# Patient Record
Sex: FEMALE | Race: WHITE | NOT HISPANIC OR LATINO | Employment: FULL TIME | URBAN - METROPOLITAN AREA
[De-identification: names, ages, dates, MRNs, and addresses within clinical notes are randomized per-mention and may not be internally consistent; named-entity substitution may affect disease eponyms.]

---

## 2017-04-18 ENCOUNTER — TELEPHONE (OUTPATIENT)
Dept: INTERNAL MEDICINE | Facility: CLINIC | Age: 52
End: 2017-04-18

## 2017-04-25 ENCOUNTER — OFFICE VISIT (OUTPATIENT)
Dept: INTERNAL MEDICINE | Facility: CLINIC | Age: 52
End: 2017-04-25

## 2017-04-25 VITALS
DIASTOLIC BLOOD PRESSURE: 72 MMHG | WEIGHT: 150 LBS | OXYGEN SATURATION: 99 % | BODY MASS INDEX: 26.57 KG/M2 | SYSTOLIC BLOOD PRESSURE: 124 MMHG | HEART RATE: 79 BPM

## 2017-04-25 DIAGNOSIS — M16.11 OSTEOARTHRITIS OF RIGHT HIP, UNSPECIFIED OSTEOARTHRITIS TYPE: ICD-10-CM

## 2017-04-25 DIAGNOSIS — E03.9 HYPOTHYROIDISM, UNSPECIFIED TYPE: Primary | ICD-10-CM

## 2017-04-25 DIAGNOSIS — L30.9 DERMATITIS: ICD-10-CM

## 2017-04-25 DIAGNOSIS — Z00.00 HEALTHCARE MAINTENANCE: ICD-10-CM

## 2017-04-25 PROCEDURE — 99214 OFFICE O/P EST MOD 30 MIN: CPT | Performed by: INTERNAL MEDICINE

## 2017-04-25 RX ORDER — MELOXICAM 15 MG/1
15 TABLET ORAL DAILY
Qty: 30 TABLET | Refills: 2 | Status: SHIPPED | OUTPATIENT
Start: 2017-04-25 | End: 2018-02-06

## 2017-04-25 RX ORDER — VALSARTAN 160 MG/1
160 TABLET ORAL DAILY
Qty: 30 TABLET | Refills: 5 | Status: SHIPPED | OUTPATIENT
Start: 2017-04-25 | End: 2017-04-28 | Stop reason: DRUGHIGH

## 2017-04-25 RX ORDER — ESTERIFIED ESTROGEN AND METHYLTESTOSTERONE .625; 1.25 MG/1; MG/1
1 TABLET ORAL DAILY
COMMUNITY
End: 2018-05-02 | Stop reason: ALTCHOICE

## 2017-04-25 RX ORDER — CLOTRIMAZOLE AND BETAMETHASONE DIPROPIONATE 10; .64 MG/G; MG/G
CREAM TOPICAL 2 TIMES DAILY
Qty: 30 G | Refills: 0 | Status: SHIPPED | OUTPATIENT
Start: 2017-04-25 | End: 2018-02-06

## 2017-04-25 RX ORDER — TRAMADOL HYDROCHLORIDE 50 MG/1
50 TABLET ORAL EVERY 6 HOURS PRN
Qty: 20 TABLET | Refills: 2 | Status: SHIPPED | OUTPATIENT
Start: 2017-04-25 | End: 2017-09-25 | Stop reason: SDUPTHER

## 2017-04-25 RX ORDER — MELOXICAM 15 MG/1
15 TABLET ORAL DAILY
COMMUNITY
End: 2017-04-25 | Stop reason: SDUPTHER

## 2017-04-25 RX ORDER — LEVOTHYROXINE SODIUM 0.03 MG/1
25 TABLET ORAL DAILY
COMMUNITY
End: 2017-06-15

## 2017-04-25 RX ORDER — CYCLOSPORINE 0.5 MG/ML
1 EMULSION OPHTHALMIC 2 TIMES DAILY
COMMUNITY
End: 2022-03-08 | Stop reason: ALTCHOICE

## 2017-04-25 RX ORDER — LEVOTHYROXINE AND LIOTHYRONINE 38; 9 UG/1; UG/1
60 TABLET ORAL DAILY
COMMUNITY
End: 2017-09-25 | Stop reason: ALTCHOICE

## 2017-04-25 RX ORDER — TRIAMCINOLONE ACETONIDE OINTMENT USP, 0.05% 0.5 MG/G
OINTMENT TOPICAL
COMMUNITY
End: 2019-11-12

## 2017-04-25 NOTE — PROGRESS NOTES
Chief Complaint   Patient presents with   • Back Pain   • Hip Pain     R hip   • Rash     both breast   htn'    History of Present Illness   Cheri Caballero is a 52 y.o. female presents for f/u exam w/ acute needs. Last office visit approx 9/14 as she moved from [place] for a few years and just moved back in October.  Patient has right hip pain. She is s/p labral repair and repair of cartilage damage. Pain has not improved greatly from the surgery. She has not had injections since 2012. She has been to physical therapy. She is taking meloxicam 15 mg daily. Has also tried ibuprofen and alleve in the past. Pain is most pronounced if she does prolonged activity.   Has hypothyroidism. Is taking synthroid and armour thyroid daily. Has been on thyroid replacement therapy for many years. Had normal thyroid evaluation in 12/16. On hrt for menopausal symptoms. Last DEXA at least 3 years ago.   Notes skin changes on nipples.  Has elevated bp today. H/o hypertension. Has been put on meds twice and self d/c.       The following portions of the patient's history were reviewed and updated as appropriate: allergies, current medications, past family history, past medical history, past social history, past surgical history and problem list.  No current outpatient prescriptions on file prior to visit.     No current facility-administered medications on file prior to visit.      Review of Systems   Constitutional: Negative.    HENT: Negative.    Eyes: Negative.    Respiratory: Negative.    Cardiovascular: Negative.    Gastrointestinal: Negative.    Endocrine: Negative.    Genitourinary: Negative.    Musculoskeletal: Positive for arthralgias.   Allergic/Immunologic: Negative.    Neurological: Negative.    Hematological: Negative.    Psychiatric/Behavioral: Negative.        Objective   Physical Exam   Constitutional: She is oriented to person, place, and time. She appears well-developed and well-nourished.   HENT:   Head:  Normocephalic and atraumatic.   Right Ear: External ear normal.   Left Ear: External ear normal.   Nose: Nose normal.   Mouth/Throat: Oropharynx is clear and moist.   Eyes: EOM are normal. Pupils are equal, round, and reactive to light.   Neck: Normal range of motion. Neck supple.   Cardiovascular: Normal rate, regular rhythm and normal heart sounds.    Pulmonary/Chest: Effort normal and breath sounds normal. No respiratory distress.   Abdominal: Soft.   Musculoskeletal:   Right hip pain w/ reduced rom   Neurological: She is alert and oriented to person, place, and time.   Skin: Skin is warm and dry.   Dry irritated area on nipple B   Psychiatric: She has a normal mood and affect. Her behavior is normal. Judgment and thought content normal.   Nursing note and vitals reviewed.       /72  Pulse 79  Wt 150 lb (68 kg)  SpO2 99%  BMI 26.57 kg/m2    Assessment/Plan   Diagnoses and all orders for this visit:    Hypothyroidism, unspecified type  -     TSH  -     T4, Free  -     T3, free    Osteoarthritis of right hip, unspecified osteoarthritis type  -     Comprehensive Metabolic Panel  -     Ambulatory Referral to Orthopedic Surgery    Healthcare maintenance  -     CBC & Differential  -     Comprehensive Metabolic Panel  -     TSH  -     T4, Free  -     T3, free  -     Lipid Panel With LDL / HDL Ratio    Dermatitis    Other orders  -     Thyroid 60 MG PO tablet; Take 60 mg by mouth Daily.  -     levothyroxine (SYNTHROID, LEVOTHROID) 25 MCG tablet; Take 25 mcg by mouth Daily.  -     Discontinue: meloxicam (MOBIC) 15 MG tablet; Take 15 mg by mouth Daily.  -     cycloSPORINE (RESTASIS) 0.05 % ophthalmic emulsion; 1 drop 2 (Two) Times a Day.  -     estrogens, conjugated,-methyltestosterone (ESTRATEST HS) 0.625-1.25 MG per tablet; Take 1 tablet by mouth Daily.  -     progesterone (PROMETRIUM) 100 MG capsule; Take 100 mg by mouth Daily.  -     valsartan (DIOVAN) 160 MG tablet; Take 1 tablet by mouth Daily.  -      meloxicam (MOBIC) 15 MG tablet; Take 1 tablet by mouth Daily.  -     traMADol (ULTRAM) 50 MG tablet; Take 1 tablet by mouth Every 6 (Six) Hours As Needed for Moderate Pain (4-6).  -     clotrimazole-betamethasone (LOTRISONE) 1-0.05 % cream; Apply  topically 2 (Two) Times a Day.  -     Triamcinolone Acetonide 0.05 % ointment; Apply  topically.    Patient presents after almost 3 years w/ multiple complaints. Most pressing is hip pain. She has exhausted conservative measures w/ nsaid, pt and even conservative surgery. She will likely need a thr. She is referred to ortho for further evaluation. She will have listed labs tested. She has hypothyroidism and will continue current meds for this and will test labs. She has eczema v chaffing v fungal changes on her breast. Will try lotrisone and reevaluate. If persists to derm. Normal mammo. Suspect from her activity in bass fishing as noted after a full day tournament. She has hypertension and will try diovan 1 tab daily. F/u in 2 mo or prn.

## 2017-04-26 ENCOUNTER — TELEPHONE (OUTPATIENT)
Dept: INTERNAL MEDICINE | Facility: CLINIC | Age: 52
End: 2017-04-26

## 2017-04-26 LAB
ALBUMIN SERPL-MCNC: 4.7 G/DL (ref 3.5–5.2)
ALBUMIN/GLOB SERPL: 1.5 G/DL
ALP SERPL-CCNC: 111 U/L (ref 39–117)
ALT SERPL-CCNC: 42 U/L (ref 1–33)
AST SERPL-CCNC: 42 U/L (ref 1–32)
BASOPHILS # BLD AUTO: 0.05 10*3/MM3 (ref 0–0.2)
BASOPHILS NFR BLD AUTO: 0.7 % (ref 0–1.5)
BILIRUB SERPL-MCNC: 0.3 MG/DL (ref 0.1–1.2)
BUN SERPL-MCNC: 12 MG/DL (ref 6–20)
BUN/CREAT SERPL: 13 (ref 7–25)
CALCIUM SERPL-MCNC: 10.2 MG/DL (ref 8.6–10.5)
CHLORIDE SERPL-SCNC: 98 MMOL/L (ref 98–107)
CHOLEST SERPL-MCNC: 193 MG/DL (ref 0–200)
CO2 SERPL-SCNC: 25.6 MMOL/L (ref 22–29)
CREAT SERPL-MCNC: 0.92 MG/DL (ref 0.57–1)
EOSINOPHIL # BLD AUTO: 0.14 10*3/MM3 (ref 0–0.7)
EOSINOPHIL NFR BLD AUTO: 2 % (ref 0.3–6.2)
ERYTHROCYTE [DISTWIDTH] IN BLOOD BY AUTOMATED COUNT: 13.8 % (ref 11.7–13)
GLOBULIN SER CALC-MCNC: 3.2 GM/DL
GLUCOSE SERPL-MCNC: 93 MG/DL (ref 65–99)
HCT VFR BLD AUTO: 43.1 % (ref 35.6–45.5)
HDLC SERPL-MCNC: 107 MG/DL (ref 40–60)
HGB BLD-MCNC: 14.3 G/DL (ref 11.9–15.5)
IMM GRANULOCYTES # BLD: 0 10*3/MM3 (ref 0–0.03)
IMM GRANULOCYTES NFR BLD: 0 % (ref 0–0.5)
LDLC SERPL CALC-MCNC: 77 MG/DL (ref 0–100)
LDLC/HDLC SERPL: 0.72 {RATIO}
LYMPHOCYTES # BLD AUTO: 2 10*3/MM3 (ref 0.9–4.8)
LYMPHOCYTES NFR BLD AUTO: 28.8 % (ref 19.6–45.3)
MCH RBC QN AUTO: 31 PG (ref 26.9–32)
MCHC RBC AUTO-ENTMCNC: 33.2 G/DL (ref 32.4–36.3)
MCV RBC AUTO: 93.5 FL (ref 80.5–98.2)
MONOCYTES # BLD AUTO: 0.41 10*3/MM3 (ref 0.2–1.2)
MONOCYTES NFR BLD AUTO: 5.9 % (ref 5–12)
NEUTROPHILS # BLD AUTO: 4.35 10*3/MM3 (ref 1.9–8.1)
NEUTROPHILS NFR BLD AUTO: 62.6 % (ref 42.7–76)
PLATELET # BLD AUTO: 216 10*3/MM3 (ref 140–500)
POTASSIUM SERPL-SCNC: 4.4 MMOL/L (ref 3.5–5.2)
PROT SERPL-MCNC: 7.9 G/DL (ref 6–8.5)
RBC # BLD AUTO: 4.61 10*6/MM3 (ref 3.9–5.2)
SODIUM SERPL-SCNC: 139 MMOL/L (ref 136–145)
T3FREE SERPL-MCNC: 3 PG/ML (ref 2–4.4)
T4 FREE SERPL-MCNC: 1.08 NG/DL (ref 0.93–1.7)
TRIGL SERPL-MCNC: 44 MG/DL (ref 0–150)
TSH SERPL DL<=0.005 MIU/L-ACNC: 1.12 MIU/ML (ref 0.27–4.2)
VLDLC SERPL CALC-MCNC: 8.8 MG/DL (ref 5–40)
WBC # BLD AUTO: 6.95 10*3/MM3 (ref 4.5–10.7)

## 2017-04-27 NOTE — TELEPHONE ENCOUNTER
i spoke with pt today and she said she is taking half of the pill and and it seems to be feeling much better from just taking half.

## 2017-04-28 RX ORDER — VALSARTAN 80 MG/1
80 TABLET ORAL DAILY
Qty: 30 TABLET | Refills: 3 | Status: SHIPPED | OUTPATIENT
Start: 2017-04-28 | End: 2018-02-06 | Stop reason: SDUPTHER

## 2017-05-26 ENCOUNTER — TELEPHONE (OUTPATIENT)
Dept: INTERNAL MEDICINE | Facility: CLINIC | Age: 52
End: 2017-05-26

## 2017-06-05 ENCOUNTER — TELEPHONE (OUTPATIENT)
Dept: INTERNAL MEDICINE | Facility: CLINIC | Age: 52
End: 2017-06-05

## 2017-06-05 NOTE — TELEPHONE ENCOUNTER
Yes  Verify that she is taking synthroid 25 mcg daily. See if she is on branded med or generic levothyroxine. #30 x 3  i think she has a follow up visit in September?

## 2017-06-15 ENCOUNTER — TELEPHONE (OUTPATIENT)
Dept: INTERNAL MEDICINE | Facility: CLINIC | Age: 52
End: 2017-06-15

## 2017-06-15 DIAGNOSIS — E03.9 HYPOTHYROIDISM, UNSPECIFIED TYPE: Primary | ICD-10-CM

## 2017-06-15 RX ORDER — LEVOTHYROXINE SODIUM 50 UG/1
50 CAPSULE ORAL DAILY
Qty: 30 CAPSULE | Refills: 2 | Status: SHIPPED | OUTPATIENT
Start: 2017-06-15 | End: 2017-06-20

## 2017-06-15 NOTE — TELEPHONE ENCOUNTER
Pt called last week for a rf on her [place] thyroid , you requested that  I get medical records, pt did request those but i do not see them at this time in her chart, pt is worried that it has been a week and she is still without thyroid med. She does take levothyroxine with [place] and stopped the levo since she was out of [place] (?)

## 2017-06-15 NOTE — TELEPHONE ENCOUNTER
Per Dr Gonzalez she is to increase her levothyroxine to 50 mcg and recheck in 8 weeks  Pt informed [facility]

## 2017-06-20 ENCOUNTER — TELEPHONE (OUTPATIENT)
Dept: INTERNAL MEDICINE | Facility: CLINIC | Age: 52
End: 2017-06-20

## 2017-06-20 RX ORDER — LEVOTHYROXINE SODIUM 0.05 MG/1
50 TABLET ORAL DAILY
Qty: 90 TABLET | Refills: 1 | Status: SHIPPED | OUTPATIENT
Start: 2017-06-20 | End: 2017-09-25 | Stop reason: DRUGHIGH

## 2017-06-20 NOTE — TELEPHONE ENCOUNTER
Pt is calling to question her thyroid rx that went to her  Pharmacy. She said she doesn't take Tirosint , wanting to know did you change her med?

## 2017-08-31 ENCOUNTER — TELEPHONE (OUTPATIENT)
Dept: INTERNAL MEDICINE | Facility: CLINIC | Age: 52
End: 2017-08-31

## 2017-09-11 DIAGNOSIS — Z00.00 HEALTHCARE MAINTENANCE: Primary | ICD-10-CM

## 2017-09-11 DIAGNOSIS — E03.9 ADULT HYPOTHYROIDISM: ICD-10-CM

## 2017-09-11 PROBLEM — K59.09 CHRONIC CONSTIPATION: Status: ACTIVE | Noted: 2017-09-11

## 2017-09-11 PROBLEM — IMO0001 BLUES: Status: ACTIVE | Noted: 2017-09-11

## 2017-09-16 LAB
ALBUMIN SERPL-MCNC: 4.8 G/DL (ref 3.5–5.2)
ALBUMIN/GLOB SERPL: 1.5 G/DL
ALP SERPL-CCNC: 97 U/L (ref 39–117)
ALT SERPL-CCNC: 32 U/L (ref 1–33)
APPEARANCE UR: CLEAR
AST SERPL-CCNC: 35 U/L (ref 1–32)
BACTERIA #/AREA URNS HPF: NORMAL /HPF
BACTERIA UR CULT: NORMAL
BACTERIA UR CULT: NORMAL
BASOPHILS # BLD AUTO: 0.06 10*3/MM3 (ref 0–0.2)
BASOPHILS NFR BLD AUTO: 1 % (ref 0–1.5)
BILIRUB SERPL-MCNC: 0.5 MG/DL (ref 0.1–1.2)
BILIRUB UR QL STRIP: NEGATIVE
BUN SERPL-MCNC: 9 MG/DL (ref 6–20)
BUN/CREAT SERPL: 10.5 (ref 7–25)
CALCIUM SERPL-MCNC: 10.1 MG/DL (ref 8.6–10.5)
CHLORIDE SERPL-SCNC: 97 MMOL/L (ref 98–107)
CHOLEST SERPL-MCNC: 206 MG/DL (ref 0–200)
CO2 SERPL-SCNC: 29.6 MMOL/L (ref 22–29)
COLOR UR: YELLOW
CREAT SERPL-MCNC: 0.86 MG/DL (ref 0.57–1)
EOSINOPHIL # BLD AUTO: 0.18 10*3/MM3 (ref 0–0.7)
EOSINOPHIL NFR BLD AUTO: 3 % (ref 0.3–6.2)
EPI CELLS #/AREA URNS HPF: NORMAL /HPF
ERYTHROCYTE [DISTWIDTH] IN BLOOD BY AUTOMATED COUNT: 13.6 % (ref 11.7–13)
GLOBULIN SER CALC-MCNC: 3.1 GM/DL
GLUCOSE SERPL-MCNC: 76 MG/DL (ref 65–99)
GLUCOSE UR QL: NEGATIVE
HCT VFR BLD AUTO: 44.4 % (ref 35.6–45.5)
HCV AB S/CO SERPL IA: <0.1 S/CO RATIO (ref 0–0.9)
HDLC SERPL-MCNC: 113 MG/DL (ref 40–60)
HGB BLD-MCNC: 15.1 G/DL (ref 11.9–15.5)
HGB UR QL STRIP: NEGATIVE
IMM GRANULOCYTES # BLD: 0.02 10*3/MM3 (ref 0–0.03)
IMM GRANULOCYTES NFR BLD: 0.3 % (ref 0–0.5)
KETONES UR QL STRIP: NEGATIVE
LDLC SERPL CALC-MCNC: 83 MG/DL (ref 0–100)
LDLC/HDLC SERPL: 0.74 {RATIO}
LEUKOCYTE ESTERASE UR QL STRIP: ABNORMAL
LYMPHOCYTES # BLD AUTO: 1.91 10*3/MM3 (ref 0.9–4.8)
LYMPHOCYTES NFR BLD AUTO: 31.6 % (ref 19.6–45.3)
MCH RBC QN AUTO: 32.9 PG (ref 26.9–32)
MCHC RBC AUTO-ENTMCNC: 34 G/DL (ref 32.4–36.3)
MCV RBC AUTO: 96.7 FL (ref 80.5–98.2)
MICRO URNS: ABNORMAL
MONOCYTES # BLD AUTO: 0.39 10*3/MM3 (ref 0.2–1.2)
MONOCYTES NFR BLD AUTO: 6.4 % (ref 5–12)
NEUTROPHILS # BLD AUTO: 3.49 10*3/MM3 (ref 1.9–8.1)
NEUTROPHILS NFR BLD AUTO: 57.7 % (ref 42.7–76)
NITRITE UR QL STRIP: NEGATIVE
PH UR STRIP: 6.5 [PH] (ref 5–7.5)
PLATELET # BLD AUTO: 198 10*3/MM3 (ref 140–500)
POTASSIUM SERPL-SCNC: 4.5 MMOL/L (ref 3.5–5.2)
PROT SERPL-MCNC: 7.9 G/DL (ref 6–8.5)
PROT UR QL STRIP: NEGATIVE
RBC # BLD AUTO: 4.59 10*6/MM3 (ref 3.9–5.2)
RBC #/AREA URNS HPF: NORMAL /HPF
SODIUM SERPL-SCNC: 139 MMOL/L (ref 136–145)
SP GR UR: <=1.005 (ref 1–1.03)
TRIGL SERPL-MCNC: 49 MG/DL (ref 0–150)
TSH SERPL DL<=0.005 MIU/L-ACNC: 4.75 MIU/ML (ref 0.27–4.2)
URINALYSIS REFLEX: ABNORMAL
UROBILINOGEN UR STRIP-MCNC: 0.2 MG/DL (ref 0.2–1)
VLDLC SERPL CALC-MCNC: 9.8 MG/DL (ref 5–40)
WBC # BLD AUTO: 6.05 10*3/MM3 (ref 4.5–10.7)
WBC #/AREA URNS HPF: NORMAL /HPF

## 2017-09-25 ENCOUNTER — OFFICE VISIT (OUTPATIENT)
Dept: INTERNAL MEDICINE | Facility: CLINIC | Age: 52
End: 2017-09-25

## 2017-09-25 VITALS
OXYGEN SATURATION: 99 % | WEIGHT: 150 LBS | DIASTOLIC BLOOD PRESSURE: 78 MMHG | SYSTOLIC BLOOD PRESSURE: 124 MMHG | HEIGHT: 64 IN | HEART RATE: 63 BPM | BODY MASS INDEX: 25.61 KG/M2

## 2017-09-25 DIAGNOSIS — Z00.00 HEALTHCARE MAINTENANCE: ICD-10-CM

## 2017-09-25 DIAGNOSIS — Z12.11 COLON CANCER SCREENING: ICD-10-CM

## 2017-09-25 DIAGNOSIS — R94.31 ABNORMAL EKG: ICD-10-CM

## 2017-09-25 DIAGNOSIS — K59.09 CHRONIC CONSTIPATION: ICD-10-CM

## 2017-09-25 DIAGNOSIS — I10 ESSENTIAL HYPERTENSION: Primary | ICD-10-CM

## 2017-09-25 PROCEDURE — 99396 PREV VISIT EST AGE 40-64: CPT | Performed by: INTERNAL MEDICINE

## 2017-09-25 RX ORDER — TRAMADOL HYDROCHLORIDE 50 MG/1
50 TABLET ORAL EVERY 6 HOURS PRN
Qty: 30 TABLET | Refills: 2 | Status: SHIPPED | OUTPATIENT
Start: 2017-09-25 | End: 2018-04-17 | Stop reason: SINTOL

## 2017-09-25 RX ORDER — LEVOTHYROXINE SODIUM 75 MCG
75 TABLET ORAL DAILY
Qty: 90 TABLET | Refills: 3 | Status: SHIPPED | OUTPATIENT
Start: 2017-09-25 | End: 2018-02-06 | Stop reason: SDUPTHER

## 2017-09-25 NOTE — PROGRESS NOTES
Subjective   CPE, low back pain, hypothyroidism, hypertension,    Cheri Mendoza is a 52 y.o. female who presents for a complete physical exam.  In general she is doing fairly well. Unfortunately she is having increasing back pain. She has known schmorls nodes in the back and a right hip labral tear. She is s/p labral repair of the right hip. Unfortunately continues to have pain on a daily persistent basis of the right hip. She has low back pain as well. She is to have an MRI on the back today. She has tried exercise and injection therapy w/out benefit. She has tramadol available. This provides relief for about 6 hours. Does have constipation w/ this med as she has a baseline history of constipation. Sitting is actually a more pain free position. She reports feeling somewhat medicated when taking tramadol. Fitness is now limited due to pain w/ walking and hip discomfort with cycling.         Review of Systems   Constitutional: Negative.    HENT: Negative.    Eyes: Negative.    Respiratory: Negative.    Cardiovascular: Negative.    Gastrointestinal: Negative.    Endocrine: Negative.    Genitourinary: Negative.    Musculoskeletal: Positive for back pain.        Hip pain   Skin: Negative.    Allergic/Immunologic: Negative.    Hematological: Negative.    Psychiatric/Behavioral: Negative.        The following portions of the patient's history were reviewed and updated as appropriate: allergies, current medications, past family history, past medical history, past social history, past surgical history and problem list.     Patient Active Problem List   Diagnosis   • Chronic constipation   • Blues   • Adult hypothyroidism       Past Medical History:   Diagnosis Date   • Hypothyroidism        No past surgical history on file.    Family History   Problem Relation Age of Onset   • Osteoarthritis Mother    • Heart disease Mother        Social History     Social History   • Marital status:      Spouse name: N/A   •  "Number of children: N/A   • Years of education: N/A     Occupational History   • Not on file.     Social History Main Topics   • Smoking status: Never Smoker   • Smokeless tobacco: Not on file   • Alcohol use Yes   • Drug use: Not on file   • Sexual activity: Not on file     Other Topics Concern   • Not on file     Social History Narrative   • No narrative on file       Current Outpatient Prescriptions on File Prior to Visit   Medication Sig Dispense Refill   • cycloSPORINE (RESTASIS) 0.05 % ophthalmic emulsion 1 drop 2 (Two) Times a Day.     • estrogens, conjugated,-methyltestosterone (ESTRATEST HS) 0.625-1.25 MG per tablet Take 1 tablet by mouth Daily.     • levothyroxine (SYNTHROID, LEVOTHROID) 50 MCG tablet Take 1 tablet by mouth Daily. 90 tablet 1   • progesterone (PROMETRIUM) 100 MG capsule Take 100 mg by mouth Daily.     • traMADol (ULTRAM) 50 MG tablet Take 1 tablet by mouth Every 6 (Six) Hours As Needed for Moderate Pain (4-6). 20 tablet 2   • Triamcinolone Acetonide 0.05 % ointment Apply  topically.     • valsartan (DIOVAN) 80 MG tablet Take 1 tablet by mouth Daily. 30 tablet 3   • [DISCONTINUED] Thyroid 60 MG PO tablet Take 60 mg by mouth Daily.     • clotrimazole-betamethasone (LOTRISONE) 1-0.05 % cream Apply  topically 2 (Two) Times a Day. 30 g 0   • meloxicam (MOBIC) 15 MG tablet Take 1 tablet by mouth Daily. 30 tablet 2     No current facility-administered medications on file prior to visit.        Allergies   Allergen Reactions   • Sulfa Antibiotics Other (See Comments)     Elevated liver enzymes            There is no immunization history on file for this patient.    Objective     /78  Pulse 63  Ht 63.5\" (161.3 cm)  Wt 150 lb (68 kg)  SpO2 99%  BMI 26.15 kg/m2    Physical Exam   Constitutional: She is oriented to person, place, and time. She appears well-developed and well-nourished.   HENT:   Head: Normocephalic and atraumatic.   Right Ear: External ear normal.   Left Ear: External ear " normal.   Nose: Nose normal.   Mouth/Throat: Oropharynx is clear and moist.   Eyes: EOM are normal. Pupils are equal, round, and reactive to light.   Neck: Neck supple.   Cardiovascular: Normal rate, regular rhythm and normal heart sounds.    Pulmonary/Chest: Effort normal and breath sounds normal. No respiratory distress.   Abdominal: Soft. Bowel sounds are normal.   Musculoskeletal: Normal range of motion.   Neurological: She is alert and oriented to person, place, and time.   Skin: Skin is warm and dry.   Psychiatric: She has a normal mood and affect. Her behavior is normal. Judgment and thought content normal.   Nursing note and vitals reviewed.  EKG sinus. t inv vI-vII. New from previous.     Assessment/Plan   Cheri was seen today for annual exam.    Diagnoses and all orders for this visit:    Essential hypertension  -     ECG 12 Lead    Healthcare maintenance    Other orders  -     SYNTHROID 75 MCG tablet; Take 1 tablet by mouth Daily.  -     traMADol (ULTRAM) 50 MG tablet; Take 1 tablet by mouth Every 6 (Six) Hours As Needed for Moderate Pain .        Discussion    Patient presents today for a CPE.  She has back and hip pain and will have a spinal mri today. She is to  her prior lumbar mri for comparison to todays image. She has some minor ekg abnormalities so will test a stress echo prior to surgical intervention if needed. She will increase synthroid to 75 mcg daily. She is encouraged to use tramadol more regularly. She will follow up with her orthopedist routinely.     Patient follows a healthy diet.   Patient follows an adequate exercise regimen. Mammogram is up to date.   Colon cancer screening is up to date.   Pap smears are performed by the patient's gynecologist.   Immunizations are up to date.   DEXA is up to date.           No future appointments.

## 2017-10-02 NOTE — TELEPHONE ENCOUNTER
Problem: DISCHARGE PLANNING - CARE MANAGEMENT  Goal: Discharge to post-acute care or home with appropriate resources  INTERVENTIONS:  - Conduct assessment to determine patient/family and health care team treatment goals, and need for post-acute services based on payer coverage, community resources, and patient preferences, and barriers to discharge  - Address psychosocial, clinical, and financial barriers to discharge as identified in assessment in conjunction with the patient/family and health care team  - Arrange appropriate level of post-acute services according to patients   needs and preference and payer coverage in collaboration with the physician and health care team  - Communicate with and update the patient/family, physician, and health care team regarding progress on the discharge plan  - Arrange appropriate transportation to post-acute venues  Outcome: Progressing  LOS: 2 CM met with pt at bedside  Pt reports she lives in [place] with her   Pt lives in apartment on first floor with no steps to enter  Pt uses walker to ambulate  Pt denies hx of Rehab and [facility]  Pt has  through [employer]  Pt reports hx of depression and anxiety  Pt has psychiatrist, Dr Prince Amador and therapist, Iva Murrell  Pt uses [employer]  Pt denies hx of substance abuse  Pt does not have POA, does not work, and can't drive  Pt's  transports and she uses shared ride  Pt has no needs at this time  CM Department to follow pt through discharge  Pt called about the Valsartan you gave her yesterday. She said it is making her very dizzy. She feels drunk. Wants to know if there is a lower dose to start on.

## 2017-10-06 ENCOUNTER — TELEPHONE (OUTPATIENT)
Dept: INTERNAL MEDICINE | Facility: CLINIC | Age: 52
End: 2017-10-06

## 2017-10-06 NOTE — TELEPHONE ENCOUNTER
Advise that we just upgraded our machine advise patient to come back for a repeat ekg at no fee. Apologize for inconvenience. Will determine need for stress test after that.

## 2017-10-06 NOTE — TELEPHONE ENCOUNTER
Pt is calling again to see if the stress test is necessary.  It is quit expensive and wants to know what you found on the EKG that made you concerned.

## 2017-12-06 DIAGNOSIS — Z12.11 ENCOUNTER FOR SCREENING FOR MALIGNANT NEOPLASM OF COLON: Primary | ICD-10-CM

## 2017-12-06 RX ORDER — SODIUM CHLORIDE, SODIUM LACTATE, POTASSIUM CHLORIDE, CALCIUM CHLORIDE 600; 310; 30; 20 MG/100ML; MG/100ML; MG/100ML; MG/100ML
30 INJECTION, SOLUTION INTRAVENOUS CONTINUOUS
Status: CANCELLED | OUTPATIENT
Start: 2017-12-06

## 2017-12-13 PROBLEM — Z12.11 ENCOUNTER FOR SCREENING FOR MALIGNANT NEOPLASM OF COLON: Status: ACTIVE | Noted: 2017-12-13

## 2017-12-14 ENCOUNTER — TELEPHONE (OUTPATIENT)
Dept: INTERNAL MEDICINE | Facility: CLINIC | Age: 52
End: 2017-12-14

## 2017-12-14 NOTE — TELEPHONE ENCOUNTER
Pt is calling to ask if she can get a rx for Baclofen 10 mg for her back. She states is going to have surgery and they do not subscribe these types of meds/

## 2018-01-17 ENCOUNTER — HOSPITAL ENCOUNTER (OUTPATIENT)
Facility: HOSPITAL | Age: 53
Setting detail: HOSPITAL OUTPATIENT SURGERY
Discharge: HOME | End: 2018-01-17
Attending: INTERNAL MEDICINE | Admitting: INTERNAL MEDICINE

## 2018-01-17 ENCOUNTER — ANESTHESIA EVENT (OUTPATIENT)
Dept: GASTROENTEROLOGY | Facility: HOSPITAL | Age: 53
End: 2018-01-17

## 2018-01-17 ENCOUNTER — ANESTHESIA (OUTPATIENT)
Dept: GASTROENTEROLOGY | Facility: HOSPITAL | Age: 53
End: 2018-01-17

## 2018-01-17 VITALS
SYSTOLIC BLOOD PRESSURE: 146 MMHG | HEART RATE: 65 BPM | BODY MASS INDEX: 26.46 KG/M2 | TEMPERATURE: 98.2 F | WEIGHT: 149.31 LBS | RESPIRATION RATE: 16 BRPM | DIASTOLIC BLOOD PRESSURE: 92 MMHG | HEIGHT: 63 IN | OXYGEN SATURATION: 100 %

## 2018-01-17 DIAGNOSIS — Z12.11 ENCOUNTER FOR SCREENING FOR MALIGNANT NEOPLASM OF COLON: ICD-10-CM

## 2018-01-17 PROCEDURE — 45380 COLONOSCOPY AND BIOPSY: CPT | Performed by: INTERNAL MEDICINE

## 2018-01-17 PROCEDURE — 88305 TISSUE EXAM BY PATHOLOGIST: CPT | Performed by: INTERNAL MEDICINE

## 2018-01-17 PROCEDURE — 25010000002 PROPOFOL 10 MG/ML EMULSION: Performed by: NURSE ANESTHETIST, CERTIFIED REGISTERED

## 2018-01-17 RX ORDER — PROPOFOL 10 MG/ML
VIAL (ML) INTRAVENOUS AS NEEDED
Status: DISCONTINUED | OUTPATIENT
Start: 2018-01-17 | End: 2018-01-17 | Stop reason: SURG

## 2018-01-17 RX ORDER — SODIUM CHLORIDE, SODIUM LACTATE, POTASSIUM CHLORIDE, CALCIUM CHLORIDE 600; 310; 30; 20 MG/100ML; MG/100ML; MG/100ML; MG/100ML
30 INJECTION, SOLUTION INTRAVENOUS CONTINUOUS
Status: DISCONTINUED | OUTPATIENT
Start: 2018-01-17 | End: 2018-01-17 | Stop reason: HOSPADM

## 2018-01-17 RX ORDER — LIDOCAINE HYDROCHLORIDE 20 MG/ML
INJECTION, SOLUTION INFILTRATION; PERINEURAL AS NEEDED
Status: DISCONTINUED | OUTPATIENT
Start: 2018-01-17 | End: 2018-01-17 | Stop reason: SURG

## 2018-01-17 RX ORDER — PROPOFOL 10 MG/ML
VIAL (ML) INTRAVENOUS CONTINUOUS PRN
Status: DISCONTINUED | OUTPATIENT
Start: 2018-01-17 | End: 2018-01-17 | Stop reason: SURG

## 2018-01-17 RX ADMIN — PROPOFOL 50 MG: 10 INJECTION, EMULSION INTRAVENOUS at 08:18

## 2018-01-17 RX ADMIN — PROPOFOL 200 MCG/KG/MIN: 10 INJECTION, EMULSION INTRAVENOUS at 08:18

## 2018-01-17 RX ADMIN — LIDOCAINE HYDROCHLORIDE 60 MG: 20 INJECTION, SOLUTION INFILTRATION; PERINEURAL at 08:18

## 2018-01-17 RX ADMIN — SODIUM CHLORIDE, POTASSIUM CHLORIDE, SODIUM LACTATE AND CALCIUM CHLORIDE 30 ML/HR: 600; 310; 30; 20 INJECTION, SOLUTION INTRAVENOUS at 08:10

## 2018-01-17 NOTE — PLAN OF CARE
Problem: Patient Care Overview (Adult)  Goal: Plan of Care Review  Outcome: Ongoing (interventions implemented as appropriate)   01/17/18 0734   Coping/Psychosocial Response Interventions   Plan Of Care Reviewed With patient   Patient Care Overview   Progress no change     Goal: Adult Individualization and Mutuality  Outcome: Ongoing (interventions implemented as appropriate)    Goal: Discharge Needs Assessment  Outcome: Ongoing (interventions implemented as appropriate)   01/17/18 0734   Discharge Needs Assessment   Concerns To Be Addressed basic needs concerns   Discharge Disposition home or self-care   Living Environment   Transportation Available car       Problem: GI Endoscopy (Adult)  Goal: Signs and Symptoms of Listed Potential Problems Will be Absent or Manageable (GI Endoscopy)  Outcome: Ongoing (interventions implemented as appropriate)   01/17/18 0734   GI Endoscopy   Problems Assessed (GI Endoscopy) pain   Problems Present (GI Endoscopy) none

## 2018-01-17 NOTE — DISCHARGE INSTRUCTIONS
For the next 24 hours patient needs to be with a responsible adult.    For 24 hours DO NOT drive, operate machinery, appliances, drink alcohol, make important decisions or sign legal documents.    Start with a light or bland diet and advance to regular diet as tolerated.    Follow recommendations on procedure report provided by your doctor.    Call Dr Luis for problems [phone]    Problems may include but not limited to: large amounts of bleeding, trouble breathing, repeated vomiting, severe unrelieved pain, fever or chills.

## 2018-01-17 NOTE — ANESTHESIA PREPROCEDURE EVALUATION
Anesthesia Evaluation     Patient summary reviewed and Nursing notes reviewed   NPO Solid Status: > 8 hours  NPO Liquid Status: > 8 hours     Airway   Mallampati: II  TM distance: >3 FB  Neck ROM: full  no difficulty expected  Dental - normal exam     Pulmonary - negative pulmonary ROS and normal exam   Cardiovascular - negative cardio ROS and normal exam        Neuro/Psych- negative ROS  GI/Hepatic/Renal/Endo    (+)  hypothyroidism,     Musculoskeletal (-) negative ROS    Abdominal  - normal exam   Substance History - negative use     OB/GYN          Other - negative ROS                                               Anesthesia Plan    ASA 2     MAC     Anesthetic plan and risks discussed with patient.

## 2018-01-17 NOTE — ANESTHESIA POSTPROCEDURE EVALUATION
"Patient: Cheri Mendoza    Procedure Summary     Date Anesthesia Start Anesthesia Stop Room / Location    01/17/18 0814 0845  [facility]       Procedure Diagnosis Surgeon Provider    COLONOSCOPY to cecum and t.i. with polypectomy (N/A ) Encounter for screening for malignant neoplasm of colon  (Encounter for screening for malignant neoplasm of colon [Z12.11]) MD Schuyler Barrios MD          Anesthesia Type: MAC  Last vitals  BP   133/89 (01/17/18 0751)   Temp   36.8 °C (98.2 °F) (01/17/18 0751)   Pulse   73 (01/17/18 0751)   Resp   16 (01/17/18 0751)     SpO2   95 % (01/17/18 0751)     Post Anesthesia Care and Evaluation    Patient location during evaluation: PACU  Patient participation: complete - patient participated  Level of consciousness: awake and alert  Pain management: adequate  Airway patency: patent  Anesthetic complications: No anesthetic complications    Cardiovascular status: acceptable  Respiratory status: acceptable  Hydration status: acceptable    Comments: /89 (BP Location: Left arm, Patient Position: Lying)  Pulse 73  Temp 36.8 °C (98.2 °F) (Oral)   Resp 16  Ht 160 cm (63\")  Wt 67.7 kg (149 lb 5 oz)  SpO2 95%  BMI 26.45 kg/m2      "

## 2018-01-17 NOTE — H&P
[facility]  Pre Procedure History & Physical    Chief Complaint: Colon cancer screening      HPI: 51yo W with PMH as below here for routine screening colonoscopy.  She has no family history of GI malignancy nor colon polyps.  No change in bowel habits or blood in her stool.  Last colonoscopy ~2003 for constipation and reportedly normal.  Otherwise, she feels well today     Past Medical History:   Past Medical History:   Diagnosis Date   • H/O colonoscopy    • Hypothyroidism        Family History:  Family History   Problem Relation Age of Onset   • Osteoarthritis Mother    • Heart disease Mother        Social History:   reports that she has never smoked. She does not have any smokeless tobacco history on file. She reports that she drinks alcohol.    Medications:   Prescriptions Prior to Admission   Medication Sig Dispense Refill Last Dose   • cycloSPORINE (RESTASIS) 0.05 % ophthalmic emulsion 1 drop 2 (Two) Times a Day.   1/17/2018 at Unknown time   • SYNTHROID 75 MCG tablet Take 1 tablet by mouth Daily. 90 tablet 3 1/16/2018 at Unknown time   • Triamcinolone Acetonide 0.05 % ointment Apply  topically.   1/16/2018 at Unknown time   • valsartan (DIOVAN) 80 MG tablet Take 1 tablet by mouth Daily. 30 tablet 3 1/16/2018 at Unknown time   • clotrimazole-betamethasone (LOTRISONE) 1-0.05 % cream Apply  topically 2 (Two) Times a Day. 30 g 0 More than a month at Unknown time   • estrogens, conjugated,-methyltestosterone (ESTRATEST HS) 0.625-1.25 MG per tablet Take 1 tablet by mouth Daily.   Taking   • meloxicam (MOBIC) 15 MG tablet Take 1 tablet by mouth Daily. 30 tablet 2 1/17/2017   • mupirocin (BACTROBAN) 2 % ointment Apply  topically 3 (Three) Times a Day. 15 g 0 More than a month at Unknown time   • progesterone (PROMETRIUM) 100 MG capsule Take 100 mg by mouth Daily.   1/15/2018   • traMADol (ULTRAM) 50 MG tablet Take 1 tablet by mouth Every 6 (Six) Hours As Needed for Moderate Pain . 30 tablet 2  "1/3/2018       Allergies:  Sulfa antibiotics    ROS:    Pertinent items are noted in HPI     Objective     Blood pressure 133/89, pulse 73, temperature 98.2 °F (36.8 °C), temperature source Oral, resp. rate 16, height 160 cm (63\"), weight 67.7 kg (149 lb 5 oz), SpO2 95 %.    Physical Exam   Constitutional: Pt is oriented to person, place, and time and well-developed, well-nourished, and in no distress.   HENT:   Mouth/Throat: Oropharynx is clear and moist.   Neck: Normal range of motion. Neck supple.   Cardiovascular: Normal rate, regular rhythm and normal heart sounds.    Pulmonary/Chest: Effort normal and breath sounds normal. No respiratory distress. No  wheezes.   Abdominal: Soft. Bowel sounds are normal.   Skin: Skin is warm and dry.   Psychiatric: Mood, memory, affect and judgment normal.     Assessment/Plan     Diagnosis: Colon cancer screening      Anticipated Surgical Procedure:    Colonoscopy    The risks, benefits, and alternatives of this procedure have been discussed with the patient or the responsible party- the patient understands and agrees to proceed.  "

## 2018-01-18 ENCOUNTER — TELEPHONE (OUTPATIENT)
Dept: GASTROENTEROLOGY | Facility: CLINIC | Age: 53
End: 2018-01-18

## 2018-01-18 LAB
CYTO UR: NORMAL
LAB AP CASE REPORT: NORMAL
Lab: NORMAL
PATH REPORT.FINAL DX SPEC: NORMAL
PATH REPORT.GROSS SPEC: NORMAL

## 2018-01-18 NOTE — PROGRESS NOTES
The rectal polyp was a benign hyperplastic polyp.  In absence of symptoms, her next colonoscopy should be in 10 years.  Please place in recall.

## 2018-01-18 NOTE — TELEPHONE ENCOUNTER
Called pt and spoke with her partner Ermias ( on [employer] form, and advised per Dr Luis that the rectal polyp hyperplastic polyp.  In the absence of symptoms , her next c/s should be in 10yrs.  Pt's partner verb understanding.      C/s placed in recall for 01/17/2028.

## 2018-01-18 NOTE — TELEPHONE ENCOUNTER
----- Message from Rima Luis MD sent at 1/18/2018  1:15 PM EST -----  The rectal polyp was a benign hyperplastic polyp.  In absence of symptoms, her next colonoscopy should be in 10 years.  Please place in recall.

## 2018-01-24 DIAGNOSIS — E03.9 ADULT HYPOTHYROIDISM: Primary | ICD-10-CM

## 2018-01-29 LAB
T4 FREE SERPL-MCNC: 2.05 NG/DL (ref 0.93–1.7)
THYROGLOB AB SERPL-ACNC: 1.5 IU/ML (ref 0–0.9)
THYROPEROXIDASE AB SERPL-ACNC: 12 IU/ML (ref 0–34)
TSH SERPL DL<=0.005 MIU/L-ACNC: 0.35 MIU/ML (ref 0.27–4.2)

## 2018-02-06 ENCOUNTER — OFFICE VISIT (OUTPATIENT)
Dept: INTERNAL MEDICINE | Facility: CLINIC | Age: 53
End: 2018-02-06

## 2018-02-06 VITALS
DIASTOLIC BLOOD PRESSURE: 60 MMHG | SYSTOLIC BLOOD PRESSURE: 126 MMHG | WEIGHT: 150 LBS | BODY MASS INDEX: 26.57 KG/M2 | OXYGEN SATURATION: 100 % | HEART RATE: 64 BPM

## 2018-02-06 DIAGNOSIS — G89.29 CHRONIC RIGHT-SIDED LOW BACK PAIN WITHOUT SCIATICA: Primary | ICD-10-CM

## 2018-02-06 DIAGNOSIS — E03.9 ADULT HYPOTHYROIDISM: ICD-10-CM

## 2018-02-06 DIAGNOSIS — I10 HYPERTENSION, UNSPECIFIED TYPE: ICD-10-CM

## 2018-02-06 DIAGNOSIS — M54.50 CHRONIC RIGHT-SIDED LOW BACK PAIN WITHOUT SCIATICA: Primary | ICD-10-CM

## 2018-02-06 PROCEDURE — 99214 OFFICE O/P EST MOD 30 MIN: CPT | Performed by: INTERNAL MEDICINE

## 2018-02-06 RX ORDER — GABAPENTIN 100 MG/1
100 CAPSULE ORAL 3 TIMES DAILY
Qty: 90 CAPSULE | Refills: 2 | Status: SHIPPED | OUTPATIENT
Start: 2018-02-06 | End: 2018-03-09 | Stop reason: DRUGHIGH

## 2018-02-06 RX ORDER — VALSARTAN 80 MG/1
80 TABLET ORAL DAILY
Qty: 90 TABLET | Refills: 3 | Status: SHIPPED | OUTPATIENT
Start: 2018-02-06 | End: 2018-07-27 | Stop reason: CLARIF

## 2018-02-06 RX ORDER — VALSARTAN 80 MG/1
80 TABLET ORAL DAILY
Qty: 90 TABLET | Refills: 3 | Status: SHIPPED | OUTPATIENT
Start: 2018-02-06 | End: 2018-02-06 | Stop reason: SDUPTHER

## 2018-02-06 RX ORDER — LEVOTHYROXINE SODIUM 75 MCG
75 TABLET ORAL DAILY
Qty: 90 TABLET | Refills: 3 | Status: SHIPPED | OUTPATIENT
Start: 2018-02-06 | End: 2018-02-06 | Stop reason: SDUPTHER

## 2018-02-06 RX ORDER — FLUTICASONE PROPIONATE 50 MCG
2 SPRAY, SUSPENSION (ML) NASAL DAILY
Qty: 1 BOTTLE | Refills: 6 | Status: SHIPPED | OUTPATIENT
Start: 2018-02-06 | End: 2018-03-08

## 2018-02-06 RX ORDER — LEVOTHYROXINE SODIUM 75 MCG
75 TABLET ORAL DAILY
Qty: 90 TABLET | Refills: 3 | Status: SHIPPED | OUTPATIENT
Start: 2018-02-06 | End: 2019-04-23 | Stop reason: SDUPTHER

## 2018-02-06 NOTE — PROGRESS NOTES
Chief Complaint   Patient presents with   • Hypertension   • Nasal Congestion     Hypertension, low back pain, hypothyroidism  History of Present Illness   Cheri Mendoza is a 52 y.o. female presents for routine follow up evaluation. She is doing fairly well today. She has continued low back pain. She is using a brace, going to PT, and getting epidural injections. She has pain with walking or standing but can sit fairly well without pain. She has stopped meloxicam and rarely uses tramadol.   TSH is suppressed and t4 elevated. She did take 2 tab 50 mcg synthroid when she was out of 75 mcg tablets so level was increased. However she does feel balanced.      The following portions of the patient's history were reviewed and updated as appropriate: allergies, current medications, past family history, past medical history, past social history, past surgical history and problem list.  Current Outpatient Prescriptions on File Prior to Visit   Medication Sig Dispense Refill   • cycloSPORINE (RESTASIS) 0.05 % ophthalmic emulsion 1 drop 2 (Two) Times a Day.     • estrogens, conjugated,-methyltestosterone (ESTRATEST HS) 0.625-1.25 MG per tablet Take 1 tablet by mouth Daily.     • progesterone (PROMETRIUM) 100 MG capsule Take 100 mg by mouth Daily.     • traMADol (ULTRAM) 50 MG tablet Take 1 tablet by mouth Every 6 (Six) Hours As Needed for Moderate Pain . 30 tablet 2   • Triamcinolone Acetonide 0.05 % ointment Apply  topically.     • [DISCONTINUED] clotrimazole-betamethasone (LOTRISONE) 1-0.05 % cream Apply  topically 2 (Two) Times a Day. 30 g 0   • [DISCONTINUED] meloxicam (MOBIC) 15 MG tablet Take 1 tablet by mouth Daily. 30 tablet 2   • [DISCONTINUED] mupirocin (BACTROBAN) 2 % ointment Apply  topically 3 (Three) Times a Day. 15 g 0   • [DISCONTINUED] SYNTHROID 75 MCG tablet Take 1 tablet by mouth Daily. 90 tablet 3   • [DISCONTINUED] valsartan (DIOVAN) 80 MG tablet Take 1 tablet by mouth Daily. 30 tablet 3     No current  facility-administered medications on file prior to visit.      Review of Systems   Constitutional: Negative.    HENT: Negative.    Eyes: Negative.    Respiratory: Negative.    Cardiovascular: Negative.    Gastrointestinal: Negative.    Endocrine: Negative.    Genitourinary: Negative.    Musculoskeletal: Positive for back pain.   Skin: Negative.    Allergic/Immunologic: Negative.    Neurological: Negative.    Hematological: Negative.    Psychiatric/Behavioral: Negative.        Objective   Physical Exam   Constitutional: She is oriented to person, place, and time. She appears well-developed and well-nourished.   HENT:   Head: Normocephalic and atraumatic.   Right Ear: External ear normal.   Left Ear: External ear normal.   Nose: Nose normal.   Mouth/Throat: Oropharynx is clear and moist.   Eyes: EOM are normal. Pupils are equal, round, and reactive to light.   Neck: Neck supple.   Cardiovascular: Normal rate, regular rhythm and normal heart sounds.    Pulmonary/Chest: Effort normal and breath sounds normal. No respiratory distress.   Abdominal: Soft. Bowel sounds are normal.   Musculoskeletal: Normal range of motion.   Point tenderness low back   Neurological: She is alert and oriented to person, place, and time.   Skin: Skin is warm and dry.   Psychiatric: She has a normal mood and affect. Her behavior is normal. Judgment and thought content normal.   Nursing note and vitals reviewed.       /60  Pulse 64  Wt 68 kg (150 lb)  SpO2 100%  BMI 26.57 kg/m2    Assessment/Plan   Diagnoses and all orders for this visit:    Chronic right-sided low back pain without sciatica    Adult hypothyroidism    Hypertension, unspecified type    Other orders  -     Discontinue: SYNTHROID 75 MCG tablet; Take 1 tablet by mouth Daily.  -     Discontinue: valsartan (DIOVAN) 80 MG tablet; Take 1 tablet by mouth Daily.  -     SYNTHROID 75 MCG tablet; Take 1 tablet by mouth Daily.  -     valsartan (DIOVAN) 80 MG tablet; Take 1 tablet  by mouth Daily.  -     gabapentin (NEURONTIN) 100 MG capsule; Take 1 capsule by mouth 3 (Three) Times a Day.  -     fluticasone (FLONASE) 50 MCG/ACT nasal spray; 2 sprays into each nostril Daily for 30 days. Administer 2 sprays in each nostril for each dose.      Patient w/ chronic back pain. Will start gabapentin at 100 mg qhs and advance to tid. Will then advance from there if needed. Continue PT and try to get aquatic therapy. She will continue valsartan for bp. Take synthroid 75 mcg daily for thyroid regulation. She will f/u in 6 weeks or prn.

## 2018-03-09 RX ORDER — GABAPENTIN 300 MG/1
300 CAPSULE ORAL 3 TIMES DAILY
Qty: 90 CAPSULE | Refills: 2 | Status: SHIPPED | OUTPATIENT
Start: 2018-03-09 | End: 2018-04-17

## 2018-04-04 DIAGNOSIS — G89.29 CHRONIC LOW BACK PAIN WITH SCIATICA, SCIATICA LATERALITY UNSPECIFIED, UNSPECIFIED BACK PAIN LATERALITY: Primary | ICD-10-CM

## 2018-04-04 DIAGNOSIS — M54.40 CHRONIC LOW BACK PAIN WITH SCIATICA, SCIATICA LATERALITY UNSPECIFIED, UNSPECIFIED BACK PAIN LATERALITY: Primary | ICD-10-CM

## 2018-04-12 ENCOUNTER — TELEPHONE (OUTPATIENT)
Dept: INTERNAL MEDICINE | Facility: CLINIC | Age: 53
End: 2018-04-12

## 2018-04-12 DIAGNOSIS — M54.5 ACUTE LOW BACK PAIN, UNSPECIFIED BACK PAIN LATERALITY, WITH SCIATICA PRESENCE UNSPECIFIED: Primary | ICD-10-CM

## 2018-04-12 NOTE — TELEPHONE ENCOUNTER
Patient called and wanted to know if Dr. Gonzalez was going to put in a pain management referral for her or does Dr. Gonzalez want her to see the neurologists first.  There is no pain management referral in chart.  Would Dr. Gonzalez want patient to see neurologists first before putting in the pain management referral or what does she suggest for patient?

## 2018-04-12 NOTE — TELEPHONE ENCOUNTER
I put in a pain management referral. However, I would like an office visit with the patient to see how I can help her while we are waiting for pain management. She had requested a neurology referral and the office she requested does not see patients for back pain. Please schedule an office visit (rather than email medicine)

## 2018-04-17 ENCOUNTER — OFFICE VISIT (OUTPATIENT)
Dept: INTERNAL MEDICINE | Facility: CLINIC | Age: 53
End: 2018-04-17

## 2018-04-17 VITALS
HEART RATE: 70 BPM | DIASTOLIC BLOOD PRESSURE: 82 MMHG | SYSTOLIC BLOOD PRESSURE: 142 MMHG | WEIGHT: 160 LBS | BODY MASS INDEX: 28.34 KG/M2

## 2018-04-17 DIAGNOSIS — M54.41 ACUTE RIGHT-SIDED LOW BACK PAIN WITH RIGHT-SIDED SCIATICA: Primary | ICD-10-CM

## 2018-04-17 PROCEDURE — 99214 OFFICE O/P EST MOD 30 MIN: CPT | Performed by: INTERNAL MEDICINE

## 2018-04-17 RX ORDER — PREGABALIN 100 MG/1
100 CAPSULE ORAL 3 TIMES DAILY
Qty: 90 CAPSULE | Refills: 1 | Status: SHIPPED | OUTPATIENT
Start: 2018-04-17 | End: 2018-05-02

## 2018-04-17 NOTE — PROGRESS NOTES
"Chief Complaint   Patient presents with   • Back Pain       History of Present Illness   Cheri Mendoza is a 53 y.o. female presents for follow up on back pain. Patient is struggling with persistent back pain. She has had 2 epidural injections in the low back. First epidural with modest benefit but no benefit with the next injection. She is on gabapentin 300 mg tid with modest benefit. She has \"incredible\" constipation with tramadol. If she takes 100 mg she does get some pain relief. Went to spinal specialist as well. She is in physical therapy as well. She uses a TENS unit but then gets adhesive reaction.       The following portions of the patient's history were reviewed and updated as appropriate: allergies, current medications, past family history, past medical history, past social history, past surgical history and problem list.  Current Outpatient Prescriptions on File Prior to Visit   Medication Sig Dispense Refill   • cycloSPORINE (RESTASIS) 0.05 % ophthalmic emulsion 1 drop 2 (Two) Times a Day.     • estrogens, conjugated,-methyltestosterone (ESTRATEST HS) 0.625-1.25 MG per tablet Take 1 tablet by mouth Daily.     • progesterone (PROMETRIUM) 100 MG capsule Take 100 mg by mouth Daily.     • SYNTHROID 75 MCG tablet Take 1 tablet by mouth Daily. 90 tablet 3   • Triamcinolone Acetonide 0.05 % ointment Apply  topically.     • valsartan (DIOVAN) 80 MG tablet Take 1 tablet by mouth Daily. 90 tablet 3   • [DISCONTINUED] gabapentin (NEURONTIN) 300 MG capsule Take 1 capsule by mouth 3 (Three) Times a Day. 90 capsule 2   • [DISCONTINUED] traMADol (ULTRAM) 50 MG tablet Take 1 tablet by mouth Every 6 (Six) Hours As Needed for Moderate Pain . 30 tablet 2     No current facility-administered medications on file prior to visit.      Review of Systems   HENT: Negative.    Eyes: Negative.    Respiratory: Negative.    Cardiovascular: Negative.  Negative for chest pain.   Gastrointestinal: Negative.  Negative for abdominal " pain.   Endocrine: Negative.    Genitourinary: Negative.  Negative for dysuria and pelvic pain.   Musculoskeletal: Positive for back pain.   Skin: Negative.    Allergic/Immunologic: Negative.    Neurological: Positive for weakness. Negative for numbness and headaches.   Hematological: Negative.    Psychiatric/Behavioral: Negative.        Objective   Physical Exam   Constitutional: She is oriented to person, place, and time. She appears well-developed and well-nourished.   HENT:   Head: Normocephalic and atraumatic.   Right Ear: External ear normal.   Left Ear: External ear normal.   Nose: Nose normal.   Mouth/Throat: Oropharynx is clear and moist.   Eyes: Conjunctivae and EOM are normal. Pupils are equal, round, and reactive to light.   Neck: Normal range of motion. Neck supple.   Cardiovascular: Normal rate, regular rhythm, normal heart sounds and intact distal pulses.    Pulmonary/Chest: Effort normal and breath sounds normal.   Abdominal: Soft. Bowel sounds are normal.   Genitourinary: Vagina normal and uterus normal.   Neurological: She is alert and oriented to person, place, and time. She has normal reflexes.   Skin: Skin is warm and dry.   Psychiatric: She has a normal mood and affect. Her behavior is normal. Judgment and thought content normal.   Nursing note and vitals reviewed.       /82   Pulse 70   Wt 72.6 kg (160 lb)   BMI 28.34 kg/m²     Assessment/Plan   Diagnoses and all orders for this visit:    Acute right-sided low back pain with right-sided sciatica    Other orders  -     pregabalin (LYRICA) 100 MG capsule; Take 1 capsule by mouth 3 (Three) Times a Day.        Patient w/ right sided low back pain. She will try switching from gabapentin to lyrica. She will go to pain management for possible epidural injections. To see neurosurgery for additional opinion. iftikhar back brace and physical therapy. She will follow up in 2 mo or prn.

## 2018-05-02 ENCOUNTER — ANESTHESIA EVENT (OUTPATIENT)
Dept: PAIN MEDICINE | Facility: HOSPITAL | Age: 53
End: 2018-05-02

## 2018-05-02 ENCOUNTER — HOSPITAL ENCOUNTER (OUTPATIENT)
Dept: PAIN MEDICINE | Facility: HOSPITAL | Age: 53
Discharge: HOME | End: 2018-05-02
Admitting: INTERNAL MEDICINE

## 2018-05-02 ENCOUNTER — TRANSCRIBE ORDERS (OUTPATIENT)
Dept: PAIN MEDICINE | Facility: HOSPITAL | Age: 53
End: 2018-05-02

## 2018-05-02 ENCOUNTER — ANESTHESIA (OUTPATIENT)
Dept: PAIN MEDICINE | Facility: HOSPITAL | Age: 53
End: 2018-05-02

## 2018-05-02 ENCOUNTER — HOSPITAL ENCOUNTER (OUTPATIENT)
Dept: GENERAL RADIOLOGY | Facility: HOSPITAL | Age: 53
Discharge: HOME | End: 2018-05-02

## 2018-05-02 VITALS
DIASTOLIC BLOOD PRESSURE: 80 MMHG | WEIGHT: 160 LBS | TEMPERATURE: 98.3 F | HEART RATE: 71 BPM | RESPIRATION RATE: 16 BRPM | BODY MASS INDEX: 28.35 KG/M2 | SYSTOLIC BLOOD PRESSURE: 128 MMHG | OXYGEN SATURATION: 98 % | HEIGHT: 63 IN

## 2018-05-02 DIAGNOSIS — R52 PAIN: ICD-10-CM

## 2018-05-02 DIAGNOSIS — M54.5 ACUTE LOW BACK PAIN, UNSPECIFIED BACK PAIN LATERALITY, WITH SCIATICA PRESENCE UNSPECIFIED: Primary | ICD-10-CM

## 2018-05-02 DIAGNOSIS — M54.5 ACUTE LOW BACK PAIN, UNSPECIFIED BACK PAIN LATERALITY, WITH SCIATICA PRESENCE UNSPECIFIED: ICD-10-CM

## 2018-05-02 PROCEDURE — C1755 CATHETER, INTRASPINAL: HCPCS

## 2018-05-02 PROCEDURE — 77003 FLUOROGUIDE FOR SPINE INJECT: CPT

## 2018-05-02 PROCEDURE — 25010000002 MIDAZOLAM PER 1 MG: Performed by: ANESTHESIOLOGY

## 2018-05-02 PROCEDURE — 25010000002 METHYLPREDNISOLONE PER 80 MG: Performed by: ANESTHESIOLOGY

## 2018-05-02 RX ORDER — ESTRADIOL 0.04 MG/D
1 FILM, EXTENDED RELEASE TRANSDERMAL 2 TIMES WEEKLY
COMMUNITY
End: 2022-06-02

## 2018-05-02 RX ORDER — METHYLPREDNISOLONE ACETATE 80 MG/ML
80 INJECTION, SUSPENSION INTRA-ARTICULAR; INTRALESIONAL; INTRAMUSCULAR; SOFT TISSUE ONCE
Status: COMPLETED | OUTPATIENT
Start: 2018-05-02 | End: 2018-05-02

## 2018-05-02 RX ORDER — FENTANYL CITRATE 50 UG/ML
50 INJECTION, SOLUTION INTRAMUSCULAR; INTRAVENOUS AS NEEDED
Status: DISCONTINUED | OUTPATIENT
Start: 2018-05-02 | End: 2018-05-03 | Stop reason: HOSPADM

## 2018-05-02 RX ORDER — SODIUM CHLORIDE 0.9 % (FLUSH) 0.9 %
1-10 SYRINGE (ML) INJECTION AS NEEDED
Status: DISCONTINUED | OUTPATIENT
Start: 2018-05-02 | End: 2018-05-03 | Stop reason: HOSPADM

## 2018-05-02 RX ORDER — LIDOCAINE HYDROCHLORIDE 10 MG/ML
1 INJECTION, SOLUTION INFILTRATION; PERINEURAL ONCE AS NEEDED
Status: DISCONTINUED | OUTPATIENT
Start: 2018-05-02 | End: 2018-05-03 | Stop reason: HOSPADM

## 2018-05-02 RX ORDER — SODIUM CHLORIDE 0.9 % (FLUSH) 0.9 %
1-10 SYRINGE (ML) INJECTION AS NEEDED
Status: CANCELLED | OUTPATIENT
Start: 2018-06-02

## 2018-05-02 RX ORDER — MIDAZOLAM HYDROCHLORIDE 1 MG/ML
1 INJECTION INTRAMUSCULAR; INTRAVENOUS AS NEEDED
Status: DISCONTINUED | OUTPATIENT
Start: 2018-05-02 | End: 2018-05-03 | Stop reason: HOSPADM

## 2018-05-02 RX ADMIN — METHYLPREDNISOLONE ACETATE 80 MG: 80 INJECTION, SUSPENSION INTRA-ARTICULAR; INTRALESIONAL; INTRAMUSCULAR; SOFT TISSUE at 14:10

## 2018-05-02 RX ADMIN — MIDAZOLAM 2 MG: 1 INJECTION INTRAMUSCULAR; INTRAVENOUS at 14:07

## 2018-05-02 NOTE — H&P
[facility]    History and Physical    Patient Name: Cheri Mendoza  :  1965  MRN:  [id number]  Date of Admission: 2018    Subjective     Patient is a 53 y.o. female presents with chief complaint of chronic low back pain.  Onset of symptoms was gradual starting 10 years ago.  Symptoms are associated/aggravated by activity. Symptoms improve with relaxation, injection and TENS unit.  Patient had 2 epidurals at another facility in January and Feb with some success.  Pain radiate to right thigh, 3-8/10.  MRI showed LDDD at L45.    The following portions of the patients history were reviewed and updated as appropriate: current medications, allergies, past medical history, past surgical history, past family history, past social history and problem list                Objective     Past Medical History:   Past Medical History:   Diagnosis Date   • H/O colonoscopy    • Hypertension    • Hypothyroidism      Past Surgical History:   Past Surgical History:   Procedure Laterality Date   • CARPAL TUNNEL RELEASE WITH CUBITAL TUNNEL RELEASE Right    • COLONOSCOPY N/A 2018    Procedure: COLONOSCOPY to cecum and t.i. with polypectomy;  Surgeon: Rima Luis MD;  Location: [facility] ENDOSCOPY;  Service:    • HIP DEBRIDEMENT Right    • KNEE CARTILAGE SURGERY Right    • SINUS SURGERY       Family History:   Family History   Problem Relation Age of Onset   • Osteoarthritis Mother    • Heart disease Mother      Social History:   Social History   Substance Use Topics   • Smoking status: Never Smoker   • Smokeless tobacco: Never Used   • Alcohol use Yes      Comment: socially       Vital Signs Range for the last 24 hours  Temperature: Temp:  [36.8 °C (98.3 °F)] 36.8 °C (98.3 °F)   Temp Source: Temp src: Oral   BP: BP: (136)/(87) 136/87   Pulse: Heart Rate:  [77] 77   Respirations: Resp:  [16] 16   SPO2: SpO2:  [97 %] 97 %   O2 Amount (l/min):     O2 Devices Device (Oxygen Therapy): aerosol mask   Weight: Weight:   "[72.6 kg (160 lb)] 72.6 kg (160 lb)     Flowsheet Rows    Flowsheet Row First Filed Value   Admission Height 160 cm (63\") Documented at 05/02/2018 1347   Admission Weight 72.6 kg (160 lb) Documented at 05/02/2018 1347          --------------------------------------------------------------------------------    Current Outpatient Prescriptions   Medication Sig Dispense Refill   • Ascorbic Acid (VITAMIN C PO) Take 1,000 mg by mouth.     • Cholecalciferol (VITAMIN D3) 5000 units capsule capsule Take 5,000 Units by mouth Daily.     • cycloSPORINE (RESTASIS) 0.05 % ophthalmic emulsion 1 drop 2 (Two) Times a Day.     • DHEA 10 MG capsule Take  by mouth.     • estradiol (VIVELLE-DOT) 0.0375 MG/24HR Place 1 patch on the skin 2 (Two) Times a Week.     • Magnesium 100 MG tablet Take  by mouth.     • progesterone (PROMETRIUM) 100 MG capsule Take 100 mg by mouth Daily.     • SYNTHROID 75 MCG tablet Take 1 tablet by mouth Daily. 90 tablet 3   • valsartan (DIOVAN) 80 MG tablet Take 1 tablet by mouth Daily. 90 tablet 3   • Triamcinolone Acetonide 0.05 % ointment Apply  topically.       Current Facility-Administered Medications   Medication Dose Route Frequency Provider Last Rate Last Dose   • fentaNYL citrate (PF) (SUBLIMAZE) injection 50 mcg  50 mcg Intravenous PRN Sharmila Yan MD       • lidocaine (XYLOCAINE) 1 % injection 1 mL  1 mL Intradermal Once PRN Sharmila Yan MD       • methylPREDNISolone acetate (DEPO-medrol) injection 80 mg  80 mg Intra-articular Once Sharmila Yan MD       • midazolam (VERSED) injection 1 mg  1 mg Intravenous PRN Sharmila Yan MD       • sodium chloride 0.9 % flush 1-10 mL  1-10 mL Intravenous PRN Sharmila Yan MD           --------------------------------------------------------------------------------  Assessment/Plan      Anesthesia Evaluation     Patient summary reviewed and Nursing notes reviewed                Airway   Mallampati: II  TM distance: >3 FB  Neck ROM: full  no difficulty expected  " Dental - normal exam     Pulmonary - negative pulmonary ROS    breath sounds clear to auscultation  Cardiovascular - normal exam  Exercise tolerance: good (4-7 METS)    Rhythm: regular  Rate: normal    (+) hypertension,       Neuro/Psych- negative ROS and neuro exam normal  GI/Hepatic/Renal/Endo    (+)   hypothyroidism,     Musculoskeletal (-) normal exam    (+) back pain,   Abdominal  - normal exam   Substance History - negative use     OB/GYN          Other - negative ROS                  Diagnosis and Plan    Treatment Plan  ASA 2   Patient has had previous injection/procedure with 25-50% improvement.   Procedures: Lumbar Epidural Steroid Injection(LESI), With fluoroscopy,       Anesthetic plan and risks discussed with patient.          Diagnosis     * Lumbar radicular pain [M54.16]

## 2018-05-02 NOTE — ANESTHESIA PROCEDURE NOTES
PAIN Epidural block    Patient location during procedure: pain clinic  Start Time: 5/2/2018 2:00 PM  Stop Time: 5/2/2018 2:12 PM  Indication:procedure for pain  Performed By  Anesthesiologist: TANJA SALAZAR  Preanesthetic Checklist  Completed: patient identified, site marked, surgical consent, pre-op evaluation, timeout performed, IV checked, risks and benefits discussed and monitors and equipment checked  Additional Notes  LDDD  Prep:  Pt Position:prone  Sterile Tech:cap, gloves and sterile barrier  Prep:chlorhexidine gluconate and isopropyl alcohol  Monitoring:EKG, continuous pulse oximetry and blood pressure monitoring  Procedure:  Sedation: yes   Approach:right paramedian  Guidance: fluoroscopy  Location:lumbar  Level:4-5  Needle Type:Tuohy  Needle Gauge:20  Aspiration:negative  Medications:  Depomedrol:80  Preservative Free Saline:3mL    Post Assessment:  Dressing:secured with tape  Pt Tolerance:patient tolerated the procedure well with no apparent complications  Complications:no

## 2018-05-29 ENCOUNTER — TELEPHONE (OUTPATIENT)
Dept: INTERNAL MEDICINE | Facility: CLINIC | Age: 53
End: 2018-05-29

## 2018-05-29 RX ORDER — GABAPENTIN 300 MG/1
300 CAPSULE ORAL 3 TIMES DAILY
Qty: 90 CAPSULE | Refills: 3 | Status: SHIPPED | OUTPATIENT
Start: 2018-05-29 | End: 2018-05-29

## 2018-05-29 RX ORDER — PREGABALIN 150 MG/1
150 CAPSULE ORAL 3 TIMES DAILY
Qty: 90 CAPSULE | Refills: 2 | Status: SHIPPED | OUTPATIENT
Start: 2018-05-29 | End: 2018-08-13

## 2018-05-29 NOTE — TELEPHONE ENCOUNTER
Pt called to say that she started the Lyrica and it does seem to help. She said you all discussed increasing the mg. She is asking if it can be increased. She is on 100 mg tid  Pt has had the epidurals and does see Neuro July 9th

## 2018-07-09 ENCOUNTER — OFFICE VISIT (OUTPATIENT)
Dept: NEUROSURGERY | Facility: CLINIC | Age: 53
End: 2018-07-09

## 2018-07-09 VITALS
HEART RATE: 88 BPM | WEIGHT: 160 LBS | DIASTOLIC BLOOD PRESSURE: 78 MMHG | SYSTOLIC BLOOD PRESSURE: 132 MMHG | BODY MASS INDEX: 28.35 KG/M2 | HEIGHT: 63 IN

## 2018-07-09 DIAGNOSIS — G89.29 CHRONIC RIGHT-SIDED LOW BACK PAIN WITH RIGHT-SIDED SCIATICA: ICD-10-CM

## 2018-07-09 DIAGNOSIS — M54.41 CHRONIC RIGHT-SIDED LOW BACK PAIN WITH RIGHT-SIDED SCIATICA: ICD-10-CM

## 2018-07-09 DIAGNOSIS — M25.551 RIGHT HIP PAIN: ICD-10-CM

## 2018-07-09 DIAGNOSIS — M51.36 DDD (DEGENERATIVE DISC DISEASE), LUMBAR: Primary | ICD-10-CM

## 2018-07-09 PROBLEM — M51.369 DDD (DEGENERATIVE DISC DISEASE), LUMBAR: Status: ACTIVE | Noted: 2018-07-09

## 2018-07-09 PROCEDURE — 99244 OFF/OP CNSLTJ NEW/EST MOD 40: CPT | Performed by: NEUROLOGICAL SURGERY

## 2018-07-27 RX ORDER — LOSARTAN POTASSIUM 25 MG/1
25 TABLET ORAL DAILY
Qty: 90 TABLET | Refills: 0 | Status: SHIPPED | OUTPATIENT
Start: 2018-07-27 | End: 2019-02-12 | Stop reason: SDUPTHER

## 2018-08-08 NOTE — PROGRESS NOTES
Subjective   Patient ID: Cheri Mendoza is a 53 y.o. female is here today for follow-up on back pain.    At the last visit the patient reported back pain that radiates into bilateral thighs along with some weakness right worse than left.    Today the patient is here with a new myelogram of the lumbar spine. She reports no changes with the back pain and bilateral thigh weakness.    Back Pain   This is a chronic problem. The current episode started more than 1 year ago. The problem occurs daily. The problem is unchanged. The pain is present in the lumbar spine. The pain radiates to the right thigh and left thigh. Associated symptoms include leg pain and weakness. Pertinent negatives include no bladder incontinence, bowel incontinence, numbness or tingling.       The following portions of the patient's history were reviewed and updated as appropriate: allergies, current medications, past family history, past medical history, past social history, past surgical history and problem list.    Review of Systems   Gastrointestinal: Negative for bowel incontinence.   Genitourinary: Negative for bladder incontinence.   Musculoskeletal: Positive for back pain.   Neurological: Positive for weakness. Negative for tingling and numbness.   All other systems reviewed and are negative.    She is with her . We discussed her myelogram which did not show any specific nerve root impingement. Frankly, I did not expect that it would. Of course, it did confirm that she has degenerative disk disease and facet disease. I think that her pain certainly comes from that. This is not a primary hip problem. She certainly does not need any primary spinal surgery such as a diskectomy, decompression, and certainly not a fusion. She has had epidural blocks and they did not help. She has gone through therapy. 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 She does have a [employer] but the standing part still bothers her. She will look  into whether or not she can get a stool to use, that is a bar stool, that the [employer] will allow her to have. If there is some letter writing that needs to be done, I will be happy to do that. She also asked about disability parking and I would be happy to fill out a form to that effect. I do think at this point, it would be reasonable to have the pain doctors try medial branch blocks on the right side or SI blocks on the right side. If those help, then perhaps an ablation could help her. If those do not help at all, then a spinal cord stimulator trial would be the next option, and I would be happy to put in a permanent lead if it was successful and it came to that. Will refer her to Dr. Stein and have her come back in 4 months.      Objective   Physical Exam   Constitutional: She is oriented to person, place, and time. She appears well-developed and well-nourished.   HENT:   Head: Normocephalic and atraumatic.   Eyes: Pupils are equal, round, and reactive to light. Conjunctivae and EOM are normal.   Fundoscopic exam:       The right eye shows no papilledema. The right eye shows venous pulsations.        The left eye shows no papilledema. The left eye shows venous pulsations.   Neck: Carotid bruit is not present.   Neurological: She is oriented to person, place, and time. She has a normal Finger-Nose-Finger Test and a normal Heel to Shin Test. Gait normal.   Reflex Scores:       Tricep reflexes are 2+ on the right side and 2+ on the left side.       Bicep reflexes are 2+ on the right side and 2+ on the left side.       Brachioradialis reflexes are 2+ on the right side and 2+ on the left side.       Patellar reflexes are 2+ on the right side and 2+ on the left side.       Achilles reflexes are 2+ on the right side and 2+ on the left side.  Psychiatric: Her speech is normal.     Neurologic Exam     Mental Status   Oriented to person, place, and time.   Registration of memory: Good recent and remote memory.   Attention:  normal. Concentration: normal.   Speech: speech is normal   Level of consciousness: alert  Knowledge: consistent with education.     Cranial Nerves     CN II   Visual fields full to confrontation.   Visual acuity: normal    CN III, IV, VI   Pupils are equal, round, and reactive to light.  Extraocular motions are normal.     CN V   Facial sensation intact.   Right corneal reflex: normal  Left corneal reflex: normal    CN VII   Facial expression full, symmetric.   Right facial weakness: none  Left facial weakness: none    CN VIII   Hearing: intact    CN IX, X   Palate: symmetric    CN XI   Right sternocleidomastoid strength: normal  Left sternocleidomastoid strength: normal    CN XII   Tongue: not atrophic  Tongue deviation: none    Motor Exam   Muscle bulk: normal  Right arm tone: normal  Left arm tone: normal  Right leg tone: normal  Left leg tone: normal    Strength   Strength 5/5 except as noted.     Sensory Exam   Light touch normal.     Gait, Coordination, and Reflexes     Gait  Gait: normal    Coordination   Finger to nose coordination: normal  Heel to shin coordination: normal    Reflexes   Right brachioradialis: 2+  Left brachioradialis: 2+  Right biceps: 2+  Left biceps: 2+  Right triceps: 2+  Left triceps: 2+  Right patellar: 2+  Left patellar: 2+  Right achilles: 2+  Left achilles: 2+  Right : 2+  Left : 2+      Assessment/Plan   Independent Review of Radiographic Studies:    Reviewed the lumbar myelogram done recently which did not show any specific nerve root impingement at L4-L5 or L5-S1.  However there is quite a bit of degenerative disc disease and facet hypertrophy.  Agree with the report.      Medical Decision Making:    I reassured the patient that there are things to do for her problem even though a primary spinal surgery is not one of them.  We'll refer her to Dr. Stein for a trial of right-sided medial branch blocks and right-sided SI blocks and a possible radiofrequency ablation.  The  backup option if this is a spinal cord stimulator trial if the former does not work.  I'll follow up with her in 4 months.  If she needs some paperwork for me to get a stool to sit on at work I be happy to write a letter to that effect.  If she wants me to sign a disabled parking permit I will do that as well.      Cheri was seen today for back pain.    Diagnoses and all orders for this visit:    DDD (degenerative disc disease), lumbar  -     Ambulatory Referral to Pain Management    Chronic right-sided low back pain with right-sided sciatica  -     Ambulatory Referral to Pain Management    Right hip pain  -     Ambulatory Referral to Pain Management      Return in about 4 months (around 12/22/2018).

## 2018-08-17 ENCOUNTER — HOSPITAL ENCOUNTER (OUTPATIENT)
Dept: GENERAL RADIOLOGY | Facility: HOSPITAL | Age: 53
Discharge: HOME | End: 2018-08-17
Attending: NEUROLOGICAL SURGERY

## 2018-08-17 ENCOUNTER — HOSPITAL ENCOUNTER (OUTPATIENT)
Dept: CT IMAGING | Facility: HOSPITAL | Age: 53
Discharge: HOME | End: 2018-08-17
Attending: NEUROLOGICAL SURGERY | Admitting: NEUROLOGICAL SURGERY

## 2018-08-17 VITALS
SYSTOLIC BLOOD PRESSURE: 124 MMHG | BODY MASS INDEX: 26.58 KG/M2 | RESPIRATION RATE: 16 BRPM | OXYGEN SATURATION: 99 % | TEMPERATURE: 97 F | HEIGHT: 63 IN | HEART RATE: 69 BPM | DIASTOLIC BLOOD PRESSURE: 82 MMHG | WEIGHT: 150 LBS

## 2018-08-17 DIAGNOSIS — M54.41 CHRONIC RIGHT-SIDED LOW BACK PAIN WITH RIGHT-SIDED SCIATICA: ICD-10-CM

## 2018-08-17 DIAGNOSIS — M25.551 RIGHT HIP PAIN: ICD-10-CM

## 2018-08-17 DIAGNOSIS — G89.29 CHRONIC RIGHT-SIDED LOW BACK PAIN WITH RIGHT-SIDED SCIATICA: ICD-10-CM

## 2018-08-17 DIAGNOSIS — M51.36 DDD (DEGENERATIVE DISC DISEASE), LUMBAR: ICD-10-CM

## 2018-08-17 PROCEDURE — 72114 X-RAY EXAM L-S SPINE BENDING: CPT

## 2018-08-17 PROCEDURE — 0 IOPAMIDOL 41 % SOLUTION: Performed by: RADIOLOGY

## 2018-08-17 PROCEDURE — 72131 CT LUMBAR SPINE W/O DYE: CPT

## 2018-08-17 PROCEDURE — 72240 MYELOGRAPHY NECK SPINE: CPT

## 2018-08-17 PROCEDURE — 62304 MYELOGRAPHY LUMBAR INJECTION: CPT

## 2018-08-17 RX ORDER — LIDOCAINE HYDROCHLORIDE 10 MG/ML
10 INJECTION, SOLUTION INFILTRATION; PERINEURAL ONCE
Status: COMPLETED | OUTPATIENT
Start: 2018-08-17 | End: 2018-08-17

## 2018-08-17 RX ORDER — ALPRAZOLAM 0.5 MG/1
0.5 TABLET ORAL ONCE
Status: COMPLETED | OUTPATIENT
Start: 2018-08-17 | End: 2018-08-17

## 2018-08-17 RX ADMIN — ALPRAZOLAM 0.5 MG: 0.5 TABLET ORAL at 08:32

## 2018-08-17 RX ADMIN — IOPAMIDOL 20 ML: 408 INJECTION, SOLUTION INTRATHECAL at 10:07

## 2018-08-17 RX ADMIN — LIDOCAINE HYDROCHLORIDE 2 ML: 10 INJECTION, SOLUTION INFILTRATION; PERINEURAL at 10:06

## 2018-08-17 NOTE — H&P
Name: Cheri Mendoza ADMIT: 2018   : 1965  PCP: Felicia Gonzalez MD    MRN: [id number] LOS: 0 days   AGE/SEX: 53 y.o. female  ROOM: Room/bed info not found       Chief complaint   Patient is a 53 y.o. female presents for myelogram  Past Surgical History:  Past Surgical History:   Procedure Laterality Date   • BREAST AUGMENTATION  2007   • CARPAL TUNNEL RELEASE WITH CUBITAL TUNNEL RELEASE Right    • COLONOSCOPY N/A 2018    Procedure: COLONOSCOPY to cecum and t.i. with polypectomy;  Surgeon: Rima Luis MD;  Location: [facility] ENDOSCOPY;  Service:    • HIP DEBRIDEMENT Right    • KNEE CARTILAGE SURGERY Right    • SINUS SURGERY     • SKIN BIOPSY      hand - precancerous   • SKIN BIOPSY         Past Medical History:  Past Medical History:   Diagnosis Date   • GERD (gastroesophageal reflux disease)    • H/O colonoscopy    • Hypertension    • Hypothyroidism    • Pneumonia 10/2011       Home Medications:    (Not in a hospital admission)    Allergies:  Sulfa antibiotics    Family History:  Family History   Problem Relation Age of Onset   • Osteoarthritis Mother    • Heart disease Mother    • No Known Problems Brother    • Breast cancer Maternal Aunt    • Cancer Paternal Grandmother        Social History:  Social History   Substance Use Topics   • Smoking status: Former Smoker     Packs/day: 0.25     Years: 5.00     Quit date:    • Smokeless tobacco: Never Used   • Alcohol use Yes      Comment: socially        Objective     Physical Exam:   Neuro intact    Vital Signs  Temp:  [97 °F (36.1 °C)] 97 °F (36.1 °C)  Heart Rate:  [77] 77  Resp:  [16] 16  BP: (124)/(75) 124/75    Anticipated Surgical Procedure:  myelogram    The risks, benefits and alternatives of this procedure have been discussed with the patient or responsible party: Yes        Gabriele Scott MD  18  9:40 AM

## 2018-08-17 NOTE — DISCHARGE INSTRUCTIONS
EDUCATION /DISCHARGE INSTRUCTIONS:  A myelogram is a special radiology procedure of the spinal cord, spinal nerves and other related structures.  You will be awake during the examination.  An area of your lower back will be cleansed with an antiseptic solution.  The physician will inject a numbing medication in your lower back.  While your back is numb, a needle will be placed in the lower back area.  A small amount of spinal fluid may be withdrawn and sent to the lab if ordered by your physician. While the needle is in the back, an injection of a contrast material (xray dye) will be given through the needle.  The contrast material will allow the physician to see the spinal cord and spinal nerves.  Once injected, the needle will be removed and a band aid will be placed over the injection site.  The table will be tilted during the process to allow the contrast material to flow to particular areas in the spine.  Following the injection and xrays, you will be taken to the CT scan where more pictures will be taken. After the procedure is finished, the contrast material will be absorbed by your body and eliminated through your kidneys.  The radiologist will study and interpret your myelogram and send the results to your physician.    Procedure risks of a myelogram include, but are not limited to:  *  Bleeding   *  seizure  *  Infection   *  Headache, possibly severe requiring  *  Contrast reaction      a blood patch  *  Nerve or cord injury  *  Paralysis and death    Benefits of the procedure:  *  Best examination for delineating pathology related to spinal cord compression from a disc and/or nerve root compression    Alternatives to the procedure:  MRI - a non invasive procedure requiring intravenous contrast injection.  Cannot be done on patients with certain pacemakers or metal in the body.  MRI risks include possible reaction to the contrast material, movement of metal located in the body.  Benefit to MRI:   Non-invasive and usually painless procedure.  THIS EDUCATION INFORMATION WAS REVIEWED PRIOR TO THE PROCEDURE AND CONSENT. Patient initials __________________Time____0814_____________    Important information following your myelogram:  *  Lie down with your head elevated no more than 2 pillows for the next 24 hours.   *  Sit up in the car going home.  Sit up to eat and use the restroom only,  for 24 hours.  *  24 HOURS COMPLETE AT ____1100____________________   *  Tomorrow, after 24 hours complete, take it easy and rest.  *  Do not drive for 48 hours following a myelogram  *  You may remove the bandage and shower in the morning  *  Increase your fluids for the next 24 hours.  Caffeinated drinks are encouraged.     Resume taking your blood thinner or Aspirin on _No Aspirin for 24 hours after the myelogram_    CALL YOUR PHYSICIAN FOR THE FOLLOWING:    * Pain at the injection site  * Reddness, swelling, bruising or drainage at the injection site  * A fever by mouth of 101.0  * Any new symptoms    If you have problems with a headache that is not relieved with rest and medication, please call the Radiology Triage Nurse desk  [phone]

## 2018-08-17 NOTE — NURSING NOTE
Verbal and written D/C instructions given to patient and sig. Other.  They voice understanding and are able to teach back D/C instructions.

## 2018-08-18 ENCOUNTER — TELEPHONE (OUTPATIENT)
Dept: INTERVENTIONAL RADIOLOGY/VASCULAR | Facility: HOSPITAL | Age: 53
End: 2018-08-18

## 2018-08-22 ENCOUNTER — OFFICE VISIT (OUTPATIENT)
Dept: NEUROSURGERY | Facility: CLINIC | Age: 53
End: 2018-08-22

## 2018-08-22 VITALS
HEIGHT: 63 IN | SYSTOLIC BLOOD PRESSURE: 130 MMHG | WEIGHT: 150 LBS | DIASTOLIC BLOOD PRESSURE: 76 MMHG | HEART RATE: 65 BPM | BODY MASS INDEX: 26.58 KG/M2

## 2018-08-22 DIAGNOSIS — G89.29 CHRONIC RIGHT-SIDED LOW BACK PAIN WITH RIGHT-SIDED SCIATICA: ICD-10-CM

## 2018-08-22 DIAGNOSIS — M25.551 RIGHT HIP PAIN: ICD-10-CM

## 2018-08-22 DIAGNOSIS — M54.41 CHRONIC RIGHT-SIDED LOW BACK PAIN WITH RIGHT-SIDED SCIATICA: ICD-10-CM

## 2018-08-22 DIAGNOSIS — M51.36 DDD (DEGENERATIVE DISC DISEASE), LUMBAR: Primary | ICD-10-CM

## 2018-08-22 PROCEDURE — 99214 OFFICE O/P EST MOD 30 MIN: CPT | Performed by: NEUROLOGICAL SURGERY

## 2018-08-23 ENCOUNTER — DOCUMENTATION (OUTPATIENT)
Dept: NEUROSURGERY | Facility: CLINIC | Age: 53
End: 2018-08-23

## 2018-08-28 ENCOUNTER — OFFICE VISIT (OUTPATIENT)
Dept: PAIN MEDICINE | Facility: CLINIC | Age: 53
End: 2018-08-28

## 2018-08-28 VITALS
DIASTOLIC BLOOD PRESSURE: 85 MMHG | BODY MASS INDEX: 27.11 KG/M2 | RESPIRATION RATE: 16 BRPM | TEMPERATURE: 97.6 F | OXYGEN SATURATION: 96 % | WEIGHT: 153 LBS | HEART RATE: 72 BPM | HEIGHT: 63 IN | SYSTOLIC BLOOD PRESSURE: 127 MMHG

## 2018-08-28 DIAGNOSIS — M54.41 CHRONIC RIGHT-SIDED LOW BACK PAIN WITH RIGHT-SIDED SCIATICA: ICD-10-CM

## 2018-08-28 DIAGNOSIS — G89.29 CHRONIC RIGHT-SIDED LOW BACK PAIN WITH RIGHT-SIDED SCIATICA: ICD-10-CM

## 2018-08-28 DIAGNOSIS — M53.3 SACROILIAC JOINT DYSFUNCTION OF RIGHT SIDE: ICD-10-CM

## 2018-08-28 DIAGNOSIS — G89.29 OTHER CHRONIC PAIN: Primary | ICD-10-CM

## 2018-08-28 DIAGNOSIS — M51.36 DDD (DEGENERATIVE DISC DISEASE), LUMBAR: ICD-10-CM

## 2018-08-28 PROCEDURE — 99204 OFFICE O/P NEW MOD 45 MIN: CPT | Performed by: NURSE PRACTITIONER

## 2018-08-28 PROCEDURE — 80305 DRUG TEST PRSMV DIR OPT OBS: CPT | Performed by: NURSE PRACTITIONER

## 2018-08-28 RX ORDER — NAPROXEN SODIUM 220 MG
220 TABLET ORAL 2 TIMES DAILY PRN
COMMUNITY
End: 2018-12-18

## 2018-08-28 NOTE — PROGRESS NOTES
CHIEF COMPLAINT  Pt is referred here by Dr Gauthier for R sided LBP and R lateral gluteal pain . No back surgery.Pt is here for S. I. Joint or MBB injections.  Pt has had a series of 3 lumbar injections this year,most recently 5/2/18 at [facility],with no relief.   Hx of P.T. : no relief,including dry needling with no results.   Pt had hydrocodone post labrum repair in 3/2016 and became nauseated; Pt also gets nauseated with current tramadol,and takes it sparingly,on weekends (with moderate relief).   Standing and walking increases pain.Pt has no numbness.    Subjective   Cheri Mendoza is a 53 y.o. female.   She presents to the office for evaluation of back pain. She was referred here by Dr. Gauthier.     Patient reports low back pain worse on the right side since 2003 but worse for about the past 4-5 years.  She went through a series of injections at the [facility] in 2012 which she thinks were SI joint injections and these were helpful. She has been evaluated by multiple orthopedic surgeons and neurosurgeons.  She underwent a right labral surgery and [place] in 2015 which did not help her hip/back pain.  Her pain has increased since that time.  She was seen by orthopedic surgeon here in [place], Dr. Nagy, did not feel that this problem was related to her hip.  She was evaluated by neurosurgeon Dr. Short at [facility], recommended surgery, she was not comfortable with him or surgery.   She underwent a series of 3 lumbar epidural steroid injections at [facility] this spring, these did not help.  She has been to physical therapy which was not helpful.  Most recently she was evaluated by Dr. Gauthier for a second opinion.  She completed a myelogram of the lumbar spine.  At this time neurosurgical intervention is not being recommended.  She is referred here for consideration of right sided facet blocks versus right-sided sacroiliac joint injection.    C/o right lumbosacral area  pain.  Pain today 4/10 in severity.  Pain is worse as the day goes on. It is aggravated by prolonged standing and walking.  Improved with rest, sitting, laying down flat.  She takes an occasional Tramadol on the weekend, does not take during the week because it makes her drowsy and and nauseated.  Reports that tylenol, aleve, ibuprofen, meloxicam have not helped.      Back Pain   This is a chronic problem. The current episode started more than 1 year ago. The problem occurs daily. The problem has been gradually worsening since onset. The pain is present in the lumbar spine and sacro-iliac (RIGHT). The quality of the pain is described as aching and burning. The pain does not radiate. The pain is at a severity of 4/10. The pain is the same all the time. The symptoms are aggravated by standing and position (WALKING). Pertinent negatives include no bladder incontinence, bowel incontinence, chest pain, dysuria or numbness. She has tried NSAIDs, analgesics, muscle relaxant, walking and home exercises for the symptoms. The treatment provided mild relief.        PEG Assessment   What number best describes your pain on average in the past week?5  What number best describes how, during the past week, pain has interfered with your enjoyment of life?9  What number best describes how, during the past week, pain has interfered with your general activity?  9      Current Outpatient Prescriptions:   •  Ascorbic Acid (VITAMIN C PO), Take 1,000 mg by mouth Daily., Disp: , Rfl:   •  B Complex Vitamins (VITAMIN B COMPLEX PO), Take 200 mcg by mouth Daily., Disp: , Rfl:   •  baclofen (LIORESAL) 10 MG tablet, Take 10 mg by mouth 3 (Three) Times a Day As Needed., Disp: , Rfl:   •  Cholecalciferol (VITAMIN D3) 5000 units capsule capsule, Take 2,000 Units by mouth Daily., Disp: , Rfl:   •  cycloSPORINE (RESTASIS) 0.05 % ophthalmic emulsion, 1 drop 2 (Two) Times a Day., Disp: , Rfl:   •  DHEA 10 MG capsule, Take  by mouth Daily., Disp: , Rfl:    •  estradiol (VIVELLE-DOT) 0.0375 MG/24HR, Place 1 patch on the skin 2 (Two) Times a Week., Disp: , Rfl:   •  losartan (COZAAR) 25 MG tablet, Take 1 tablet by mouth Daily., Disp: 90 tablet, Rfl: 0  •  Magnesium 100 MG tablet, Take 500 mg by mouth Every Night., Disp: , Rfl:   •  Magnesium-Potassium (CHANDRAKANT MAGNESIUM-POTASSIUM) 250-100 MG tablet, Take  by mouth Daily., Disp: , Rfl:   •  Multiple Vitamins-Minerals (PRESERVISION AREDS PO), Take  by mouth Daily., Disp: , Rfl:   •  naproxen sodium (ALEVE) 220 MG tablet, Take 220 mg by mouth As Needed., Disp: , Rfl:   •  Omega-3 Fatty Acids (FISH OIL PO), Take 1,170 mg by mouth Daily., Disp: , Rfl:   •  Probiotic Product (PROBIOTIC DAILY PO), Take  by mouth Daily., Disp: , Rfl:   •  progesterone (PROMETRIUM) 100 MG capsule, Take 100 mg by mouth Daily., Disp: , Rfl:   •  SYNTHROID 75 MCG tablet, Take 1 tablet by mouth Daily., Disp: 90 tablet, Rfl: 3  •  traMADol (ULTRAM) 50 MG tablet, Take 50 mg by mouth Every 6 (Six) Hours As Needed for Moderate Pain ., Disp: , Rfl:   •  Triamcinolone Acetonide 0.05 % ointment, Apply  topically., Disp: , Rfl:     The following portions of the patient's history were reviewed and updated as appropriate: allergies, current medications, past family history, past medical history, past social history, past surgical history and problem list.    REVIEW OF PERTINENT MEDICAL DATA    The office visit encounter from 8/22/2018 with Dr. Ramon Gauthier  Patient reported back pain that radiated into bilateral thighs.  He presents today with a nEW myelogram of lumbar spine.  Myelogram did not show any specific nerve root impingement.  She does have degenerative disc disease and facet disease.  I think her pain primarily comes back.  She does not need spinal surgery.  We'll refer to pain management for medial branch blocks on the right side or SI joint injections on the right side.  It does help possible ablation could help her.  If those are not helpful  "a spinal cord stimulator trial.  Next option and I would be happy to put in a permanent lead if it comes to that.    MRI      OTTO      Review of Systems   Constitutional: Positive for activity change (decreased walks with a crutch on long distances). Negative for appetite change.   HENT: Negative for hearing loss and trouble swallowing.    Eyes: Negative for visual disturbance.   Respiratory: Negative for apnea, chest tightness and shortness of breath.    Cardiovascular: Negative for chest pain.   Gastrointestinal: Positive for nausea (occasionally,from tramadol). Negative for bowel incontinence, constipation and diarrhea (IBS).   Genitourinary: Negative for bladder incontinence, difficulty urinating and dysuria.   Musculoskeletal: Positive for back pain and gait problem (due to back pain).   Neurological: Negative for dizziness, seizures, light-headedness and numbness.   Psychiatric/Behavioral: Positive for sleep disturbance. Negative for suicidal ideas.     Vitals:    08/28/18 1531   BP: 127/85   Pulse: 72   Resp: 16   Temp: 97.6 °F (36.4 °C)   SpO2: 96%   Weight: 69.4 kg (153 lb)   Height: 160.2 cm (63.07\")   PainSc: 4  Comment: R sided LBP ranges from 2-6/10   PainLoc: Back     Objective   Physical Exam   Constitutional: She is oriented to person, place, and time. She appears well-developed and well-nourished. She is cooperative. No distress.   HENT:   Head: Normocephalic and atraumatic.   Right Ear: External ear normal.   Left Ear: External ear normal.   Nose: Nose normal.   Eyes: Pupils are equal, round, and reactive to light. Conjunctivae, EOM and lids are normal.   Neck: Trachea normal and normal range of motion. Neck supple.   Cardiovascular: Normal rate, regular rhythm and normal heart sounds.    Pulmonary/Chest: Effort normal and breath sounds normal. No respiratory distress.   Abdominal: Soft. Normal appearance.   Musculoskeletal: Normal range of motion. She exhibits no deformity.        Lumbar back: " She exhibits tenderness and pain.   +RIGHT SI JOINT TENDERNESS  LALI EQUIVOCAL    Neurological: She is alert and oriented to person, place, and time. She has normal strength and normal reflexes. No cranial nerve deficit or sensory deficit. Coordination and gait normal.   Reflex Scores:       Patellar reflexes are 2+ on the right side and 2+ on the left side.       Achilles reflexes are 2+ on the right side and 2+ on the left side.  Skin: Skin is warm, dry and intact. No rash noted. She is not diaphoretic.   Psychiatric: She has a normal mood and affect. Her speech is normal and behavior is normal. Judgment and thought content normal. Cognition and memory are normal.   Nursing note and vitals reviewed.    Assessment/Plan   Cheri was seen today for back pain.    Diagnoses and all orders for this visit:    Other chronic pain    Chronic right-sided low back pain with right-sided sciatica    DDD (degenerative disc disease), lumbar    Sacroiliac joint dysfunction of right side  -     Case Request      --- Right SI joint injection  ----------  Education about Sacroiliac joint injections:  This Sacroiliac joint injection (blockade) we have suggested is intended for diagnostic purposes, with the intent of offering the patient Radiofrequency thermal rhizotomy (RF) of the sensory branches to the joint if the block is diagnostically effective.  The diagnostic blockade is necessary to determine the likelihood that RF therapy could be efficacious in providing long term relief to the patient.    In this procedure, the sacroiliac joint is aligned with imaging, and under image guidance a needle is placed with the needle tip into the joint.  The needle position is confirmed to be appropriately in the joint before injection of medication into the joint.  When xray fluoroscopy is used, contrast dye is used to confirm a proper arthrogram (i.e., outline of the joint).  When ultrasound is used, IV fluid (normal saline) is injected to  see the flow of the fluid into the joint.  Once confirmed, then the medication can be injected into the joint.  Oftentimes this medication is a combination of local anesthetics (for diagnostic purposes) and also a steroid (to decrease irritation & inflammation in the joint, also known as sacroilitis).      Medically, a successful RF procedure should provide a patient with 50% pain relief or more for at least 6 months.  Clinical experience suggests that successful patients receive relief more in the range of 12 months on average.  We also discussed that many patients receive therapeutic success from the intraarticular joint injection, and may not require RF ablation.  If a patient receives more than 8 weeks of relief from joint injection, then occasional repeat joint blocks for therapeutic purposes is a very reasonable alternative therapy.  This course of therapy is consistent with our LCDs according to our CMS  in the area, and therefore other insurance providers should follow accordingly.  We will monitor our patients to screen for these therapeutic responders and will offer RF therapy only when necessary.      We discussed that joint injections & also RF procedures are not without risks.  Best practices regarding anticoagulant use & neuraxial procedures will be respected.  Oftentimes a patient on an anticoagulant can be offered a joint injection safely, but again this is not risk-free, and such patients give consent with regards to this increased bleeding risk, which could cause problems including but not limited to worsening of pain, nerve damage, or muscle damage.  Patients that are ill or otherwise may be at risk for sepsis will not have their spines accessed by neuraxial injections of any type.  This patient will not be offered these therapies if there is an increased risk.   We discussed that there is a risk of postprocedural pain and also a risk of worsening of clinical picture with these  procedures as with any neuraxial procedure.    ----------    --- Routine UDS in office today as part of new patient evaluation.  The specimen was viewed and the immunoassay result reviewed and is negative.  This specimen will be sent to [facility] for confirmation.     --- Follow-up after procedure         OTTO REPORT    As part of the patient's treatment plan, I am prescribing controlled substances. The patient has been made aware of appropriate use of such medications, including potential risk of somnolence, limited ability to drive and/or work safely, and the potential for dependence or overdose. It has also bee made clear that these medications are for use by this patient only, without concomitant use of alcohol or other substances unless prescribed.     Patient has completed prescribing agreement detailing terms of continued prescribing of controlled substances, including monitoring OTTO reports, urine drug screening, and pill counts if necessary. The patient is aware that inappropriate use will results in cessation of prescribing such medications.    OTTO report has been reviewed and scanned into the patient's chart.    As the clinician, I personally reviewed the OTTO from 8/27/2018 while the patient was in the office today.    History and physical exam exhibit continued safe and appropriate use of controlled substances.     EMR Dragon/Transcription disclaimer:   Much of this encounter note is an electronic transcription/translation of spoken language to printed text. The electronic translation of spoken language may permit erroneous, or at times, nonsensical words or phrases to be inadvertently transcribed; Although I have reviewed the note for such errors, some may still exist.

## 2018-08-29 LAB
POC AMPHETAMINES: NEGATIVE
POC BARBITURATES: NEGATIVE
POC BENZODIAZEPHINES: NEGATIVE
POC COCAINE: NEGATIVE
POC METHADONE: NEGATIVE
POC METHAMPHETAMINE SCREEN URINE: NEGATIVE
POC OPIATES: NEGATIVE
POC OXYCODONE: NEGATIVE
POC PHENCYCLIDINE: NEGATIVE
POC PROPOXYPHENE: NEGATIVE
POC THC: NEGATIVE
POC TRICYCLIC ANTIDEPRESSANTS: NEGATIVE

## 2018-09-03 ENCOUNTER — RESULTS ENCOUNTER (OUTPATIENT)
Dept: PAIN MEDICINE | Facility: CLINIC | Age: 53
End: 2018-09-03

## 2018-09-03 DIAGNOSIS — G89.29 OTHER CHRONIC PAIN: ICD-10-CM

## 2018-09-03 DIAGNOSIS — M54.41 CHRONIC RIGHT-SIDED LOW BACK PAIN WITH RIGHT-SIDED SCIATICA: ICD-10-CM

## 2018-09-03 DIAGNOSIS — M51.36 DDD (DEGENERATIVE DISC DISEASE), LUMBAR: ICD-10-CM

## 2018-09-03 DIAGNOSIS — G89.29 CHRONIC RIGHT-SIDED LOW BACK PAIN WITH RIGHT-SIDED SCIATICA: ICD-10-CM

## 2018-09-03 DIAGNOSIS — M53.3 SACROILIAC JOINT DYSFUNCTION OF RIGHT SIDE: ICD-10-CM

## 2018-09-26 ENCOUNTER — TELEPHONE (OUTPATIENT)
Dept: NEUROSURGERY | Facility: CLINIC | Age: 53
End: 2018-09-26

## 2018-09-26 DIAGNOSIS — M51.36 DDD (DEGENERATIVE DISC DISEASE), LUMBAR: Primary | ICD-10-CM

## 2018-09-26 NOTE — TELEPHONE ENCOUNTER
Patient wanted to know if Dr Gauthier could write a prescription for the electrod pads and wires for her tens unit.  The patient did say he was not the one who prescribed it.

## 2018-09-27 NOTE — TELEPHONE ENCOUNTER
Left message for patient to let her know that Dr. Gauthier is going to write the Rx for the pads and wires for the tens unit.

## 2018-10-03 ENCOUNTER — OUTSIDE FACILITY SERVICE (OUTPATIENT)
Dept: PAIN MEDICINE | Facility: CLINIC | Age: 53
End: 2018-10-03

## 2018-10-03 ENCOUNTER — DOCUMENTATION (OUTPATIENT)
Dept: PAIN MEDICINE | Facility: CLINIC | Age: 53
End: 2018-10-03

## 2018-10-03 PROCEDURE — 27096 INJECT SACROILIAC JOINT: CPT | Performed by: PAIN MEDICINE

## 2018-10-03 NOTE — PROGRESS NOTES
RIGHT Sacroiliac Joint Injection  [facility]    PREOPERATIVE DIAGNOSIS:   Sacroiliac joint dysfunction on the RIGHT    POSTOPERATIVE DIAGNOSIS:  Sacroiliac joint dysfunction on the RIGHT    PROCEDURE:  Sacroiliac Joint Injection, on the RIGHT, with fluoroscopic guidance    PRE-PROCEDURE DISCUSSION WITH PATIENT:    Risks and complications were discussed with the patient prior to starting the procedure and informed consent was obtained.  We discussed various topics including but not limited to bleeding, infection, injury, postprocedural site soreness, painful flareup, worsening of clinical picture, paralysis, coma, and death.     SURGEON:  Rossi Stein MD    REASON FOR PROCEDURE:    Patient has pain consistent with SI pathology on history and physical exam. Positive sacroiliac provocation maneuvers noted.   Tender to palpation over the affected SI joint.    SEDATION:  Versed 2mg & Fentanyl 50 mcg IV  ANESTHETIC AGENT:  Marcaine 0.5%  STEROID AGENT:  40mg DepoMedrol    DESCRIPTON OF PROCEDURE:  After obtaining informed consent, IV access was obtained in the preoperative area.  The patient was transported to the operative suite and placed in the prone position with a pillow under the pelvic area. EKG, blood pressure, and pulse oximeter were monitored. The lumbosacral area was prepped with Chloraprep and draped in a sterile fashion.     Under fluoroscopic guidance the inferior most portion of the RIGHT sacroiliac joint was identified. The overlying skin and subcutaneous tissue was anesthetized with 1% lidocaine. A 22-gauge spinal needle was introduced from the inferior most portion of the joint into the sacroiliac joint under fluoroscopic guidance in the AP dimension with slight oblique rotation to the contralateral side.  Aspiration was negative.  After confirming the position of the needle with fluoroscopy, 1 mL of Omnipaque was injected and after seeing appropriate spread into the joint a  total of 2.5 mL of Marcaine, with approximately 40 mg of DepoMedrol, was injected very slowly.  Vital signs remained stable.  The onset of analgesia was noted.    ESTIMATED BLOOD LOSS:  minimal  SPECIMENS:  None    COMPLICATIONS:  No complications were noted. and There was no indication of vascular uptake on live injection of contrast dye.    TOLERANCE & DISCHARGE CONDITION:    The patient tolerated the procedure well.  The patient was transported to the recovery area without difficulties.  The patient was discharged to home under the care of family in stable and satisfactory condition.    PLAN OF CARE:  1. The patient was given our standard instruction sheet and will resume all medications as per the medication reconciliation sheet.  2. The patient will Return to clinic 4-6 wks.  3. The patient is instructed to keep a pain log hourly for 8 hours after the procedure.

## 2018-10-16 ENCOUNTER — TELEPHONE (OUTPATIENT)
Dept: PAIN MEDICINE | Facility: CLINIC | Age: 53
End: 2018-10-16

## 2018-10-16 NOTE — TELEPHONE ENCOUNTER
Pt called to request an earlier appointment than 10/31/18 since her R sided LBP/hip pain is significantly increased post R S. I. Joint injection. Pt told we would attempt to get an earlier appointment and call her if this is doable.

## 2018-10-17 ENCOUNTER — TELEPHONE (OUTPATIENT)
Dept: PAIN MEDICINE | Facility: CLINIC | Age: 53
End: 2018-10-17

## 2018-10-17 RX ORDER — ACETAMINOPHEN AND CODEINE PHOSPHATE 300; 30 MG/1; MG/1
1 TABLET ORAL DAILY PRN
Qty: 30 TABLET | Refills: 0 | Status: SHIPPED | OUTPATIENT
Start: 2018-10-17 | End: 2018-10-23

## 2018-10-23 ENCOUNTER — OFFICE VISIT (OUTPATIENT)
Dept: PAIN MEDICINE | Facility: CLINIC | Age: 53
End: 2018-10-23

## 2018-10-23 VITALS
BODY MASS INDEX: 27.36 KG/M2 | SYSTOLIC BLOOD PRESSURE: 143 MMHG | HEART RATE: 67 BPM | RESPIRATION RATE: 15 BRPM | DIASTOLIC BLOOD PRESSURE: 90 MMHG | WEIGHT: 154.4 LBS | HEIGHT: 63 IN | OXYGEN SATURATION: 100 % | TEMPERATURE: 98.1 F

## 2018-10-23 DIAGNOSIS — G89.29 OTHER CHRONIC PAIN: Primary | ICD-10-CM

## 2018-10-23 DIAGNOSIS — M53.3 SACROILIAC JOINT DYSFUNCTION: ICD-10-CM

## 2018-10-23 DIAGNOSIS — M47.816 LUMBAR FACET ARTHROPATHY: ICD-10-CM

## 2018-10-23 DIAGNOSIS — M51.36 DDD (DEGENERATIVE DISC DISEASE), LUMBAR: ICD-10-CM

## 2018-10-23 PROCEDURE — 99213 OFFICE O/P EST LOW 20 MIN: CPT | Performed by: NURSE PRACTITIONER

## 2018-10-23 PROCEDURE — 96372 THER/PROPH/DIAG INJ SC/IM: CPT | Performed by: NURSE PRACTITIONER

## 2018-10-23 RX ORDER — KETOROLAC TROMETHAMINE 30 MG/ML
30 INJECTION, SOLUTION INTRAMUSCULAR; INTRAVENOUS ONCE
Status: COMPLETED | OUTPATIENT
Start: 2018-10-23 | End: 2018-10-23

## 2018-10-23 RX ADMIN — KETOROLAC TROMETHAMINE 30 MG: 30 INJECTION, SOLUTION INTRAMUSCULAR; INTRAVENOUS at 10:14

## 2018-10-23 NOTE — PROGRESS NOTES
"CHIEF COMPLAINT  F/U right hip pain. Pt states she feels it is worse. Tylenol #3 helped pain but it made her constipated so she stopped taking it. She takes Aleve and doesn't prefer to take Tramadol because how it affects her (can not take and work) but it does help the pain. States she will be on FMLA for 4 weeks starting next Monday.    Subjective   Cheri Mendoza is a 53 y.o. female  who presents to the office for follow-up of procedure.  She completed a RIGHT Sacroiliac Joint Injection   on  10-03-18 performed by Dr. Stein for management of right hip pain. Patient reports 0% relief from the procedure.     Saw. Dr. Gauthier on 8/22/18.  Recommended SI joint injection versus right lumbar MBB.      Given Tylenol #3 outside of this office visit.  \"constipated\" her \"immediately\".  Still takes Tramadol PRN prescribed by PCP.      Back Pain   This is a chronic problem. The current episode started more than 1 year ago. The problem occurs daily. The problem has been gradually worsening since onset. The pain is present in the lumbar spine and sacro-iliac (RIGHT). The quality of the pain is described as aching and burning. The pain does not radiate. The pain is at a severity of 8/10. The pain is severe. The pain is the same all the time. The symptoms are aggravated by standing and position (WALKING). Associated symptoms include headaches and weakness (right leg). Pertinent negatives include no bladder incontinence, bowel incontinence, chest pain, dysuria or numbness. She has tried NSAIDs, analgesics, muscle relaxant, walking and home exercises for the symptoms. The treatment provided mild relief.          PEG Assessment   What number best describes your pain on average in the past week?7-8  What number best describes how, during the past week, pain has interfered with your enjoyment of life?10  What number best describes how, during the past week, pain has interfered with your general activity?  10    The following " "portions of the patient's history were reviewed and updated as appropriate: allergies, current medications, past family history, past medical history, past social history, past surgical history and problem list.    Review of Systems   Constitutional: Positive for activity change (decreased walks with a crutch on long distances). Negative for appetite change.   HENT: Negative for hearing loss and trouble swallowing.    Eyes: Negative for visual disturbance.   Respiratory: Negative for apnea, chest tightness and shortness of breath.    Cardiovascular: Negative for chest pain.   Gastrointestinal: Positive for nausea (occasionally,from tramadol). Negative for bowel incontinence, constipation and diarrhea (IBS).   Genitourinary: Negative for bladder incontinence, difficulty urinating and dysuria.   Musculoskeletal: Positive for back pain and gait problem (due to back pain).   Neurological: Positive for weakness (right leg) and headaches. Negative for dizziness, seizures, light-headedness and numbness.   Psychiatric/Behavioral: Positive for sleep disturbance (occ). Negative for agitation, confusion and suicidal ideas. The patient is not nervous/anxious.        Vitals:    10/23/18 0950   BP: 143/90   Pulse: 67   Resp: 15   Temp: 98.1 °F (36.7 °C)   SpO2: 100%   Weight: 70 kg (154 lb 6.4 oz)   Height: 160.2 cm (63.07\")   PainSc:   8   PainLoc: Hip  Comment: right       Objective   Physical Exam   Constitutional: She is oriented to person, place, and time. She appears well-developed and well-nourished. She is cooperative. No distress.   HENT:   Head: Normocephalic and atraumatic.   Eyes: Conjunctivae, EOM and lids are normal.   Neck: Trachea normal and normal range of motion. Neck supple.   Cardiovascular: Normal rate.    Pulmonary/Chest: Effort normal. No respiratory distress.   Abdominal: Normal appearance.   Musculoskeletal: Normal range of motion.        Lumbar back: She exhibits tenderness and pain.   +right lumbar " "facet tenderness/loading    Neurological: She is alert and oriented to person, place, and time. She has normal strength and normal reflexes. No sensory deficit. Gait normal.   Skin: Skin is warm, dry and intact.   Psychiatric: She has a normal mood and affect. Her speech is normal and behavior is normal. Judgment and thought content normal. Cognition and memory are normal.   Nursing note and vitals reviewed.      Assessment/Plan   Cheri was seen today for back pain and hip pain.    Diagnoses and all orders for this visit:    Other chronic pain  -     ketorolac (TORADOL) injection 30 mg; Inject 1 mL into the appropriate muscle as directed by prescriber 1 (One) Time.    DDD (degenerative disc disease), lumbar  -     Ambulatory Referral to Physical Therapy [facility]    Sacroiliac joint dysfunction    Lumbar facet arthropathy  -     Case Request      --- Right L2-L5 MBB   -------  Education about Medial Branch Blockade and RF Therapy:    This medial branch blockade (MBB) suggested is intended for diagnostic purposes, with the intent of offering the patient Radiofrequency thermal rhizotomy (RF) if the MBB is diagnostically effective.  The diagnostic blockade is necessary to determine the likelihood that RF therapy could be efficacious in providing long term relief to the patient.    Medial branches are sensory nerve branches that connect to a facet joint and transmit sensations & pain signals from that joint.  Facet is a term for the type of joints found in the spine.  Medial branches are the nerves that go to a facet, and therefore are also sometimes called \"facet joint nerves\" (FJNs).      In a medial branch blockade procedure, xray fluoroscopy is used to verify the locations of the outside of the joint lines which are being targeted.  Under xray guidance, needles are placed to these areas.  Contrast dye is injected to confirm proper placement, with dye flowing over the joint area, and to ensure that the dye does not " flow into unintended areas such as a vein.  When this is confirmed, local anesthetic is injected to block the medial branch at that joint level.      If MBBs are diagnostically successful in blocking pain, then the patient is most likely a great candidate for Radiofrequency of those facet joint nerves.  In the RF procedure, needles are placed to the joint lines in the same fashion, and after testing, the needle tips are heated to thermally treat the nerves, blocking the nerves by in essence damaging the nerves with the heat treatment.       Medically, a successful RF procedure should provide a patient with 50% pain relief or more for at least 6 months.  Clinical experience suggests that successful patients receive relief more in the range of 12 months on average.  We also discussed that a fortunate minority of patients receive therapeutic success from the MBB, and may not require RF ablation.  If a patient receives more than 8 weeks of relief from MBB, then occasional repeat MBB for therapeutic purposes is a very reasonable alternative therapy.  This course of therapy is consistent with our LCDs according to our CMS  in the area, and therefore other insurance providers should follow accordingly.  We will monitor our patients to screen for these therapeutic responders and will offer RF therapy only when necessary.      We discussed that MBB & RF are not without risks.  Guidelines regarding anticoagulant use & neuraxial procedures will be respected.  Patients that are ill or otherwise may be at risk for sepsis will not have their spines accessed by neuraxial injections of any type.  This patient will not be offered these therapies if there is an increased risk.   We discussed that there is a risk of postprocedural pain and also a risk of worsening of clinical picture with these procedures as with any neuraxial procedure.    -------    --- Toradol 30 mg IM   --- Follow-up after procedure         OTTO  REPORT    OTTO report has been reviewed and scanned into the patient's chart.    As the clinician, I personally reviewed the OTTO from 10/22/2018 while the patient was in the office today.    EMR Dragon/Transcription disclaimer:   Much of this encounter note is an electronic transcription/translation of spoken language to printed text. The electronic translation of spoken language may permit erroneous, or at times, nonsensical words or phrases to be inadvertently transcribed; Although I have reviewed the note for such errors, some may still exist.

## 2018-10-23 NOTE — PATIENT INSTRUCTIONS
"-------  Education about Medial Branch Blockade and RF Therapy:    This medial branch blockade (MBB) suggested is intended for diagnostic purposes, with the intent of offering the patient Radiofrequency thermal rhizotomy (RF) if the MBB is diagnostically effective.  The diagnostic blockade is necessary to determine the likelihood that RF therapy could be efficacious in providing long term relief to the patient.    Medial branches are sensory nerve branches that connect to a facet joint and transmit sensations & pain signals from that joint.  Facet is a term for the type of joints found in the spine.  Medial branches are the nerves that go to a facet, and therefore are also sometimes called \"facet joint nerves\" (FJNs).      In a medial branch blockade procedure, xray fluoroscopy is used to verify the locations of the outside of the joint lines which are being targeted.  Under xray guidance, needles are placed to these areas.  Contrast dye is injected to confirm proper placement, with dye flowing over the joint area, and to ensure that the dye does not flow into unintended areas such as a vein.  When this is confirmed, local anesthetic is injected to block the medial branch at that joint level.      If MBBs are diagnostically successful in blocking pain, then the patient is most likely a great candidate for Radiofrequency of those facet joint nerves.  In the RF procedure, needles are placed to the joint lines in the same fashion, and after testing, the needle tips are heated to thermally treat the nerves, blocking the nerves by in essence damaging the nerves with the heat treatment.       Medically, a successful RF procedure should provide a patient with 50% pain relief or more for at least 6 months.  Clinical experience suggests that successful patients receive relief more in the range of 12 months on average.  We also discussed that a fortunate minority of patients receive therapeutic success from the MBB, and may " not require RF ablation.  If a patient receives more than 8 weeks of relief from MBB, then occasional repeat MBB for therapeutic purposes is a very reasonable alternative therapy.  This course of therapy is consistent with our LCDs according to our CMS  in the area, and therefore other insurance providers should follow accordingly.  We will monitor our patients to screen for these therapeutic responders and will offer RF therapy only when necessary.        We discussed that MBB & RF are not without risks.  Guidelines regarding anticoagulant use & neuraxial procedures will be respected.  Patients that are ill or otherwise may be at risk for sepsis will not have their spines accessed by neuraxial injections of any type.  This patient will not be offered these therapies if there is an increased risk.   We discussed that there is a risk of postprocedural pain and also a risk of worsening of clinical picture with these procedures as with any neuraxial procedure.    -------

## 2018-10-30 ENCOUNTER — HOSPITAL ENCOUNTER (OUTPATIENT)
Dept: PHYSICAL THERAPY | Facility: HOSPITAL | Age: 53
Setting detail: THERAPIES SERIES
Discharge: HOME | End: 2018-10-30

## 2018-10-30 DIAGNOSIS — G89.29 CHRONIC RIGHT-SIDED LOW BACK PAIN WITHOUT SCIATICA: ICD-10-CM

## 2018-10-30 DIAGNOSIS — Z74.09 IMPAIRED MOBILITY AND ACTIVITIES OF DAILY LIVING: ICD-10-CM

## 2018-10-30 DIAGNOSIS — M53.3 SI (SACROILIAC) JOINT DYSFUNCTION: Primary | ICD-10-CM

## 2018-10-30 DIAGNOSIS — Z78.9 IMPAIRED MOBILITY AND ACTIVITIES OF DAILY LIVING: ICD-10-CM

## 2018-10-30 DIAGNOSIS — M54.50 CHRONIC RIGHT-SIDED LOW BACK PAIN WITHOUT SCIATICA: ICD-10-CM

## 2018-10-30 DIAGNOSIS — M51.36 DDD (DEGENERATIVE DISC DISEASE), LUMBAR: ICD-10-CM

## 2018-10-30 PROCEDURE — 97162 PT EVAL MOD COMPLEX 30 MIN: CPT | Performed by: PHYSICAL THERAPIST

## 2018-10-31 ENCOUNTER — DOCUMENTATION (OUTPATIENT)
Dept: PAIN MEDICINE | Facility: CLINIC | Age: 53
End: 2018-10-31

## 2018-10-31 ENCOUNTER — OUTSIDE FACILITY SERVICE (OUTPATIENT)
Dept: PAIN MEDICINE | Facility: CLINIC | Age: 53
End: 2018-10-31

## 2018-10-31 PROCEDURE — 64495 INJ PARAVERT F JNT L/S 3 LEV: CPT | Performed by: PAIN MEDICINE

## 2018-10-31 PROCEDURE — 64493 INJ PARAVERT F JNT L/S 1 LEV: CPT | Performed by: PAIN MEDICINE

## 2018-10-31 PROCEDURE — 64494 INJ PARAVERT F JNT L/S 2 LEV: CPT | Performed by: PAIN MEDICINE

## 2018-10-31 NOTE — THERAPY EVALUATION
Outpatient Physical Therapy Ortho Initial Evaluation  [facility]     Patient Name: Cheri Mendoza  : 1965  MRN: [id number]  Today's Date: 10/31/2018      Visit Date: 10/30/2018    Patient Active Problem List   Diagnosis   • Chronic constipation   • Blues   • Adult hypothyroidism   • Encounter for screening for malignant neoplasm of colon   • Acute right-sided low back pain with right-sided sciatica   • DDD (degenerative disc disease), lumbar   • Chronic right-sided low back pain with right-sided sciatica   • Right hip pain   • Sacroiliac joint dysfunction        Past Medical History:   Diagnosis Date   • GERD (gastroesophageal reflux disease)    • H/O colonoscopy    • Hypertension    • Hypothyroidism    • Low back pain    • Pneumonia 10/2011        Past Surgical History:   Procedure Laterality Date   • BREAST AUGMENTATION  2007   • CARPAL TUNNEL RELEASE WITH CUBITAL TUNNEL RELEASE Right    • COLONOSCOPY N/A 2018    Procedure: COLONOSCOPY to cecum and t.i. with polypectomy;  Surgeon: Rima Luis MD;  Location: [facility] ENDOSCOPY;  Service:    • HIP DEBRIDEMENT Right    • KNEE CARTILAGE SURGERY Right    • SINUS SURGERY     • SKIN BIOPSY      hand - precancerous   • SKIN BIOPSY         Visit Dx:     ICD-10-CM ICD-9-CM   1. SI (sacroiliac) joint dysfunction M53.3 724.6   2. Chronic right-sided low back pain without sciatica M54.5 724.2    G89.29 338.29   3. Impaired mobility and activities of daily living Z74.09 799.89   4. DDD (degenerative disc disease), lumbar M51.36 722.52             Patient History     Row Name 10/31/18 0900             History    Chief Complaint Balance Problems;Difficulty Walking;Difficulty with daily activities;Fatigue/poor endurance;Muscle weakness;Pain  -CK      Type of Pain Hip pain;Back pain  -CK      Brief Description of Current Complaint In 2016 I was having some R hip pain. I had a labral debridement/resurfacing in [place] and the pain has never stopped.  I have seen Dr. BUENROSTRO and he ruled out avascular necrosis or issues of the hip joint. I am seeing Dr. Gauthier for my back issues and Rossi Stein for pain management. I have done land therapy with Flo at [facility] to no avail. I have tried an SI belt, SI injections, traction, etc. I was in more pain after my last SI injection. Now I am trying injections to determine if RFA with help (starting tomorrow). I cannot walk or stand for any length of time (8/10 pain). sitting (3-4/10 pain). I just took FMLA to address my issues and I am changing to a lower stress job starting Nov. 26th. I am unable to sleep on the R side at all. I use my tens machine daily.   -CK      Previous treatment for THIS PROBLEM Injections;Massage;Pain Management;Rehabilitation;Medication  -CK      Patient/Caregiver Goals Relieve pain;Improve strength;Return to prior level of function  -CK      Current Tobacco Use none  -CK      Patient's Rating of General Health Good  -CK      What clinical tests have you had for this problem? X-ray;CT scan;MRI;Myelogram;Arthrogram  -CK      Results of Clinical Tests lumbar stenosis and degenerative changes  -CK      Are you or can you be pregnant No  -CK         Pain     Pain Location Hip;Buttocks;Back  -CK      Pain at Present 8  -CK      Pain at Best 6  -CK      Pain at Worst 10  -CK      Pain Frequency Constant/continuous  -CK      Pain Description Throbbing;Stabbing;Sharp  -CK      What Performance Factors Make the Current Problem(s) WORSE? standing/walking  -CK      What Performance Factors Make the Current Problem(s) BETTER? prayer position  -CK      Is your sleep disturbed? Yes  -CK      Difficulties at work? yes  -CK      Difficulties with ADL's? yes  -CK      Difficulties with recreational activities? yes  -CK         Fall Risk Assessment    Any falls in the past year: No  -CK         Services    Prior Rehab/Home Health Experiences No  -CK         Daily Activities    Primary Language English  -CK      Are  you able to read Yes  -CK      Are you able to write Yes  -CK      How does patient learn best? Listening;Pictures/Video;Demonstration  -CK      Teaching needs identified Home Exercise Program;Management of Condition  -CK      Patient is concerned about/has problems with Climbing Stairs;Difficulty with self care (i.e. bathing, dressing, toileting:;Flexibility;Performing home management (household chores, shopping, care of dependents);Performing job responsibilities/community activities (work, school,;Performing sports, recreation, and play activities;Sitting;Standing;Transfers (getting out of a chair, bed);Walking  -CK      Does patient have problems with the following? Depression;Other (comment)   emotional problems  -CK      Barriers to learning None  -CK      Pt Participated in POC and Goals Yes  -CK         Safety    Are you being hurt, hit, or frightened by anyone at home or in your life? No  -CK      Are you being neglected by a caregiver No  -CK        User Key  (r) = Recorded By, (t) = Taken By, (c) = Cosigned By    Initials Name Provider Type    CK Albert Luciano, PT Physical Therapist                PT Ortho     Row Name 10/31/18 0900       Posture/Observations    Alignment Options Lumbar lordosis;Iliac crests  -CK    Lumbar lordosis Decreased  -CK    Iliac crests Normal  -CK    Posture/Observations Comments patient stands with slumped posture fwd/to the L. Possible LLD vs. scoliosis observed.  -CK       DTR- Lower Quarter Clearing    Patellar tendon (L2-4) Bilateral:;2- Normal response  -CK    Achilles tendon (S1-2) Bilateral:;1- Minimal response  -CK       Neural Tension Signs- Lower Quarter Clearing    SLR Bilateral:;Negative  -CK    Prone knee flexion Bilateral:;Negative  -CK       Sensory Screen for Light Touch- Lower Quarter Clearing    L1 (inguinal area) Bilateral:;Intact  -CK    L2 (anterior mid thigh) Bilateral:;Intact  -CK    L3 (distal anterior thigh) Bilateral:;Intact  -CK    L4 (medial lower  leg/foot) Bilateral:;Intact  -CK    L5 (lateral lower leg/great toe) Bilateral:;Intact  -CK    S1 (bottom of foot) Bilateral:;Intact  -CK       Myotomal Screen- Lower Quarter Clearing    Hip flexion (L2) Bilateral:;4+ (Good +)  -CK    Knee extension (L3) Bilateral:;4+ (Good +)  -CK    Ankle PF (S1) Bilateral:;4 (Good)  -CK    Knee flexion (S2) Bilateral:;4+ (Good +)  -CK       Lumbar ROM Screen- Lower Quarter Clearing    Lumbar Flexion Normal   able to touch the floor but with pain  -CK    Lumbar Extension Normal   with pain  -CK    Lumbar Lateral Flexion Impaired   Limited 50% Bilaterally  -CK    Lumbar Rotation Impaired   limtied 25%  -CK       SI/Hip Screen- Lower Quarter Clearing    Gaenslen's test Right:;Positive  -CK    Sherman's/Garret's test Negative  -CK    Posterior thigh sheer Right:;Positive  -CK    Pain in Chitra's area Right:;Positive  -CK       Lumbar/SI Special Tests    FAIR Test (Piriformis Syndrome) Right:;Positive  -CK       Special Lumbosacral Questions    Are you pregnant? No  -CK    Have you had any abdominal surgeries? No  -CK    Do you have pain when standing on one leg? No  -CK    Do you have pain going up/down stairs? Yes  -CK       Hip Special Tests    Hip scour test (labral vs hip pathology) Right:;Negative  -CK    Stinchfield test (hip vs back pathology) Right:;Negative  -CK       General ROM    GENERAL ROM COMMENTS BLE AROM full  -CK       MMT (Manual Muscle Testing)    General MMT Comments B hip abd: 4/5, B hip ext: 4/5  -CK       Flexibility    Flexibility Tested? Lower Extremity  -CK       Lower Extremity Flexibility    Hamstrings Bilateral:;Mildly limited  -CK    Hip External Rotators Bilateral:;Moderately limited  -CK    Quadratus Lumborum Bilateral:;Moderately limited  -CK       Balance Skills Training    SLS 4-5 sec B  -CK       Gait/Stairs Assessment/Training    Blaine Level (Gait) conditional independence  -CK    Assistive Device (Gait) cane, straight  -CK     "Deviations/Abnormal Patterns (Gait) antalgic;ana decreased;stride length decreased  -CK    Bilateral Gait Deviations forward flexed posture;leans left  -CK    Right Sided Gait Deviations weight shift ability decreased;heel strike decreased  -CK    Comment (Gait/Stairs) stopped twice during 50\" ambulation assessment to bend forward and rest arms on legs due to pain  -CK      User Key  (r) = Recorded By, (t) = Taken By, (c) = Cosigned By    Initials Name Provider Type    CK Albert Luciano PT Physical Therapist                      Therapy Education  Education Details: Findings from evaluation. Plan to transition to aquatic based therapy  Program: New  How Provided: Verbal  Provided to: Patient  Level of Understanding: Verbalized           PT OP Goals     Row Name 10/31/18 1000          PT Short Term Goals    STG 1 Patient will report improved ability to sleep by 25%  -CK     STG 2 Patient will report no increase in pain for 24 hours following aqua therapy session.   -CK     STG 3 Patient will be able to ambulate in aquatic environment for 10-15 min without rest break.   -CK        Long Term Goals    LTG 1 Patient will report improved ability to sleep/lie on R side by 75%.  -CK     LTG 2 Patient will demonstrate understanding of comprehensive aquatic program for self management once complete with formal therapy  -CK     LTG 3 Patient will report decreased pain to 4-5/10 with all standing/walking activities.  -CK     LTG 4 Patient will be able to ambulate with upright posture and proper heel-toe gait pattern using SC for imroved household and community mobility.  -CK     LTG 5 Patient will report </= 50% score on Oswestry Index, indicating improved percieved function with all daily activties.  -CK       User Key  (r) = Recorded By, (t) = Taken By, (c) = Cosigned By    Initials Name Provider Type    Albert Adame, PT Physical Therapist                PT Assessment/Plan     Row Name 10/31/18 0927          PT " "Assessment    Functional Limitations Impaired gait;Impaired locomotion;Limitation in home management;Limitations in community activities;Performance in work activities;Performance in sport activities;Performance in self-care ADL;Performance in leisure activities;Limitations in functional capacity and performance  -CK     Impairments Balance;Endurance;Gait;Posture;Poor body mechanics;Pain;Range of motion;Impaired muscle endurance;Muscle strength  -CK     Assessment Comments Ms. Mendoza is a 53 year old female who presents to clinic with c/o evolving R back/buttock/hip pain. She has comorbid history of R hip debridement/resurfacing surgery. She has tried unsuccesfully to date: land PT, SI belt, Si injections, traction, and massage. She is to trial injections for possible RFA tomorrow. Her personal factors including depression, emotional problems, and a high stress job interefere with her ability to manage her chronic pain. She reports pain is exacerbated by standing/walking of any length. She ambulates with SC and fwd/laterally flexed gait pattern due to high levels of pain. Sitting decreases but does not alleviate pain. She demonstrated positive testing for Chitra's area, posterior shear, and Gaenslen's test indicative of SI involvement. However, SI injections did not alleviate any of her pain. Lumbar imaging is positive for \"mild\" degenerative disc changes at L4-5. AROM is limited in rotation and sidebending. Flexion/ext are full but painful. BLE is grossly 4+/5, exception of hip extensors/abductors at 4/5. She self scored a 70% disability on the Oswestry Index. She is appropriate for skilled aquatic therapy at this time to address her deficits in order to improve her participation in daily activities with decreased pain.   -CK     Please refer to paper survey for additional self-reported information Yes  -CK     Rehab Potential Fair  -CK     Patient/caregiver participated in establishment of treatment plan and " goals Yes  -CK     Patient would benefit from skilled therapy intervention Yes  -CK        PT Plan    PT Frequency 2x/week  -CK     Predicted Duration of Therapy Intervention (Therapy Eval) 8 weeks  -CK     Planned CPT's? PT EVAL MOD COMPLELITY: 21882;PT THER PROC EA 15 MIN: 50537;PT MANUAL THERAPY EA 15 MIN: 11819;PT GAIT TRAINING EA 15 MIN: 42750;PT AQUATIC THERAPY EA 15 MIN: 08869;PT HOT OR COLD PACK TREAT MCARE;PT ELECTRICAL STIM UNATTEND: ;PT ULTRASOUND EA 15 MIN: 44526;PT TRACTION LUMBAR: 25951  -CK     PT Plan Comments Transfer to aqua program  -CK       User Key  (r) = Recorded By, (t) = Taken By, (c) = Cosigned By    Initials Name Provider Type    CK Albert Luciano, PT Physical Therapist                              Outcome Measure Options: Modifed Owestry, Lower Extremity Functional Scale (LEFS)  Lower Extremity Functional Index  Any of your usual work, housework or school activities:  (to be filled out at first treatment session)  Modified Oswestry  Modified Oswestry Score/Comments: 70%      Time Calculation:     Therapy Suggested Charges     Code   Minutes Charges    None             Start Time: 1615  Stop Time: 1710  Time Calculation (min): 55 min  Total Timed Code Minutes- PT: 0 minute(s)     Therapy Charges for Today     Code Description Service Date Service Provider Modifiers Qty    80668785810  PT EVAL MOD COMPLEXITY 4 10/30/2018 Albert Luciano, PT GP 1          PT G-Codes  Outcome Measure Options: Modifed Owestry, Lower Extremity Functional Scale (LEFS)  Modified Oswestry Score/Comments: 70%         Albert Luciano PT  10/31/2018

## 2018-10-31 NOTE — PROGRESS NOTES
RIGHT L2-5 Lumbar Medial Branch Blockade  [facility]    PREOPERATIVE DIAGNOSIS:  Lumbar spondylosis without myelopathy    POSTOPERATIVE DIAGNOSIS:  Lumbar spondylosis without myelopathy    PROCEDURE:   Diagnostic Right Lumbar Medial Branch Nerve Blockades, with fluoroscopy:  L2, L3, L4, and L5 nerves (at the L3, L4, L5 transverse processes and the sacral alar groove) to block facet joints L3-4, L4-5, and L5-S1  1. 49679 -- Lumbar Facet block, 1st Level  2. 94010 -- Lumbar Facet block, 2nd  Level  3. 82870 -- Lumbar Facet block, 3rd Level    PRE-PROCEDURE DISCUSSION WITH PATIENT:    Risks and complications were discussed with the patient prior to starting the procedure and informed consent was obtained.      SURGEON:   Rossi Stein MD    REASON FOR PROCEDURE:    The patient complains of pain that seems to have a significant axial component, Increased back pain on range of motion exams and Pain on extension of the lumbar spine    SEDATION:  Versed 4mg IV  ANESTHETIC:  Marcaine 0.25%  STEROID:  Methylprednisolone (DEPO MEDROL) 40mg/ml  TOTAL VOLUME OF SOLUTION:  4 mL    DESCRIPTON OF PROCEDURE:  After obtaining informed consent, IV access was obtained in the preoperative area.   The patient was taken to the operating room.  The patient was placed in the prone position with a pillow under the abdomen. All pressure points were well padded.  EKG, blood pressure, and pulse oximeter were monitored.  The patient was monitored and sedated by the RN under my direction. The lumbosacral area was prepped with Chloraprep and draped in a sterile fashion. Under fluoroscopic guidance the transverse processes of the L3, L4, and L5 vertebrae at the junctions of the superior articular processes were identified on the affected side.  Also identified was the groove between the ala and the superior articular process of the sacrum on the ipsilateral side.  Skin and subcutaneous tissue were anesthetized with 1%  lidocaine above each of these points. A spinal needle was introduced under fluoroscopic guidance at the above junctions. Aspiration was negative for blood and CSF.  After confirming the position of the needle with fluoroscope in all views, 1 mL of the anesthetic solution noted above was injected at each of these points.  Needles were removed intact from each of the areas.   Onset of analgesia was noted.  Vital signs remained stable throughout.      ESTIMATED BLOOD LOSS:  <5 mL  SPECIMENS:  none    COMPLICATIONS:   No complications were noted.    TOLERANCE & DISCHARGE CONDITION:    The patient tolerated the procedure well.  The patient was transported to the recovery area without difficulties.  The patient was discharged to home under the care of family in stable and satisfactory condition.    PLAN OF CARE:  1. The patient was given our standard instruction sheet.  2. We discussed that Lumbar Medial Branch Blockade is a diagnostic procedure in consideration for radiofrequency ablation if two diagnostic procedures prove to be positive for significant benefit.  If sustained relief of 6 to eight weeks is obtained, then an alternative plan could be therapeutic lumbar branch blockades.  3. The patient is asked to keep a pain log each hour for 8 hours after the procedure today.  4. The patient will  Return to clinic 4-6 wks  5. The patient will resume all medications as per the medication reconciliation sheet.

## 2018-11-01 ENCOUNTER — APPOINTMENT (OUTPATIENT)
Dept: PHYSICAL THERAPY | Facility: HOSPITAL | Age: 53
End: 2018-11-01

## 2018-11-05 ENCOUNTER — HOSPITAL ENCOUNTER (OUTPATIENT)
Dept: PHYSICAL THERAPY | Facility: HOSPITAL | Age: 53
Setting detail: THERAPIES SERIES
Discharge: HOME | End: 2018-11-05

## 2018-11-05 DIAGNOSIS — M53.3 SI (SACROILIAC) JOINT DYSFUNCTION: Primary | ICD-10-CM

## 2018-11-05 DIAGNOSIS — Z78.9 IMPAIRED MOBILITY AND ACTIVITIES OF DAILY LIVING: ICD-10-CM

## 2018-11-05 DIAGNOSIS — Z74.09 IMPAIRED MOBILITY AND ACTIVITIES OF DAILY LIVING: ICD-10-CM

## 2018-11-05 DIAGNOSIS — M54.50 CHRONIC RIGHT-SIDED LOW BACK PAIN WITHOUT SCIATICA: ICD-10-CM

## 2018-11-05 DIAGNOSIS — M51.36 DDD (DEGENERATIVE DISC DISEASE), LUMBAR: ICD-10-CM

## 2018-11-05 DIAGNOSIS — G89.29 CHRONIC RIGHT-SIDED LOW BACK PAIN WITHOUT SCIATICA: ICD-10-CM

## 2018-11-05 PROCEDURE — 97113 AQUATIC THERAPY/EXERCISES: CPT | Performed by: PHYSICAL THERAPIST

## 2018-11-05 NOTE — THERAPY TREATMENT NOTE
Outpatient Physical Therapy Ortho Treatment Note  [facility]     Patient Name: Cheri Mendoza  : 1965  MRN: [id number]  Today's Date: 2018      Visit Date: 2018    Visit Dx:    ICD-10-CM ICD-9-CM   1. SI (sacroiliac) joint dysfunction M53.3 724.6   2. Chronic right-sided low back pain without sciatica M54.5 724.2    G89.29 338.29   3. Impaired mobility and activities of daily living Z74.09 799.89   4. DDD (degenerative disc disease), lumbar M51.36 722.52       Patient Active Problem List   Diagnosis   • Chronic constipation   • Blues   • Adult hypothyroidism   • Encounter for screening for malignant neoplasm of colon   • Acute right-sided low back pain with right-sided sciatica   • DDD (degenerative disc disease), lumbar   • Chronic right-sided low back pain with right-sided sciatica   • Right hip pain   • Sacroiliac joint dysfunction        Past Medical History:   Diagnosis Date   • GERD (gastroesophageal reflux disease)    • H/O colonoscopy    • Hypertension    • Hypothyroidism    • Low back pain    • Pneumonia 10/2011        Past Surgical History:   Procedure Laterality Date   • BREAST AUGMENTATION  2007   • CARPAL TUNNEL RELEASE WITH CUBITAL TUNNEL RELEASE Right    • COLONOSCOPY N/A 2018    Procedure: COLONOSCOPY to cecum and t.i. with polypectomy;  Surgeon: Rima Luis MD;  Location: [facility] ENDOSCOPY;  Service:    • HIP DEBRIDEMENT Right    • KNEE CARTILAGE SURGERY Right    • SINUS SURGERY     • SKIN BIOPSY      hand - precancerous   • SKIN BIOPSY                               PT Assessment/Plan     Row Name 18 1534          PT Assessment    Assessment Comments First session in aquatic environment today. Patient demonstrated significant gait deviations observed more today as she had on a swimsuit not baggy clothing. R fwd/lateral trunk flexion with straight cane. Cane may not be proper height from observation. Initiated basic aquatic program for mobility and  AROM. All exercises performed in small painfree ROM.  Majority of session performed in deep water for decompression purposes. Patient able to stand with erect posture in chest depth water.   -CK       User Key  (r) = Recorded By, (t) = Taken By, (c) = Cosigned By    Initials Name Provider Type    CK Albert Luciano, PT Physical Therapist                    Exercises     Row Name 11/05/18 1500             Subjective Comments    Subjective Comments I had the medial branch blocks done last week and I have yet to notice a difference.   -CK         Subjective Pain    Able to rate subjective pain? yes  -CK      Pre-Treatment Pain Level 8  -CK      Post-Treatment Pain Level 7  -CK         Aquatics    Aquatics performed? Yes  -CK         Aquatics LE    Water Walk forward;side;backward   x 3 laps 15” across pool with TA  -CK      Stretch 1 B calf stretch 2 x 30 sec  -CK      Stretch 2 B piriformis stretch 2 x30 sec  -CK      Stretch 3 B HS/adductor/sciatic stretch x 12 SN  -CK      Stretch Other 1 B quad stretch 2 x30 SN  -CK      Stretch Other 2 DKTC on wall 5 x 10sec  -CK      Hip Abd/Add B 12x with TA  -CK      March in Place deep water x 3 laps,15”  -CK      Toe/Heel Raises tip toes/tandem 15” x 3 laps  -CK      Uni-Clock B hip circles cw/ccw 10/10  -CK      Bicycle seated x 2min  -CK      Flutter/Scissor seated x 1 min/-  -CK         Exercise 1    Exercise Name 1 Hot tub for jet massage, end of session  -CK        User Key  (r) = Recorded By, (t) = Taken By, (c) = Cosigned By    Initials Name Provider Type    CK Albert Luciano, PT Physical Therapist                               PT OP Goals     Row Name 11/05/18 1500          PT Short Term Goals    STG 1 Patient will report improved ability to sleep by 25%  -CK     STG 1 Progress Ongoing  -CK     STG 2 Patient will report no increase in pain for 24 hours following aqua therapy session.   -CK     STG 2 Progress Ongoing  -CK     STG 3 Patient will be able to ambulate in  aquatic environment for 10-15 min without rest break.   -CK     STG 3 Progress Ongoing  -CK        Long Term Goals    LTG 1 Patient will report improved ability to sleep/lie on R side by 75%.  -CK     LTG 1 Progress Ongoing  -CK     LTG 2 Patient will demonstrate understanding of comprehensive aquatic program for self management once complete with formal therapy  -CK     LTG 2 Progress Ongoing  -CK     LTG 3 Patient will report decreased pain to 4-5/10 with all standing/walking activities.  -CK     LTG 3 Progress Ongoing  -CK     LTG 4 Patient will be able to ambulate with upright posture and proper heel-toe gait pattern using SC for imroved household and community mobility.  -CK     LTG 4 Progress Ongoing  -CK     LTG 5 Patient will report </= 50% score on Oswestry Index, indicating improved percieved function with all daily activties.  -CK     LTG 5 Progress Ongoing  -CK       User Key  (r) = Recorded By, (t) = Taken By, (c) = Cosigned By    Initials Name Provider Type    CK Albert Luciano, PT Physical Therapist                         Time Calculation:   Start Time: 1430  Stop Time: 1515  Time Calculation (min): 45 min  Total Timed Code Minutes- PT: 45 minute(s)  Therapy Suggested Charges     Code   Minutes Charges    None           Therapy Charges for Today     Code Description Service Date Service Provider Modifiers Qty    14100701730 HC PT AQUATIC THERAPY EA 15 MIN 11/5/2018 Albert Luciano PT GP 3                    Albert Luciano PT  11/5/2018

## 2018-11-14 ENCOUNTER — HOSPITAL ENCOUNTER (OUTPATIENT)
Dept: PHYSICAL THERAPY | Facility: HOSPITAL | Age: 53
Setting detail: THERAPIES SERIES
Discharge: HOME | End: 2018-11-14

## 2018-11-14 DIAGNOSIS — M53.3 SI (SACROILIAC) JOINT DYSFUNCTION: Primary | ICD-10-CM

## 2018-11-14 DIAGNOSIS — M51.36 DDD (DEGENERATIVE DISC DISEASE), LUMBAR: ICD-10-CM

## 2018-11-14 DIAGNOSIS — G89.29 CHRONIC RIGHT-SIDED LOW BACK PAIN WITHOUT SCIATICA: ICD-10-CM

## 2018-11-14 DIAGNOSIS — Z78.9 IMPAIRED MOBILITY AND ACTIVITIES OF DAILY LIVING: ICD-10-CM

## 2018-11-14 DIAGNOSIS — M54.50 CHRONIC RIGHT-SIDED LOW BACK PAIN WITHOUT SCIATICA: ICD-10-CM

## 2018-11-14 DIAGNOSIS — Z74.09 IMPAIRED MOBILITY AND ACTIVITIES OF DAILY LIVING: ICD-10-CM

## 2018-11-14 PROCEDURE — 97113 AQUATIC THERAPY/EXERCISES: CPT | Performed by: PHYSICAL THERAPIST

## 2018-11-14 NOTE — THERAPY TREATMENT NOTE
Outpatient Physical Therapy Ortho Treatment Note  [facility]     Patient Name: Cheri Mendoza  : 1965  MRN: [id number]  Today's Date: 2018      Visit Date: 2018    Visit Dx:    ICD-10-CM ICD-9-CM   1. SI (sacroiliac) joint dysfunction M53.3 724.6   2. Chronic right-sided low back pain without sciatica M54.5 724.2    G89.29 338.29   3. Impaired mobility and activities of daily living Z74.09 799.89   4. DDD (degenerative disc disease), lumbar M51.36 722.52       Patient Active Problem List   Diagnosis   • Chronic constipation   • Blues   • Adult hypothyroidism   • Encounter for screening for malignant neoplasm of colon   • Acute right-sided low back pain with right-sided sciatica   • DDD (degenerative disc disease), lumbar   • Chronic right-sided low back pain with right-sided sciatica   • Right hip pain   • Sacroiliac joint dysfunction        Past Medical History:   Diagnosis Date   • GERD (gastroesophageal reflux disease)    • H/O colonoscopy    • Hypertension    • Hypothyroidism    • Low back pain    • Pneumonia 10/2011        Past Surgical History:   Procedure Laterality Date   • BREAST AUGMENTATION  2007   • CARPAL TUNNEL RELEASE WITH CUBITAL TUNNEL RELEASE Right    • HIP DEBRIDEMENT Right    • KNEE CARTILAGE SURGERY Right    • SINUS SURGERY     • SKIN BIOPSY      hand - precancerous   • SKIN BIOPSY                         PT Assessment/Plan     Row Name 18 1133          PT Assessment    Assessment Comments  Second session in aquatic envrionment. Hiatus from therapy secondary being out of town to [place] to care for her ailing mother. Reports plans to start land therapy tomorrow for dry needling to the hip. Discussion on not “over performing activities” and not performing activities that arent appropriate at this time as she reports she wants to “try inline skating.” Suggested we work on improving her ambulation/gait pattern with less pain before skating. Continued with  basic exercises from last session with addition of a few core strengthening exercises. She demonstrated improved posture in aquatic environment with walking tasks.   -CK       User Key  (r) = Recorded By, (t) = Taken By, (c) = Cosigned By    Initials Name Provider Type    CK Albert Luciano, PT Physical Therapist              Exercises     Row Name 11/14/18 1000             Subjective Comments    Subjective Comments  I did really well after the first session. I was actually encouraged. I start land PT tomorrow for dry needling to my hips  -CK         Subjective Pain    Able to rate subjective pain?  yes  -CK      Pre-Treatment Pain Level  7  -CK      Post-Treatment Pain Level  6  -CK         Aquatics    Aquatics performed?  Yes  -CK      41557 - Aquatic Therapy Minutes  45  -CK         Aquatics LE    Water Walk  forward;side;backward x 3 laps 15” across pool with TA  -CK      Stretch 1  B calf stretch 2 x 30 sec  -CK      Stretch 2  B piriformis stretch 2 x30 sec  -CK      Stretch 3  B HS/adductor/sciatic stretch x 15 SN  -CK      Stretch Other 2  DKTC on wall 5 x 10sec  -CK      Abdominals  noodle LN, x 10 rectus and obliques  -CK      Hip Abd/Add  B 12x with TA  -CK      March in Place  deep water x 3 laps,15”  -CK      Toe/Heel Raises  tip toes/tandem 15” x 3 laps  -CK      Uni-Squat  B leg press x 15,LN  -CK      Uni-Clock  B hip circles cw/ccw 10/10  -CK      Step Ups  sit to stand from bench w/ gluteal squeeze at top x 8  -CK      Bicycle  seated x 2min  -CK         Exercise 1    Exercise Name 1  Hot tub for jet massage, end of session  -CK        User Key  (r) = Recorded By, (t) = Taken By, (c) = Cosigned By    Initials Name Provider Type    CK Albert Luciano, PT Physical Therapist                         PT OP Goals     Row Name 11/14/18 1100          PT Short Term Goals    STG 1  Patient will report improved ability to sleep by 25%  -CK     STG 1 Progress  Ongoing  -CK     STG 1 Progress Comments  she  reports sleeping 6hours last night. normal is 3-4  -CK     STG 2  Patient will report no increase in pain for 24 hours following aqua therapy session.   -CK     STG 2 Progress  Ongoing  -CK     STG 2 Progress Comments  decreased pain after initial session per her report  -CK     STG 3  Patient will be able to ambulate in aquatic environment for 10-15 min without rest break.   -CK     STG 3 Progress  Ongoing  -CK     STG 3 Progress Comments  approx 10 min at this time with improved posture demonstrated  -CK        Long Term Goals    LTG 1  Patient will report improved ability to sleep/lie on R side by 75%.  -CK     LTG 1 Progress  Ongoing  -CK     LTG 2  Patient will demonstrate understanding of comprehensive aquatic program for self management once complete with formal therapy  -CK     LTG 2 Progress  Ongoing  -CK     LTG 3  Patient will report decreased pain to 4-5/10 with all standing/walking activities.  -CK     LTG 3 Progress  Ongoing  -CK     LTG 4  Patient will be able to ambulate with upright posture and proper heel-toe gait pattern using SC for imroved household and community mobility.  -CK     LTG 4 Progress  Ongoing  -CK     LTG 5  Patient will report </= 50% score on Oswestry Index, indicating improved percieved function with all daily activties.  -CK     LTG 5 Progress  Ongoing  -CK       User Key  (r) = Recorded By, (t) = Taken By, (c) = Cosigned By    Initials Name Provider Type    CK Albert Luciano, PT Physical Therapist                         Time Calculation:   Start Time: 1030  Stop Time: 1115  Time Calculation (min): 45 min  Total Timed Code Minutes- PT: 45 minute(s)  Therapy Suggested Charges     Code   Minutes Charges    36344 (CPT®) Hc Pt Neuromusc Re Education Ea 15 Min      11933 (CPT®) Hc Pt Ther Proc Ea 15 Min      60161 (CPT®) Hc Gait Training Ea 15 Min      01015 (CPT®) Hc Pt Therapeutic Act Ea 15 Min      39287 (CPT®) Hc Pt Manual Therapy Ea 15 Min      77112 (CPT®) Hc Pt Ther Massage-  Per 15 Min      62669 (CPT®) Hc Pt Iontophoresis Ea 15 Min      32191 (CPT®) Hc Pt Elec Stim Ea-Per 15 Min      91860 (CPT®) Hc Pt Ultrasound Ea 15 Min      25374 (CPT®) Hc Pt Self Care/Mgmt/Train Ea 15 Min      59399 (CPT®) Hc Pt Prosthetic (S) Train Initial Encounter, Each 15 Min      97613 (CPT®) Hc Orthotic(S) Mgmt/Train Initial Encounter, Each 15min      17094 (CPT®) Hc Pt Aquatic Therapy Ea 15 Min 45 3    99515 (CPT®) Hc Pt Orthotic(S)/Prosthetic(S) Encounter, Each 15 Min       (CPT®) Hc Pt Electrical Stim Unattended      Total  45 3        Therapy Charges for Today     Code Description Service Date Service Provider Modifiers Qty    38534205875 HC PT AQUATIC THERAPY EA 15 MIN 11/14/2018 Albert Luciano, PT GP 3                    Albert Luciano, PT  11/14/2018

## 2018-11-19 ENCOUNTER — HOSPITAL ENCOUNTER (OUTPATIENT)
Dept: PHYSICAL THERAPY | Facility: HOSPITAL | Age: 53
Setting detail: THERAPIES SERIES
Discharge: HOME | End: 2018-11-19

## 2018-11-19 DIAGNOSIS — M51.36 DDD (DEGENERATIVE DISC DISEASE), LUMBAR: ICD-10-CM

## 2018-11-19 DIAGNOSIS — Z78.9 IMPAIRED MOBILITY AND ACTIVITIES OF DAILY LIVING: ICD-10-CM

## 2018-11-19 DIAGNOSIS — M54.50 CHRONIC RIGHT-SIDED LOW BACK PAIN WITHOUT SCIATICA: Primary | ICD-10-CM

## 2018-11-19 DIAGNOSIS — G89.29 CHRONIC RIGHT-SIDED LOW BACK PAIN WITHOUT SCIATICA: Primary | ICD-10-CM

## 2018-11-19 DIAGNOSIS — M53.3 SI (SACROILIAC) JOINT DYSFUNCTION: ICD-10-CM

## 2018-11-19 DIAGNOSIS — Z74.09 IMPAIRED MOBILITY AND ACTIVITIES OF DAILY LIVING: ICD-10-CM

## 2018-11-19 PROCEDURE — 97113 AQUATIC THERAPY/EXERCISES: CPT | Performed by: PHYSICAL THERAPIST

## 2018-11-19 NOTE — THERAPY TREATMENT NOTE
Outpatient Physical Therapy Ortho Treatment Note  [facility]     Patient Name: Cheri Mendoza  : 1965  MRN: [id number]  Today's Date: 2018      Visit Date: 2018    Visit Dx:    ICD-10-CM ICD-9-CM   1. Chronic right-sided low back pain without sciatica M54.5 724.2    G89.29 338.29   2. Impaired mobility and activities of daily living Z74.09 799.89   3. DDD (degenerative disc disease), lumbar M51.36 722.52   4. SI (sacroiliac) joint dysfunction M53.3 724.6       Patient Active Problem List   Diagnosis   • Chronic constipation   • Blues   • Adult hypothyroidism   • Encounter for screening for malignant neoplasm of colon   • Acute right-sided low back pain with right-sided sciatica   • DDD (degenerative disc disease), lumbar   • Chronic right-sided low back pain with right-sided sciatica   • Right hip pain   • Sacroiliac joint dysfunction        Past Medical History:   Diagnosis Date   • GERD (gastroesophageal reflux disease)    • H/O colonoscopy    • Hypertension    • Hypothyroidism    • Low back pain    • Pneumonia 10/2011        Past Surgical History:   Procedure Laterality Date   • BREAST AUGMENTATION  2007   • CARPAL TUNNEL RELEASE WITH CUBITAL TUNNEL RELEASE Right    • HIP DEBRIDEMENT Right    • KNEE CARTILAGE SURGERY Right    • SINUS SURGERY     • SKIN BIOPSY      hand - precancerous   • SKIN BIOPSY                         PT Assessment/Plan     Row Name 18 1123          PT Assessment    Assessment Comments  Third session in aquatic environment. Reports of increased pain after land session late last week. Continued with basic ROM and mobility work. Performed all ROM in pain minimal range in deep water to decrease gravity and decompressive forces. Added suspended core work with no immediate increase in symptoms/discomfort. Observed improved posture and gait mechanics in aquatic environment despite pain.   -CK       User Key  (r) = Recorded By, (t) = Taken By, (c) =  Cosigned By    Initials Name Provider Type    CK Albert Luciano, PT Physical Therapist              Exercises     Row Name 11/19/18 1000             Subjective Comments    Subjective Comments  I had a land session last week, Thursday. We did some hip strengthening and dry needling. I was really hurting on Saturday and thus I took two tramadol. Standing and walking is the absolute worst.   -CK         Subjective Pain    Able to rate subjective pain?  yes  -CK      Pre-Treatment Pain Level  4  -CK         Aquatics    34608 - Aquatic Therapy Minutes  45  -CK         Aquatics LE    Water Walk  forward;side;backward x 3 laps 15” across pool with TA, deep water  -CK      Stretch 1  B calf stretch 2 x 30 sec  -CK      Stretch 2  B piriformis stretch 2 x30 sec  -CK      Stretch 3  B HS/adductor/sciatic stretch x 15 LN  -CK      Stretch Other 2  DKTC on wall 5 x 10sec  -CK      Abdominals  noodle LN, x 10 rectus and obliques  -CK      Clams  suspended x 20 (small range)  -CK      Hip Abd/Add  B 12x with TA, deep water  -CK      March in Place  deep water x 3 laps,15”  -CK      Toe/Heel Raises  tip toes/tandem 15” x 2 laps  -CK      Uni-Clock  B hip circles cw/ccw 10/10 deep water  -CK      Step Ups  sit to stand from bench w/ gluteal squeeze at top x 8  -CK      Bicycle  seated x 2min, suspended x 2 min  -CK      Flutter/Scissor  seated x 1 min/-  -CK         Exercise 1    Exercise Name 1  Hot tub for jet massage, end of session  -CK         Exercise 2    Exercise Name 2  suspended tuck ups   -CK      Reps 2  15  -CK        User Key  (r) = Recorded By, (t) = Taken By, (c) = Cosigned By    Initials Name Provider Type    CK Albert Luciano, PT Physical Therapist                                            Time Calculation:   Start Time: 1030  Stop Time: 1115  Time Calculation (min): 45 min  Total Timed Code Minutes- PT: 45 minute(s)  Therapy Suggested Charges     Code   Minutes Charges    91853 (CPT®)  Pt Neuromusc Re  Education Ea 15 Min      85561 (CPT®) Hc Pt Ther Proc Ea 15 Min      57096 (CPT®) Hc Gait Training Ea 15 Min      84646 (CPT®) Hc Pt Therapeutic Act Ea 15 Min      00513 (CPT®) Hc Pt Manual Therapy Ea 15 Min      88028 (CPT®) Hc Pt Ther Massage- Per 15 Min      24086 (CPT®) Hc Pt Iontophoresis Ea 15 Min      71333 (CPT®) Hc Pt Elec Stim Ea-Per 15 Min      40191 (CPT®) Hc Pt Ultrasound Ea 15 Min      38465 (CPT®) Hc Pt Self Care/Mgmt/Train Ea 15 Min      35040 (CPT®) Hc Pt Prosthetic (S) Train Initial Encounter, Each 15 Min      04562 (CPT®) Hc Orthotic(S) Mgmt/Train Initial Encounter, Each 15min      01986 (CPT®) Hc Pt Aquatic Therapy Ea 15 Min 45 3    45133 (CPT®) Hc Pt Orthotic(S)/Prosthetic(S) Encounter, Each 15 Min       (CPT®) Hc Pt Electrical Stim Unattended      Total  45 3        Therapy Charges for Today     Code Description Service Date Service Provider Modifiers Qty    93033101407 HC PT AQUATIC THERAPY EA 15 MIN 11/19/2018 Albert Luciano, PT GP 3                    Albert Luciano, PT  11/19/2018

## 2018-11-20 ENCOUNTER — OFFICE VISIT (OUTPATIENT)
Dept: PAIN MEDICINE | Facility: CLINIC | Age: 53
End: 2018-11-20

## 2018-11-20 VITALS
HEIGHT: 63 IN | BODY MASS INDEX: 26.4 KG/M2 | SYSTOLIC BLOOD PRESSURE: 146 MMHG | TEMPERATURE: 97.9 F | DIASTOLIC BLOOD PRESSURE: 92 MMHG | WEIGHT: 149 LBS | OXYGEN SATURATION: 98 % | RESPIRATION RATE: 16 BRPM | HEART RATE: 71 BPM

## 2018-11-20 DIAGNOSIS — G89.29 CHRONIC RIGHT-SIDED LOW BACK PAIN WITH RIGHT-SIDED SCIATICA: Primary | ICD-10-CM

## 2018-11-20 DIAGNOSIS — M53.3 SACROILIAC JOINT DYSFUNCTION: ICD-10-CM

## 2018-11-20 DIAGNOSIS — M51.36 DDD (DEGENERATIVE DISC DISEASE), LUMBAR: ICD-10-CM

## 2018-11-20 DIAGNOSIS — M54.41 CHRONIC RIGHT-SIDED LOW BACK PAIN WITH RIGHT-SIDED SCIATICA: Primary | ICD-10-CM

## 2018-11-20 DIAGNOSIS — M47.816 LUMBAR FACET ARTHROPATHY: ICD-10-CM

## 2018-11-20 PROCEDURE — 99214 OFFICE O/P EST MOD 30 MIN: CPT | Performed by: NURSE PRACTITIONER

## 2018-11-20 NOTE — PROGRESS NOTES
CHIEF COMPLAINT  Pt reportedly had pain reduction following 10/31/18 R L2-5 MBB  Initial Evaluation by Lisbeth GONCALVES-- KW PATIENT.  Subjective   Cheri Mendoza is a 53 y.o. female  who presents to the office for follow-up of procedure.  She completed a RIGHT L2-L5 MBB  on  10-31-18 performed by Dr. STEIN for management of BACK PAIN. Patient reports 80% relief from the procedure for 8 hours post-procedure.     Complains of pain in her low back, right worse than left. Today her pain is 5/10VAS.  Describes the pain as continuous aching with occasional burning. Pain increases with walking, standing; pain decreases with medication, rest and sitting.  ADL's by self.    She completed a RIGHT Sacroiliac Joint Injection   on  10-03-18 performed by Dr. Stein for management of right hip pain. Patient reports 0% relief from the procedure.      Saw. Dr. Gauthier on 8/22/18.  Recommended SI joint injection versus right lumbar MBB.    Back Pain   This is a chronic problem. The current episode started more than 1 year ago. The problem occurs constantly. The problem has been gradually worsening since onset. The pain is present in the gluteal and sacro-iliac (RIGHT). The quality of the pain is described as aching, burning and cramping. The pain radiates to the right thigh. The pain is at a severity of 5/10 (pain ranges from 3-7/10VAS). The pain is worse during the day. The symptoms are aggravated by position, standing, stress and twisting. Stiffness is present all day. Associated symptoms include leg pain and weakness (R leg). Pertinent negatives include no abdominal pain, bladder incontinence, bowel incontinence, chest pain, dysuria, fever, headaches, numbness, paresis, paresthesias, pelvic pain, perianal numbness, tingling or weight loss. Risk factors include history of osteoporosis, lack of exercise and menopause.      PEG Assessment   What number best describes your pain on average in the past week?5  What number  "best describes how, during the past week, pain has interfered with your enjoyment of life?9  What number best describes how, during the past week, pain has interfered with your general activity?  9    The following portions of the patient's history were reviewed and updated as appropriate: allergies, current medications, past family history, past medical history, past social history, past surgical history and problem list.    Review of Systems   Constitutional: Negative for activity change (decreased walks with a crutch on long distances), appetite change, fever and weight loss.   HENT: Negative for hearing loss and trouble swallowing.    Eyes: Negative for visual disturbance.   Respiratory: Negative for apnea, chest tightness and shortness of breath.    Cardiovascular: Negative for chest pain.   Gastrointestinal: Negative for abdominal pain, bowel incontinence, constipation, diarrhea (IBS) and nausea (occasionally,from tramadol).   Genitourinary: Negative for bladder incontinence, difficulty urinating, dysuria and pelvic pain.   Musculoskeletal: Positive for back pain and gait problem (due to back pain).   Neurological: Positive for weakness (R leg). Negative for dizziness, tingling, seizures, light-headedness, numbness, headaches and paresthesias.   Psychiatric/Behavioral: Positive for sleep disturbance (occ). Negative for agitation, confusion and suicidal ideas. The patient is not nervous/anxious.        Vitals:    11/20/18 1431   BP: 146/92   Pulse: 71   Resp: 16   Temp: 97.9 °F (36.6 °C)   SpO2: 98%   Weight: 67.6 kg (149 lb)   Height: 160.2 cm (63.07\")   PainSc:   5   PainLoc: Back  Comment: R sided LBP ranges from 3-7/10     Objective   Physical Exam   Constitutional: She is oriented to person, place, and time. Vital signs are normal. She appears well-developed and well-nourished. She is cooperative.   HENT:   Head: Normocephalic and atraumatic.   Nose: Nose normal.   Eyes: Conjunctivae and lids are normal. "   Cardiovascular: Normal rate.   Pulmonary/Chest: Effort normal.   Abdominal: Normal appearance.   Musculoskeletal:        Lumbar back: She exhibits tenderness and bony tenderness (moderate tenderness right L3-L5 facets--- + loading manuever).   Mild right SI joint tenderness    Negative SLR bilaterally   Neurological: She is alert and oriented to person, place, and time. Gait (cane) abnormal.   Reflex Scores:       Patellar reflexes are 1+ on the right side and 1+ on the left side.  Skin: Skin is warm, dry and intact.   Psychiatric: She has a normal mood and affect. Her speech is normal and behavior is normal. Judgment and thought content normal. Cognition and memory are normal.   Nursing note and vitals reviewed.      Assessment/Plan   Cheri was seen today for back pain.    Diagnoses and all orders for this visit:    Chronic right-sided low back pain with right-sided sciatica    DDD (degenerative disc disease), lumbar    Sacroiliac joint dysfunction    Lumbar facet arthropathy  -     Case Request      --- Right L2-L5 MBB x1 with Dr. Stein. No blood thinners. Reviewed the procedure at length with the patient.  Included in the review was expectations, complications, risk and benefits.The procedure was described in detail and the risks, benefits and alternatives were discussed with the patient (including but not limited to: bleeding, infection, nerve damage, worsening of pain, inability to perform injection, paralysis, seizures, and death) who agreed to proceed.  Discussed the potential for sedation if warranted/wanted.  The procedure will plan to be performed at [facility] with fluoroscopic guidance(unless ultrasound is indicated). Questions were answered and in a way the patient could understand.  Patient verbalized understanding and wishes to proceed.  This intervention will be ordered.  --- Follow-up after procedure or sooner if needed.  -------  Education about Medial Branch Blockade and  "RF Therapy:    This medial branch blockade (MBB) suggested is intended for diagnostic purposes, with the intent of offering the patient Radiofrequency thermal rhizotomy (RF) if the MBB is diagnostically effective.  The diagnostic blockade is necessary to determine the likelihood that RF therapy could be efficacious in providing long term relief to the patient.    Medial branches are sensory nerve branches that connect to a facet joint and transmit sensations & pain signals from that joint.  Facet is a term for the type of joints found in the spine.  Medial branches are the nerves that go to a facet, and therefore are also sometimes called \"facet joint nerves\" (FJNs).      In a medial branch blockade procedure, xray fluoroscopy is used to verify the locations of the outside of the joint lines which are being targeted.  Under xray guidance, needles are placed to these areas.  Contrast dye is injected to confirm proper placement, with dye flowing over the joint area, and to ensure that the dye does not flow into unintended areas such as a vein.  When this is confirmed, local anesthetic is injected to block the medial branch at that joint level.      If MBBs are diagnostically successful in blocking pain, then the patient is most likely a great candidate for Radiofrequency of those facet joint nerves.  In the RF procedure, needles are placed to the joint lines in the same fashion, and after testing, the needle tips are heated to thermally treat the nerves, blocking the nerves by in essence damaging the nerves with the heat treatment.       Medically, a successful RF procedure should provide a patient with 50% pain relief or more for at least 6 months.  Clinical experience suggests that successful patients receive relief more in the range of 12 months on average.  We also discussed that a fortunate minority of patients receive therapeutic success from the MBB, and may not require RF ablation.  If a patient receives more " than 8 weeks of relief from MBB, then occasional repeat MBB for therapeutic purposes is a very reasonable alternative therapy.  This course of therapy is consistent with our LCDs according to our CMS  in the area, and therefore other insurance providers should follow accordingly.  We will monitor our patients to screen for these therapeutic responders and will offer RF therapy only when necessary.        We discussed that MBB & RF are not without risks.  Guidelines regarding anticoagulant use & neuraxial procedures will be respected.  Patients that are ill or otherwise may be at risk for sepsis will not have their spines accessed by neuraxial injections of any type.  This patient will not be offered these therapies if there is an increased risk.   We discussed that there is a risk of postprocedural pain and also a risk of worsening of clinical picture with these procedures as with any neuraxial procedure.    -------       OTTO REPORT      OTTO report has been reviewed and scanned into the patient's chart.    As the clinician, I personally reviewed the OTTO from 11-19-18 while the patient was in the office today.        EMR Dragon/Transcription disclaimer:   Much of this encounter note is an electronic transcription/translation of spoken language to printed text. The electronic translation of spoken language may permit erroneous, or at times, nonsensical words or phrases to be inadvertently transcribed; Although I have reviewed the note for such errors, some may still exist.

## 2018-11-21 ENCOUNTER — APPOINTMENT (OUTPATIENT)
Dept: PHYSICAL THERAPY | Facility: HOSPITAL | Age: 53
End: 2018-11-21

## 2018-11-26 ENCOUNTER — APPOINTMENT (OUTPATIENT)
Dept: PHYSICAL THERAPY | Facility: HOSPITAL | Age: 53
End: 2018-11-26

## 2018-11-28 ENCOUNTER — HOSPITAL ENCOUNTER (OUTPATIENT)
Dept: PHYSICAL THERAPY | Facility: HOSPITAL | Age: 53
Setting detail: THERAPIES SERIES
End: 2018-11-28

## 2018-12-03 ENCOUNTER — HOSPITAL ENCOUNTER (OUTPATIENT)
Dept: PHYSICAL THERAPY | Facility: HOSPITAL | Age: 53
Setting detail: THERAPIES SERIES
Discharge: HOME | End: 2018-12-03

## 2018-12-03 DIAGNOSIS — Z74.09 IMPAIRED MOBILITY AND ACTIVITIES OF DAILY LIVING: ICD-10-CM

## 2018-12-03 DIAGNOSIS — Z78.9 IMPAIRED MOBILITY AND ACTIVITIES OF DAILY LIVING: ICD-10-CM

## 2018-12-03 DIAGNOSIS — M53.3 SI (SACROILIAC) JOINT DYSFUNCTION: ICD-10-CM

## 2018-12-03 DIAGNOSIS — M51.36 DDD (DEGENERATIVE DISC DISEASE), LUMBAR: ICD-10-CM

## 2018-12-03 DIAGNOSIS — M54.50 CHRONIC RIGHT-SIDED LOW BACK PAIN WITHOUT SCIATICA: Primary | ICD-10-CM

## 2018-12-03 DIAGNOSIS — G89.29 CHRONIC RIGHT-SIDED LOW BACK PAIN WITHOUT SCIATICA: Primary | ICD-10-CM

## 2018-12-03 PROCEDURE — 97113 AQUATIC THERAPY/EXERCISES: CPT | Performed by: PHYSICAL THERAPIST

## 2018-12-03 NOTE — THERAPY PROGRESS REPORT/RE-CERT
Outpatient Physical Therapy Ortho Progress Note  [facility]     Patient Name: Cheri Mendoza  : 1965  MRN: [id number]  Today's Date: 12/3/2018      Visit Date: 2018    Patient Active Problem List   Diagnosis   • Chronic constipation   • Blues   • Adult hypothyroidism   • Encounter for screening for malignant neoplasm of colon   • Acute right-sided low back pain with right-sided sciatica   • DDD (degenerative disc disease), lumbar   • Chronic right-sided low back pain with right-sided sciatica   • Right hip pain   • Sacroiliac joint dysfunction        Past Medical History:   Diagnosis Date   • GERD (gastroesophageal reflux disease)    • H/O colonoscopy    • Hypertension    • Hypothyroidism    • Low back pain    • Pneumonia 10/2011        Past Surgical History:   Procedure Laterality Date   • BREAST AUGMENTATION  2007   • CARPAL TUNNEL RELEASE WITH CUBITAL TUNNEL RELEASE Right    • HIP DEBRIDEMENT Right    • KNEE CARTILAGE SURGERY Right    • SINUS SURGERY     • SKIN BIOPSY      hand - precancerous   • SKIN BIOPSY         Visit Dx:     ICD-10-CM ICD-9-CM   1. Chronic right-sided low back pain without sciatica M54.5 724.2    G89.29 338.29   2. Impaired mobility and activities of daily living Z74.09 799.89   3. SI (sacroiliac) joint dysfunction M53.3 724.6   4. DDD (degenerative disc disease), lumbar M51.36 722.52                                 PT OP Goals     Row Name 18 1700          PT Short Term Goals    STG 1  Patient will report improved ability to sleep by 25%  -CK     STG 1 Progress  Progressing  -CK     STG 1 Progress Comments  10-15% per her report today  -CK     STG 2  Patient will report no increase in pain for 24 hours following aqua therapy session.   -CK     STG 2 Progress  Partially Met  -CK     STG 2 Progress Comments  decrease pain reported, will monitor for consistency  -CK     STG 3  Patient will be able to ambulate in aquatic environment for 10-15 min without rest  break.   -CK     STG 3 Progress  Met  -CK     STG 3 Progress Comments  improved posture observed  -CK        Long Term Goals    LTG 1  Patient will report improved ability to sleep/lie on R side by 75%.  -CK     LTG 1 Progress  Ongoing  -CK     LTG 1 Progress Comments  able for short moments but still pain  -CK     LTG 2  Patient will demonstrate understanding of comprehensive aquatic program for self management once complete with formal therapy  -CK     LTG 2 Progress  Ongoing  -CK     LTG 2 Progress Comments  progressing as symptoms allow  -CK     LTG 3  Patient will report decreased pain to 4-5/10 with all standing/walking activities.  -CK     LTG 3 Progress  Progressing  -CK     LTG 3 Progress Comments  4-5/10 today and last session  -CK     LTG 4  Patient will be able to ambulate with upright posture and proper heel-toe gait pattern using SC for imroved household and community mobility.  -CK     LTG 4 Progress  Ongoing  -CK     LTG 4 Progress Comments  posture dysfunction still observed but did ambulate without SC into clinic today  -CK     LTG 5  Patient will report </= 50% score on Oswestry Index, indicating improved percieved function with all daily activties.  -CK     LTG 5 Progress  Ongoing  -CK     LTG 5 Progress Comments  56% with reassessment today  -CK       User Key  (r) = Recorded By, (t) = Taken By, (c) = Cosigned By    Initials Name Provider Type    CK Albert Luciano, PT Physical Therapist          PT Assessment/Plan     Row Name 12/03/18 1800          PT Assessment    Assessment Comments  Ms. Mendoza has been seen for 5 skilled therapy sessions (evaluation and 4 treatments) in aquatic setting since initiating therapy for chronic low back pain, possible SI dysfunction, and impaired posture and mobility. She reports follow up with pain management secondary to medial branch block injections with some relief, thus plans to repeat again on Wednesday 12/5/18. Overall she is reporting decreased  "\"intensity\" of pain and improved sleeping. She also reports decreased stress in life with starting a new job. Observed ambulation into clinic without SC. Postural dysfunction still observed but able to perform without AD, thus improvement. Self scored Oswestry improved to a 56% from original 70% (0= no deficits). She remains highly appropriate for skilled therapy to progress from basic aquatic program in order to achieve long term goals and educate on stabilization program to manage complaints independently once formal therapy is complete.   -CK       User Key  (r) = Recorded By, (t) = Taken By, (c) = Cosigned By    Initials Name Provider Type    CK Albert Luciano, PT Physical Therapist            Exercises     Row Name 12/03/18 1700             Subjective Comments    Subjective Comments  Sorry I missed the other day, I was sick. I do feel a little better. I have my next set of injections on Wednesday. I will say the pain intensity is less.   -CK         Subjective Pain    Able to rate subjective pain?  yes  -CK      Pre-Treatment Pain Level  5  -CK      Post-Treatment Pain Level  4  -CK         Aquatics    34408 - Aquatic Therapy Minutes  45  -CK         Aquatics LE    Water Walk  forward;side;backward x 3 laps 15” across pool with TA, deep water  -CK      Stretch 1  B calf stretch 2 x 30 sec  -CK      Stretch 2  B piriformis stretch 2 x30 sec  -CK      Stretch 3  B HS/adductor/sciatic stretch x 15 LN  -CK      Stretch Other 1  B quad stretch 2 x30 LN  -CK      Stretch Other 2  DKTC on wall 5 x 10sec  -CK      Abdominals  noodle LN, x 10 rectus and obliques  -CK      Clams  suspended x 20 (small range)  -CK      Hip Abd/Add  B 15x with TA, deep water  -CK      Hip Flex/Ext  Leg press, B 15x blue ring  -CK      March in Place  x 3 laps with TA  -CK      Toe/Heel Raises  tip toes/tandem 15” x 2 laps  -CK      Uni-Squat  kickboard push/pull  -CK      Step Ups  sit to stand from bench w/ gluteal squeeze at top x 8  -CK "      Bicycle  seated x 2min, suspended x 2 min  -CK      Flutter/Scissor  seated x 1 min/-  -CK         Exercise 1    Exercise Name 1  Hot tub for jet massage, end of session  -CK      Time 1  5-6 min  -CK         Exercise 2    Exercise Name 2  suspended tuck ups   -CK      Reps 2  15  -CK        User Key  (r) = Recorded By, (t) = Taken By, (c) = Cosigned By    Initials Name Provider Type    CK Albert Luciano, PT Physical Therapist                        Outcome Measure Options: Amalia Rajan, Lower Extremity Functional Scale (LEFS)  Lower Extremity Functional Index  Any of your usual work, housework or school activities: (24/80)  Modified Oswestry  Modified Oswestry Score/Comments: 56%      Time Calculation:     Therapy Suggested Charges     Code   Minutes Charges    14035 (CPT®) Hc Pt Neuromusc Re Education Ea 15 Min      87678 (CPT®) Hc Pt Ther Proc Ea 15 Min      96812 (CPT®) Hc Gait Training Ea 15 Min      85713 (CPT®) Hc Pt Therapeutic Act Ea 15 Min      37325 (CPT®) Hc Pt Manual Therapy Ea 15 Min      56912 (CPT®) Hc Pt Ther Massage- Per 15 Min      78079 (CPT®) Hc Pt Iontophoresis Ea 15 Min      25934 (CPT®) Hc Pt Elec Stim Ea-Per 15 Min      11747 (CPT®) Hc Pt Ultrasound Ea 15 Min      92825 (CPT®) Hc Pt Self Care/Mgmt/Train Ea 15 Min      78734 (CPT®) Hc Pt Prosthetic (S) Train Initial Encounter, Each 15 Min      42656 (CPT®) Hc Orthotic(S) Mgmt/Train Initial Encounter, Each 15min      96782 (CPT®) Hc Pt Aquatic Therapy Ea 15 Min 45 3    59146 (CPT®) Hc Pt Orthotic(S)/Prosthetic(S) Encounter, Each 15 Min       (CPT®) Hc Pt Electrical Stim Unattended      Total  45 3          Start Time: 1645  Stop Time: 1730  Time Calculation (min): 45 min  Total Timed Code Minutes- PT: 45 minute(s)     Therapy Charges for Today     Code Description Service Date Service Provider Modifiers Qty    88101454177 HC PT AQUATIC THERAPY EA 15 MIN 12/3/2018 Albert Luciano, PT GP 3          PT G-Codes  Outcome Measure  Options: Amalia Rajan, Lower Extremity Functional Scale (LEFS)  Modified Oswestry Score/Comments: 56%         Albert Luciano, PT  12/3/2018

## 2018-12-05 ENCOUNTER — DOCUMENTATION (OUTPATIENT)
Dept: PAIN MEDICINE | Facility: CLINIC | Age: 53
End: 2018-12-05

## 2018-12-05 ENCOUNTER — APPOINTMENT (OUTPATIENT)
Dept: PHYSICAL THERAPY | Facility: HOSPITAL | Age: 53
End: 2018-12-05

## 2018-12-05 ENCOUNTER — OUTSIDE FACILITY SERVICE (OUTPATIENT)
Dept: PAIN MEDICINE | Facility: CLINIC | Age: 53
End: 2018-12-05

## 2018-12-05 PROCEDURE — 64493 INJ PARAVERT F JNT L/S 1 LEV: CPT | Performed by: PAIN MEDICINE

## 2018-12-05 PROCEDURE — 64495 INJ PARAVERT F JNT L/S 3 LEV: CPT | Performed by: PAIN MEDICINE

## 2018-12-05 PROCEDURE — 64494 INJ PARAVERT F JNT L/S 2 LEV: CPT | Performed by: PAIN MEDICINE

## 2018-12-05 NOTE — PROGRESS NOTES
Right L2-5 Lumbar Medial Branch Blockade  [facility]    PREOPERATIVE DIAGNOSIS:  Lumbar spondylosis without myelopathy    POSTOPERATIVE DIAGNOSIS:  Lumbar spondylosis without myelopathy    PROCEDURE:   Diagnostic right sided Lumbar Medial Branch Nerve Blockades, with fluoroscopy:  L2, L3, L4, and L5 nerves (at the L3, L4, L5 transverse processes and the sacral alar groove) to block facet joints L3-4, L4-5, and L5-S1  1. 98345 -- right Lumbar Facet blocks, 1st Level  2. 45326 -- right Lumbar Facet blocks, 2nd  Level  3. 09078 -- right Lumbar Facet blocks, 3rd Level    PRE-PROCEDURE DISCUSSION WITH PATIENT:    Risks and complications were discussed with the patient prior to starting the procedure and informed consent was obtained.      SURGEON:  Rossi Stein MD    REASON FOR PROCEDURE:   The patient complains of pain that seems to have a significant axial component, Increased back pain on range of motion exams and Pain on extension of the lumbar spine    SEDATION:  Versed 4mg IV  ANESTHETIC:  Marcaine 0.25%  STEROID:  Methylprednisolone (DEPO MEDROL) 80mg/ml  TOTAL VOLUME OF SOLUTION: 8ml    DESCRIPTON OF PROCEDURE:  After obtaining informed consent, IV access was obtained in the preoperative area.   The patient was taken to the operating room.  The patient was placed in the prone position with a pillow under the abdomen. All pressure points were well padded.  EKG, blood pressure, and pulse oximeter were monitored.  The patient was monitored and sedated by the RN under my direction. The lumbosacral area was prepped with Chloraprep and draped in a sterile fashion. Under fluoroscopic guidance the transverse processes of the L3, L4, and L5 vertebrae at the junctions of the superior articular processes were identified on the right. Also identified was the groove between the ala and the superior articular process of the sacrum on the ipsilateral side.  Skin and subcutaneous tissue were anesthetized  with 1% lidocaine above each of these points. A 22-gauge spinal needle was introduced under fluoroscopic guidance at the above junctions. Aspiration was negative for blood and CSF.  After confirming the position of the needle with fluoroscope in all views, 1 mL of the anesthetic solution noted above was injected at each of these points.  Needles were removed intact from each of the areas.   Onset of analgesia was noted.  Vital signs remained stable throughout.      ESTIMATED BLOOD LOSS:  <5 mL  SPECIMENS:  none    COMPLICATIONS:   No complications were noted.    TOLERANCE & DISCHARGE CONDITION:    The patient tolerated the procedure well.  The patient was transported to the recovery area without difficulties.  The patient was discharged to home under the care of family in stable and satisfactory condition.    PLAN OF CARE:  1. The patient was given our standard instruction sheet.  2. We discussed that Lumbar Medial Branch Blockade is a diagnostic procedure in consideration for radiofrequency ablation if two diagnostic procedures prove to be positive for significant benefit.  If sustained relief of 6 to eight weeks is obtained, then an alternative plan could be therapeutic lumbar branch blockades.  3. The patient is asked to keep a pain log each hour for 8 hours after the procedure today.  4. The patient will  Return to clinic 4 wks.  5. The patient will resume all medications as per the medication reconciliation sheet.

## 2018-12-10 ENCOUNTER — HOSPITAL ENCOUNTER (OUTPATIENT)
Dept: PHYSICAL THERAPY | Facility: HOSPITAL | Age: 53
Setting detail: THERAPIES SERIES
Discharge: HOME | End: 2018-12-10

## 2018-12-10 DIAGNOSIS — G89.29 CHRONIC RIGHT-SIDED LOW BACK PAIN WITHOUT SCIATICA: Primary | ICD-10-CM

## 2018-12-10 DIAGNOSIS — M51.36 DDD (DEGENERATIVE DISC DISEASE), LUMBAR: ICD-10-CM

## 2018-12-10 DIAGNOSIS — Z78.9 IMPAIRED MOBILITY AND ACTIVITIES OF DAILY LIVING: ICD-10-CM

## 2018-12-10 DIAGNOSIS — M53.3 SI (SACROILIAC) JOINT DYSFUNCTION: ICD-10-CM

## 2018-12-10 DIAGNOSIS — M54.50 CHRONIC RIGHT-SIDED LOW BACK PAIN WITHOUT SCIATICA: Primary | ICD-10-CM

## 2018-12-10 DIAGNOSIS — Z74.09 IMPAIRED MOBILITY AND ACTIVITIES OF DAILY LIVING: ICD-10-CM

## 2018-12-10 PROCEDURE — 97113 AQUATIC THERAPY/EXERCISES: CPT | Performed by: PHYSICAL THERAPIST

## 2018-12-10 NOTE — THERAPY TREATMENT NOTE
Outpatient Physical Therapy Ortho Treatment Note  [facility]     Patient Name: Cheri Mendoza  : 1965  MRN: [id number]  Today's Date: 12/10/2018      Visit Date: 12/10/2018    Visit Dx:    ICD-10-CM ICD-9-CM   1. Chronic right-sided low back pain without sciatica M54.5 724.2    G89.29 338.29   2. Impaired mobility and activities of daily living Z74.09 799.89   3. SI (sacroiliac) joint dysfunction M53.3 724.6   4. DDD (degenerative disc disease), lumbar M51.36 722.52       Patient Active Problem List   Diagnosis   • Chronic constipation   • Blues   • Adult hypothyroidism   • Encounter for screening for malignant neoplasm of colon   • Acute right-sided low back pain with right-sided sciatica   • DDD (degenerative disc disease), lumbar   • Chronic right-sided low back pain with right-sided sciatica   • Right hip pain   • Sacroiliac joint dysfunction        Past Medical History:   Diagnosis Date   • GERD (gastroesophageal reflux disease)    • H/O colonoscopy    • Hypertension    • Hypothyroidism    • Low back pain    • Pneumonia 10/2011        Past Surgical History:   Procedure Laterality Date   • BREAST AUGMENTATION  2007   • CARPAL TUNNEL RELEASE WITH CUBITAL TUNNEL RELEASE Right    • HIP DEBRIDEMENT Right    • KNEE CARTILAGE SURGERY Right    • SINUS SURGERY     • SKIN BIOPSY      hand - precancerous   • SKIN BIOPSY                         PT Assessment/Plan     Row Name 12/10/18 1800          PT Assessment    Assessment Comments  Patient reports no notable benefit from facet injections.  She has follow up with neurosurgeon Wednesday of this week to see what the next step is.  She does feel a little better while in the water but not as much overall benefit thus far as she had hoped.  Continued with previous core/LE and flexibility exercises mostly in deep water for decompression.  PT provided cunig for posture, core activation, correct form/technique, and keeping ex in small range to  minimize/avoid pain/discomfort with ther ex.  -RA        PT Plan    PT Plan Comments  Follow up with patient regarding MD visit and recommendations.  Continue with skilled therapy in aquatic environment.  -RA       User Key  (r) = Recorded By, (t) = Taken By, (c) = Cosigned By    Initials Name Provider Type    Vanesa Carrion, PT Physical Therapist              Exercises     Row Name 12/10/18 1600             Subjective Comments    Subjective Comments  Pain didn’t get any better, might even be a little worse since facet injections.  I see Dr Gauthier on Wednesday.    -RA         Subjective Pain    Able to rate subjective pain?  yes  -RA      Pre-Treatment Pain Level  5  -RA         Aquatics    71165 - Aquatic Therapy Minutes  45  -RA         Aquatics LE    Water Walk  forward;side;backward x 3 laps 15” across pool with TA, deep water  -RA      Stretch 1  B calf stretch 2 x 30 sec  -RA      Stretch 2  B piriformis stretch 2 x30 sec  -RA      Stretch 3  B HS/adductor/sciatic stretch x 15 LN  -RA      Stretch Other 1  B quad stretch 2 x30 LN  -RA      Stretch Other 2  DKTC on wall 5 x 10sec  -RA      Vertical Traction  LN x 5 minutes   -RA      Abdominals  noodle LN, x 10 rectus and obliques  -RA      Clams  suspended x 20 (small range)  -RA      Hip Abd/Add  B 15x with TA, deep water  -RA      Hip Flex/Ext  Leg press, B 15x blue ring  -RA      March in Place  x 3 laps with TA  -RA      Toe/Heel Raises  tip toes/tandem 15” x 2 laps  -RA      Uni-Squat  kickboard push/pull x 15  -RA      Step Ups  sit to stand from bench w/ gluteal squeeze at top x 8  -RA      Bicycle  seated x 2min, suspended x 2 min  -RA      Flutter/Scissor  held 2/2 pain wiht fluttre today  -RA         Exercise 1    Exercise Name 1  Hot tub for jet massage, end of session  -RA      Time 1  5-6 min  -RA         Exercise 2    Exercise Name 2  suspended tuck ups   -RA      Reps 2  15  -RA        User Key  (r) = Recorded By, (t) = Taken By, (c) =  Cosigned By    Initials Name Provider Type    Vanesa Carrion, PT Physical Therapist                             Therapy Education  Education Details: Keeping ex in comfortable ROM to minimize pain  Program: Reinforced  How Provided: Verbal, Demonstration  Provided to: Patient  Level of Understanding: Teach back education performed, Verbalized              Time Calculation:   Start Time: 1645  Stop Time: 1735  Time Calculation (min): 50 min  Therapy Suggested Charges     Code   Minutes Charges    98995 (CPT®) Hc Pt Neuromusc Re Education Ea 15 Min      26042 (CPT®) Hc Pt Ther Proc Ea 15 Min      63719 (CPT®) Hc Gait Training Ea 15 Min      37616 (CPT®) Hc Pt Therapeutic Act Ea 15 Min      32387 (CPT®) Hc Pt Manual Therapy Ea 15 Min      00305 (CPT®) Hc Pt Ther Massage- Per 15 Min      25993 (CPT®) Hc Pt Iontophoresis Ea 15 Min      50147 (CPT®) Hc Pt Elec Stim Ea-Per 15 Min      87997 (CPT®) Hc Pt Ultrasound Ea 15 Min      69184 (CPT®) Hc Pt Self Care/Mgmt/Train Ea 15 Min      86612 (CPT®) Hc Pt Prosthetic (S) Train Initial Encounter, Each 15 Min      86084 (CPT®) Hc Orthotic(S) Mgmt/Train Initial Encounter, Each 15min      86714 (CPT®) Hc Pt Aquatic Therapy Ea 15 Min 45 3    79847 (CPT®) Hc Pt Orthotic(S)/Prosthetic(S) Encounter, Each 15 Min       (CPT®) Hc Pt Electrical Stim Unattended      Total  45 3        Therapy Charges for Today     Code Description Service Date Service Provider Modifiers Qty    19318399061 HC PT AQUATIC THERAPY EA 15 MIN 12/10/2018 Vanesa Quiroga PT GP 3                    Vanesa Quiroga PT  12/10/2018

## 2018-12-11 NOTE — PROGRESS NOTES
Subjective   Patient ID: Cheri Mendoza is a 53 y.o. female is here today for 4 month follow-up. Patient was last seen 8.22.18 for low back and bilateral leg pain.  Patient was referred to Dr Stein, pain management, for right-sided medial branch blocks and possible RFA.  She has had 2 rounds of facet injections and they have not helped.  Per patient she has not had any MBB or RFA.  She has a follow up appointment 1.2.19, she is unsure if RFA will be attempted since she has had to relief with facet injections. She is having constant low back and right hip/anterior thigh pain. Patient has intermittent right leg weakness.  Patient denies N/T.  Walking and prolonged standing make her symptoms worse.  She is going to water therapy, it is not helping a lot, she would like to join [facility] [place] pool.  She is not currently taking Baclofen or Tramadol.      Back Pain   The problem occurs constantly. The problem has been gradually worsening since onset. The pain is present in the lumbar spine. The pain is at a severity of 6/10. The symptoms are aggravated by standing. Associated symptoms include leg pain. Pertinent negatives include no numbness.   Leg Pain    The pain is present in the right leg. The pain is at a severity of 6/10. Pertinent negatives include no numbness.       The following portions of the patient's history were reviewed and updated as appropriate: allergies, current medications, past family history, past medical history, past social history, past surgical history and problem list.    Review of Systems   Musculoskeletal: Positive for arthralgias and back pain. Negative for gait problem.   Neurological: Negative for numbness.   All other systems reviewed and are negative.    She is with her .  She has been working with Dr. Stein and has had some medial branch blocks.  The 1st one helped.  The 2nd one did not.  She is supposed to have an office visit with Dr. Stein early next month to determine  if she is going to have an ablation or not.  If not, then we might possibly move forward with Dr. Stein doing the spinal cord stimulator trial.  If that helps, I would be happy to put a permanent device in.  Frankly, I think it is reasonable to consider this given that her chronic pain symptom has been going on for about 3 years.  It is in the right side of the low back, the right buttock, the right groin and the right thigh, which I think is a territory of pain that can be treated adequately with a spinal cord stimulator if it comes down to it.  She will let us know what Dr. Stein has in mind after that visit.        Objective   Physical Exam   Constitutional: She is oriented to person, place, and time. She appears well-developed and well-nourished.   HENT:   Head: Normocephalic and atraumatic.   Eyes: Conjunctivae and EOM are normal. Pupils are equal, round, and reactive to light.   Fundoscopic exam:       The right eye shows no papilledema. The right eye shows venous pulsations.        The left eye shows no papilledema. The left eye shows venous pulsations.   Neck: Carotid bruit is not present.   Neurological: She is oriented to person, place, and time. She has a normal Finger-Nose-Finger Test and a normal Heel to Shin Test. Gait normal.   Reflex Scores:       Tricep reflexes are 2+ on the right side and 2+ on the left side.       Bicep reflexes are 2+ on the right side and 2+ on the left side.       Brachioradialis reflexes are 2+ on the right side and 2+ on the left side.       Patellar reflexes are 2+ on the right side and 2+ on the left side.       Achilles reflexes are 2+ on the right side and 2+ on the left side.  Psychiatric: Her speech is normal.     Neurologic Exam     Mental Status   Oriented to person, place, and time.   Registration of memory: Good recent and remote memory.   Attention: normal. Concentration: normal.   Speech: speech is normal   Level of consciousness: alert  Knowledge: consistent  with education.     Cranial Nerves     CN II   Visual fields full to confrontation.   Visual acuity: normal    CN III, IV, VI   Pupils are equal, round, and reactive to light.  Extraocular motions are normal.     CN V   Facial sensation intact.   Right corneal reflex: normal  Left corneal reflex: normal    CN VII   Facial expression full, symmetric.   Right facial weakness: none  Left facial weakness: none    CN VIII   Hearing: intact    CN IX, X   Palate: symmetric    CN XI   Right sternocleidomastoid strength: normal  Left sternocleidomastoid strength: normal    CN XII   Tongue: not atrophic  Tongue deviation: none    Motor Exam   Muscle bulk: normal  Right arm tone: normal  Left arm tone: normal  Right leg tone: normal  Left leg tone: normal    Strength   Strength 5/5 except as noted.     Sensory Exam   Light touch normal.     Gait, Coordination, and Reflexes     Gait  Gait: normal    Coordination   Finger to nose coordination: normal  Heel to shin coordination: normal    Reflexes   Right brachioradialis: 2+  Left brachioradialis: 2+  Right biceps: 2+  Left biceps: 2+  Right triceps: 2+  Left triceps: 2+  Right patellar: 2+  Left patellar: 2+  Right achilles: 2+  Left achilles: 2+  Right : 2+  Left : 2+      Assessment/Plan   Independent Review of Radiographic Studies:    Review her original lumbar MRI which did show just some nonspecific degenerative disc disease and some hypertrophy but no root compression.  Agree with the report.      Medical Decision Making:    She will be seeing Dr. Stein to determine whether or not she is going to have an ablation or whether or not we should proceed with a spinal cord stimulator trial.  If she has a trial and it helps and obviously I be happy to put in a permanent system.  We'll make him visit for her in 3 months can be modified depending upon the circumstances.  I did sign some paperwork for her to have a disabled parking sticker.      Cheri was seen today for  back pain and hip pain.    Diagnoses and all orders for this visit:    DDD (degenerative disc disease), lumbar    Chronic right-sided low back pain with right-sided sciatica    Right hip pain      Return in about 3 months (around 3/12/2019).

## 2018-12-12 ENCOUNTER — OFFICE VISIT (OUTPATIENT)
Dept: NEUROSURGERY | Facility: CLINIC | Age: 53
End: 2018-12-12

## 2018-12-12 VITALS
BODY MASS INDEX: 26.93 KG/M2 | HEIGHT: 63 IN | SYSTOLIC BLOOD PRESSURE: 141 MMHG | DIASTOLIC BLOOD PRESSURE: 85 MMHG | HEART RATE: 64 BPM | WEIGHT: 152 LBS

## 2018-12-12 DIAGNOSIS — M25.551 RIGHT HIP PAIN: ICD-10-CM

## 2018-12-12 DIAGNOSIS — M51.36 DDD (DEGENERATIVE DISC DISEASE), LUMBAR: Primary | ICD-10-CM

## 2018-12-12 DIAGNOSIS — G89.29 CHRONIC RIGHT-SIDED LOW BACK PAIN WITH RIGHT-SIDED SCIATICA: ICD-10-CM

## 2018-12-12 DIAGNOSIS — M54.41 CHRONIC RIGHT-SIDED LOW BACK PAIN WITH RIGHT-SIDED SCIATICA: ICD-10-CM

## 2018-12-12 PROCEDURE — 99214 OFFICE O/P EST MOD 30 MIN: CPT | Performed by: NEUROLOGICAL SURGERY

## 2018-12-18 ENCOUNTER — OFFICE VISIT (OUTPATIENT)
Dept: PAIN MEDICINE | Facility: CLINIC | Age: 53
End: 2018-12-18

## 2018-12-18 VITALS
HEART RATE: 71 BPM | TEMPERATURE: 98.6 F | OXYGEN SATURATION: 98 % | RESPIRATION RATE: 15 BRPM | BODY MASS INDEX: 26.65 KG/M2 | SYSTOLIC BLOOD PRESSURE: 125 MMHG | HEIGHT: 63 IN | DIASTOLIC BLOOD PRESSURE: 87 MMHG | WEIGHT: 150.4 LBS

## 2018-12-18 DIAGNOSIS — G89.29 CHRONIC RIGHT-SIDED LOW BACK PAIN WITH RIGHT-SIDED SCIATICA: ICD-10-CM

## 2018-12-18 DIAGNOSIS — G89.29 OTHER CHRONIC PAIN: Primary | ICD-10-CM

## 2018-12-18 DIAGNOSIS — M54.41 CHRONIC RIGHT-SIDED LOW BACK PAIN WITH RIGHT-SIDED SCIATICA: ICD-10-CM

## 2018-12-18 PROCEDURE — 99214 OFFICE O/P EST MOD 30 MIN: CPT | Performed by: NURSE PRACTITIONER

## 2018-12-18 NOTE — PROGRESS NOTES
"CHIEF COMPLAINT    F/U back pain. Has not changed since the injection.    Subjective   Cheri Mendoza is a 53 y.o. female  who presents to the office for follow-up of procedure.  She completed a Right L2-5 Lumbar Medial Branch Blockade   on  12-05-18 performed by Dr. Lam for management of back pain. Patient reports 0% relief from the procedure.  Reports that her pain has actually seemed worse overall since the block.      RIGHT L2-L5 MBB  on  10-31-18 performed by Dr. LAM for management of BACK PAIN. Patient reports 80% relief from the procedure for 8 hours post-procedure.     She completed a RIGHT Sacroiliac Joint Injection   on  10-03-18 performed by Dr. Lam for management of right hip pain. Patient reports 0% relief from the procedure.     12/12/18 Dr. Gauthier office encounter       Has tried Tylenol #3 and Tramadol. Both caused her to feel \"drunk\" or sleepy.   Tylenol 3 helped but caused too much drowsiness.  hydrocodone has done the same.  Has been taking Naproxen or Ibuprofen with little relief.      Back Pain   This is a chronic problem. The current episode started more than 1 year ago. The problem occurs daily. The problem has been gradually worsening since onset. The pain is present in the lumbar spine and sacro-iliac (RIGHT). The quality of the pain is described as aching and burning. The pain does not radiate. The pain is at a severity of 5/10. The pain is severe. The pain is the same all the time. The symptoms are aggravated by standing and position (WALKING). Associated symptoms include weakness (R leg). Pertinent negatives include no abdominal pain, bladder incontinence, bowel incontinence, chest pain, dysuria, fever, headaches, numbness or pelvic pain. She has tried NSAIDs, analgesics, muscle relaxant, walking and home exercises for the symptoms. The treatment provided mild relief.          PEG Assessment   What number best describes your pain on average in the past week?5  What number " "best describes how, during the past week, pain has interfered with your enjoyment of life?10  What number best describes how, during the past week, pain has interfered with your general activity?  10    The following portions of the patient's history were reviewed and updated as appropriate: allergies, current medications, past family history, past medical history, past social history, past surgical history and problem list.    Review of Systems   Constitutional: Negative for activity change (decreased walks with a crutch on long distances), appetite change and fever.   HENT: Negative for hearing loss and trouble swallowing.    Eyes: Negative for visual disturbance.   Respiratory: Negative for apnea, chest tightness and shortness of breath.    Cardiovascular: Negative for chest pain.   Gastrointestinal: Negative for abdominal pain, bowel incontinence, constipation, diarrhea (IBS) and nausea (occasionally,from tramadol).   Genitourinary: Negative for bladder incontinence, difficulty urinating, dysuria and pelvic pain.   Musculoskeletal: Positive for back pain and gait problem (due to back pain).   Neurological: Positive for weakness (R leg). Negative for dizziness, seizures, light-headedness, numbness and headaches.   Psychiatric/Behavioral: Positive for sleep disturbance (occ). Negative for agitation, confusion and suicidal ideas. The patient is not nervous/anxious.      Vitals:    12/18/18 0736   BP: 125/87   Pulse: 71   Resp: 15   Temp: 98.6 °F (37 °C)   SpO2: 98%   Weight: 68.2 kg (150 lb 6.4 oz)   Height: 160.2 cm (63.07\")   PainSc:   5   PainLoc: Back     Objective   Physical Exam   Constitutional: She is oriented to person, place, and time. She appears well-developed and well-nourished. She is cooperative. No distress.   HENT:   Head: Normocephalic and atraumatic.   Eyes: Conjunctivae, EOM and lids are normal.   Neck: Trachea normal and normal range of motion. Neck supple.   Cardiovascular: Normal rate. "   Pulmonary/Chest: Effort normal. No respiratory distress.   Abdominal: Normal appearance.   Musculoskeletal: Normal range of motion.        Lumbar back: She exhibits tenderness and pain.   Neurological: She is alert and oriented to person, place, and time. She has normal strength and normal reflexes. No sensory deficit. Gait normal.   Skin: Skin is warm, dry and intact.   Psychiatric: She has a normal mood and affect. Her speech is normal and behavior is normal. Judgment and thought content normal. Cognition and memory are normal.   Nursing note and vitals reviewed.    Assessment/Plan   Cheri was seen today for back pain.    Diagnoses and all orders for this visit:    Other chronic pain    Chronic right-sided low back pain with right-sided sciatica  -     Ambulatory Referral to Psychology    Other orders  -     diclofenac (VOLTAREN) 50 MG EC tablet; Take 1 tablet by mouth 2 (Two) Times a Day.      ----------  Education about SCS therapy:    -  This was an extended office visit in which we entered into discussion about advanced pain relieving techniques, and discussed implantable pain therapies.  We discussed advanced neuromodulation in the form of Spinal Cord Stimulation.  This is a reasonable therapy for patients who have exhausted basic nonnarcotic options, basic modalities and physical therapies, and do not have any other reasonable surgical options.  This therapy as an alternative to long term high dose opioid therapy.    -  Risks include but are not limited to bleeding, infection, injury, paralysis, nerve injury, dural puncture, and risk for postprocedural pain.  Implanted equipment risks include but are not limited to lead migration, lead fracture, risk of loss of pain relieving stimulation, risk of electrical shock, and risk of system failure.    - We discussed the theory and basic science behind SCS therapy including but not limited to energy delivery and relevant anatomy, in terms that are easy to  understand and also with use of illustrative devices.  Spinal Cord Stimulation therapies apply an electromagnetic field to a specific area on the spinal cord (Dorsal Column) to attempt to block transmission of painful signals from the peripheral nerves to the brain.    -  We discussed that prior to trialing, I request that patients review relevant materials and perform some research, and also have a follow up education session with a device specialist from the .  Also, insurance requires a presurgical psychological evaluation.  When these have been completed, and all the patient's questions have been answered to their satisfaction, then we will plan to request authorization for trialing.   - We discussed the trialing process (aka Phase 1)  that usually lasts a week, and the temporary nature of this trial.  Trial success will determine whether or not we proceed to implant.  We discussed reasonable expectations, and that I feel that consistent 50% pain relief is medically successful and is a reasonable expectation to justify moving forward to permanent implant.    -  Additional risks of Phase 1 include but are not limited to bleeding on insertion, bleeding on lead removal, and procedural site soreness.  - We discussed the percutaneous surgical implant, including postsurgical restrictions, risks, and alternatives.   For spinal cord stimulation implanted device (aka SCS Phase 2) there is usually a midline vertical incision for the spinally implanted leads, and also a horizontal incision in the posterior lumbar flank for implantation of the battery & computer (aka IPG).  The leads are tunneled from the midline incision to the medial aspect of the battery pocket incision.    -  Postoperative restrictions include limiting the following activity as much as possible for 90 days:  Lifting >10 lbs, bending at the waist, stretching/reaching overhead, and twisting.  ----------  --- Trial Diclofenac. Discussed  medication with the patient.  Included in this discussion was the potential for side effects and adverse events.  Patient verbalized understanding and wished to proceed.  Prescription will be sent to pharmacy.               OTTO REPORT  OTTO report has been reviewed and scanned into the patient's chart.    As the clinician, I personally reviewed the OTTO from 12/14/2018 while the patient was in the office today.    EMR Dragon/Transcription disclaimer:   Much of this encounter note is an electronic transcription/translation of spoken language to printed text. The electronic translation of spoken language may permit erroneous, or at times, nonsensical words or phrases to be inadvertently transcribed; Although I have reviewed the note for such errors, some may still exist.

## 2018-12-19 ENCOUNTER — HOSPITAL ENCOUNTER (OUTPATIENT)
Dept: PHYSICAL THERAPY | Facility: HOSPITAL | Age: 53
Setting detail: THERAPIES SERIES
Discharge: HOME | End: 2018-12-19

## 2018-12-19 DIAGNOSIS — Z78.9 IMPAIRED MOBILITY AND ACTIVITIES OF DAILY LIVING: ICD-10-CM

## 2018-12-19 DIAGNOSIS — M53.3 SI (SACROILIAC) JOINT DYSFUNCTION: ICD-10-CM

## 2018-12-19 DIAGNOSIS — Z74.09 IMPAIRED MOBILITY AND ACTIVITIES OF DAILY LIVING: ICD-10-CM

## 2018-12-19 DIAGNOSIS — M54.50 CHRONIC RIGHT-SIDED LOW BACK PAIN WITHOUT SCIATICA: Primary | ICD-10-CM

## 2018-12-19 DIAGNOSIS — M51.36 DDD (DEGENERATIVE DISC DISEASE), LUMBAR: ICD-10-CM

## 2018-12-19 DIAGNOSIS — G89.29 CHRONIC RIGHT-SIDED LOW BACK PAIN WITHOUT SCIATICA: Primary | ICD-10-CM

## 2018-12-19 PROCEDURE — 97113 AQUATIC THERAPY/EXERCISES: CPT | Performed by: PHYSICAL THERAPIST

## 2018-12-19 NOTE — THERAPY TREATMENT NOTE
"    Outpatient Physical Therapy Ortho Treatment Note  [facility]     Patient Name: Cheri Mendoza  : 1965  MRN: [id number]  Today's Date: 2018      Visit Date: 2018    Visit Dx:    ICD-10-CM ICD-9-CM   1. Chronic right-sided low back pain without sciatica M54.5 724.2    G89.29 338.29   2. Impaired mobility and activities of daily living Z74.09 799.89   3. SI (sacroiliac) joint dysfunction M53.3 724.6   4. DDD (degenerative disc disease), lumbar M51.36 722.52       Patient Active Problem List   Diagnosis   • Chronic constipation   • Blues   • Adult hypothyroidism   • Encounter for screening for malignant neoplasm of colon   • Acute right-sided low back pain with right-sided sciatica   • DDD (degenerative disc disease), lumbar   • Chronic right-sided low back pain with right-sided sciatica   • Right hip pain   • Sacroiliac joint dysfunction        Past Medical History:   Diagnosis Date   • GERD (gastroesophageal reflux disease)    • H/O colonoscopy    • Hypertension    • Hypothyroidism    • Low back pain    • Pneumonia 10/2011        Past Surgical History:   Procedure Laterality Date   • BREAST AUGMENTATION  2007   • CARPAL TUNNEL RELEASE WITH CUBITAL TUNNEL RELEASE Right    • COLONOSCOPY N/A 2018    Procedure: COLONOSCOPY to cecum and t.i. with polypectomy;  Surgeon: Rima Luis MD;  Location: [facility] ENDOSCOPY;  Service:    • HIP DEBRIDEMENT Right    • KNEE CARTILAGE SURGERY Right    • SINUS SURGERY     • SKIN BIOPSY      hand - precancerous   • SKIN BIOPSY                         PT Assessment/Plan     Row Name 18 1800          PT Assessment    Assessment Comments  Since last treatment session on 12/10/18 she reports no change in symptoms with second facet injection. Follow up with Zach and pain management (alireza) for discussion of implantation of spinal cord stimulator. Verbalized wanting a second opinion before proceeding as such as she still \"feels it is her " "SI\". Information given re.  at [place] for second opinion at her request as he is MD in [facility] that sees a lot of SI patients and performs SI fusions. Continued with current exercise program providing cueing for posture and technique. All exercises instructed to be performed in painfree range.   -CK       User Key  (r) = Recorded By, (t) = Taken By, (c) = Cosigned By    Initials Name Provider Type    CK Albert Luciano, PT Physical Therapist              Exercises     Row Name 12/19/18 1600             Subjective Comments    Subjective Comments  I am so frustrated. I am tired of hurting. I dont know what to do anymore.   -CK         Subjective Pain    Pre-Treatment Pain Level  7  -CK      Post-Treatment Pain Level  5  -CK      Subjective Pain Comment  3-4/10 at rest  -CK         Aquatics    27886 - Aquatic Therapy Minutes  45  -CK         Aquatics LE    Water Walk  forward;side;backward x 3 laps 15” across pool with TA, deep water  -CK      Stretch 1  B calf stretch 2 x 30 sec  -CK      Stretch 2  B piriformis stretch 2 x30 sec  -CK      Stretch 3  B HS/adductor/sciatic stretch x 15 LN  -CK      Stretch Other 1  B quad stretch 2 x30 LN  -CK      Stretch Other 2  DKTC on wall 5 x 10sec  -CK      Vertical Traction  LN x 5 minutes   -CK      Abdominals  noodle LN, x 15 rectus and obliques  -CK      Clams  suspended x 30 (small range)  -CK      Hip Abd/Add  B 15x with TA, deep water  -CK      Hip Flex/Ext  Leg press, B 15x blue ring  -CK      March in Place  x 3 laps with TA  -CK      Toe/Heel Raises  tip toes/tandem 15” x 2 laps  -CK      Uni-Squat  kickboard push/pull x 15  -CK      Bicycle  suspended x 2 min  -CK         Exercise 1    Exercise Name 1  Hot tub for jet massage, end of session  -CK      Time 1  5-6 min  -CK         Exercise 2    Exercise Name 2  suspended tuck ups   -CK      Reps 2  15  -CK        User Key  (r) = Recorded By, (t) = Taken By, (c) = Cosigned By    Initials Name Provider Type    CK Mustapha, " Albert BIRCH PT Physical Therapist                                            Time Calculation:   Start Time: 1600  Stop Time: 1645  Time Calculation (min): 45 min  Total Timed Code Minutes- PT: 45 minute(s)  Therapy Suggested Charges     Code   Minutes Charges    64423 (CPT®) Hc Pt Neuromusc Re Education Ea 15 Min      37561 (CPT®) Hc Pt Ther Proc Ea 15 Min      12693 (CPT®) Hc Gait Training Ea 15 Min      90581 (CPT®) Hc Pt Therapeutic Act Ea 15 Min      57514 (CPT®) Hc Pt Manual Therapy Ea 15 Min      83598 (CPT®) Hc Pt Ther Massage- Per 15 Min      52501 (CPT®) Hc Pt Iontophoresis Ea 15 Min      36274 (CPT®) Hc Pt Elec Stim Ea-Per 15 Min      97107 (CPT®) Hc Pt Ultrasound Ea 15 Min      44703 (CPT®) Hc Pt Self Care/Mgmt/Train Ea 15 Min      97224 (CPT®) Hc Pt Prosthetic (S) Train Initial Encounter, Each 15 Min      59267 (CPT®) Hc Orthotic(S) Mgmt/Train Initial Encounter, Each 15min      27750 (CPT®) Hc Pt Aquatic Therapy Ea 15 Min 45 3    52667 (CPT®) Hc Pt Orthotic(S)/Prosthetic(S) Encounter, Each 15 Min       (CPT®) Hc Pt Electrical Stim Unattended      Total  45 3        Therapy Charges for Today     Code Description Service Date Service Provider Modifiers Qty    33773276104 HC PT AQUATIC THERAPY EA 15 MIN 12/19/2018 Albert Luciano PT GP 3                    Albert Luciano PT  12/19/2018

## 2019-01-02 DIAGNOSIS — M54.41 CHRONIC RIGHT-SIDED LOW BACK PAIN WITH RIGHT-SIDED SCIATICA: Primary | ICD-10-CM

## 2019-01-02 DIAGNOSIS — G89.29 CHRONIC RIGHT-SIDED LOW BACK PAIN WITH RIGHT-SIDED SCIATICA: Primary | ICD-10-CM

## 2019-01-14 ENCOUNTER — TELEPHONE (OUTPATIENT)
Dept: INTERNAL MEDICINE | Facility: CLINIC | Age: 54
End: 2019-01-14

## 2019-01-15 ENCOUNTER — TELEPHONE (OUTPATIENT)
Dept: INTERNAL MEDICINE | Facility: CLINIC | Age: 54
End: 2019-01-15

## 2019-01-18 DIAGNOSIS — M54.41 CHRONIC RIGHT-SIDED LOW BACK PAIN WITH RIGHT-SIDED SCIATICA: Primary | ICD-10-CM

## 2019-01-18 DIAGNOSIS — M53.3 SACROILIAC JOINT DYSFUNCTION: ICD-10-CM

## 2019-01-18 DIAGNOSIS — M25.551 RIGHT HIP PAIN: ICD-10-CM

## 2019-01-18 DIAGNOSIS — G89.29 CHRONIC RIGHT-SIDED LOW BACK PAIN WITH RIGHT-SIDED SCIATICA: Primary | ICD-10-CM

## 2019-01-23 ENCOUNTER — HOSPITAL ENCOUNTER (OUTPATIENT)
Dept: PHYSICAL THERAPY | Facility: HOSPITAL | Age: 54
Setting detail: THERAPIES SERIES
Discharge: HOME | End: 2019-01-23

## 2019-01-23 DIAGNOSIS — Z78.9 IMPAIRED MOBILITY AND ACTIVITIES OF DAILY LIVING: ICD-10-CM

## 2019-01-23 DIAGNOSIS — Z74.09 IMPAIRED MOBILITY AND ACTIVITIES OF DAILY LIVING: ICD-10-CM

## 2019-01-23 DIAGNOSIS — M53.3 SI (SACROILIAC) JOINT DYSFUNCTION: ICD-10-CM

## 2019-01-23 DIAGNOSIS — M54.50 CHRONIC RIGHT-SIDED LOW BACK PAIN WITHOUT SCIATICA: Primary | ICD-10-CM

## 2019-01-23 DIAGNOSIS — M51.36 DDD (DEGENERATIVE DISC DISEASE), LUMBAR: ICD-10-CM

## 2019-01-23 DIAGNOSIS — G89.29 CHRONIC RIGHT-SIDED LOW BACK PAIN WITHOUT SCIATICA: Primary | ICD-10-CM

## 2019-01-23 PROCEDURE — 97113 AQUATIC THERAPY/EXERCISES: CPT

## 2019-01-25 NOTE — THERAPY PROGRESS REPORT/RE-CERT
Outpatient Physical Therapy Ortho Progress Note  [facility]     Patient Name: Cheri Mendoza  : 1965  MRN: [id number]  Today's Date: 2019      Visit Date: 2019    Visit Dx:    ICD-10-CM ICD-9-CM   1. Chronic right-sided low back pain without sciatica M54.5 724.2    G89.29 338.29   2. Impaired mobility and activities of daily living Z74.09 799.89   3. SI (sacroiliac) joint dysfunction M53.3 724.6   4. DDD (degenerative disc disease), lumbar M51.36 722.52       Patient Active Problem List   Diagnosis   • Chronic constipation   • Blues   • Adult hypothyroidism   • Encounter for screening for malignant neoplasm of colon   • Acute right-sided low back pain with right-sided sciatica   • DDD (degenerative disc disease), lumbar   • Chronic right-sided low back pain with right-sided sciatica   • Right hip pain   • Sacroiliac joint dysfunction        Past Medical History:   Diagnosis Date   • GERD (gastroesophageal reflux disease)    • H/O colonoscopy    • Hypertension    • Hypothyroidism    • Low back pain    • Pneumonia 10/2011        Past Surgical History:   Procedure Laterality Date   • BREAST AUGMENTATION  2007   • CARPAL TUNNEL RELEASE WITH CUBITAL TUNNEL RELEASE Right    • COLONOSCOPY N/A 2018    Procedure: COLONOSCOPY to cecum and t.i. with polypectomy;  Surgeon: Rima Luis MD;  Location: [facility] ENDOSCOPY;  Service:    • HIP DEBRIDEMENT Right    • KNEE CARTILAGE SURGERY Right    • SINUS SURGERY     • SKIN BIOPSY      hand - precancerous   • SKIN BIOPSY                         19 1500   PT Assessment   Functional Limitations Impaired gait;Impaired locomotion;Limitation in home management;Limitations in community activities;Performance in work activities;Performance in sport activities;Performance in self-care ADL;Performance in leisure activities;Limitations in functional capacity and performance   Impairments Balance;Endurance;Gait;Posture;Poor body  mechanics;Pain;Range of motion;Impaired muscle endurance;Muscle strength   Assessment Comments Pt seen by this PT first time. Pt is due to 30 day progress update, last seen 12/18 and has had injections that helped in SI joint, but not at spinal area so is not proceeding with RF.  Offered to try some problem solving mobility/ex focus on SI (since SI injection also helped as diagnostic support of SI issue). Pt  aggreeable to some different activities and acknowledged she could get sore and agreeable. Did trouble shoot pts SI c/o. which is challenging in the water, but pt has very fatigue with ex with muscles R side consisitent with R anterior ilium, also lacks flexibility in R hip consistent with R ant ilum. Benefitted with SI belt for stabilization with ex. Benefitted from R blue ring stomp and R hip ext and clam ex. All providing mobilization to R SI. Pt will likely be sore. Was interested in accupuncture resource and provided to pt. Pt also agreeable to self massage or professional massage to R hip and QL areas. Pt still overall limited by her evolving hip pain and will benefit from ongoing interventions.  Will f/u with regular treating PT next session.    Please refer to paper survey for additional self-reported information Yes   Rehab Potential Fair   Patient/caregiver participated in establishment of treatment plan and goals Yes   Patient would benefit from skilled therapy intervention Yes   PT Plan   PT Frequency 2x/week;1x/week   Predicted Duration of Therapy Intervention (Therapy Eval) 30 days   Planned CPT's? PT THER PROC EA 15 MIN: 95559;PT MANUAL THERAPY EA 15 MIN: 19033;PT GAIT TRAINING EA 15 MIN: 57333;PT AQUATIC THERAPY EA 15 MIN: 02146;PT HOT OR COLD PACK TREAT MCARE;PT ELECTRICAL STIM UNATTEND: ;PT ULTRASOUND EA 15 MIN: 87772;PT TRACTION LUMBAR: 72642   PT Plan Comments f/u on response, this PT avail for clarification of treatment/and SI belt.                    01/23/19 1500   Subjective  Comments   Subjective Comments pt reports chronicity of problem. Frustration with ongoing limitation. Did change jobs and it is a less stressful job. Did get injections. Spinal onces did not help so not RF candidate. The SI one did help but not long enough.      Subjective Pain   Able to rate subjective pain? yes   Pre-Treatment Pain Level 5   Subjective Pain Comment pt    Aquatics   Aquatics performed? Yes   72663 - Aquatic Therapy Minutes 45   Aquatics LE   Water Walk forward;side;backward   Stretch 1 B calf stretch 2 x 30 sec   Stretch 2 B piriformis stretch 2 x30 sec   Stretch 3 B HS/adductor/sciatic stretch adjusting each to sensitize best  (see ex)    Stretch Other 1 B quad stretch 2 x30 LN (scotty R )   Stretch Other 2 DKTC on wall 5 x 10sec   Vertical Traction LN x 5 minutes    Abdominals noodle  (LN, x 15 rectus and obliques)   Clams standing R side focus, like a stork x 10 , repeated later x 1 set    Hip Abd/Add 15 R slow, gentle abd, 10 Left slow gentle add    Hip Flex/Ext Leg press blue ring x 15 x 2 sets and pt has most benefit with this ex.    March in Place self guided 3 laps march with Ta    Toe/Heel Raises 10/10    Bicycle suspended x 2 min   Exercise 1   Exercise Name 1 hip extension in postion of upper trunk support on arms at rail (golfers lift). 10 B feels it at SI (gentle/mobilization mostly on R, and also feels in hip flexors as stretch)    Exercise 2   Exercise Name 2 suspended tuck ups    Reps 2 15   Exercise 3   Exercise Name 3 Hamstring stretch Left focus until relaxation    Exercise 4   Exercise Name 4 R anterior ilium self mobilization via standing (imagine kary Realeyes 3D), at bench stands left, R leg up on bench, supported by L arm and R arm reaches down for 30 sec, then cont to 60 sec ((normally a 2 min stretch but first time) also showed alternate positon and pt like this better. SEated and R leg up hip flexed abd and foot on bench R arm reach down infront and gentle isometric  against R thigh. L leg down, L arm for upper body support. (pt did 60 sec) intended as a 2 min stretch.    Additional Comments on these stretches ed if no increase in radicular symptoms (since she has some back referral to R hip/groin) then she can do 1 x a day up to 2 min    Exercise 5   Exercise Name 5 side bend stretches. B tighter Left. has QL tightness. Rec massage if able. (also felt stretch here with vertical deompression)    Exercise 6   Exercise Name 6 tried SI belt, she had reported using one. Tried [employer] brand, notes she has not used hers in a while. Used it during sesion in water. she felt it did benefit moderately. So can retry to see benefits next visit (this therapist will have belt avail).    Exercise 7   Exercise Name 7 L hip flex isometric 10 sec x 3 for ed    Exercise 8   Exercise Name 8 walking with sacral pressure and hip bracing to see benefits and it did. Also manually stabilzed pelvis with some manual hold into R post ilium to offset R ant ilium hypermobiltiy.    Exercise 9   Exercise Name 9 Ex for R ant ilum hypermobitily initially reduced, then her pain was more across back at B PSIS and that reduced some with HS stretches.           01/23/19 1600   PT Short Term Goals   STG 1 Patient will report improved ability to sleep by 25%   STG 1 Progress Progressing   STG 1 Progress Comments Still limited sleep. Pt is very frustrated with longevity of problem.    STG 2 Patient will report no increase in pain for 24 hours following aqua therapy session.    STG 2 Progress Partially Met   STG 2 Progress Comments pt not concerned about pain/soreness from sessions. Is still trying to figure out how to help self    STG 3 Patient will be able to ambulate in aquatic environment for 10-15 min without rest break.    STG 3 Progress Met   Long Term Goals   LTG 1 Patient will report improved ability to sleep/lie on R side by 75%.   LTG 1 Progress Ongoing   LTG 2 Patient will demonstrate understanding of  comprehensive aquatic program for self management once complete with formal therapy   LTG 2 Progress Ongoing;Progressing   LTG 2 Progress Comments Pt has several exercises she recalls and self guide but appears to still be problem solving.    LTG 3 Patient will report decreased pain to 4-5/10 with all standing/walking activities.   LTG 3 Progress Progressing   LTG 4 Patient will be able to ambulate with upright posture and proper heel-toe gait pattern using SC for imroved household and community mobility.   LTG 4 Progress Ongoing   LTG 5 Patient will report </= 50% score on Oswestry Index, indicating improved percieved function with all daily activties.   LTG 5 Progress Ongoing            Time Calculation:   Start Time: 1605  Stop Time: 1650  Time Calculation (min): 45 min  Total Timed Code Minutes- PT: 45 minute(s)  Therapy Suggested Charges     Code   Minutes Charges    46870 (CPT®) Hc Pt Neuromusc Re Education Ea 15 Min      24169 (CPT®) Hc Pt Ther Proc Ea 15 Min      46377 (CPT®) Hc Gait Training Ea 15 Min      80069 (CPT®) Hc Pt Therapeutic Act Ea 15 Min      56021 (CPT®) Hc Pt Manual Therapy Ea 15 Min      94331 (CPT®) Hc Pt Ther Massage- Per 15 Min      40700 (CPT®) Hc Pt Iontophoresis Ea 15 Min      27844 (CPT®) Hc Pt Elec Stim Ea-Per 15 Min      00219 (CPT®) Hc Pt Ultrasound Ea 15 Min      76309 (CPT®) Hc Pt Self Care/Mgmt/Train Ea 15 Min      83928 (CPT®) Hc Pt Prosthetic (S) Train Initial Encounter, Each 15 Min      38607 (CPT®) Hc Orthotic(S) Mgmt/Train Initial Encounter, Each 15min      14791 (CPT®) Hc Pt Aquatic Therapy Ea 15 Min 45 3    35203 (CPT®) Hc Pt Orthotic(S)/Prosthetic(S) Encounter, Each 15 Min       (CPT®) Hc Pt Electrical Stim Unattended      Total  45 3          HC PT AQUATIC THERAPY EA 15 MIN [id number]   01/23/2019 Zehra Mejía, PT GP                 Zehra Mejía, PT  1/25/2019

## 2019-01-30 ENCOUNTER — DOCUMENTATION (OUTPATIENT)
Dept: PAIN MEDICINE | Facility: CLINIC | Age: 54
End: 2019-01-30

## 2019-01-30 ENCOUNTER — APPOINTMENT (OUTPATIENT)
Dept: PHYSICAL THERAPY | Facility: HOSPITAL | Age: 54
End: 2019-01-30

## 2019-01-31 NOTE — PROGRESS NOTES
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  We discussed various topics including but not limited to bleeding, infection, injury, postprocedural site soreness, painful flareup, worsening of clinical picture, paralysis, coma, and death.     SURGEON:  Rossi Stein MD    REASON FOR PROCEDURE:    Patient has pain consistent with SI pathology on history and physical exam. Positive sacroiliac provocation maneuvers noted.   Tender to palpation over the affected SI joint.    SEDATION:  Versed 2mg & Fentanyl 100 mcg IV  ANESTHETIC AGENT:  Marcaine 0.5%  STEROID AGENT:  40mg DepoMedrol    DESCRIPTON OF PROCEDURE:  After obtaining informed consent, IV access was obtained in the preoperative area.  The patient was transported to the operative suite and placed in the prone position with a pillow under the pelvic area. EKG, blood pressure, and pulse oximeter were monitored. The lumbosacral area was prepped with Chloraprep and draped in a sterile fashion.     Under fluoroscopic guidance the inferior most portion of the RIGHT sacroiliac joint was identified. The overlying skin and subcutaneous tissue was anesthetized with 1% lidocaine. A 22-gauge spinal needle was introduced from the inferior most portion of the joint into the sacroiliac joint under fluoroscopic guidance in the AP dimension with slight oblique rotation to the contralateral side.  Aspiration was negative.  After confirming the position of the needle with fluoroscopy, 1 mL of Omnipaque was injected and after seeing appropriate spread into the joint a  total of 2.5 mL of Marcaine, with approximately 40 mg of DepoMedrol, was injected very slowly.  Vital signs remained stable.  The onset of analgesia was noted.    ESTIMATED BLOOD LOSS:  minimal  SPECIMENS:  None    COMPLICATIONS:  No complications were noted. and There was no indication of vascular uptake on live injection of contrast dye.    TOLERANCE & DISCHARGE CONDITION:    The patient tolerated the procedure well.  The patient was transported to the recovery area without difficulties.  The patient was discharged to home under the care of family in stable and satisfactory condition.    PLAN OF CARE:  1. The patient was given our standard instruction sheet and will resume all medications as per the medication reconciliation sheet.  2. The patient will Return to clinic PRN.  3. The patient is instructed to keep a pain log hourly for 8 hours after the procedure.

## 2019-02-12 RX ORDER — LOSARTAN POTASSIUM 25 MG/1
TABLET ORAL
Qty: 30 TABLET | Refills: 0 | Status: SHIPPED | OUTPATIENT
Start: 2019-02-12 | End: 2019-03-14 | Stop reason: SDUPTHER

## 2019-02-13 ENCOUNTER — OUTSIDE FACILITY SERVICE (OUTPATIENT)
Dept: PAIN MEDICINE | Facility: CLINIC | Age: 54
End: 2019-02-13

## 2019-02-13 ENCOUNTER — DOCUMENTATION (OUTPATIENT)
Dept: PAIN MEDICINE | Facility: CLINIC | Age: 54
End: 2019-02-13

## 2019-02-13 PROCEDURE — 95972 ALYS CPLX SP/PN NPGT W/PRGRM: CPT | Performed by: PAIN MEDICINE

## 2019-02-13 PROCEDURE — 63650 IMPLANT NEUROELECTRODES: CPT | Performed by: PAIN MEDICINE

## 2019-02-13 NOTE — PROGRESS NOTES
Spinal Cord Stimulation - Phase 1 (SCS Trialing)  Two-Lead Technique    [facility]    SURGEON: Rossi Stein MD    Preop Dx: Chronic low back pain  Postop Dx: Same as preop dx    SCS System: [employer]    Lead one...   16 contact lead entering into the T12/L1 interspace, advanced to a point with the distal tip at the bottom of T7 vertebral level, lying just left of midline      Lead two...   16 contact lead entering into the T12/L1 interspace, advanced to a point with the distal tip at the bottom of T7 vertebral level, lying just right of midline      Preprocedure Discussion with Patient:  Risks and complications were discussed with the patient prior to starting the procedure and informed consent was obtained.  We discussed various topics including but not limited to bleeding, infection, injury, allergic reactions, spinal cord and/or neural injury, implant site pain, post procedural site soreness, equipment malfunction including but not limited to lead fracture or lead migration or risk of electrical shock or risk of loss stimulation, risk of the lack of pain relief, and risk of worsening clinical picture.      Reason for procedure:  Chronic low back pain non responsive to conservative therapy.  No surgical options.  This patient had a favorable psychological profile noted prior to trialing.         Sedation:  Local Anesthetics & IV sedation administered by RN; versed: 2 mg; fentanyl: 50 mcg  Antibiotics:   Cefazolin 1g IV immediately prior to incision    Description of Procedure:    After obtaining informed consent, IV access had been obtained by nursing staff in the preoperative area.  The patient was transported to the operative suite and was placed in a prone position.  Appropriate padding was placed under the patient's abdomen.  Anesthesia care and cardiopulmonary monitoring maintained by member of the anesthesiology team throughout the procedure.  Perioperative antibodies were  given prior to incision per routine as indicated in the anesthesia record and indicated above.  The patient's spine was cleansed appropriately with routine cleansing, and then prepped in a sterile routine fashion.  The area was then draped in sterile routine fashion.       The midline of the patient's spine was identified and marked.  The skin and subcutaneous tissue at the needle entry sites were anesthetized with appropriate amounts of local anesthetic solution.     Needle entry sites were about 1 1/2-2 vertebral levels below the intended interlaminar space for epidural access.  The entry points were medial to the pedicles, and inserted at acute angles of less than 45° and in to the interlaminar space noted above.  Loss-of-resistance technique was utilized to identify entry into the epidural space.  Aspiration was negative.  The leads were inserted in the epidural needles as noted above to the aforementioned target.  Lateral fluoroscopic view was utilized to confirm posterior placement in the epidural space of each lead.    Temporary wire extensions were attached to the leads and the connectors were passed out the sterile field to the spinal cord stimulation device representative.  Stimulator testing was performed with representative's assistance.  Impedances were acceptable.  The patient reported good quality of capture of stimulation covering the painful areas.    Epidural needle was removed slowly from the skin at each insertion with care not to advance to retract needle tip position.  AP fluoroscopic imaging was checked sequentially to confirm no gross changes in lead position had occurred.      Each lead was secured to the skin using a StayFix dressing.  Once more in AP fluoroscopic view was checked to confirm final lead placement after the bandage placement was completed.  The sites were dressed with a Tegaderm dressing in my routine fashion.        Estimated Blood  Loss: None  Specimens:   None  Complications:  No complications were noted.    Tolerance and discharge condition:    The patient tolerated the procedure well and was awakened from any sedation without incident.  The patient was transported to the recovery area without difficulties.  Further device programming was performed by the spinal cord stimulation device representative in the recovery area.  The patient was again cautioned not to drive at this time and avoid strenuous activities and to avoid reaching overhead and to avoid bending and avoid lifting objects over 5 pounds.  The patient was discharged home under the care of family members in stable and satisfactory condition.      Plan of care:    - The patient was given our standard instruction sheet and will resume all medications as per the medication reconciliation sheet.      - The patient will return to my office at the appropriate time scheduled in about one business day with the stimulation device representative for further device programming and education about device function if necessary.     - The patient will again return to my office in about a week for the final visit of the trial in order to end the trial and decide upon whether or not to proceed to Phase 2.     The patient understands that there is any signs of complication, bleeding, infection, fever, chills, increasing back pain or spine pain, any neurologic deficit, or any other concerning finding, that the patient of a family member should call my office at [phone] at any time to access the answering service.        INITIAL STIMULATION SETTINGS:  Amp: 3.2 mAmp  PW: 310  Rate: 40 Hzt

## 2019-02-18 ENCOUNTER — OFFICE VISIT (OUTPATIENT)
Dept: PAIN MEDICINE | Facility: CLINIC | Age: 54
End: 2019-02-18

## 2019-02-18 VITALS
WEIGHT: 150 LBS | OXYGEN SATURATION: 100 % | DIASTOLIC BLOOD PRESSURE: 84 MMHG | HEART RATE: 67 BPM | RESPIRATION RATE: 16 BRPM | SYSTOLIC BLOOD PRESSURE: 140 MMHG | BODY MASS INDEX: 26.58 KG/M2 | HEIGHT: 63 IN | TEMPERATURE: 97.9 F

## 2019-02-18 DIAGNOSIS — M51.36 DDD (DEGENERATIVE DISC DISEASE), LUMBAR: ICD-10-CM

## 2019-02-18 DIAGNOSIS — G89.29 OTHER CHRONIC PAIN: Primary | ICD-10-CM

## 2019-02-18 DIAGNOSIS — M53.3 SACROILIAC JOINT DYSFUNCTION: ICD-10-CM

## 2019-02-18 PROCEDURE — 99024 POSTOP FOLLOW-UP VISIT: CPT | Performed by: NURSE PRACTITIONER

## 2019-02-18 RX ORDER — IBUPROFEN 800 MG/1
800 TABLET ORAL 2 TIMES DAILY
COMMUNITY
End: 2020-04-22

## 2019-02-18 NOTE — PROGRESS NOTES
"CHIEF COMPLAINT  Pt reportedly is having significant reduction in Low back,    Subjective   Cheri Mendoza is a 53 y.o. female  who presents to the office for follow-up of procedure.  She completed a BS SCS   on  2-13-19 performed by Dr. LAM for management of LOW BACK PAIN. Patient reports 75% relief from the procedure. Noticed her activity improved. She did have some pain still, but it was much less severe or frequent.  Did have to have some reprogramming for right low back/hip pain. Did notice improvement with this.   She was having significant relief in her right low back in first few days, but this has somewhat decreased. She did have 2 days of significant relief. Is wanting to proceed with implant. Wishes to proceed with Dr. Serrano after discussing paddle placement. No b/b changes. ADL\"s by self.     Back Pain   This is a chronic problem. The current episode started more than 1 year ago. The problem occurs constantly. The problem has been gradually worsening (significant improvement with SCS trial) since onset. The pain is present in the gluteal and sacro-iliac (RIGHT > left). The quality of the pain is described as aching, burning and cramping. The pain radiates to the right thigh. The pain is at a severity of 3/10 (pain ranges from 3-7/10VAS). The pain is worse during the day. The symptoms are aggravated by position, standing, stress and twisting. Stiffness is present all day. Associated symptoms include leg pain and weakness (R leg). Pertinent negatives include no abdominal pain, bladder incontinence, bowel incontinence, chest pain, dysuria, fever, headaches, numbness, paresis, paresthesias, pelvic pain, perianal numbness, tingling or weight loss. Risk factors include history of osteoporosis, lack of exercise and menopause. She has tried bed rest, analgesics, chiropractic manipulation, heat, home exercises, ice, muscle relaxant, NSAIDs and walking for the symptoms. The treatment provided mild " "relief.      PEG Assessment   What number best describes your pain on average in the past week?5  What number best describes how, during the past week, pain has interfered with your enjoyment of life?9  What number best describes how, during the past week, pain has interfered with your general activity?  9    The following portions of the patient's history were reviewed and updated as appropriate: allergies, current medications, past family history, past medical history, past social history, past surgical history and problem list.    Review of Systems   Constitutional: Positive for activity change (decreased walks with a crutch on long distances). Negative for appetite change, fever and weight loss.   HENT: Negative for hearing loss and trouble swallowing.    Eyes: Negative for visual disturbance.   Respiratory: Negative for apnea, chest tightness and shortness of breath.    Cardiovascular: Negative for chest pain.   Gastrointestinal: Negative for abdominal pain, bowel incontinence, constipation, diarrhea (IBS) and nausea (occasionally,from tramadol).   Genitourinary: Negative for bladder incontinence, difficulty urinating, dysuria and pelvic pain.   Musculoskeletal: Positive for back pain and gait problem (due to back pain).   Neurological: Positive for weakness (R leg). Negative for dizziness, tingling, seizures, light-headedness, numbness, headaches and paresthesias.   Psychiatric/Behavioral: Positive for sleep disturbance. Negative for suicidal ideas.       Vitals:    02/18/19 0820   BP: 140/84   Pulse: 67   Resp: 16   Temp: 97.9 °F (36.6 °C)   SpO2: 100%   Weight: 68 kg (150 lb)   Height: 160.2 cm (63.07\")   PainSc: 3  Comment: R hip pain ranges from 0-9/10   PainLoc: Hip  Comment: R hip pain ranges from 0-9/10     Objective   Physical Exam   Constitutional: She is oriented to person, place, and time. She appears well-developed and well-nourished. She is cooperative. No distress.   HENT:   Head: Normocephalic " and atraumatic.   Eyes: Conjunctivae and lids are normal.   Cardiovascular: Normal rate.   Pulmonary/Chest: Effort normal. No respiratory distress.   Abdominal: Normal appearance.   Musculoskeletal: Normal range of motion.        Lumbar back: She exhibits tenderness and pain.   Neurological: She is alert and oriented to person, place, and time. She has normal strength and normal reflexes. No sensory deficit. Gait normal.   Skin: Skin is warm and dry.        Psychiatric: She has a normal mood and affect. Her speech is normal and behavior is normal. Judgment and thought content normal. Cognition and memory are normal.   Nursing note and vitals reviewed.      Assessment/Plan   Cheri was seen today for hip pain.    Diagnoses and all orders for this visit:    Other chronic pain    DDD (degenerative disc disease), lumbar    Sacroiliac joint dysfunction      --- Refer to Dr. Gauthier for BS SCS placement.   --- Follow-up PRN.       OTTO REPORT  OTTO report has been reviewed and scanned into the patient's chart.    As the clinician, I personally reviewed the OTTO from 2-15-19 while the patient was in the office today.          EMR Dragon/Transcription disclaimer:   Much of this encounter note is an electronic transcription/translation of spoken language to printed text. The electronic translation of spoken language may permit erroneous, or at times, nonsensical words or phrases to be inadvertently transcribed; Although I have reviewed the note for such errors, some may still exist.

## 2019-02-20 ENCOUNTER — TELEPHONE (OUTPATIENT)
Dept: PAIN MEDICINE | Facility: CLINIC | Age: 54
End: 2019-02-20

## 2019-03-07 ENCOUNTER — TELEPHONE (OUTPATIENT)
Dept: INTERNAL MEDICINE | Facility: CLINIC | Age: 54
End: 2019-03-07

## 2019-03-07 ENCOUNTER — TELEMEDICINE (OUTPATIENT)
Dept: FAMILY MEDICINE CLINIC | Facility: TELEHEALTH | Age: 54
End: 2019-03-07

## 2019-03-07 DIAGNOSIS — J11.1 INFLUENZA WITH UPPER RESPIRATORY SYMPTOMS: ICD-10-CM

## 2019-03-07 DIAGNOSIS — J01.40 ACUTE PANSINUSITIS, RECURRENCE NOT SPECIFIED: Primary | ICD-10-CM

## 2019-03-07 DIAGNOSIS — J02.9 ACUTE PHARYNGITIS, UNSPECIFIED ETIOLOGY: ICD-10-CM

## 2019-03-07 PROCEDURE — VIDEOVISIT: Performed by: NURSE PRACTITIONER

## 2019-03-07 RX ORDER — AZITHROMYCIN 250 MG/1
TABLET, FILM COATED ORAL
Qty: 6 TABLET | Refills: 0 | Status: SHIPPED | OUTPATIENT
Start: 2019-03-07 | End: 2019-03-12

## 2019-03-07 RX ORDER — DEXTROMETHORPHAN HYDROBROMIDE AND PROMETHAZINE HYDROCHLORIDE 15; 6.25 MG/5ML; MG/5ML
5 SYRUP ORAL 4 TIMES DAILY PRN
Qty: 75 ML | Refills: 0 | Status: SHIPPED | OUTPATIENT
Start: 2019-03-07 | End: 2019-03-13

## 2019-03-07 NOTE — PATIENT INSTRUCTIONS
"Influenza, Adult  Influenza (“the flu\") is an infection in the lungs, nose, and throat (respiratory tract). It is caused by a virus. The flu causes many common cold symptoms, as well as a high fever and body aches. It can make you feel very sick.  The flu spreads easily from person to person (is contagious). Getting a flu shot (influenza vaccination) every year is the best way to prevent the flu.  Follow these instructions at home:  · Take over-the-counter and prescription medicines only as told by your doctor.  · Use a cool mist humidifier to add moisture (humidity) to the air in your home. This can make it easier to breathe.  · Rest as needed.  · Drink enough fluid to keep your pee (urine) clear or pale yellow.  · Cover your mouth and nose when you cough or sneeze.  · Wash your hands with soap and water often, especially after you cough or sneeze. If you cannot use soap and water, use hand .  · Stay home from work or school as told by your doctor. Unless you are visiting your doctor, try to avoid leaving home until your fever has been gone for 24 hours without the use of medicine.  · Keep all follow-up visits as told by your doctor. This is important.  How is this prevented?  · Getting a yearly (annual) flu shot is the best way to avoid getting the flu. You may get the flu shot in late summer, fall, or winter. Ask your doctor when you should get your flu shot.  · Wash your hands often or use hand  often.  · Avoid contact with people who are sick during cold and flu season.  · Eat healthy foods.  · Drink plenty of fluids.  · Get enough sleep.  · Exercise regularly.  Contact a doctor if:  · You get new symptoms.  · You have:  ? Chest pain.  ? Watery poop (diarrhea).  ? A fever.  · Your cough gets worse.  · You start to have more mucus.  · You feel sick to your stomach (nauseous).  · You throw up (vomit).  Get help right away if:  · You start to be short of breath or have trouble breathing.  · Your " skin or nails turn a bluish color.  · You have very bad pain or stiffness in your neck.  · You get a sudden headache.  · You get sudden pain in your face or ear.  · You cannot stop throwing up.  This information is not intended to replace advice given to you by your health care provider. Make sure you discuss any questions you have with your health care provider.  Document Released: 09/26/2009 Document Revised: 05/25/2017 Document Reviewed: 10/11/2016  [employer] Patient Education © 2017 [employer]    Pharyngitis  Pharyngitis is a sore throat (pharynx). There is redness, pain, and swelling of your throat.  Follow these instructions at home:  · Drink enough fluids to keep your pee (urine) clear or pale yellow.  · Only take medicine as told by your doctor.  ? You may get sick again if you do not take medicine as told. Finish your medicines, even if you start to feel better.  ? Do not take aspirin.  · Rest.  · Rinse your mouth (gargle) with salt water (½ tsp of salt per 1 qt of water) every 1-2 hours. This will help the pain.  · If you are not at risk for choking, you can suck on hard candy or sore throat lozenges.  Contact a doctor if:  · You have large, tender lumps on your neck.  · You have a rash.  · You cough up green, yellow-brown, or bloody spit.  Get help right away if:  · You have a stiff neck.  · You drool or cannot swallow liquids.  · You throw up (vomit) or are not able to keep medicine or liquids down.  · You have very bad pain that does not go away with medicine.  · You have problems breathing (not from a stuffy nose).  This information is not intended to replace advice given to you by your health care provider. Make sure you discuss any questions you have with your health care provider.  Document Released: 06/05/2009 Document Revised: 05/25/2017 Document Reviewed: 08/25/2014  [employer] Patient Education © 2017 [employer]    Postnasal Drip  Postnasal drip is the feeling of mucus  going down the back of your throat. Mucus is a slimy substance that moistens and cleans your nose and throat, as well as the air pockets in face bones near your forehead and cheeks (sinuses). Small amounts of mucus pass from your nose and sinuses down the back of your throat all the time. This is normal. When you produce too much mucus or the mucus gets too thick, you can feel it.  Some common causes of postnasal drip include:  · Having more mucus because of:  ? A cold or the flu.  ? Allergies.  ? Cold air.  ? Certain medicines.  · Having more mucus that is thicker because of:  ? A sinus or nasal infection.  ? Dry air.  ? A food allergy.    Follow these instructions at home:  Relieving discomfort  · Gargle with a salt-water mixture 3-4 times a day or as needed. To make a salt-water mixture, completely dissolve ½-1 tsp of salt in 1 cup of warm water.  · If the air in your home is dry, use a humidifier to add moisture to the air.  · Use a saline spray or container (neti pot) to flush out the nose (nasal irrigation). These methods can help clear away mucus and keep the nasal passages moist.  General instructions  · Take over-the-counter and prescription medicines only as told by your health care provider.  · Follow instructions from your health care provider about eating or drinking restrictions. You may need to avoid caffeine.  · Avoid things that you know you are allergic to (allergens), like dust, mold, pollen, pets, or certain foods.  · Drink enough fluid to keep your urine pale yellow.  · Keep all follow-up visits as told by your health care provider. This is important.  Contact a health care provider if:  · You have a fever.  · You have a sore throat.  · You have difficulty swallowing.  · You have headache.  · You have sinus pain.  · You have a cough that does not go away.  · The mucus from your nose becomes thick and is green or yellow in color.  · You have cold or flu symptoms that last more than 10  days.  Summary  · Postnasal drip is the feeling of mucus going down the back of your throat.  · If your health care provider approves, use nasal irrigation or a nasal spray 2?4 times a day.  · Avoid things that you know you are allergic to (allergens), like dust, mold, pollen, pets, or certain foods.  This information is not intended to replace advice given to you by your health care provider. Make sure you discuss any questions you have with your health care provider.  Document Released: 04/02/2018 Document Revised: 04/02/2018 Document Reviewed: 04/02/2018  [employer] Patient Education © 2018 [employer]    Sinusitis, Adult  Sinusitis is soreness and inflammation of your sinuses. Sinuses are hollow spaces in the bones around your face. They are located:  · Around your eyes.  · In the middle of your forehead.  · Behind your nose.  · In your cheekbones.    Your sinuses and nasal passages are lined with a stringy fluid (mucus). Mucus normally drains out of your sinuses. When your nasal tissues get inflamed or swollen, the mucus can get trapped or blocked so air cannot flow through your sinuses. This lets bacteria, viruses, and funguses grow, and that leads to infection.  Follow these instructions at home:  Medicines  · Take, use, or apply over-the-counter and prescription medicines only as told by your doctor. These may include nasal sprays.  · If you were prescribed an antibiotic medicine, take it as told by your doctor. Do not stop taking the antibiotic even if you start to feel better.  Hydrate and Humidify  · Drink enough water to keep your pee (urine) clear or pale yellow.  · Use a cool mist humidifier to keep the humidity level in your home above 50%.  · Breathe in steam for 10-15 minutes, 3-4 times a day or as told by your doctor. You can do this in the bathroom while a hot shower is running.  · Try not to spend time in cool or dry air.  Rest  · Rest as much as possible.  · Sleep with your head  raised (elevated).  · Make sure to get enough sleep each night.  General instructions  · Put a warm, moist washcloth on your face 3-4 times a day or as told by your doctor. This will help with discomfort.  · Wash your hands often with soap and water. If there is no soap and water, use hand .  · Do not smoke. Avoid being around people who are smoking (secondhand smoke).  · Keep all follow-up visits as told by your doctor. This is important.  Contact a doctor if:  · You have a fever.  · Your symptoms get worse.  · Your symptoms do not get better within 10 days.  Get help right away if:  · You have a very bad headache.  · You cannot stop throwing up (vomiting).  · You have pain or swelling around your face or eyes.  · You have trouble seeing.  · You feel confused.  · Your neck is stiff.  · You have trouble breathing.  This information is not intended to replace advice given to you by your health care provider. Make sure you discuss any questions you have with your health care provider.  Document Released: 06/05/2009 Document Revised: 08/13/2017 Document Reviewed: 10/12/2016  [employer] Interactive Patient Education © 2018 [employer]

## 2019-03-07 NOTE — PROGRESS NOTES
CHIEF COMPLAINT  No chief complaint on file.      HPI  Cheri Mendoza is a 53 y.o. female  presents with complaint of    4 day history of sudden onset of severe symptoms including nasal congestion with green mucous, sore throat (hurts to cough) PND, occ green sputum production with mild cough, has increased water intake--but still extremely thirsty since she is taking mucinex d, headache, both ears are itchy; has been taking tylenol for body aches since , fatigue, decreased appetite    Review of Systems   Denies chest tightness, wheezing, SOA,  n/v/d    Pertinent ROS noted in HPI    Past Medical History:   Diagnosis Date   • GERD (gastroesophageal reflux disease)    • H/O colonoscopy    • Hypertension    • Hypothyroidism    • Low back pain    • Pneumonia 10/2011       Family History   Problem Relation Age of Onset   • Osteoarthritis Mother    • Heart disease Mother    • No Known Problems Brother    • Breast cancer Maternal Aunt    • Cancer Paternal Grandmother        Social History     Socioeconomic History   • Marital status: Significant Other     Spouse name: Not on file   • Number of children: 0   • Years of education: ms   • Highest education level: Not on file   Social Needs   • Financial resource strain: Not on file   • Food insecurity - worry: Not on file   • Food insecurity - inability: Not on file   • Transportation needs - medical: Not on file   • Transportation needs - non-medical: Not on file   Occupational History   • Occupation: secratary   Tobacco Use   • Smoking status: Former Smoker     Packs/day: 0.25     Years: 5.00     Pack years: 1.25     Last attempt to quit:      Years since quittin.1   • Smokeless tobacco: Never Used   Substance and Sexual Activity   • Alcohol use: Yes     Comment: socially   • Drug use: Defer   • Sexual activity: Defer   Other Topics Concern   • Not on file   Social History Narrative   • Not on file         There were no vitals taken for this  visit.    PHYSICAL EXAM  Physical Exam   Constitutional: She is oriented to person, place, and time. She appears well-developed and well-nourished. She is cooperative.   HENT:   Head: Normocephalic and atraumatic.   Neurological: She is alert and oriented to person, place, and time.         Assessment/Plan   Diagnoses and all orders for this visit:    Acute pansinusitis, recurrence not specified  -     azithromycin (ZITHROMAX) 250 MG tablet; Take 2 tablets the first day, then 1 tablet daily for 4 days.  -     promethazine-dextromethorphan (PROMETHAZINE-DM) 6.25-15 MG/5ML syrup; Take 5 mL by mouth 4 (Four) Times a Day As Needed for Cough.    Influenza with upper respiratory symptoms    Acute pharyngitis, unspecified etiology    --discussed with Ms. Mendoza that her illness most likely began as influenza as evidenced by her description of sudden onset, continued body aches, cough and other symptoms, unfortunately, since it has been over 48 hrs, she would most likely not benefit from Tamiflu or Xofluza; since she feels as if she is worsening--will have her start Zpack, continue mucinex D, flonase, and add cough syrup for night time cough    Written home care instructions are available in My Chart for her diagnoses      FOLLOW-UP  F/U with Dr. Felicia Gonzalez in 4-5 days if no improvement or sooner if worsening of symptoms    Patient verbalizes understanding of medication dosage, comfort measures, instructions for treatment and follow-up.    IVANNA Coughlin  03/07/2019  10:16 AM

## 2019-03-07 NOTE — TELEPHONE ENCOUNTER
Regarding: Non-Urgent Medical Question  Contact: [phone]  ----- Message from Mychart, Generic sent at 3/7/2019  8:28 AM EST -----    Good morning!  Despite my best efforts, I have a cold.  I do not have fever, but I do have colored mucus.  I have had it since Sunday evening.  I have taken OTC Mucinex cold and flu faithfully since then.  Is there anything else I can do?  Should I try to see Dr. Gonzalez?  Or what would be her recommendation, please.    Thank you!    Cheri  [phone]        I called the patient, we talked and I informed her about [employer] new program, E-Visit through MY CHART.  Patient will contact and request a virtual visit.    DUNIA

## 2019-03-08 NOTE — PROGRESS NOTES
Subjective   Patient ID: Cheri Mendoza is a 53 y.o. female is here today for follow-up .  Patient was last seen 12.12.18 and was referred to Dr Stein for SCS trial.      At the time of her her last visit on 12.13.18 for low back and hip pain.    Patient states that she unsure if stimulator trial was effective or not, she said she was unable to do her normal activities to know if it helped her.  She has more questions before proceeding.  Patient would like to know why she has no pain when she is stationary and does have pain when she is active.    Patient states her low back pain is intermittent and is worse with activity., however, when she has it it is severe. She denies leg pain, she does have right hip pain.  No leg weakness or N/T.  She is not taking any meds for pain.          This patient has had chronic hip pain for the last 3 years.  We have been investigating it and concluded that it was probably from her degenerative disc disease but that she did not need any surgery.  She had a spinal cord stimulator trial last month and she was equivocal about the results.  It was a [employer] system and I will need to speak with the representatives about it.  He did not consistently cover the right hip which is where she has most of her pain.  She is aware that there really is not much of an alternative if she chooses not to move forward with a spinal cord stimulator other than living with it.  She got a new job which is less stressful.  She and her  engaged in Bass fishing competitively and want to go to some tournament so she can see if the activity aggravates it.  Typically activity does and she feels better when she is not doing a whole lot but she would like to stay active.  I will speak to the representatives and have been come back in about a month after the bass fishing tournament and we can regroup.      Back Pain   The problem occurs intermittently. The pain is present in the lumbar spine.  The pain does not radiate. The pain is at a severity of 9/10. The pain is severe. The symptoms are aggravated by bending, position, standing and twisting. Pertinent negatives include no leg pain, numbness or weakness.       The following portions of the patient's history were reviewed and updated as appropriate: allergies, current medications, past family history, past medical history, past social history, past surgical history and problem list.    Review of Systems   Musculoskeletal: Positive for arthralgias, back pain and myalgias. Negative for gait problem.   Neurological: Negative for weakness and numbness.   All other systems reviewed and are negative.      Objective   Physical Exam   Constitutional: She is oriented to person, place, and time. She appears well-developed and well-nourished.   HENT:   Head: Normocephalic and atraumatic.   Eyes: Conjunctivae and EOM are normal. Pupils are equal, round, and reactive to light.   Fundoscopic exam:       The right eye shows no papilledema. The right eye shows venous pulsations.        The left eye shows no papilledema. The left eye shows venous pulsations.   Neck: Carotid bruit is not present.   Neurological: She is oriented to person, place, and time. She has a normal Finger-Nose-Finger Test and a normal Heel to Shin Test. Gait normal.   Reflex Scores:       Tricep reflexes are 2+ on the right side and 2+ on the left side.       Bicep reflexes are 2+ on the right side and 2+ on the left side.       Brachioradialis reflexes are 2+ on the right side and 2+ on the left side.       Patellar reflexes are 2+ on the right side and 2+ on the left side.       Achilles reflexes are 2+ on the right side and 2+ on the left side.  Psychiatric: Her speech is normal.     Neurologic Exam     Mental Status   Oriented to person, place, and time.   Registration of memory: Good recent and remote memory.   Attention: normal. Concentration: normal.   Speech: speech is normal   Level of  consciousness: alert  Knowledge: consistent with education.     Cranial Nerves     CN II   Visual fields full to confrontation.   Visual acuity: normal    CN III, IV, VI   Pupils are equal, round, and reactive to light.  Extraocular motions are normal.     CN V   Facial sensation intact.   Right corneal reflex: normal  Left corneal reflex: normal    CN VII   Facial expression full, symmetric.   Right facial weakness: none  Left facial weakness: none    CN VIII   Hearing: intact    CN IX, X   Palate: symmetric    CN XI   Right sternocleidomastoid strength: normal  Left sternocleidomastoid strength: normal    CN XII   Tongue: not atrophic  Tongue deviation: none    Motor Exam   Muscle bulk: normal  Right arm tone: normal  Left arm tone: normal  Right leg tone: normal  Left leg tone: normal    Strength   Strength 5/5 except as noted.     Sensory Exam   Light touch normal.     Gait, Coordination, and Reflexes     Gait  Gait: normal    Coordination   Finger to nose coordination: normal  Heel to shin coordination: normal    Reflexes   Right brachioradialis: 2+  Left brachioradialis: 2+  Right biceps: 2+  Left biceps: 2+  Right triceps: 2+  Left triceps: 2+  Right patellar: 2+  Left patellar: 2+  Right achilles: 2+  Left achilles: 2+  Right : 2+  Left : 2+      Assessment/Plan   Independent Review of Radiographic Studies:    Reviewed her original lumbar MRI which did show some nonspecific degenerative disc disease facet hypertrophy but no root compression.  Agree with the report.      Medical Decision Making:    She will come to see me after her bass fishing tournaments and will get a better idea of the severity of her symptoms at that point.  In the meantime I have a chance to speak to the [employer] representatives about the trial itself and whether or not we can do better with a surgical lead.      Cheri was seen today for back pain and hip pain.    Diagnoses and all orders for this visit:    DDD  (degenerative disc disease), lumbar    Chronic right-sided low back pain with right-sided sciatica    Right hip pain      Return in about 4 weeks (around 4/10/2019).           I spoke with Charlotte the representative from [employer].  The leads that were being stimulated were at the T8-T9 level as expected.  Frankly I think we should be able to cover her painful right upper buttock and hip and groin area with surgical leads.  She does need to decide if this is something she wants to move forward with.  Will discuss at next visit.  .

## 2019-03-13 ENCOUNTER — OFFICE VISIT (OUTPATIENT)
Dept: NEUROSURGERY | Facility: CLINIC | Age: 54
End: 2019-03-13

## 2019-03-13 VITALS
WEIGHT: 150 LBS | SYSTOLIC BLOOD PRESSURE: 122 MMHG | DIASTOLIC BLOOD PRESSURE: 75 MMHG | HEART RATE: 69 BPM | BODY MASS INDEX: 26.58 KG/M2 | HEIGHT: 63 IN

## 2019-03-13 DIAGNOSIS — M25.551 RIGHT HIP PAIN: ICD-10-CM

## 2019-03-13 DIAGNOSIS — M51.36 DDD (DEGENERATIVE DISC DISEASE), LUMBAR: Primary | ICD-10-CM

## 2019-03-13 DIAGNOSIS — M54.41 CHRONIC RIGHT-SIDED LOW BACK PAIN WITH RIGHT-SIDED SCIATICA: ICD-10-CM

## 2019-03-13 DIAGNOSIS — G89.29 CHRONIC RIGHT-SIDED LOW BACK PAIN WITH RIGHT-SIDED SCIATICA: ICD-10-CM

## 2019-03-13 PROCEDURE — 99213 OFFICE O/P EST LOW 20 MIN: CPT | Performed by: NEUROLOGICAL SURGERY

## 2019-03-14 RX ORDER — LOSARTAN POTASSIUM 25 MG/1
TABLET ORAL
Qty: 30 TABLET | Refills: 0 | Status: SHIPPED | OUTPATIENT
Start: 2019-03-14 | End: 2019-04-19 | Stop reason: SDUPTHER

## 2019-04-01 ENCOUNTER — DOCUMENTATION (OUTPATIENT)
Dept: PHYSICAL THERAPY | Facility: HOSPITAL | Age: 54
End: 2019-04-01

## 2019-04-01 NOTE — THERAPY DISCHARGE NOTE
Outpatient Physical Therapy Discharge Summary         Patient Name: Cheri Mendoza  : 1965  MRN: [id number]    Today's Date: 2019    Visit Dx:      PT OP Goals     Row Name 19 0800          PT Short Term Goals    STG 1  Patient will report improved ability to sleep by 25%  -CK     STG 1 Progress  Partially Met  -CK     STG 2  Patient will report no increase in pain for 24 hours following aqua therapy session.   -CK     STG 2 Progress  Partially Met  -CK     STG 3  Patient will be able to ambulate in aquatic environment for 10-15 min without rest break.   -CK     STG 3 Progress  Met  -CK        Long Term Goals    LTG 1  Patient will report improved ability to sleep/lie on R side by 75%.  -CK     LTG 1 Progress  Not Met  -CK     LTG 2  Patient will demonstrate understanding of comprehensive aquatic program for self management once complete with formal therapy  -CK     LTG 2 Progress  Not Met  -CK     LTG 3  Patient will report decreased pain to 4-5/10 with all standing/walking activities.  -CK     LTG 3 Progress  Not Met  -CK     LTG 4  Patient will be able to ambulate with upright posture and proper heel-toe gait pattern using SC for imroved household and community mobility.  -CK     LTG 4 Progress  Not Met  -CK     LTG 5  Patient will report </= 50% score on Oswestry Index, indicating improved percieved function with all daily activties.  -CK     LTG 5 Progress  Not Met  -CK       User Key  (r) = Recorded By, (t) = Taken By, (c) = Cosigned By    Initials Name Provider Type    CK Albert Luciano, PT Physical Therapist          OP PT Discharge Summary  Date of Discharge: 19  Reason for Discharge: Lack of progress, Non-compliant  Discharge Destination: Home with home program  Discharge Instructions/Additional Comments: Ms. Mendoza has been seen for 8 skilled therapy sessions since initiating treatment for chronic low back pain and SI dysfunction. Initial evaluation on 10/30 and appts were  inconsistent secondary to caring for ailing mother out of town. She continued to demonstrate poor posture with pain with all daily activities. Therapy sessions consisted of both land and aquatic exercises. She is being discharged at this time secondary to lack of consistency with appts.         Albert Luciano, PT  4/1/2019

## 2019-04-05 ENCOUNTER — TRANSCRIBE ORDERS (OUTPATIENT)
Dept: ADMINISTRATIVE | Facility: HOSPITAL | Age: 54
End: 2019-04-05

## 2019-04-05 DIAGNOSIS — M25.551 RIGHT HIP PAIN: Primary | ICD-10-CM

## 2019-04-13 ENCOUNTER — HOSPITAL ENCOUNTER (OUTPATIENT)
Dept: MRI IMAGING | Facility: HOSPITAL | Age: 54
Discharge: HOME | End: 2019-04-13
Admitting: ORTHOPAEDIC SURGERY

## 2019-04-13 DIAGNOSIS — M25.551 RIGHT HIP PAIN: ICD-10-CM

## 2019-04-13 PROCEDURE — 73721 MRI JNT OF LWR EXTRE W/O DYE: CPT

## 2019-04-16 RX ORDER — LOSARTAN POTASSIUM 25 MG/1
TABLET ORAL
Qty: 30 TABLET | Refills: 0 | OUTPATIENT
Start: 2019-04-16

## 2019-04-19 RX ORDER — LOSARTAN POTASSIUM 25 MG/1
25 TABLET ORAL DAILY
Qty: 30 TABLET | Refills: 0 | Status: SHIPPED | OUTPATIENT
Start: 2019-04-19 | End: 2019-05-10 | Stop reason: DRUGHIGH

## 2019-04-23 RX ORDER — LEVOTHYROXINE SODIUM 75 MCG
TABLET ORAL
Qty: 90 TABLET | Refills: 2 | Status: SHIPPED | OUTPATIENT
Start: 2019-04-23 | End: 2019-11-12 | Stop reason: DRUGHIGH

## 2019-05-10 ENCOUNTER — OFFICE VISIT (OUTPATIENT)
Dept: INTERNAL MEDICINE | Facility: CLINIC | Age: 54
End: 2019-05-10

## 2019-05-10 VITALS — OXYGEN SATURATION: 97 % | HEART RATE: 67 BPM | SYSTOLIC BLOOD PRESSURE: 140 MMHG | DIASTOLIC BLOOD PRESSURE: 72 MMHG

## 2019-05-10 DIAGNOSIS — I10 HYPERTENSION, UNSPECIFIED TYPE: ICD-10-CM

## 2019-05-10 DIAGNOSIS — G89.29 CHRONIC RIGHT-SIDED LOW BACK PAIN WITH RIGHT-SIDED SCIATICA: Primary | ICD-10-CM

## 2019-05-10 DIAGNOSIS — M54.41 CHRONIC RIGHT-SIDED LOW BACK PAIN WITH RIGHT-SIDED SCIATICA: Primary | ICD-10-CM

## 2019-05-10 DIAGNOSIS — E03.9 ADULT HYPOTHYROIDISM: ICD-10-CM

## 2019-05-10 PROCEDURE — 99214 OFFICE O/P EST MOD 30 MIN: CPT | Performed by: INTERNAL MEDICINE

## 2019-05-10 RX ORDER — DULOXETIN HYDROCHLORIDE 60 MG/1
60 CAPSULE, DELAYED RELEASE ORAL DAILY
Qty: 30 CAPSULE | Refills: 5 | Status: SHIPPED | OUTPATIENT
Start: 2019-05-10 | End: 2019-11-12

## 2019-05-10 RX ORDER — LOSARTAN POTASSIUM 50 MG/1
50 TABLET ORAL DAILY
Qty: 90 TABLET | Refills: 3 | Status: SHIPPED | OUTPATIENT
Start: 2019-05-10 | End: 2020-05-26 | Stop reason: SDUPTHER

## 2019-05-10 NOTE — PROGRESS NOTES
Chief Complaint   Patient presents with   • Hypothyroidism   • Hip Pain   • Back Pain       History of Present Illness   Cheri Mendoza is a 54 y.o. female presents for follow up evaluation on hypothyroidism and hypertension. She has continued back pain. She has tried epidural injections, facet injections, and other conservative measures. She has been to a neurosurgeon and spinal stimulator is advised. She is awaiting an evaluation by an outside source as well. She has tried gabapentin and side effects were worse than benefits. She felt a true mental fogginess with this as well as lyrica. She did not get benefit with ibuprofen, diclofenac, or meloxicam either. She predominantly notes the pain in her right hip. It is limiting activity. She does engage in fishing routinely and pain is well regulated when she is seated in the boat.         The following portions of the patient's history were reviewed and updated as appropriate: allergies, current medications, past family history, past medical history, past social history, past surgical history and problem list.  Current Outpatient Medications on File Prior to Visit   Medication Sig Dispense Refill   • Ascorbic Acid (VITAMIN C PO) Take 1,000 mg by mouth Daily.     • B Complex Vitamins (VITAMIN B COMPLEX PO) Take 200 mcg by mouth Daily.     • baclofen (LIORESAL) 10 MG tablet Take 10 mg by mouth 3 (Three) Times a Day As Needed.     • Cholecalciferol (VITAMIN D3) 5000 units capsule capsule Take 2,000 Units by mouth Daily.     • cycloSPORINE (RESTASIS) 0.05 % ophthalmic emulsion 1 drop 2 (Two) Times a Day.     • estradiol (VIVELLE-DOT) 0.0375 MG/24HR Place 1 patch on the skin 2 (Two) Times a Week.     • ibuprofen (ADVIL,MOTRIN) 800 MG tablet Take 800 mg by mouth 2 (Two) Times a Day.     • Magnesium-Potassium (CHANDRAKANT MAGNESIUM-POTASSIUM) 250-100 MG tablet Take  by mouth Daily.     • Omega-3 Fatty Acids (FISH OIL PO) Take 1,170 mg by mouth Daily.     • Probiotic Product  (PROBIOTIC DAILY PO) Take  by mouth Daily.     • progesterone (PROMETRIUM) 100 MG capsule Take 100 mg by mouth Daily.     • SYNTHROID 75 MCG tablet TAKE ONE TABLET BY MOUTH DAILY 90 tablet 2   • Triamcinolone Acetonide 0.05 % ointment Apply  topically.     • [DISCONTINUED] losartan (COZAAR) 25 MG tablet Take 1 tablet by mouth Daily. 30 tablet 0   • DHEA 10 MG capsule Take  by mouth Daily.     • diclofenac (VOLTAREN) 50 MG EC tablet Take 1 tablet by mouth 2 (Two) Times a Day. 60 tablet 1   • Magnesium 100 MG tablet Take 500 mg by mouth Every Night.     • Multiple Vitamins-Minerals (PRESERVISION AREDS PO) Take  by mouth Daily.     • traMADol (ULTRAM) 50 MG tablet Take 50 mg by mouth Every 6 (Six) Hours As Needed for Moderate Pain .       No current facility-administered medications on file prior to visit.      Review of Systems   Constitutional: Negative.    HENT: Negative.    Eyes: Negative.    Respiratory: Negative.    Cardiovascular: Negative.    Gastrointestinal: Negative.    Endocrine: Negative.    Genitourinary: Negative.    Musculoskeletal: Positive for arthralgias and back pain.   Skin: Negative.    Allergic/Immunologic: Negative.    Neurological: Negative.    Hematological: Negative.    Psychiatric/Behavioral: Negative.        Objective   Physical Exam   Constitutional: She is oriented to person, place, and time. She appears well-developed and well-nourished.   HENT:   Head: Normocephalic and atraumatic.   Right Ear: External ear normal.   Left Ear: External ear normal.   Nose: Nose normal.   Mouth/Throat: Oropharynx is clear and moist.   Eyes: Conjunctivae and EOM are normal. Pupils are equal, round, and reactive to light.   Neck: Normal range of motion. Neck supple.   Cardiovascular: Normal rate, regular rhythm, normal heart sounds and intact distal pulses.   Pulmonary/Chest: Effort normal and breath sounds normal.   Abdominal: Soft. Bowel sounds are normal.   Neurological: She is alert and oriented to  person, place, and time.   Skin: Skin is warm and dry.   Psychiatric: She has a normal mood and affect. Her behavior is normal. Judgment and thought content normal.   Nursing note and vitals reviewed.       /72   Pulse 67   SpO2 97%     Assessment/Plan   Diagnoses and all orders for this visit:    Chronic right-sided low back pain with right-sided sciatica    Adult hypothyroidism  -     T4, Free  -     TSH  -     Comprehensive Metabolic Panel    Hypertension, unspecified type  -     T4, Free  -     TSH  -     Comprehensive Metabolic Panel    Other orders  -     losartan (COZAAR) 50 MG tablet; Take 1 tablet by mouth Daily.  -     DULoxetine (CYMBALTA) 60 MG capsule; Take 1 capsule by mouth Daily.        pateint w/ chronic low back pain. She may get a spinal stimulator. She will start cymbalta one tablet daily for pain and mood improvement. Encouraged increased movement in aquatic setting. She is to increase losartan to 50 mg once daily. Will test thyroid function tests to ensure appropriate dosing of synthroid. She will f/u w/ neurology and in office routinely.

## 2019-05-11 LAB
ALBUMIN SERPL-MCNC: 4.7 G/DL (ref 3.5–5.2)
ALBUMIN/GLOB SERPL: 1.6 G/DL
ALP SERPL-CCNC: 116 U/L (ref 39–117)
ALT SERPL-CCNC: 32 U/L (ref 1–33)
AST SERPL-CCNC: 34 U/L (ref 1–32)
BILIRUB SERPL-MCNC: 0.2 MG/DL (ref 0.2–1.2)
BUN SERPL-MCNC: 16 MG/DL (ref 6–20)
BUN/CREAT SERPL: 17.2 (ref 7–25)
CALCIUM SERPL-MCNC: 10.2 MG/DL (ref 8.6–10.5)
CHLORIDE SERPL-SCNC: 101 MMOL/L (ref 98–107)
CO2 SERPL-SCNC: 28.4 MMOL/L (ref 22–29)
CREAT SERPL-MCNC: 0.93 MG/DL (ref 0.57–1)
GLOBULIN SER CALC-MCNC: 3 GM/DL
GLUCOSE SERPL-MCNC: 90 MG/DL (ref 65–99)
POTASSIUM SERPL-SCNC: 4 MMOL/L (ref 3.5–5.2)
PROT SERPL-MCNC: 7.7 G/DL (ref 6–8.5)
SODIUM SERPL-SCNC: 139 MMOL/L (ref 136–145)
T4 FREE SERPL-MCNC: 1.39 NG/DL (ref 0.93–1.7)
TSH SERPL DL<=0.005 MIU/L-ACNC: 4.35 MIU/ML (ref 0.27–4.2)

## 2019-05-13 ENCOUNTER — TELEPHONE (OUTPATIENT)
Dept: INTERNAL MEDICINE | Facility: CLINIC | Age: 54
End: 2019-05-13

## 2019-05-13 NOTE — TELEPHONE ENCOUNTER
----- Message from Felicia Gonzalez MD sent at 5/12/2019  6:07 PM EDT -----  Advise patein there thyroid level is slightly low. She should take an additional 1/2 tablet 2 times weekly            Pt informed and understands

## 2019-05-28 ENCOUNTER — OFFICE VISIT (OUTPATIENT)
Dept: INTERNAL MEDICINE | Facility: CLINIC | Age: 54
End: 2019-05-28

## 2019-05-28 VITALS
SYSTOLIC BLOOD PRESSURE: 132 MMHG | WEIGHT: 149 LBS | DIASTOLIC BLOOD PRESSURE: 70 MMHG | BODY MASS INDEX: 26.33 KG/M2 | HEART RATE: 69 BPM | OXYGEN SATURATION: 95 %
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
## 2019-05-28 PROCEDURE — 71101 X-RAY EXAM UNILAT RIBS/CHEST: CPT | Performed by: INTERNAL MEDICINE

## 2019-05-28 PROCEDURE — 99213 OFFICE O/P EST LOW 20 MIN: CPT | Performed by: INTERNAL MEDICINE

## 2019-05-28 RX ORDER — HYDROCODONE BITARTRATE AND ACETAMINOPHEN 5; 325 MG/1; MG/1
1 TABLET ORAL EVERY 6 HOURS PRN
Qty: 15 TABLET | Refills: 0 | Status: SHIPPED | OUTPATIENT
Start: 2019-05-28 | End: 2019-11-12

## 2019-05-28 RX ORDER — ONDANSETRON HYDROCHLORIDE 8 MG/1
8 TABLET, FILM COATED ORAL EVERY 8 HOURS PRN
Qty: 20 TABLET | Refills: 1 | Status: SHIPPED | OUTPATIENT
Start: 2019-05-28 | End: 2019-11-12

## 2019-06-04 RX ORDER — FLUTICASONE PROPIONATE 50 MCG
SPRAY, SUSPENSION (ML) NASAL
Qty: 1 BOTTLE | Refills: 4 | Status: SHIPPED | OUTPATIENT
Start: 2019-06-04 | End: 2019-11-06

## 2019-07-12 ENCOUNTER — TELEPHONE (OUTPATIENT)
Dept: NEUROSURGERY | Facility: CLINIC | Age: 54
End: 2019-07-12

## 2019-07-12 NOTE — TELEPHONE ENCOUNTER
She was supposed to come back to discuss SCS again. Tell her that's probably the only thing I have left to offer, but she'll have to come in to discuss it.

## 2019-07-12 NOTE — TELEPHONE ENCOUNTER
She cancelled her last follow up appt and has not rescheduled.  Her pain is mainly in right hip and buttock.    Please advise

## 2019-07-12 NOTE — TELEPHONE ENCOUNTER
Spoke with patient and informed her of Dr THORNTON's response.  She said she would think about what she wants to do and call back if she wants to come back into office to discuss SCS further.  She may go to [facility].  She is not sure.

## 2019-10-30 DIAGNOSIS — Z00.00 HEALTHCARE MAINTENANCE: ICD-10-CM

## 2019-10-30 DIAGNOSIS — E03.9 ADULT HYPOTHYROIDISM: Primary | ICD-10-CM

## 2019-10-31 ENCOUNTER — TELEPHONE (OUTPATIENT)
Dept: NEUROSURGERY | Facility: CLINIC | Age: 54
End: 2019-10-31

## 2019-10-31 NOTE — TELEPHONE ENCOUNTER
Patient called and is requesting to get a second opinion from Dr. Christian. She is currently a patient of Dr. Gauthier's. She last saw Dr. Gauthier on 03/13/19. He recommended a spinal cord stimulator trial. She did the trial but noticed no improvement or relief. She is wanting to see Dr. Christian to see if there are any other options.         I verbally spoke with Dr. Christian and he is not willing to see this patient because she is still under the care of Dr. Gauthier. I LVM for patient to call the office.

## 2019-11-01 ENCOUNTER — TELEPHONE (OUTPATIENT)
Dept: INTERNAL MEDICINE | Facility: CLINIC | Age: 54
End: 2019-11-01

## 2019-11-01 NOTE — TELEPHONE ENCOUNTER
Patient called and stated her finger looked like it was possibly infected, she cut her finger on Monday with a knife.  I called her and offered her an appointment with Kena Burger today.   Patient declined, said she will keep it clean and will watch it.

## 2019-11-04 NOTE — PROGRESS NOTES
"Subjective   Patient ID: Cheri Lopez is a 54 y.o. female is here today for follow-up to discus SCS trial by Dr Stein, she states that she did not get much relief from the trial.  She is still seeing her for pain management, but has not had any other procedures.  She had right hip MRI at [facility] in April that was ordered by her surgeon in [place], Dr Gamez.    Patient was last seen 3.13.19 for low back and hip pain    Patient states that she is now having primarily right hip pain, she denies leg or low back pain.  She does have intermittent right leg weakness. She denies N/T, but states that she can \" feel her blood flowing thru her veins\" intermittently right hip.    Patient states that she recently saw her orthopedist in [place] who did her right hip replacement surgery and he ruled out problems with her hip    She is with her . Actually it has been about 8 or 9 months since I have seen her. She has seen her orthopedic surgeon in [place] who ordered a repeat MRI here in [facility]. He does not think that there is anything else that he can do. Her original labrum surgery was in 2006. Today she complains mainly of the hip hurting her. She has no radiating leg pain and just a mild amount of back pain. The spinal cord stimulator trial never helped, so obviously there is no reason to do anything like a permanent stimulator. The only thing that I can suggest at this point since we have reached an endpoint as far as what I can offer is for her to see an orthopedic surgeon here regarding whether or not this is still an intrinsic hip problem. To be perfectly honest, by its description it seems to be. She has never really had the classic symptoms associated with a back problem. She has already seen Dr. Nagy in addition to her orthopedic surgeon in [place], but we will send her to see Dr. Butler to see if he has any additional thoughts.       Extremity Weakness    The pain is present in the right upper " leg and right lower leg. The problem occurs intermittently. The problem has been unchanged. The pain is at a severity of 3/10. The pain is mild. Pertinent negatives include no numbness.       The following portions of the patient's history were reviewed and updated as appropriate: allergies, current medications, past family history, past medical history, past social history, past surgical history and problem list.    Review of Systems   Musculoskeletal: Positive for extremity weakness. Negative for arthralgias, back pain, gait problem and myalgias.   Neurological: Positive for weakness. Negative for numbness.   All other systems reviewed and are negative.      Objective   Physical Exam   Constitutional: She is oriented to person, place, and time. She appears well-developed and well-nourished.   HENT:   Head: Normocephalic and atraumatic.   Eyes: Conjunctivae and EOM are normal. Pupils are equal, round, and reactive to light.   Fundoscopic exam:       The right eye shows no papilledema. The right eye shows venous pulsations.        The left eye shows no papilledema. The left eye shows venous pulsations.   Neck: Carotid bruit is not present.   Neurological: She is oriented to person, place, and time. She has a normal Finger-Nose-Finger Test and a normal Heel to Shin Test. Gait normal.   Reflex Scores:       Tricep reflexes are 2+ on the right side and 2+ on the left side.       Bicep reflexes are 2+ on the right side and 2+ on the left side.       Brachioradialis reflexes are 2+ on the right side and 2+ on the left side.       Patellar reflexes are 2+ on the right side and 2+ on the left side.       Achilles reflexes are 2+ on the right side and 2+ on the left side.  Psychiatric: Her speech is normal.     Neurologic Exam     Mental Status   Oriented to person, place, and time.   Registration of memory: Good recent and remote memory.   Attention: normal. Concentration: normal.   Speech: speech is normal   Level of  consciousness: alert  Knowledge: consistent with education.     Cranial Nerves     CN II   Visual fields full to confrontation.   Visual acuity: normal    CN III, IV, VI   Pupils are equal, round, and reactive to light.  Extraocular motions are normal.     CN V   Facial sensation intact.   Right corneal reflex: normal  Left corneal reflex: normal    CN VII   Facial expression full, symmetric.   Right facial weakness: none  Left facial weakness: none    CN VIII   Hearing: intact    CN IX, X   Palate: symmetric    CN XI   Right sternocleidomastoid strength: normal  Left sternocleidomastoid strength: normal    CN XII   Tongue: not atrophic  Tongue deviation: none    Motor Exam   Muscle bulk: normal  Right arm tone: normal  Left arm tone: normal  Right leg tone: normal  Left leg tone: normal    Strength   Strength 5/5 except as noted.     Sensory Exam   Light touch normal.     Gait, Coordination, and Reflexes     Gait  Gait: normal    Coordination   Finger to nose coordination: normal  Heel to shin coordination: normal    Reflexes   Right brachioradialis: 2+  Left brachioradialis: 2+  Right biceps: 2+  Left biceps: 2+  Right triceps: 2+  Left triceps: 2+  Right patellar: 2+  Left patellar: 2+  Right achilles: 2+  Left achilles: 2+  Right : 2+  Left : 2+      Assessment/Plan   Independent Review of Radiographic Studies:    Viewed the hip MRI done on 4/13/2019 which shows mild degenerative changes in both hips and mild edema adjacent to both greater trochanters and gluteus medius tendon insertions.  Agree with the report.      Medical Decision Making:    Frankly, I have very little left offer.  But we will send her to get another additional orthopedic opinion about the hip from Dr. Butler.  I asked her to let us know what his thoughts were but otherwise we will keep it open-ended.      Cheri was seen today for hip pain and extremity weakness.    Diagnoses and all orders for this visit:    DDD (degenerative disc  disease), lumbar  -     Ambulatory Referral to Orthopedic Surgery    Right hip pain  -     Ambulatory Referral to Orthopedic Surgery      Return if symptoms worsen or fail to improve.

## 2019-11-06 ENCOUNTER — OFFICE VISIT (OUTPATIENT)
Dept: NEUROSURGERY | Facility: CLINIC | Age: 54
End: 2019-11-06

## 2019-11-06 VITALS
HEART RATE: 72 BPM | DIASTOLIC BLOOD PRESSURE: 74 MMHG | SYSTOLIC BLOOD PRESSURE: 128 MMHG | HEIGHT: 63 IN | BODY MASS INDEX: 26.33 KG/M2

## 2019-11-06 DIAGNOSIS — M51.36 DDD (DEGENERATIVE DISC DISEASE), LUMBAR: Primary | ICD-10-CM

## 2019-11-06 DIAGNOSIS — M25.551 RIGHT HIP PAIN: ICD-10-CM

## 2019-11-06 PROCEDURE — 99214 OFFICE O/P EST MOD 30 MIN: CPT | Performed by: NEUROLOGICAL SURGERY

## 2019-11-07 LAB
ALBUMIN SERPL-MCNC: 5 G/DL (ref 3.5–5.2)
ALBUMIN/GLOB SERPL: 1.9 G/DL
ALP SERPL-CCNC: 101 U/L (ref 39–117)
ALT SERPL-CCNC: 34 U/L (ref 1–33)
APPEARANCE UR: CLEAR
AST SERPL-CCNC: 35 U/L (ref 1–32)
BACTERIA #/AREA URNS HPF: NORMAL /HPF
BASOPHILS # BLD AUTO: 0.05 10*3/MM3 (ref 0–0.2)
BASOPHILS NFR BLD AUTO: 0.9 % (ref 0–1.5)
BILIRUB SERPL-MCNC: 0.5 MG/DL (ref 0.2–1.2)
BILIRUB UR QL STRIP: NEGATIVE
BUN SERPL-MCNC: 8 MG/DL (ref 6–20)
BUN/CREAT SERPL: 11.1 (ref 7–25)
CALCIUM SERPL-MCNC: 9.6 MG/DL (ref 8.6–10.5)
CHLORIDE SERPL-SCNC: 97 MMOL/L (ref 98–107)
CHOLEST SERPL-MCNC: 218 MG/DL (ref 0–200)
CO2 SERPL-SCNC: 27.2 MMOL/L (ref 22–29)
COLOR UR: YELLOW
CREAT SERPL-MCNC: 0.72 MG/DL (ref 0.57–1)
EOSINOPHIL # BLD AUTO: 0.12 10*3/MM3 (ref 0–0.4)
EOSINOPHIL NFR BLD AUTO: 2.2 % (ref 0.3–6.2)
EPI CELLS #/AREA URNS HPF: NORMAL /HPF
ERYTHROCYTE [DISTWIDTH] IN BLOOD BY AUTOMATED COUNT: 12.8 % (ref 12.3–15.4)
GLOBULIN SER CALC-MCNC: 2.7 GM/DL
GLUCOSE SERPL-MCNC: 89 MG/DL (ref 65–99)
GLUCOSE UR QL: NEGATIVE
HCT VFR BLD AUTO: 43.4 % (ref 34–46.6)
HDLC SERPL-MCNC: 107 MG/DL (ref 40–60)
HGB BLD-MCNC: 14.4 G/DL (ref 12–15.9)
HGB UR QL STRIP: NEGATIVE
IMM GRANULOCYTES # BLD AUTO: 0.01 10*3/MM3 (ref 0–0.05)
IMM GRANULOCYTES NFR BLD AUTO: 0.2 % (ref 0–0.5)
KETONES UR QL STRIP: NEGATIVE
LDLC SERPL CALC-MCNC: 100 MG/DL (ref 0–100)
LEUKOCYTE ESTERASE UR QL STRIP: NEGATIVE
LYMPHOCYTES # BLD AUTO: 2.19 10*3/MM3 (ref 0.7–3.1)
LYMPHOCYTES NFR BLD AUTO: 41 % (ref 19.6–45.3)
MCH RBC QN AUTO: 31.6 PG (ref 26.6–33)
MCHC RBC AUTO-ENTMCNC: 33.2 G/DL (ref 31.5–35.7)
MCV RBC AUTO: 95.2 FL (ref 79–97)
MICRO URNS: NORMAL
MICRO URNS: NORMAL
MONOCYTES # BLD AUTO: 0.47 10*3/MM3 (ref 0.1–0.9)
MONOCYTES NFR BLD AUTO: 8.8 % (ref 5–12)
NEUTROPHILS # BLD AUTO: 2.5 10*3/MM3 (ref 1.7–7)
NEUTROPHILS NFR BLD AUTO: 46.9 % (ref 42.7–76)
NITRITE UR QL STRIP: NEGATIVE
NRBC BLD AUTO-RTO: 0 /100 WBC (ref 0–0.2)
PH UR STRIP: 6.5 [PH] (ref 5–7.5)
PLATELET # BLD AUTO: 203 10*3/MM3 (ref 140–450)
POTASSIUM SERPL-SCNC: 4.1 MMOL/L (ref 3.5–5.2)
PROT SERPL-MCNC: 7.7 G/DL (ref 6–8.5)
PROT UR QL STRIP: NEGATIVE
RBC # BLD AUTO: 4.56 10*6/MM3 (ref 3.77–5.28)
RBC #/AREA URNS HPF: NORMAL /HPF
SODIUM SERPL-SCNC: 137 MMOL/L (ref 136–145)
SP GR UR: 1 (ref 1–1.03)
TRIGL SERPL-MCNC: 54 MG/DL (ref 0–150)
TSH SERPL DL<=0.005 MIU/L-ACNC: 3.7 UIU/ML (ref 0.27–4.2)
URINALYSIS REFLEX: NORMAL
UROBILINOGEN UR STRIP-MCNC: 0.2 MG/DL (ref 0.2–1)
VLDLC SERPL CALC-MCNC: 10.8 MG/DL
WBC # BLD AUTO: 5.34 10*3/MM3 (ref 3.4–10.8)
WBC #/AREA URNS HPF: NORMAL /HPF

## 2019-11-12 ENCOUNTER — OFFICE VISIT (OUTPATIENT)
Dept: INTERNAL MEDICINE | Facility: CLINIC | Age: 54
End: 2019-11-12

## 2019-11-12 ENCOUNTER — TELEPHONE (OUTPATIENT)
Dept: NEUROSURGERY | Facility: CLINIC | Age: 54
End: 2019-11-12

## 2019-11-12 VITALS
DIASTOLIC BLOOD PRESSURE: 90 MMHG | BODY MASS INDEX: 27.29 KG/M2 | WEIGHT: 154 LBS | HEART RATE: 66 BPM | SYSTOLIC BLOOD PRESSURE: 160 MMHG | HEIGHT: 63 IN

## 2019-11-12 DIAGNOSIS — M51.36 DDD (DEGENERATIVE DISC DISEASE), LUMBAR: Primary | ICD-10-CM

## 2019-11-12 DIAGNOSIS — M25.511 CHRONIC PAIN OF BOTH SHOULDERS: ICD-10-CM

## 2019-11-12 DIAGNOSIS — M54.41 ACUTE RIGHT-SIDED LOW BACK PAIN WITH RIGHT-SIDED SCIATICA: ICD-10-CM

## 2019-11-12 DIAGNOSIS — G89.29 CHRONIC PAIN OF BOTH SHOULDERS: ICD-10-CM

## 2019-11-12 DIAGNOSIS — E03.9 HYPOTHYROIDISM, UNSPECIFIED TYPE: ICD-10-CM

## 2019-11-12 DIAGNOSIS — N62 GYNECOMASTIA: ICD-10-CM

## 2019-11-12 DIAGNOSIS — M25.512 CHRONIC PAIN OF BOTH SHOULDERS: ICD-10-CM

## 2019-11-12 DIAGNOSIS — M25.551 RIGHT HIP PAIN: ICD-10-CM

## 2019-11-12 DIAGNOSIS — Z00.00 HEALTHCARE MAINTENANCE: Primary | ICD-10-CM

## 2019-11-12 PROCEDURE — 99396 PREV VISIT EST AGE 40-64: CPT | Performed by: INTERNAL MEDICINE

## 2019-11-12 RX ORDER — LEVOTHYROXINE SODIUM 88 MCG
88 TABLET ORAL DAILY
Qty: 90 TABLET | Refills: 2 | Status: SHIPPED | OUTPATIENT
Start: 2019-11-12 | End: 2020-09-09

## 2019-11-12 RX ORDER — HYDROCHLOROTHIAZIDE 12.5 MG/1
12.5 CAPSULE, GELATIN COATED ORAL DAILY
Qty: 90 CAPSULE | Refills: 2 | Status: SHIPPED | OUTPATIENT
Start: 2019-11-12 | End: 2020-08-30

## 2019-11-12 NOTE — TELEPHONE ENCOUNTER
Dr THORNTON please advise, I do not see an order for this nor have I ever seen you given an order for pads and wires for a SCS

## 2019-11-12 NOTE — PROGRESS NOTES
Subjective   CPe  Back pain and right hip pain  Hypothyroidism  Chronic constipation    Cheri Lopez is a 54 y.o. female who presents for a complete physical exam. In general she is doing well but she has continual struggles with low back pain that radiates into her right hip. She has been evaluated by several specialists. Did a spinal cord stimulator trial without any benefit so she chose not to have this implanted. She is now scheduled to see ortho to evaluate the hip as this is where the pain is most pronounced. She has been to physical therapy, chiropracter, aqua therapy, etc. Pain meds cause sedation w/ only temporary benefit of pain. She has tried gabapentin, lyrica, meloxicam, and cymbalta. At least 6 weeks of each without any relief. She is now using baclofen and not much else for pain. She is following w/ pain management. She has tried spinal epidural and facet injections. Considering a hip injection. 11 sessions w/ a chiropractor w/ some benefit but this is cost prohibitive.   Has hypertension. Taking losartan daily for this.   Patient is s/p breast implants. She now feels neck and shoulder tenderness and feels that some discomfort is related to her cumbersome breasts. She has discomfort with her bra and is difficult to fit.         Review of Systems   Constitutional: Negative.    HENT: Negative.    Eyes: Negative.    Respiratory: Negative.    Cardiovascular: Negative.    Gastrointestinal: Negative.    Endocrine: Negative.    Genitourinary: Negative.    Musculoskeletal: Positive for back pain.        Right hip pain   Skin: Negative.    Allergic/Immunologic: Negative.    Hematological: Negative.    Psychiatric/Behavioral: Negative.        The following portions of the patient's history were reviewed and updated as appropriate: allergies, current medications, past family history, past medical history, past social history, past surgical history and problem list.     Patient Active Problem List    Diagnosis   • Chronic constipation   • Blues   • Adult hypothyroidism   • Encounter for screening for malignant neoplasm of colon   • Acute right-sided low back pain with right-sided sciatica   • DDD (degenerative disc disease), lumbar   • Right hip pain   • Sacroiliac joint dysfunction       Past Medical History:   Diagnosis Date   • GERD (gastroesophageal reflux disease)    • H/O colonoscopy    • Hypertension    • Hypothyroidism    • Low back pain    • Pneumonia 10/2011       Past Surgical History:   Procedure Laterality Date   • BREAST AUGMENTATION  2007   • CARPAL TUNNEL RELEASE WITH CUBITAL TUNNEL RELEASE Right    • COLONOSCOPY N/A 2018    Procedure: COLONOSCOPY to cecum and t.i. with polypectomy;  Surgeon: Rima Luis MD;  Location: [facility] ENDOSCOPY;  Service:    • HIP DEBRIDEMENT Right    • KNEE CARTILAGE SURGERY Right    • SINUS SURGERY     • SKIN BIOPSY      hand - precancerous   • SKIN BIOPSY         Family History   Problem Relation Age of Onset   • Osteoarthritis Mother    • Heart disease Mother    • No Known Problems Brother    • Breast cancer Maternal Aunt    • Cancer Paternal Grandmother        Social History     Socioeconomic History   • Marital status: Significant Other     Spouse name: Not on file   • Number of children: 0   • Years of education: ms   • Highest education level: Not on file   Occupational History   • Occupation: secratary   Tobacco Use   • Smoking status: Former Smoker     Packs/day: 0.25     Years: 5.00     Pack years: 1.25     Last attempt to quit:      Years since quittin.8   • Smokeless tobacco: Never Used   Substance and Sexual Activity   • Alcohol use: Yes     Comment: socially   • Drug use: Defer   • Sexual activity: Defer       Current Outpatient Medications on File Prior to Visit   Medication Sig Dispense Refill   • Ascorbic Acid (VITAMIN C PO) Take 1,000 mg by mouth Daily.     • B Complex Vitamins (VITAMIN B COMPLEX PO) Take 200 mcg by mouth  Daily.     • baclofen (LIORESAL) 10 MG tablet Take 10 mg by mouth 3 (Three) Times a Day As Needed.     • Cholecalciferol (VITAMIN D3) 5000 units capsule capsule Take 2,000 Units by mouth Daily.     • cycloSPORINE (RESTASIS) 0.05 % ophthalmic emulsion 1 drop 2 (Two) Times a Day.     • DHEA 10 MG capsule Take  by mouth Daily.     • diclofenac (VOLTAREN) 50 MG EC tablet Take 1 tablet by mouth 2 (Two) Times a Day. 60 tablet 1   • estradiol (VIVELLE-DOT) 0.0375 MG/24HR Place 1 patch on the skin 2 (Two) Times a Week.     • ibuprofen (ADVIL,MOTRIN) 800 MG tablet Take 800 mg by mouth 2 (Two) Times a Day.     • losartan (COZAAR) 50 MG tablet Take 1 tablet by mouth Daily. 90 tablet 3   • Magnesium 100 MG tablet Take 500 mg by mouth Every Night.     • Magnesium-Potassium (CHANDRAKANT MAGNESIUM-POTASSIUM) 250-100 MG tablet Take  by mouth Daily.     • Multiple Vitamins-Minerals (PRESERVISION AREDS PO) Take  by mouth Daily.     • Omega-3 Fatty Acids (FISH OIL PO) Take 1,170 mg by mouth Daily.     • Probiotic Product (PROBIOTIC DAILY PO) Take  by mouth Daily.     • progesterone (PROMETRIUM) 100 MG capsule Take 100 mg by mouth Daily.     • [DISCONTINUED] DULoxetine (CYMBALTA) 60 MG capsule Take 1 capsule by mouth Daily. 30 capsule 5   • [DISCONTINUED] HYDROcodone-acetaminophen (NORCO) 5-325 MG per tablet Take 1 tablet by mouth Every 6 (Six) Hours As Needed for Moderate Pain . 15 tablet 0   • [DISCONTINUED] ondansetron (ZOFRAN) 8 MG tablet Take 1 tablet by mouth Every 8 (Eight) Hours As Needed for Nausea or Vomiting. 20 tablet 1   • [DISCONTINUED] SYNTHROID 75 MCG tablet TAKE ONE TABLET BY MOUTH DAILY 90 tablet 2   • [DISCONTINUED] Triamcinolone Acetonide 0.05 % ointment Apply  topically.       No current facility-administered medications on file prior to visit.        Allergies   Allergen Reactions   • Sulfa Antibiotics Other (See Comments)     Elevated liver enzymes          Immunization History   Administered Date(s) Administered   •  "Flu Vaccine Quad PF >18YRS 11/01/2019   • Hepatitis A 01/01/2008, 06/01/2008   • Tdap 01/01/2017       Objective     /90   Pulse 66   Ht 160 cm (63\")   Wt 69.9 kg (154 lb)   BMI 27.28 kg/m²     Physical Exam   Constitutional: She is oriented to person, place, and time. She appears well-developed and well-nourished.   HENT:   Head: Normocephalic and atraumatic.   Right Ear: External ear normal.   Left Ear: External ear normal.   Nose: Nose normal.   Mouth/Throat: Oropharynx is clear and moist.   Eyes: Conjunctivae and EOM are normal. Pupils are equal, round, and reactive to light.   Neck: Normal range of motion. Neck supple.   discomfort neck and shoulder range.    Cardiovascular: Normal rate, regular rhythm, normal heart sounds and intact distal pulses.   Pulmonary/Chest: Effort normal and breath sounds normal.   Abdominal: Soft. Bowel sounds are normal.   Musculoskeletal:   Right hip discomfort posterior and lateral   Neurological: She is alert and oriented to person, place, and time.   Skin: Skin is warm and dry.   Psychiatric: She has a normal mood and affect. Her behavior is normal. Judgment and thought content normal.   Nursing note and vitals reviewed.      Assessment/Plan   Cheri was seen today for annual exam.    Diagnoses and all orders for this visit:    Healthcare maintenance    Acute right-sided low back pain with right-sided sciatica    Right hip pain    Other orders  -     SYNTHROID 88 MCG tablet; Take 1 tablet by mouth Daily.  -     hydroCHLOROthiazide (MICROZIDE) 12.5 MG capsule; Take 1 capsule by mouth Daily.        Discussion    Patient presents today for a CPE.  Patient follows a healthy diet.   Patient follows an adequate exercise regimen. Mammogram is up to date.   Pap smears are performed by the patient's gynecologist.   Immunizations are up to date.  Patient has quite elevated bp. She is to reduce caffeine, consistently hydrate w water, sodium reduction, increase activity. Will add " hctz and continue losartan. She will increase synthroid to 88 mcg daily. Continue prn topical capsaicin. To ortho and pain management as scheduled. She will have bmp tsh and free t4 in 6 weeks. To plastic surgery to discuss possible breast reduction. F/u 6 mo or prn.            No future appointments.

## 2019-11-12 NOTE — TELEPHONE ENCOUNTER
She doesn't have a SCS, she is using a TENS I believe. I don't mind giving the rx for the extra pads and /or wires. Can go ahead and do that.

## 2019-11-12 NOTE — TELEPHONE ENCOUNTER
Patient called requesting a copy of the order she was given by Dr. Gauthier for the pads and wires for her tens unit. However, I couldn't find it. Patient is requesting order/prescription for the pads and wires because she misplaced the original one. Patient was last seen on 11/06/19

## 2019-12-27 ENCOUNTER — LAB (OUTPATIENT)
Dept: INTERNAL MEDICINE | Facility: CLINIC | Age: 54
End: 2019-12-27

## 2019-12-27 VITALS — DIASTOLIC BLOOD PRESSURE: 79 MMHG | SYSTOLIC BLOOD PRESSURE: 145 MMHG

## 2019-12-27 LAB
BUN SERPL-MCNC: 7 MG/DL (ref 6–20)
BUN/CREAT SERPL: 8.4 (ref 7–25)
CALCIUM SERPL-MCNC: 9.8 MG/DL (ref 8.6–10.5)
CHLORIDE SERPL-SCNC: 96 MMOL/L (ref 98–107)
CO2 SERPL-SCNC: 26.1 MMOL/L (ref 22–29)
CREAT SERPL-MCNC: 0.83 MG/DL (ref 0.57–1)
GLUCOSE SERPL-MCNC: 89 MG/DL (ref 65–99)
POTASSIUM SERPL-SCNC: 4.4 MMOL/L (ref 3.5–5.2)
SODIUM SERPL-SCNC: 135 MMOL/L (ref 136–145)
T4 FREE SERPL-MCNC: 1.56 NG/DL (ref 0.93–1.7)
TSH SERPL DL<=0.005 MIU/L-ACNC: 2.15 UIU/ML (ref 0.27–4.2)

## 2020-02-11 ENCOUNTER — TELEPHONE (OUTPATIENT)
Dept: INTERNAL MEDICINE | Facility: CLINIC | Age: 55
End: 2020-02-11

## 2020-02-11 RX ORDER — DULOXETIN HYDROCHLORIDE 60 MG/1
60 CAPSULE, DELAYED RELEASE ORAL DAILY
Qty: 30 CAPSULE | Refills: 4 | Status: SHIPPED | OUTPATIENT
Start: 2020-02-11 | End: 2020-06-30

## 2020-02-11 NOTE — TELEPHONE ENCOUNTER
rx sent to [facility]. A.M. Is preferred time for taking the medication. May take a month to realize full benefit of the medication so stay vigilant w/ daily dosing.

## 2020-02-11 NOTE — TELEPHONE ENCOUNTER
would like duloxetine 60 mg for back pain. Use to take but didn't think it worked but wants to try again. Wants to know if she can take it in the morning.

## 2020-02-13 ENCOUNTER — TELEPHONE (OUTPATIENT)
Dept: INTERNAL MEDICINE | Facility: CLINIC | Age: 55
End: 2020-02-13

## 2020-02-13 NOTE — TELEPHONE ENCOUNTER
Would she like to try a lower dose? If so send in rx for 30 mg  Advise to take first of the morning.

## 2020-04-22 ENCOUNTER — TELEMEDICINE (OUTPATIENT)
Dept: INTERNAL MEDICINE | Facility: CLINIC | Age: 55
End: 2020-04-22

## 2020-04-22 DIAGNOSIS — M51.36 DDD (DEGENERATIVE DISC DISEASE), LUMBAR: Primary | ICD-10-CM

## 2020-04-22 DIAGNOSIS — G89.29 CHRONIC RIGHT-SIDED LOW BACK PAIN WITH RIGHT-SIDED SCIATICA: ICD-10-CM

## 2020-04-22 DIAGNOSIS — M53.3 SACROILIAC JOINT DYSFUNCTION: ICD-10-CM

## 2020-04-22 DIAGNOSIS — M54.41 CHRONIC RIGHT-SIDED LOW BACK PAIN WITH RIGHT-SIDED SCIATICA: ICD-10-CM

## 2020-04-22 PROBLEM — M41.50 SCOLIOSIS DUE TO DEGENERATIVE DISEASE OF SPINE IN ADULT PATIENT: Status: ACTIVE | Noted: 2017-10-19

## 2020-04-22 PROBLEM — M48.061 NEURAL FORAMINAL STENOSIS OF LUMBAR SPINE: Status: ACTIVE | Noted: 2020-04-20

## 2020-04-22 PROCEDURE — 99213 OFFICE O/P EST LOW 20 MIN: CPT | Performed by: INTERNAL MEDICINE

## 2020-04-22 RX ORDER — BACLOFEN 10 MG/1
10 TABLET ORAL 3 TIMES DAILY PRN
Qty: 30 TABLET | Refills: 1 | Status: SHIPPED | OUTPATIENT
Start: 2020-04-22 | End: 2021-01-26

## 2020-04-22 NOTE — PROGRESS NOTES
"No chief complaint on file.      HPI  Cheri Lopez is a 55 y.o. female presents in acute care via video link in Epic. This visit is occurring during the COVID 19 pandemic.    You have chosen to receive care through a telehealth visit.  Do you consent to use a video/audio connection for your medical care today? Yes   \"Long, long hx of back problems\".  Planning surgery in 5/2020 with Dr. Short with a 'T-lift' procedure.  Notes in last few  Years had epidurals, facet injx, and stimulator trial with Dr. Gauthier.    Notes 3 days ago, driving back from [place] and called Dr. Short, as had been told to.  No event at grocery.  Just noted that AM had some elevated pain over her usual baseline pain. \"Dont know what happened, just driving home started to hurt worse and worse\".  Was brought to tears by pain.  Got Medrol Dose Bryan from Dr. Short.  Moved surgery to 'emergent category' and scheduled for surgery.  Notes in past has used baclofen and will help with pain and allow sleep.  \"only thing getting me through the night\".  Dr. Short really did not want to be the one to Rx Baclofen as \"your primary doctor will monitor that more closely\".  Really is just calling to get Rx refilled.  Has used without incident or side effect many times in past and uses it regularly.   Surgery date is 5/5/20.     Review of Systems   Musculoskeletal: Positive for back pain. Negative for falls and muscle weakness.   Neurological: Negative for focal weakness.       The following portions of the patient's history were reviewed and updated as appropriate: allergies, current medications, past medical history, past social history and problem list.      Current Outpatient Medications:   •  Ascorbic Acid (VITAMIN C PO), Take 1,000 mg by mouth Daily., Disp: , Rfl:   •  B Complex Vitamins (VITAMIN B COMPLEX PO), Take 200 mcg by mouth Daily., Disp: , Rfl:   •  baclofen (LIORESAL) 10 MG tablet, Take 1 tablet by mouth 3 (Three) Times a Day " As Needed for Muscle Spasms., Disp: 30 tablet, Rfl: 1  •  Cholecalciferol (VITAMIN D3) 5000 units capsule capsule, Take 2,000 Units by mouth Daily., Disp: , Rfl:   •  cycloSPORINE (RESTASIS) 0.05 % ophthalmic emulsion, 1 drop 2 (Two) Times a Day., Disp: , Rfl:   •  DHEA 10 MG capsule, Take  by mouth Daily., Disp: , Rfl:   •  DULoxetine (CYMBALTA) 60 MG capsule, Take 1 capsule by mouth Daily., Disp: 30 capsule, Rfl: 4  •  estradiol (VIVELLE-DOT) 0.0375 MG/24HR, Place 1 patch on the skin 2 (Two) Times a Week., Disp: , Rfl:   •  hydroCHLOROthiazide (MICROZIDE) 12.5 MG capsule, Take 1 capsule by mouth Daily., Disp: 90 capsule, Rfl: 2  •  losartan (COZAAR) 50 MG tablet, Take 1 tablet by mouth Daily., Disp: 90 tablet, Rfl: 3  •  Magnesium 100 MG tablet, Take 500 mg by mouth Every Night., Disp: , Rfl:   •  Magnesium-Potassium (CHANDRAKANT MAGNESIUM-POTASSIUM) 250-100 MG tablet, Take  by mouth Daily., Disp: , Rfl:   •  Multiple Vitamins-Minerals (PRESERVISION AREDS PO), Take  by mouth Daily., Disp: , Rfl:   •  Omega-3 Fatty Acids (FISH OIL PO), Take 1,170 mg by mouth Daily., Disp: , Rfl:   •  progesterone (PROMETRIUM) 100 MG capsule, Take 100 mg by mouth Daily., Disp: , Rfl:   •  SYNTHROID 88 MCG tablet, Take 1 tablet by mouth Daily., Disp: 90 tablet, Rfl: 2    There were no vitals filed for this visit.  There is no height or weight on file to calculate BMI.      Physical Exam   Constitutional: She is oriented to person, place, and time. She appears well-developed and well-nourished. No distress.   HENT:   Head: Normocephalic.   Pulmonary/Chest: Effort normal. No respiratory distress.   Neurological: She is alert and oriented to person, place, and time.   Psychiatric: She has a normal mood and affect. Her behavior is normal. Thought content normal.   Vitals reviewed.      Assessment/ Plan  Diagnoses and all orders for this visit:    DDD (degenerative disc disease), lumbar    Sacroiliac joint dysfunction    Chronic right-sided low  back pain with right-sided sciatica    Other orders  -     baclofen (LIORESAL) 10 MG tablet; Take 1 tablet by mouth 3 (Three) Times a Day As Needed for Muscle Spasms.        Return for Next scheduled follow up.      Discussion:  Cheri Lopez is a 55 y.o. female presents in acute care (progressive issue in new pt to examiner) via video link in Epic. This visit is occurring during the COVID 19 pandemic.    Pt with long hx of DDD/ foraminal stenosis/ low back pain with mutliple Nsgy consults/ pain trials and planned surgery 5/5/20 with Dr. Short at [facility]. Pt was asked to have muscle relaxer refill completed through this office due to need for long term monitoring. Pt has used med with success and good tolerance nightly for some time.  Needs refill to get through to surgery on 5/5/20.  Refilled med for pt today. Aware of side effect profile. Will call if any issues otherwise. F/U as planned.     Total time of visit ~15 minutes.

## 2020-04-23 ENCOUNTER — TELEPHONE (OUTPATIENT)
Dept: INTERNAL MEDICINE | Facility: CLINIC | Age: 55
End: 2020-04-23

## 2020-04-23 NOTE — TELEPHONE ENCOUNTER
RAVINDRA FROM [facility] CALLED TO REQUEST A COPY OF PATIENT'S STRESS TEST AND THE LAST COPY OF THE OFFICE NOTE.  RAVINDRA WOULD LIKE IT FAXED TO  [phone].    PLEASE ADVISE

## 2020-05-19 DIAGNOSIS — E03.9 ADULT HYPOTHYROIDISM: ICD-10-CM

## 2020-05-19 DIAGNOSIS — Z00.00 HEALTHCARE MAINTENANCE: Primary | ICD-10-CM

## 2020-05-28 RX ORDER — LOSARTAN POTASSIUM 50 MG/1
50 TABLET ORAL DAILY
Qty: 90 TABLET | Refills: 3 | Status: SHIPPED | OUTPATIENT
Start: 2020-05-28 | End: 2021-01-26 | Stop reason: DRUGHIGH

## 2020-06-30 ENCOUNTER — OFFICE VISIT (OUTPATIENT)
Dept: ORTHOPEDIC SURGERY | Facility: CLINIC | Age: 55
End: 2020-06-30

## 2020-06-30 VITALS — TEMPERATURE: 98 F | HEIGHT: 63 IN | BODY MASS INDEX: 26.58 KG/M2 | WEIGHT: 150 LBS

## 2020-06-30 DIAGNOSIS — M25.562 LEFT KNEE PAIN, UNSPECIFIED CHRONICITY: Primary | ICD-10-CM

## 2020-06-30 PROCEDURE — 73562 X-RAY EXAM OF KNEE 3: CPT | Performed by: ORTHOPAEDIC SURGERY

## 2020-06-30 PROCEDURE — 99203 OFFICE O/P NEW LOW 30 MIN: CPT | Performed by: ORTHOPAEDIC SURGERY

## 2020-07-01 ENCOUNTER — OFFICE VISIT (OUTPATIENT)
Dept: INTERNAL MEDICINE | Facility: CLINIC | Age: 55
End: 2020-07-01

## 2020-07-01 VITALS
DIASTOLIC BLOOD PRESSURE: 83 MMHG | OXYGEN SATURATION: 100 % | HEART RATE: 79 BPM | SYSTOLIC BLOOD PRESSURE: 133 MMHG | BODY MASS INDEX: 26.93 KG/M2 | TEMPERATURE: 97.3 F | RESPIRATION RATE: 16 BRPM | HEIGHT: 63 IN | WEIGHT: 152 LBS

## 2020-07-01 DIAGNOSIS — M48.061 NEURAL FORAMINAL STENOSIS OF LUMBAR SPINE: ICD-10-CM

## 2020-07-01 DIAGNOSIS — L30.9 DERMATITIS: ICD-10-CM

## 2020-07-01 DIAGNOSIS — F32.A DEPRESSION, UNSPECIFIED DEPRESSION TYPE: Primary | ICD-10-CM

## 2020-07-01 PROCEDURE — 99214 OFFICE O/P EST MOD 30 MIN: CPT | Performed by: INTERNAL MEDICINE

## 2020-07-01 RX ORDER — CLOTRIMAZOLE AND BETAMETHASONE DIPROPIONATE 10; .64 MG/G; MG/G
CREAM TOPICAL 2 TIMES DAILY
Qty: 15 G | Refills: 1 | Status: SHIPPED | OUTPATIENT
Start: 2020-07-01 | End: 2021-01-26

## 2020-07-01 RX ORDER — DULOXETIN HYDROCHLORIDE 30 MG/1
30 CAPSULE, DELAYED RELEASE ORAL DAILY
Qty: 90 CAPSULE | Refills: 3 | Status: SHIPPED | OUTPATIENT
Start: 2020-07-01 | End: 2020-09-08

## 2020-07-01 RX ORDER — CYCLOBENZAPRINE HCL 10 MG
TABLET ORAL
COMMUNITY
Start: 2020-05-13 | End: 2020-07-01 | Stop reason: SDUPTHER

## 2020-07-01 NOTE — PROGRESS NOTES
"Chief Complaint   Patient presents with   • Depression     mother passed away in march and back surgery   • Insect Bite     between 4 and 5 toe rt foot       History of Present Illness   Cheri Lopez is a 55 y.o. female presents for acute needs. Patient is just under 2 months post operative from L/4-L/5 spinal fusion surgery. This was essentially urgent given acute increase in unremitting pain. She reports that she has improved significantly in terms of pain. She is still working to get function to optimal levels. She has difficulty w/ walking upright. She is using crutches to achieve this at this time. She has a bakers cyst in the leg that also limits her activity. She has depression related to her injury as well as loss of her mother.   Patient has tried med for mood in the past but \"did not like the way that it made her feel\" unsure which meds were tried. rx cymbalta last year but did not fill. She notes that due to depression it is hard to feel up to doing more for her back.             The following portions of the patient's history were reviewed and updated as appropriate: allergies, current medications, past family history, past medical history, past social history, past surgical history and problem list.  Current Outpatient Medications on File Prior to Visit   Medication Sig Dispense Refill   • Ascorbic Acid (VITAMIN C PO) Take 1,000 mg by mouth Daily.     • B Complex Vitamins (VITAMIN B COMPLEX PO) Take 200 mcg by mouth Daily.     • baclofen (LIORESAL) 10 MG tablet Take 1 tablet by mouth 3 (Three) Times a Day As Needed for Muscle Spasms. 30 tablet 1   • Cholecalciferol (VITAMIN D3) 5000 units capsule capsule Take 2,000 Units by mouth Daily.     • cycloSPORINE (RESTASIS) 0.05 % ophthalmic emulsion 1 drop 2 (Two) Times a Day.     • DHEA 10 MG capsule Take  by mouth Daily.     • estradiol (VIVELLE-DOT) 0.0375 MG/24HR Place 1 patch on the skin 2 (Two) Times a Week.     • hydroCHLOROthiazide (MICROZIDE) " 12.5 MG capsule Take 1 capsule by mouth Daily. 90 capsule 2   • losartan (Cozaar) 50 MG tablet Take 1 tablet by mouth Daily. 90 tablet 3   • Magnesium 100 MG tablet Take 500 mg by mouth Every Night.     • Multiple Vitamins-Minerals (PRESERVISION AREDS PO) Take  by mouth Daily.     • Omega-3 Fatty Acids (FISH OIL PO) Take 1,170 mg by mouth Daily.     • progesterone (PROMETRIUM) 100 MG capsule Take 100 mg by mouth Daily.     • SYNTHROID 88 MCG tablet Take 1 tablet by mouth Daily. 90 tablet 2   • Magnesium-Potassium (CHANDRAKANT MAGNESIUM-POTASSIUM) 250-100 MG tablet Take  by mouth Daily.     • [DISCONTINUED] cyclobenzaprine (FLEXERIL) 10 MG tablet        No current facility-administered medications on file prior to visit.      Review of Systems   Constitutional: Positive for fatigue.   HENT: Negative.    Eyes: Negative.    Respiratory: Negative.    Cardiovascular: Negative.    Gastrointestinal: Negative.    Endocrine: Negative.    Genitourinary: Negative.    Musculoskeletal: Positive for back pain and gait problem.   Skin: Negative.    Allergic/Immunologic: Negative.    Hematological: Negative.    Psychiatric/Behavioral: Positive for sleep disturbance. The patient is nervous/anxious.         Depression         Objective   Physical Exam   Constitutional: She is oriented to person, place, and time. She appears well-developed and well-nourished.   HENT:   Head: Normocephalic and atraumatic.   Eyes: Pupils are equal, round, and reactive to light. EOM are normal.   Neck: Normal range of motion. Neck supple.   Cardiovascular: Normal rate, regular rhythm and normal heart sounds.   Pulmonary/Chest: Effort normal and breath sounds normal.   Abdominal: Soft. Bowel sounds are normal.   Musculoskeletal: Normal range of motion.   Neurological: She is alert and oriented to person, place, and time.   Skin: Skin is warm and dry.   Post inflammatory reaction between 4th 5th toes at skin crease. Mild erythema.    Psychiatric: She has a  "normal mood and affect. Her behavior is normal. Judgment and thought content normal.   Nursing note and vitals reviewed.       /83 (BP Location: Left arm, Patient Position: Sitting, Cuff Size: Adult)   Pulse 79   Temp 97.3 °F (36.3 °C)   Resp 16   Ht 160 cm (63\")   Wt 68.9 kg (152 lb)   SpO2 100%   BMI 26.93 kg/m²     Assessment/Plan   Diagnoses and all orders for this visit:    Depression, unspecified depression type    Neural foraminal stenosis of lumbar spine    Dermatitis    Other orders  -     Discontinue: cyclobenzaprine (FLEXERIL) 10 MG tablet  -     DULoxetine (Cymbalta) 30 MG capsule; Take 1 capsule by mouth Daily.  -     clotrimazole-betamethasone (Lotrisone) 1-0.05 % cream; Apply  topically to the appropriate area as directed 2 (Two) Times a Day.    maria d slater depression. She will start cymbalta 30 mg daily and may advance to two tablets in 1-2 weeks. She will meet with her therapist routinely as well. She will start physical therapy when appropriate and follow guidelines as given. She will use lotrisone cream to her discomfort of the toe. She will use ice to the area.              Answers for HPI/ROS submitted by the patient on 7/1/2020   Have you had these symptoms before?: No  How long have you been having these symptoms?: Greater than 2 weeks  What is the primary reason for your visit?: Other      "

## 2020-07-14 ENCOUNTER — HOSPITAL ENCOUNTER (OUTPATIENT)
Dept: MRI IMAGING | Facility: HOSPITAL | Age: 55
Discharge: HOME | End: 2020-07-14
Admitting: ORTHOPAEDIC SURGERY

## 2020-07-14 DIAGNOSIS — M25.562 LEFT KNEE PAIN, UNSPECIFIED CHRONICITY: ICD-10-CM

## 2020-07-14 PROCEDURE — 73721 MRI JNT OF LWR EXTRE W/O DYE: CPT

## 2020-07-16 ENCOUNTER — TELEPHONE (OUTPATIENT)
Dept: ORTHOPEDIC SURGERY | Facility: CLINIC | Age: 55
End: 2020-07-16

## 2020-07-16 NOTE — TELEPHONE ENCOUNTER
Please let patient know that the MRI of her left knee does show a large meniscus tear.  While she does have some arthritic changes, would recommend that she see Dr. Génesis Mg to discuss options and see if knee arthroscopy is a possibility

## 2020-07-17 DIAGNOSIS — M25.562 LEFT KNEE PAIN, UNSPECIFIED CHRONICITY: Primary | ICD-10-CM

## 2020-07-31 ENCOUNTER — OFFICE VISIT (OUTPATIENT)
Dept: ORTHOPEDIC SURGERY | Facility: CLINIC | Age: 55
End: 2020-07-31

## 2020-07-31 VITALS — HEIGHT: 63 IN | WEIGHT: 150.1 LBS | TEMPERATURE: 98.3 F | BODY MASS INDEX: 26.59 KG/M2

## 2020-07-31 DIAGNOSIS — S83.242A OTHER TEAR OF MEDIAL MENISCUS OF LEFT KNEE AS CURRENT INJURY, INITIAL ENCOUNTER: Primary | ICD-10-CM

## 2020-07-31 PROCEDURE — 99214 OFFICE O/P EST MOD 30 MIN: CPT | Performed by: ORTHOPAEDIC SURGERY

## 2020-07-31 RX ORDER — CEFAZOLIN SODIUM 2 G/100ML
2 INJECTION, SOLUTION INTRAVENOUS ONCE
Status: CANCELLED | OUTPATIENT
Start: 2020-08-12 | End: 2020-07-31

## 2020-07-31 NOTE — PROGRESS NOTES
New Left Knee      Patient: Cheri Lopez        YOB: 1965    Medical Record Number: [id number]        Chief Complaints: left knee pain      History of Present Illness: This is a + 55-year-old female who presents complaining of left knee pain began near the first of the year.  She had spine surgery in May of this year due to marked forward flexed posture.  She states she is not a lot better from her back surgery yet but her knee is really holding up for rehab she has severe left medial knee pain that is moderate to severe constant aching clicking with swelling worse with standing sitting driving and walking.  She is a  her symptoms are worsened with the above listed activity somewhat improved with rest she is using crutches but states there is much for her back to keep her upright is they are for her knee her past medical history is more for depression anxiety thyroid disease        Allergies:   Allergies   Allergen Reactions   • Sulfa Antibiotics Other (See Comments)     Elevated liver enzymes     LONG TERM EFFECT ,FOUND IN BLOOD WORK       Medications:   Home Medications:  Current Outpatient Medications on File Prior to Visit   Medication Sig   • Ascorbic Acid (VITAMIN C PO) Take 1,000 mg by mouth Daily.   • B Complex Vitamins (VITAMIN B COMPLEX PO) Take 200 mcg by mouth Daily.   • baclofen (LIORESAL) 10 MG tablet Take 1 tablet by mouth 3 (Three) Times a Day As Needed for Muscle Spasms.   • Cholecalciferol (VITAMIN D3) 5000 units capsule capsule Take 2,000 Units by mouth Daily.   • clotrimazole-betamethasone (Lotrisone) 1-0.05 % cream Apply  topically to the appropriate area as directed 2 (Two) Times a Day.   • cycloSPORINE (RESTASIS) 0.05 % ophthalmic emulsion 1 drop 2 (Two) Times a Day.   • DHEA 10 MG capsule Take  by mouth Daily.   • DULoxetine (Cymbalta) 30 MG capsule Take 1 capsule by mouth Daily.   • estradiol (VIVELLE-DOT) 0.0375 MG/24HR Place 1 patch on the skin 2 (Two)  Times a Week.   • hydroCHLOROthiazide (MICROZIDE) 12.5 MG capsule Take 1 capsule by mouth Daily.   • losartan (Cozaar) 50 MG tablet Take 1 tablet by mouth Daily.   • Magnesium 100 MG tablet Take 500 mg by mouth Every Night.   • Magnesium-Potassium (CHANDRAKANT MAGNESIUM-POTASSIUM) 250-100 MG tablet Take  by mouth Daily.   • Multiple Vitamins-Minerals (PRESERVISION AREDS PO) Take  by mouth Daily.   • Omega-3 Fatty Acids (FISH OIL PO) Take 1,170 mg by mouth Daily.   • progesterone (PROMETRIUM) 100 MG capsule Take 100 mg by mouth Daily.   • SYNTHROID 88 MCG tablet Take 1 tablet by mouth Daily.     No current facility-administered medications on file prior to visit.      Current Medications:  Scheduled Meds:  Continuous Infusions:  No current facility-administered medications for this visit.   PRN Meds:.    Past Medical History:   Diagnosis Date   • GERD (gastroesophageal reflux disease)    • H/O colonoscopy    • Hypertension    • Hypothyroidism    • Low back pain    • Pneumonia 10/2011        Past Surgical History:   Procedure Laterality Date   • BREAST AUGMENTATION  01/17/2007   • CARPAL TUNNEL RELEASE WITH CUBITAL TUNNEL RELEASE Right    • COLONOSCOPY N/A 1/17/2018    Procedure: COLONOSCOPY to cecum and t.i. with polypectomy;  Surgeon: Rima Luis MD;  Location: [facility] ENDOSCOPY;  Service:    • HIP DEBRIDEMENT Right    • KNEE CARTILAGE SURGERY Right    • SINUS SURGERY     • SKIN BIOPSY      hand - precancerous   • SKIN BIOPSY          Social History     Occupational History   • Occupation: secratary   Tobacco Use   • Smoking status: Former Smoker     Packs/day: 0.25     Years: 5.00     Pack years: 1.25     Last attempt to quit: 2005     Years since quitting: 15.5   • Smokeless tobacco: Never Used   Substance and Sexual Activity   • Alcohol use: Yes     Comment: socially   • Drug use: Never   • Sexual activity: Defer    Social History     Social History Narrative   • Not on file        Family History   Problem Relation  "Age of Onset   • Osteoarthritis Mother    • Heart disease Mother    • No Known Problems Brother    • Breast cancer Maternal Aunt    • Cancer Paternal Grandmother              Review of Systems: 14 point review of systems are remarkable for the pertinent positives listed in the chart by the patient the remainder negative    Review of Systems      Physical Exam: 55 y.o. female  General Appearance:    Alert, cooperative, in no acute distress                 There were no vitals filed for this visit.   Patient is alert and read ×3 no acute distress appears her above-listed at height weight and age.  Affect is normal respiratory rate is normal unlabored. Heart rate regular rate rhythm, sclera, dentition and hearing are normal for the purpose of this exam.        Ortho Exam  Physical exam of the left knee reveals no effusion no redness.  The patient does have tenderness about the medial joint line.  No tenderness about the lateral joint line.  A negative bounce home and a positive medial Derek.  There is some pain medially  with a lateral Derek.  Patient has a stable ligamentous exam.  Quads are reasonable and symmetric bilaterally.  Calf is soft and nontender.  There is no overlying skin changes no lymphedema lymphadenopathy.  Patient has good hip range of motion full symmetric and asymptomatic and a normal ankle exam.    Physical Exam: 55 y.o. female  General Appearance:    Alert, cooperative, in no acute distress                      Vitals:    07/31/20 0952   Temp: 98.3 °F (36.8 °C)   TempSrc: Temporal   Weight: 68.1 kg (150 lb 1.6 oz)   Height: 160 cm (63\")        Head:    Normocephalic, without obvious abnormality, atraumatic   Eyes:            conjunctivae and sclerae normal, no pallor, corneas clear,    Ears:    Ears appear intact with no abnormalities noted   Throat:   No oral lesions, no thrush, oral mucosa moist   Neck:   No adenopathy, supple, trachea midline, no thyromegaly,    Back:     No kyphosis " present, no scoliosis present, no skin lesions,      erythema or scars, no tenderness to percussion or                   palpation,   range of motion normal   Lungs:   n,respirations regular, even and                  unlabored    Heart:    Regular rhythm and normal rate               Chest Wall:    No abnormalities observed               Pulses:   Pulses palpable and equal bilaterally   Skin:   No bleeding, bruising or rash   Lymph nodes:   No palpable adenopathy   Neurologic:   Appears neurologic intact       Procedures             Radiology:   AP, Lateral and merchant views of the left knee  were /reviewed to evauateknee pain.  I did review these they were taken by Dr. Awan on June 30 she has some mild patellofemoral OA but this is mild no other acute bony pathology good maintenance of her joint space she does have an MRI which reveals an oblique tear of the posterior horn medial meniscus is a partial-thickness articular cartilage thinning of the medial femoral condyle.  I have reviewed the above and agree with the findings  Imaging Results (Most Recent)     None        Assessment/Plan:      Left knee medial meniscus tear she wishes to proceed with arthroscopy.  She had a right knee scope and she understands what is involved.  I did discuss perioperative regimen.  Also discussed risk benefits and alternatives The patient voiced understanding of the risks, benefits, and alternative forms of treatment that were discussed and the patient consents to proceed with the above listed surgery.  All risks, benefits and alternatives were discussed.  Risks including to but not exclusive to anesthetic complications, including death, MI, CVA, infection, bleeding DVT, fracture, residual pain and need for future surgery.  She understands these and agrees to proceed                              Answers for HPI/ROS submitted by the patient on 7/29/2020   Lower extremity pain  What is the primary reason for your visit?: Lower  Extremity Injury  Incident occurred: more than 1 week ago  Incident location: other  Injury mechanism: unknown  Pain location: left knee  Pain - numeric: 8/10  Pain course: fluctuating  inability to bear weight: Yes  loss of motion: Yes  Foreign body present: no foreign bodies  Aggravated by: movement, weight bearing

## 2020-08-03 PROBLEM — S83.242A TEAR OF MEDIAL MENISCUS OF LEFT KNEE, CURRENT: Status: ACTIVE | Noted: 2020-08-03

## 2020-08-04 ENCOUNTER — TRANSCRIBE ORDERS (OUTPATIENT)
Dept: PREADMISSION TESTING | Facility: HOSPITAL | Age: 55
End: 2020-08-04

## 2020-08-04 ENCOUNTER — TELEPHONE (OUTPATIENT)
Dept: ORTHOPEDIC SURGERY | Facility: CLINIC | Age: 55
End: 2020-08-04

## 2020-08-04 DIAGNOSIS — Z01.818 OTHER SPECIFIED PRE-OPERATIVE EXAMINATION: Primary | ICD-10-CM

## 2020-08-04 NOTE — TELEPHONE ENCOUNTER
----- Message from Cheri Lopez sent at 8/4/2020  7:39 AM EDT -----  Regarding: Non-Urgent Medical Question  Contact: [phone]  Good morning,  I see what building I am going to for my Covid test - but what entrance and/or room do I go to, please?  Thank you.

## 2020-08-06 ENCOUNTER — APPOINTMENT (OUTPATIENT)
Dept: PREADMISSION TESTING | Facility: HOSPITAL | Age: 55
End: 2020-08-06

## 2020-08-06 VITALS
TEMPERATURE: 98.3 F | HEART RATE: 66 BPM | SYSTOLIC BLOOD PRESSURE: 149 MMHG | DIASTOLIC BLOOD PRESSURE: 88 MMHG | HEIGHT: 63 IN | RESPIRATION RATE: 20 BRPM | OXYGEN SATURATION: 100 % | BODY MASS INDEX: 27.29 KG/M2 | WEIGHT: 154 LBS

## 2020-08-06 LAB
ANION GAP SERPL CALCULATED.3IONS-SCNC: 10.8 MMOL/L (ref 5–15)
BUN SERPL-MCNC: 8 MG/DL (ref 6–20)
BUN/CREAT SERPL: 11.1 (ref 7–25)
CALCIUM SPEC-SCNC: 9.3 MG/DL (ref 8.6–10.5)
CHLORIDE SERPL-SCNC: 98 MMOL/L (ref 98–107)
CO2 SERPL-SCNC: 26.2 MMOL/L (ref 22–29)
CREAT SERPL-MCNC: 0.72 MG/DL (ref 0.57–1)
DEPRECATED RDW RBC AUTO: 47.4 FL (ref 37–54)
ERYTHROCYTE [DISTWIDTH] IN BLOOD BY AUTOMATED COUNT: 13.9 % (ref 12.3–15.4)
GFR SERPL CREATININE-BSD FRML MDRD: 84 ML/MIN/1.73
GLUCOSE SERPL-MCNC: 78 MG/DL (ref 65–99)
HCT VFR BLD AUTO: 41.7 % (ref 34–46.6)
HGB BLD-MCNC: 13.7 G/DL (ref 12–15.9)
MCH RBC QN AUTO: 30.7 PG (ref 26.6–33)
MCHC RBC AUTO-ENTMCNC: 32.9 G/DL (ref 31.5–35.7)
MCV RBC AUTO: 93.5 FL (ref 79–97)
PLATELET # BLD AUTO: 207 10*3/MM3 (ref 140–450)
PMV BLD AUTO: 9.9 FL (ref 6–12)
POTASSIUM SERPL-SCNC: 4.3 MMOL/L (ref 3.5–5.2)
RBC # BLD AUTO: 4.46 10*6/MM3 (ref 3.77–5.28)
SODIUM SERPL-SCNC: 135 MMOL/L (ref 136–145)
WBC # BLD AUTO: 4.43 10*3/MM3 (ref 3.4–10.8)

## 2020-08-06 PROCEDURE — 80048 BASIC METABOLIC PNL TOTAL CA: CPT | Performed by: ORTHOPAEDIC SURGERY

## 2020-08-06 PROCEDURE — 93005 ELECTROCARDIOGRAM TRACING: CPT

## 2020-08-06 PROCEDURE — 36415 COLL VENOUS BLD VENIPUNCTURE: CPT

## 2020-08-06 PROCEDURE — 85027 COMPLETE CBC AUTOMATED: CPT | Performed by: ORTHOPAEDIC SURGERY

## 2020-08-06 PROCEDURE — 93010 ELECTROCARDIOGRAM REPORT: CPT | Performed by: INTERNAL MEDICINE

## 2020-08-06 RX ORDER — IBUPROFEN 800 MG/1
800 TABLET ORAL 2 TIMES DAILY
COMMUNITY
End: 2021-01-26

## 2020-08-06 NOTE — DISCHARGE INSTRUCTIONS
Take the following medications the morning of surgery:    cymbalta   Thyroid med  Pain med if needed    General Instructions:  • Do not eat solid food after midnight the night before surgery.  • You may drink clear liquids day of surgery but must stop at least one hour before your hospital arrival time.  • It is beneficial for you to have a clear drink that contains carbohydrates the day of surgery.  We suggest a 12 to 20 ounce bottle of Gatorade or Powerade for non-diabetic patients or a 12 to 20 ounce bottle of G2 or Powerade Zero for diabetic patients. (Pediatric patients, are not advised to drink a 12 to 20 ounce carbohydrate drink)    Clear liquids are liquids you can see through.  Nothing red in color.     Plain water                               Sports drinks  Sodas                                   Gelatin (Jell-O)  Fruit juices without pulp such as white grape juice and apple juice  Popsicles that contain no fruit or yogurt  Tea or coffee (no cream or milk added)  Gatorade / Powerade  G2 / Powerade Zero    • Infants may have breast milk up to four hours before surgery.  • Infants drinking formula may drink formula up to six hours before surgery.   • Patients who avoid smoking, chewing tobacco and alcohol for 4 weeks prior to surgery have a reduced risk of post-operative complications.  Quit smoking as many days before surgery as you can.  • Do not smoke, use chewing tobacco or drink alcohol the day of surgery.   • If applicable bring your C-PAP/ BI-PAP machine.  • Bring any papers given to you in the doctor’s office.  • Wear clean comfortable clothes.  • Do not wear contact lenses, false eyelashes or make-up.  Bring a case for your glasses.   • Bring crutches or walker if applicable.  • Remove all piercings.  Leave jewelry and any other valuables at home.  • Hair extensions with metal clips must be removed prior to surgery.  • The Pre-Admission Testing nurse will instruct you to bring medications if unable  to obtain an accurate list in Pre-Admission Testing.        If you were given a blood bank ID arm band remember to bring it with you the day of surgery.    Preventing a Surgical Site Infection:  • For 2 to 3 days before surgery, avoid shaving with a razor because the razor can irritate skin and make it easier to develop an infection.    • Any areas of open skin can increase the risk of a post-operative wound infection by allowing bacteria to enter and travel throughout the body.  Notify your surgeon if you have any skin wounds / rashes even if it is not near the expected surgical site.  The area will need assessed to determine if surgery should be delayed until it is healed.  • The night prior to surgery shower using a fresh bar of anti-bacterial soap (such as Dial) and clean washcloth.  Sleep in a clean bed with clean clothing.  Do not allow pets to sleep with you.  • Shower on the morning of surgery using a fresh bar of anti-bacterial soap (such as Dial) and clean washcloth.  Dry with a clean towel and dress in clean clothing.  • Ask your surgeon if you will be receiving antibiotics prior to surgery.  • Make sure you, your family, and all healthcare providers clean their hands with soap and water or an alcohol based hand  before caring for you or your wound.    Day of surgery:8/12/2020   0645  Your arrival time is approximately two hours before your scheduled surgery time.  Upon arrival, a Pre-op nurse and Anesthesiologist will review your health history, obtain vital signs, and answer questions you may have.  The only belongings needed at this time will be a list of your home medications and if applicable your C-PAP/BI-PAP machine.  If you are staying overnight your family can leave the rest of your belongings in the car and bring them to your room later.  A Pre-op nurse will start an IV and you may receive medication in preparation for surgery, including something to help you relax.  Your family will be  able to see you in the Pre-op area.  Two visitors at a time will be allowed in the Pre-op room.  While you are in surgery your family should notify the waiting room  if they leave the waiting room area and provide a contact phone number.    Please be aware that surgery does come with discomfort.  We want to make every effort to control your discomfort so please discuss any uncontrolled symptoms with your nurse.   Your doctor will most likely have prescribed pain medications.      If you are going home after surgery you will receive individualized written care instructions before being discharged.  A responsible adult must drive you to and from the hospital on the day of your surgery and stay with you for 24 hours.    If you are staying overnight following surgery, you will be transported to your hospital room following the recovery period.  [facility] has all private rooms.    If you have any questions please call Pre-Admission Testing at [phone].  Deductibles and co-payments are collected on the day of service. Please be prepared to pay the required co-pay, deductible or deposit on the day of service as defined by your plan.    Patient Education for Self-Quarantine Process    Following your COVID testing, we strongly recommend that you do not leave your home after you have been tested for COVID except to get medical care. This includes not going to work, school or to public areas.  If this is not possible for you to do please limit your activities to only required outings.  Be sure to wear a mask when you are with other people, practice social distancing and wash your hands frequently.      The following items provide additional details to keep you safe.  • Wash your hands with soap and water frequently for at least 20 seconds.   • Avoid touching your eyes, nose and mouth with unwashed hands.  • Do not share anything - utensils, towels, food from the same bowl.   • Have your own  utensils, drinking glass, dishes, towels and bedding.   • Do not have visitors.   • Do use FaceTime to stay in touch with family and friends.  • You should stay in a specific room away from others if possible.   • Stay at least 6 feet away from others in the home if you cannot have a dedicated room to yourself.   • Do not snuggle with your pet. While the CDC says there is no evidence that pets can spread COVID-19 or be infected from humans, it is probably best to avoid “petting, snuggling, being kissed or licked and sharing food (during self-quarantine)”, according to the CDC.   • Sanitize household surfaces daily. Include all high touch areas (door handles, light switches, phones, countertops, etc.)  • Do not share a bathroom with others, if possible.   • Wear a mask around others in your home if you are unable to stay in a separate room or 6 feet apart. If  you are unable to wear a mask, have your family member wear a mask if they must be within 6 feet of you.   Call your surgeon immediately if you experience any of the following symptoms:  • Sore Throat  • Shortness of Breath or difficulty breathing  • Cough  • Chills  • Body soreness or muscle pain  • Headache  • Fever  • New loss of taste or smell  • Do not arrive for your surgery ill.  Your procedure will need to be rescheduled to another time.  You will need to call your physician before the day of surgery to avoid any unnecessary exposure to hospital staff as well as other patients.

## 2020-08-10 ENCOUNTER — APPOINTMENT (OUTPATIENT)
Dept: LAB | Facility: HOSPITAL | Age: 55
End: 2020-08-10

## 2020-08-10 ENCOUNTER — LAB (OUTPATIENT)
Dept: LAB | Facility: HOSPITAL | Age: 55
End: 2020-08-10

## 2020-08-10 DIAGNOSIS — Z01.818 OTHER SPECIFIED PRE-OPERATIVE EXAMINATION: ICD-10-CM

## 2020-08-10 PROCEDURE — C9803 HOPD COVID-19 SPEC COLLECT: HCPCS

## 2020-08-10 PROCEDURE — U0004 COV-19 TEST NON-CDC HGH THRU: HCPCS

## 2020-08-11 LAB
REF LAB TEST METHOD: NORMAL
SARS-COV-2 RNA RESP QL NAA+PROBE: NOT DETECTED

## 2020-08-12 ENCOUNTER — ANESTHESIA (OUTPATIENT)
Dept: PERIOP | Facility: HOSPITAL | Age: 55
End: 2020-08-12

## 2020-08-12 ENCOUNTER — ANESTHESIA EVENT (OUTPATIENT)
Dept: PERIOP | Facility: HOSPITAL | Age: 55
End: 2020-08-12

## 2020-08-12 ENCOUNTER — HOSPITAL ENCOUNTER (OUTPATIENT)
Facility: HOSPITAL | Age: 55
Setting detail: HOSPITAL OUTPATIENT SURGERY
Discharge: HOME | End: 2020-08-12
Attending: ORTHOPAEDIC SURGERY | Admitting: ORTHOPAEDIC SURGERY

## 2020-08-12 VITALS
OXYGEN SATURATION: 99 % | RESPIRATION RATE: 16 BRPM | DIASTOLIC BLOOD PRESSURE: 96 MMHG | HEART RATE: 75 BPM | SYSTOLIC BLOOD PRESSURE: 168 MMHG | TEMPERATURE: 97.4 F

## 2020-08-12 DIAGNOSIS — S83.242A OTHER TEAR OF MEDIAL MENISCUS OF LEFT KNEE AS CURRENT INJURY, INITIAL ENCOUNTER: ICD-10-CM

## 2020-08-12 PROCEDURE — 25010000002 DEXAMETHASONE PER 1 MG: Performed by: NURSE ANESTHETIST, CERTIFIED REGISTERED

## 2020-08-12 PROCEDURE — 25010000003 CEFAZOLIN IN DEXTROSE 2-4 GM/100ML-% SOLUTION: Performed by: ORTHOPAEDIC SURGERY

## 2020-08-12 PROCEDURE — 25010000002 METHYLPREDNISOLONE PER 80 MG: Performed by: ORTHOPAEDIC SURGERY

## 2020-08-12 PROCEDURE — 29881 ARTHRS KNE SRG MNISECTMY M/L: CPT | Performed by: ORTHOPAEDIC SURGERY

## 2020-08-12 PROCEDURE — 25010000002 HYDROMORPHONE PER 4 MG: Performed by: ANESTHESIOLOGY

## 2020-08-12 PROCEDURE — 25010000002 KETOROLAC TROMETHAMINE PER 15 MG: Performed by: NURSE ANESTHETIST, CERTIFIED REGISTERED

## 2020-08-12 PROCEDURE — 25010000002 PROPOFOL 10 MG/ML EMULSION: Performed by: NURSE ANESTHETIST, CERTIFIED REGISTERED

## 2020-08-12 PROCEDURE — 25010000002 FENTANYL CITRATE (PF) 100 MCG/2ML SOLUTION: Performed by: ANESTHESIOLOGY

## 2020-08-12 PROCEDURE — 25010000002 ONDANSETRON PER 1 MG: Performed by: NURSE ANESTHETIST, CERTIFIED REGISTERED

## 2020-08-12 RX ORDER — SODIUM CHLORIDE 0.9 % (FLUSH) 0.9 %
3 SYRINGE (ML) INJECTION EVERY 12 HOURS SCHEDULED
Status: DISCONTINUED | OUTPATIENT
Start: 2020-08-12 | End: 2020-08-12 | Stop reason: HOSPADM

## 2020-08-12 RX ORDER — PROMETHAZINE HYDROCHLORIDE 25 MG/1
25 TABLET ORAL ONCE AS NEEDED
Status: DISCONTINUED | OUTPATIENT
Start: 2020-08-12 | End: 2020-08-12 | Stop reason: HOSPADM

## 2020-08-12 RX ORDER — ONDANSETRON 2 MG/ML
INJECTION INTRAMUSCULAR; INTRAVENOUS AS NEEDED
Status: DISCONTINUED | OUTPATIENT
Start: 2020-08-12 | End: 2020-08-12 | Stop reason: SURG

## 2020-08-12 RX ORDER — KETOROLAC TROMETHAMINE 30 MG/ML
INJECTION, SOLUTION INTRAMUSCULAR; INTRAVENOUS AS NEEDED
Status: DISCONTINUED | OUTPATIENT
Start: 2020-08-12 | End: 2020-08-12 | Stop reason: SURG

## 2020-08-12 RX ORDER — SODIUM CHLORIDE 0.9 % (FLUSH) 0.9 %
3-10 SYRINGE (ML) INJECTION AS NEEDED
Status: DISCONTINUED | OUTPATIENT
Start: 2020-08-12 | End: 2020-08-12 | Stop reason: HOSPADM

## 2020-08-12 RX ORDER — LIDOCAINE HYDROCHLORIDE 20 MG/ML
INJECTION, SOLUTION INFILTRATION; PERINEURAL AS NEEDED
Status: DISCONTINUED | OUTPATIENT
Start: 2020-08-12 | End: 2020-08-12 | Stop reason: SURG

## 2020-08-12 RX ORDER — FLUMAZENIL 0.1 MG/ML
0.2 INJECTION INTRAVENOUS AS NEEDED
Status: DISCONTINUED | OUTPATIENT
Start: 2020-08-12 | End: 2020-08-12 | Stop reason: HOSPADM

## 2020-08-12 RX ORDER — HYDROCODONE BITARTRATE AND ACETAMINOPHEN 5; 325 MG/1; MG/1
1 TABLET ORAL EVERY 4 HOURS PRN
Qty: 30 TABLET | Refills: 0 | Status: SHIPPED | OUTPATIENT
Start: 2020-08-12 | End: 2021-01-26

## 2020-08-12 RX ORDER — PROMETHAZINE HYDROCHLORIDE 25 MG/1
25 SUPPOSITORY RECTAL ONCE AS NEEDED
Status: DISCONTINUED | OUTPATIENT
Start: 2020-08-12 | End: 2020-08-12 | Stop reason: HOSPADM

## 2020-08-12 RX ORDER — PROPOFOL 10 MG/ML
VIAL (ML) INTRAVENOUS AS NEEDED
Status: DISCONTINUED | OUTPATIENT
Start: 2020-08-12 | End: 2020-08-12 | Stop reason: SURG

## 2020-08-12 RX ORDER — SODIUM CHLORIDE, SODIUM LACTATE, POTASSIUM CHLORIDE, CALCIUM CHLORIDE 600; 310; 30; 20 MG/100ML; MG/100ML; MG/100ML; MG/100ML
9 INJECTION, SOLUTION INTRAVENOUS CONTINUOUS
Status: DISCONTINUED | OUTPATIENT
Start: 2020-08-12 | End: 2020-08-12 | Stop reason: HOSPADM

## 2020-08-12 RX ORDER — FENTANYL CITRATE 50 UG/ML
100 INJECTION, SOLUTION INTRAMUSCULAR; INTRAVENOUS
Status: DISCONTINUED | OUTPATIENT
Start: 2020-08-12 | End: 2020-08-12 | Stop reason: HOSPADM

## 2020-08-12 RX ORDER — HYDRALAZINE HYDROCHLORIDE 20 MG/ML
5 INJECTION INTRAMUSCULAR; INTRAVENOUS
Status: DISCONTINUED | OUTPATIENT
Start: 2020-08-12 | End: 2020-08-12 | Stop reason: HOSPADM

## 2020-08-12 RX ORDER — MIDAZOLAM HYDROCHLORIDE 1 MG/ML
2 INJECTION INTRAMUSCULAR; INTRAVENOUS
Status: DISCONTINUED | OUTPATIENT
Start: 2020-08-12 | End: 2020-08-12 | Stop reason: HOSPADM

## 2020-08-12 RX ORDER — ACETAMINOPHEN 325 MG/1
650 TABLET ORAL ONCE
Status: COMPLETED | OUTPATIENT
Start: 2020-08-12 | End: 2020-08-12

## 2020-08-12 RX ORDER — ACETAMINOPHEN 500 MG
1000 TABLET ORAL EVERY 6 HOURS PRN
COMMUNITY
End: 2021-01-26

## 2020-08-12 RX ORDER — FENTANYL CITRATE 50 UG/ML
50 INJECTION, SOLUTION INTRAMUSCULAR; INTRAVENOUS
Status: DISCONTINUED | OUTPATIENT
Start: 2020-08-12 | End: 2020-08-12 | Stop reason: HOSPADM

## 2020-08-12 RX ORDER — SCOLOPAMINE TRANSDERMAL SYSTEM 1 MG/1
1 PATCH, EXTENDED RELEASE TRANSDERMAL ONCE
Status: DISCONTINUED | OUTPATIENT
Start: 2020-08-12 | End: 2020-08-12 | Stop reason: HOSPADM

## 2020-08-12 RX ORDER — OXYCODONE AND ACETAMINOPHEN 7.5; 325 MG/1; MG/1
1 TABLET ORAL ONCE AS NEEDED
Status: DISCONTINUED | OUTPATIENT
Start: 2020-08-12 | End: 2020-08-12 | Stop reason: HOSPADM

## 2020-08-12 RX ORDER — GLYCOPYRROLATE 0.2 MG/ML
INJECTION INTRAMUSCULAR; INTRAVENOUS AS NEEDED
Status: DISCONTINUED | OUTPATIENT
Start: 2020-08-12 | End: 2020-08-12 | Stop reason: SURG

## 2020-08-12 RX ORDER — ONDANSETRON 2 MG/ML
4 INJECTION INTRAMUSCULAR; INTRAVENOUS ONCE AS NEEDED
Status: DISCONTINUED | OUTPATIENT
Start: 2020-08-12 | End: 2020-08-12 | Stop reason: HOSPADM

## 2020-08-12 RX ORDER — HYDROCODONE BITARTRATE AND ACETAMINOPHEN 7.5; 325 MG/1; MG/1
1 TABLET ORAL ONCE AS NEEDED
Status: COMPLETED | OUTPATIENT
Start: 2020-08-12 | End: 2020-08-12

## 2020-08-12 RX ORDER — FAMOTIDINE 10 MG/ML
20 INJECTION, SOLUTION INTRAVENOUS ONCE
Status: COMPLETED | OUTPATIENT
Start: 2020-08-12 | End: 2020-08-12

## 2020-08-12 RX ORDER — LIDOCAINE HYDROCHLORIDE 10 MG/ML
0.5 INJECTION, SOLUTION EPIDURAL; INFILTRATION; INTRACAUDAL; PERINEURAL ONCE AS NEEDED
Status: DISCONTINUED | OUTPATIENT
Start: 2020-08-12 | End: 2020-08-12 | Stop reason: HOSPADM

## 2020-08-12 RX ORDER — EPHEDRINE SULFATE 50 MG/ML
5 INJECTION, SOLUTION INTRAVENOUS ONCE AS NEEDED
Status: DISCONTINUED | OUTPATIENT
Start: 2020-08-12 | End: 2020-08-12 | Stop reason: HOSPADM

## 2020-08-12 RX ORDER — PROMETHAZINE HYDROCHLORIDE 25 MG/ML
6.25 INJECTION, SOLUTION INTRAMUSCULAR; INTRAVENOUS ONCE AS NEEDED
Status: DISCONTINUED | OUTPATIENT
Start: 2020-08-12 | End: 2020-08-12 | Stop reason: HOSPADM

## 2020-08-12 RX ORDER — HYDROMORPHONE HYDROCHLORIDE 1 MG/ML
0.5 INJECTION, SOLUTION INTRAMUSCULAR; INTRAVENOUS; SUBCUTANEOUS
Status: DISCONTINUED | OUTPATIENT
Start: 2020-08-12 | End: 2020-08-12 | Stop reason: HOSPADM

## 2020-08-12 RX ORDER — ONDANSETRON 4 MG/1
4 TABLET, FILM COATED ORAL EVERY 8 HOURS PRN
Qty: 30 TABLET | Refills: 0 | Status: SHIPPED | OUTPATIENT
Start: 2020-08-12 | End: 2021-01-26

## 2020-08-12 RX ORDER — CEFAZOLIN SODIUM 2 G/100ML
2 INJECTION, SOLUTION INTRAVENOUS ONCE
Status: COMPLETED | OUTPATIENT
Start: 2020-08-12 | End: 2020-08-12

## 2020-08-12 RX ORDER — SODIUM CHLORIDE, SODIUM LACTATE, POTASSIUM CHLORIDE, AND CALCIUM CHLORIDE .6; .31; .03; .02 G/100ML; G/100ML; G/100ML; G/100ML
IRRIGANT IRRIGATION AS NEEDED
Status: DISCONTINUED | OUTPATIENT
Start: 2020-08-12 | End: 2020-08-12 | Stop reason: HOSPADM

## 2020-08-12 RX ORDER — DEXAMETHASONE SODIUM PHOSPHATE 10 MG/ML
INJECTION INTRAMUSCULAR; INTRAVENOUS AS NEEDED
Status: DISCONTINUED | OUTPATIENT
Start: 2020-08-12 | End: 2020-08-12 | Stop reason: SURG

## 2020-08-12 RX ADMIN — SODIUM CHLORIDE, POTASSIUM CHLORIDE, SODIUM LACTATE AND CALCIUM CHLORIDE 9 ML/HR: 600; 310; 30; 20 INJECTION, SOLUTION INTRAVENOUS at 09:52

## 2020-08-12 RX ADMIN — HYDROCODONE BITARTRATE AND ACETAMINOPHEN 1 TABLET: 7.5; 325 TABLET ORAL at 09:51

## 2020-08-12 RX ADMIN — PROPOFOL 200 MG: 10 INJECTION, EMULSION INTRAVENOUS at 08:44

## 2020-08-12 RX ADMIN — PROPOFOL 250 MCG/KG/MIN: 10 INJECTION, EMULSION INTRAVENOUS at 08:44

## 2020-08-12 RX ADMIN — CEFAZOLIN SODIUM 2 G: 2 INJECTION, SOLUTION INTRAVENOUS at 08:50

## 2020-08-12 RX ADMIN — SCOPALAMINE 1 PATCH: 1 PATCH, EXTENDED RELEASE TRANSDERMAL at 07:57

## 2020-08-12 RX ADMIN — FENTANYL CITRATE 50 MCG: 50 INJECTION, SOLUTION INTRAMUSCULAR; INTRAVENOUS at 10:08

## 2020-08-12 RX ADMIN — FAMOTIDINE 20 MG: 10 INJECTION, SOLUTION INTRAVENOUS at 07:57

## 2020-08-12 RX ADMIN — PROPOFOL 100 MG: 10 INJECTION, EMULSION INTRAVENOUS at 09:05

## 2020-08-12 RX ADMIN — GLYCOPYRROLATE 0.2 MG: 0.2 INJECTION INTRAMUSCULAR; INTRAVENOUS at 08:43

## 2020-08-12 RX ADMIN — SODIUM CHLORIDE, POTASSIUM CHLORIDE, SODIUM LACTATE AND CALCIUM CHLORIDE 9 ML/HR: 600; 310; 30; 20 INJECTION, SOLUTION INTRAVENOUS at 07:58

## 2020-08-12 RX ADMIN — FENTANYL CITRATE 75 MCG: 50 INJECTION INTRAMUSCULAR; INTRAVENOUS at 09:05

## 2020-08-12 RX ADMIN — ONDANSETRON HYDROCHLORIDE 4 MG: 2 SOLUTION INTRAMUSCULAR; INTRAVENOUS at 08:43

## 2020-08-12 RX ADMIN — KETOROLAC TROMETHAMINE 30 MG: 30 INJECTION, SOLUTION INTRAMUSCULAR; INTRAVENOUS at 09:21

## 2020-08-12 RX ADMIN — LIDOCAINE HYDROCHLORIDE 40 MG: 20 INJECTION, SOLUTION INFILTRATION; PERINEURAL at 08:44

## 2020-08-12 RX ADMIN — FENTANYL CITRATE 25 MCG: 50 INJECTION INTRAMUSCULAR; INTRAVENOUS at 08:48

## 2020-08-12 RX ADMIN — FENTANYL CITRATE 50 MCG: 50 INJECTION, SOLUTION INTRAMUSCULAR; INTRAVENOUS at 09:47

## 2020-08-12 RX ADMIN — DEXAMETHASONE SODIUM PHOSPHATE 8 MG: 10 INJECTION INTRAMUSCULAR; INTRAVENOUS at 08:48

## 2020-08-12 RX ADMIN — HYDROMORPHONE HYDROCHLORIDE 0.5 MG: 1 INJECTION, SOLUTION INTRAMUSCULAR; INTRAVENOUS; SUBCUTANEOUS at 10:19

## 2020-08-12 RX ADMIN — ACETAMINOPHEN 650 MG: 325 TABLET, FILM COATED ORAL at 07:57

## 2020-08-12 NOTE — H&P
History & Physical       Patient: Cheri Lopez    Date of Admission: 8/12/2020  6:41 AM    YOB: 1965    Medical Record Number: [id number]    Attending Physician: Génesis Mg MD        Chief Complaints: Other tear of medial meniscus of left knee as current injury, initial encounter [S83.242A]      History of Present Illness: Patient is several month history of left knee pain she does have an MRI which shows a tear of the posterior horn medial meniscus she is failed conservative management.  She has had back surgery earlier this year and seems to be progressing with that.  She presents for arthroscopy     Allergies:   Allergies   Allergen Reactions   • Sulfa Antibiotics Other (See Comments)     Elevated liver enzymes     LONG TERM EFFECT ,FOUND IN BLOOD WORK       Medications:   Home Medications:  No current facility-administered medications on file prior to encounter.      Current Outpatient Medications on File Prior to Encounter   Medication Sig   • acetaminophen (TYLENOL) 500 MG tablet Take 1,000 mg by mouth Every 6 (Six) Hours As Needed for Mild Pain .   • DULoxetine (Cymbalta) 30 MG capsule Take 1 capsule by mouth Daily.   • hydroCHLOROthiazide (MICROZIDE) 12.5 MG capsule Take 1 capsule by mouth Daily.   • losartan (Cozaar) 50 MG tablet Take 1 tablet by mouth Daily.   • SYNTHROID 88 MCG tablet Take 1 tablet by mouth Daily.   • Ascorbic Acid (VITAMIN C PO) Take 1,000 mg by mouth Daily.   • B Complex Vitamins (VITAMIN B COMPLEX PO) Take 200 mcg by mouth Daily.   • baclofen (LIORESAL) 10 MG tablet Take 1 tablet by mouth 3 (Three) Times a Day As Needed for Muscle Spasms.   • clotrimazole-betamethasone (Lotrisone) 1-0.05 % cream Apply  topically to the appropriate area as directed 2 (Two) Times a Day.   • cycloSPORINE (RESTASIS) 0.05 % ophthalmic emulsion 1 drop 2 (Two) Times a Day.   • DHEA 10 MG capsule Take 1 capsule by mouth Daily.   • estradiol (VIVELLE-DOT) 0.0375 MG/24HR Place 1  patch on the skin 2 (Two) Times a Week.   • Magnesium-Potassium (CHANDRAKANT MAGNESIUM-POTASSIUM) 250-100 MG tablet Take 1 tablet by mouth Daily.   • Multiple Vitamins-Minerals (PRESERVISION AREDS PO) Take 1 capsule by mouth 2 (two) times a day.   • Omega-3 Fatty Acids (FISH OIL PO) Take 1 capsule by mouth 2 (two) times a day.   • progesterone (PROMETRIUM) 100 MG capsule Take 100 mg by mouth Daily.     Current Medications:  Scheduled Meds:  acetaminophen 650 mg Oral Once   ceFAZolin 2 g Intravenous Once   famotidine 20 mg Intravenous Once   Scopolamine 1 patch Transdermal Once   sodium chloride 3 mL Intravenous Q12H     Continuous Infusions:  lactated ringers 9 mL/hr     PRN Meds:.fentanyl  •  lidocaine PF 1%  •  midazolam  •  sodium chloride    Past Medical History:   Diagnosis Date   • Arthritis    • Depression    • GERD (gastroesophageal reflux disease)    • H/O colonoscopy    • Hip pain    • Hypertension    • Hypothyroidism    • IBS (irritable bowel syndrome)    • Knee pain    • Low back pain    • Pneumonia 10/2011   • PONV (postoperative nausea and vomiting)         Past Surgical History:   Procedure Laterality Date   • BREAST AUGMENTATION  01/17/2007   • CARPAL TUNNEL RELEASE WITH CUBITAL TUNNEL RELEASE Right    • COLONOSCOPY N/A 1/17/2018    Procedure: COLONOSCOPY to cecum and t.i. with polypectomy;  Surgeon: Rima Luis MD;  Location: [facility] ENDOSCOPY;  Service:    • HIP DEBRIDEMENT Right    • KNEE CARTILAGE SURGERY Right    • SINUS SURGERY     • SKIN BIOPSY      hand - precancerous   • SKIN BIOPSY     • SPINAL FUSION      L4 &L5        Social History     Occupational History   • Occupation: secratary   Tobacco Use   • Smoking status: Former Smoker     Packs/day: 0.25     Years: 5.00     Pack years: 1.25     Last attempt to quit: 2005     Years since quitting: 15.6   • Smokeless tobacco: Never Used   Substance and Sexual Activity   • Alcohol use: Yes     Alcohol/week: 14.0 standard drinks     Types: 14 Glasses  of wine per week   • Drug use: Never   • Sexual activity: Not on file    Social History     Social History Narrative   • Not on file        Family History   Problem Relation Age of Onset   • Osteoarthritis Mother    • Heart disease Mother    • No Known Problems Brother    • Breast cancer Maternal Aunt    • Cancer Paternal Grandmother    • Malig Hyperthermia Neg Hx        Review of Systems      Physical Exam: 55 y.o. female  General Appearance:    Alert, cooperative, in no acute distress                    Vitals:    08/12/20 0708   BP: 145/97   BP Location: Left arm   Patient Position: Lying   Pulse: 81   Resp: 20   Temp: 98.8 °F (37.1 °C)   TempSrc: Oral   SpO2: 96%        Head:    Normocephalic, without obvious abnormality, atraumatic   Eyes:            conjunctivae and sclerae normal, no pallor, corneas clear,    Ears:    Ears appear intact with no abnormalities noted   Throat:   No oral lesions, no thrush, oral mucosa moist   Neck:   No adenopathy, supple, trachea midline, no thyromegaly,    Back:     No kyphosis present, no scoliosis present, no skin lesions,      erythema or scars, no tenderness to percussion or                   palpation,   range of motion normal   Lungs:     Clear to auscultation,respirations regular, even and                  unlabored    Heart:    Regular rhythm and normal rate               Chest Wall:    No abnormalities observed   Abdomen:     Normal bowel sounds, no masses, no organomegaly, soft        non-tender, non-distended, no guarding, no rebound                tenderness   Rectal:     Deferred   Extremities:    Moves all extremities well, no edema,   no cyanosis, no redness   Pulses:   Pulses palpable and equal bilaterally   Skin:   No bleeding, bruising or rash   Lymph nodes:   No palpable adenopathy   Neurologic:   Appears neurologic intact             Assessment:  Patient Active Problem List   Diagnosis   • Chronic constipation   • Blues   • Adult hypothyroidism   •  Encounter for screening for malignant neoplasm of colon   • Acute right-sided low back pain with right-sided sciatica   • DDD (degenerative disc disease), lumbar   • Right hip pain   • Sacroiliac joint dysfunction   • Allergic rhinitis due to allergen   • Scoliosis due to degenerative disease of spine in adult patient   • Low back pain   • Neural foraminal stenosis of lumbar spine   • Tear of medial meniscus of left knee, current           Plan: All risks, benefits and alternatives were discussed.  Risks including to but not exclusive to anesthetic complications, including death, MI, CVA, infection, bleeding DVT, PE,  fracture, residual pain and need for future surgery.  Patient understood all and agrees to proceed.  With her back issues and recent back surgery I will scope her knee without dropping the end of the table so as not to stress her back

## 2020-08-12 NOTE — ANESTHESIA PROCEDURE NOTES
Airway  Urgency: elective    Date/Time: 8/12/2020 8:48 AM  Airway not difficult    General Information and Staff    Patient location during procedure: OR  Anesthesiologist: Schuyler Sommer MD  CRNA: Citlaly Conway CRNA    Indications and Patient Condition  Indications for airway management: airway protection    Preoxygenated: yes      Final Airway Details  Final airway type: supraglottic airway      Successful airway: unique  Size 4    Number of attempts at approach: 1  Assessment: lips, teeth, and gum same as pre-op and atraumatic intubation

## 2020-08-12 NOTE — OP NOTE
Operative Note      Facility: [facility]  Patient Name: Cheri Lopez  YOB: 1965  Date: 8/12/2020  Medical Record Number: [id number]      Pre-op Diagnosis:   Other tear of medial meniscus of left knee as current injury, initial encounter [S83.242A]    Post-Op Diagnosis Codes:  Complex tear of the medial meniscus, grade 3 changes on the medial femoral condyle, grade 3 changes patella  Procedure(s):  Left KNEE ARTHROSCOPy, partial medial menisectomy and debridement of arthritis chondroplasty of the medial femoral condyle patella    Surgeon(s):  Génesis Mg MD    Anesthesia: General  Anesthesiologist: Schuyler Sommer MD  CRNA: Citlaly Conway CRNA    Staff:   Circulator: Estrella Hamilton RN  Scrub Person: Thanh Hairston  Other: Mateus Baldwin RN    Assistants : none      Estimated Blood Loss: 5 cc    Specimens:    none     Drains: None    Findings: See Dictation    Complications: None      Indication for procedure: This patient has had a several month history of knee pain and has an exam and an MRI which are consistent with meniscal pathology. They understand all options and wished to proceed with arthroscopy.      Description of procedure: The patient was taken to the operating room. They were placed supine on the operating room table. After induction of adequate LMA anesthesia, IV antibiotics the underwent exam under anesthesia was symmetric full range of motion. Nonsterile tourniquet was applied patient was placed in the thigh qureshi all prominent areas well padded and into the table dropped. The leg was prepped and draped in usual sterile fashion. Standard lateral incision was made with 11 blade. Blunt trocar penetrated into the joint scope followed in the evaluation began the patella peer to sit centrally within the trochlear groove she did have some degenerative changes on the patella felt to be grade 3 she had synovitis throughout the knee that  entered the medial compartment under spinal needle localization direct visualization a medial portal was established.  She had a complex tear of the medial meniscus involving the posterior horn and the body.  This was resected with various angles biters baskets motorized jess ultimately the Apollo device.  I resected approximately 60% of the meniscus.  She did have degenerative changes on the medial femoral condyle that were felt to be grade 3 these were debrided with a motorized shaver back to a nice stable edge the notch was evaluated the ACL PCL were intact lateral compartment was normal she has some mild degenerative changes on the most anterior aspect lateral tibial plateau that were stable.  I then turned my attention patellofemoral joint gently debrided grade 3 changes on the patella.             At this point everything was thoroughly irrigated it was suctioned all 3 compartments, the gutters the suprapatellar pouch were all evaluated there was no further acute pathology seen.  Everything was thoroughly irrigated it was injected with Marcaine Depo-Medrol.  The portals were closed with 3-0 nylon interrupted fashion.  Sterile dressings and Ace wraps were applied.  The patient tolerated the procedure well and was taken to recovery room in good condition.  All sponge and needle count were correct                    Date: 8/12/2020  Time: 09:43

## 2020-08-12 NOTE — ANESTHESIA PREPROCEDURE EVALUATION
Anesthesia Evaluation     Patient summary reviewed and Nursing notes reviewed   history of anesthetic complications: PONV  NPO Solid Status: > 8 hours             Airway   Mallampati: II  TM distance: >3 FB  Neck ROM: full  no difficulty expected  Dental - normal exam     Pulmonary - negative pulmonary ROS and normal exam   Cardiovascular - normal exam    (+) hypertension,       Neuro/Psych- negative ROS  GI/Hepatic/Renal/Endo - negative ROS     Musculoskeletal     (+) back pain,   Abdominal  - normal exam   Substance History - negative use     OB/GYN negative ob/gyn ROS         Other        ROS/Med Hx Other: Recent L spinal fusion                  Anesthesia Plan    ASA 2     general   (Severe ponv - background propofol     Po tylenol given pre op)  intravenous induction     Anesthetic plan, all risks, benefits, and alternatives have been provided, discussed and informed consent has been obtained with: patient.    Plan discussed with CRNA.

## 2020-08-12 NOTE — ANESTHESIA POSTPROCEDURE EVALUATION
Patient: Cheri Lopez    Procedure Summary     Date:  08/12/20 Room / Location:   [facility]    Anesthesia Start:  0841 Anesthesia Stop:  0937    Procedure:  Left KNEE ARTHROSCOPy, partial medial menisectomy and debridement of arthritis (Left Knee) Diagnosis:       Other tear of medial meniscus of left knee as current injury, initial encounter      (Other tear of medial meniscus of left knee as current injury, initial encounter [S83.242A])    Surgeon:  Génesis Mg MD Provider:  Schuyler Sommer MD    Anesthesia Type:  general ASA Status:  2          Anesthesia Type: general    Vitals  Vitals Value Taken Time   /97 8/12/2020 10:45 AM   Temp 36.3 °C (97.4 °F) 8/12/2020  9:33 AM   Pulse 73 8/12/2020 10:53 AM   Resp 16 8/12/2020 10:45 AM   SpO2 96 % 8/12/2020 10:54 AM   Vitals shown include unvalidated device data.        Post Anesthesia Care and Evaluation    Patient location during evaluation: bedside  Patient participation: complete - patient participated  Level of consciousness: awake and alert  Pain management: adequate  Airway patency: patent  Anesthetic complications: No anesthetic complications  PONV Status: controlled  Cardiovascular status: blood pressure returned to baseline and acceptable  Respiratory status: acceptable  Hydration status: acceptable

## 2020-08-21 ENCOUNTER — OFFICE VISIT (OUTPATIENT)
Dept: ORTHOPEDIC SURGERY | Facility: CLINIC | Age: 55
End: 2020-08-21

## 2020-08-21 VITALS — BODY MASS INDEX: 26.59 KG/M2 | WEIGHT: 150.1 LBS | HEIGHT: 63 IN | TEMPERATURE: 97.3 F

## 2020-08-21 DIAGNOSIS — Z98.890 S/P ARTHROSCOPY OF KNEE: Primary | ICD-10-CM

## 2020-08-21 PROCEDURE — 99024 POSTOP FOLLOW-UP VISIT: CPT | Performed by: ORTHOPAEDIC SURGERY

## 2020-08-21 NOTE — PROGRESS NOTES
Left Knee Scope follow Up 1st Visit      Patient: Cheri Lopez        YOB: 1965      Chief Complaints: Left knee pain      History of Present Illness: Pt is here f/u knee arthroscopy she states she doing great the pressure in her knee the pressure in the back of the knee all that is gone she gets a little bit of popping but other not doing great        Allergies:   Allergies   Allergen Reactions   • Sulfa Antibiotics Other (See Comments)     Elevated liver enzymes     LONG TERM EFFECT ,FOUND IN BLOOD WORK       Medications:   Home Medications:  Current Outpatient Medications on File Prior to Visit   Medication Sig   • acetaminophen (TYLENOL) 500 MG tablet Take 1,000 mg by mouth Every 6 (Six) Hours As Needed for Mild Pain .   • Ascorbic Acid (VITAMIN C PO) Take 1,000 mg by mouth Daily.   • B Complex Vitamins (VITAMIN B COMPLEX PO) Take 200 mcg by mouth Daily.   • baclofen (LIORESAL) 10 MG tablet Take 1 tablet by mouth 3 (Three) Times a Day As Needed for Muscle Spasms.   • Calcium Carb-Cholecalciferol (CALTRATE 600+D3 PO) Take 1 tablet by mouth Daily.   • clotrimazole-betamethasone (Lotrisone) 1-0.05 % cream Apply  topically to the appropriate area as directed 2 (Two) Times a Day.   • cycloSPORINE (RESTASIS) 0.05 % ophthalmic emulsion 1 drop 2 (Two) Times a Day.   • DHEA 10 MG capsule Take 1 capsule by mouth Daily.   • DULoxetine (Cymbalta) 30 MG capsule Take 1 capsule by mouth Daily.   • estradiol (VIVELLE-DOT) 0.0375 MG/24HR Place 1 patch on the skin 2 (Two) Times a Week.   • hydroCHLOROthiazide (MICROZIDE) 12.5 MG capsule Take 1 capsule by mouth Daily.   • HYDROcodone-acetaminophen (NORCO) 5-325 MG per tablet Take 1 tablet by mouth Every 4 (Four) Hours As Needed for Severe Pain .   • ibuprofen (ADVIL,MOTRIN) 800 MG tablet Take 800 mg by mouth 2 (two) times a day.   • losartan (Cozaar) 50 MG tablet Take 1 tablet by mouth Daily.   • Magnesium-Potassium (CHANDRAKANT MAGNESIUM-POTASSIUM) 250-100 MG tablet  Take 1 tablet by mouth Daily.   • Multiple Vitamins-Minerals (PRESERVISION AREDS PO) Take 1 capsule by mouth 2 (two) times a day.   • Omega-3 Fatty Acids (FISH OIL PO) Take 1 capsule by mouth 2 (two) times a day.   • ondansetron (Zofran) 4 MG tablet Take 1 tablet by mouth Every 8 (Eight) Hours As Needed for Nausea or Vomiting.   • progesterone (PROMETRIUM) 100 MG capsule Take 100 mg by mouth Daily.   • SYNTHROID 88 MCG tablet Take 1 tablet by mouth Daily.     No current facility-administered medications on file prior to visit.      Current Medications:  Scheduled Meds:  Continuous Infusions:  No current facility-administered medications for this visit.   PRN Meds:.          Physical Exam: 55 y.o. female  General Appearance:    Alert, cooperative, in no acute distress                 There were no vitals filed for this visit.   Patient is alert and oriented ×3 no acute distress normal mood physical exam.  Physical exam of the knee, incisions looked good there is no erythema, calf is soft and non-tender.  No sign or sx of DVT      Assessment  S/P knee scope.  I did review intraoperative findings and arthroscopic pictures with the patient.          Plan: To remove sutures today place Steri-Strips ay and I will have thrm follow up in 4 weeks.  We will not start her into formal physical therapy as she has a lot of limitations from her back surgery she is well versed in what to do we did review things that she could and could not do I will have her make an appointment for 4 weeks but if she is doing well at that point she may call and cancel                Answers for HPI/ROS submitted by the patient on 8/19/2020   What is the primary reason for your visit?: Other  Please describe your symptoms.: Get my stitches out.  Have you had these symptoms before?: No  How long have you been having these symptoms?: 1-2 weeks  Please list any medications you are currently taking for this condition.: acetaminaphen

## 2020-08-30 RX ORDER — HYDROCHLOROTHIAZIDE 12.5 MG/1
CAPSULE, GELATIN COATED ORAL
Qty: 90 CAPSULE | Refills: 1 | Status: SHIPPED | OUTPATIENT
Start: 2020-08-30 | End: 2021-03-14

## 2020-09-08 ENCOUNTER — TELEMEDICINE (OUTPATIENT)
Dept: INTERNAL MEDICINE | Facility: CLINIC | Age: 55
End: 2020-09-08

## 2020-09-08 DIAGNOSIS — F32.A DEPRESSION, UNSPECIFIED DEPRESSION TYPE: Primary | ICD-10-CM

## 2020-09-08 DIAGNOSIS — G89.29 CHRONIC RIGHT-SIDED LOW BACK PAIN WITH RIGHT-SIDED SCIATICA: ICD-10-CM

## 2020-09-08 DIAGNOSIS — K59.09 CHRONIC CONSTIPATION: ICD-10-CM

## 2020-09-08 DIAGNOSIS — M54.41 CHRONIC RIGHT-SIDED LOW BACK PAIN WITH RIGHT-SIDED SCIATICA: ICD-10-CM

## 2020-09-08 PROCEDURE — 99214 OFFICE O/P EST MOD 30 MIN: CPT | Performed by: INTERNAL MEDICINE

## 2020-09-08 RX ORDER — DULOXETIN HYDROCHLORIDE 60 MG/1
60 CAPSULE, DELAYED RELEASE ORAL DAILY
Qty: 90 CAPSULE | Refills: 2 | Status: SHIPPED | OUTPATIENT
Start: 2020-09-08 | End: 2020-09-23 | Stop reason: DRUGHIGH

## 2020-09-08 NOTE — PROGRESS NOTES
Chief Complaint   Patient presents with   • Back Pain   • Depression   • Constipation       History of Present Illness   Cheri Lopez is a 55 y.o. female presents for follow up evaluation. She is s/p L knee medial mesical repair from last visit. She reports that her knee pain has improved substantially. She is s/p spinal surgery earlier this year. Unfortunately her discomfort has only improved about 5-10% after surgery. She has increased pain at surgical site. She is using cane v crutches to ambulate.   She started cymbalta at the last visit. She likes it very well. She notes some increased constipation after starting the medication. She is using kumbacha and a high fiber diet for this. She has not recently required miralax but she does have a bm when she needs this.     The following portions of the patient's history were reviewed and updated as appropriate: allergies, current medications, past family history, past medical history, past social history, past surgical history and problem list.  Current Outpatient Medications on File Prior to Visit   Medication Sig Dispense Refill   • acetaminophen (TYLENOL) 500 MG tablet Take 1,000 mg by mouth Every 6 (Six) Hours As Needed for Mild Pain .     • Ascorbic Acid (VITAMIN C PO) Take 1,000 mg by mouth Daily.     • B Complex Vitamins (VITAMIN B COMPLEX PO) Take 200 mcg by mouth Daily.     • baclofen (LIORESAL) 10 MG tablet Take 1 tablet by mouth 3 (Three) Times a Day As Needed for Muscle Spasms. 30 tablet 1   • Calcium Carb-Cholecalciferol (CALTRATE 600+D3 PO) Take 1 tablet by mouth Daily.     • clotrimazole-betamethasone (Lotrisone) 1-0.05 % cream Apply  topically to the appropriate area as directed 2 (Two) Times a Day. 15 g 1   • cycloSPORINE (RESTASIS) 0.05 % ophthalmic emulsion 1 drop 2 (Two) Times a Day.     • DHEA 10 MG capsule Take 1 capsule by mouth Daily.     • estradiol (VIVELLE-DOT) 0.0375 MG/24HR Place 1 patch on the skin 2 (Two) Times a Week.     •  hydroCHLOROthiazide (MICROZIDE) 12.5 MG capsule TAKE ONE CAPSULE BY MOUTH DAILY 90 capsule 1   • HYDROcodone-acetaminophen (NORCO) 5-325 MG per tablet Take 1 tablet by mouth Every 4 (Four) Hours As Needed for Severe Pain . 30 tablet 0   • ibuprofen (ADVIL,MOTRIN) 800 MG tablet Take 800 mg by mouth 2 (two) times a day.     • losartan (Cozaar) 50 MG tablet Take 1 tablet by mouth Daily. 90 tablet 3   • Magnesium-Potassium (CHANDRAKANT MAGNESIUM-POTASSIUM) 250-100 MG tablet Take 1 tablet by mouth Daily.     • Multiple Vitamins-Minerals (PRESERVISION AREDS PO) Take 1 capsule by mouth 2 (two) times a day.     • Omega-3 Fatty Acids (FISH OIL PO) Take 1 capsule by mouth 2 (two) times a day.     • ondansetron (Zofran) 4 MG tablet Take 1 tablet by mouth Every 8 (Eight) Hours As Needed for Nausea or Vomiting. 30 tablet 0   • progesterone (PROMETRIUM) 100 MG capsule Take 100 mg by mouth Daily.     • SYNTHROID 88 MCG tablet Take 1 tablet by mouth Daily. 90 tablet 2   • [DISCONTINUED] DULoxetine (Cymbalta) 30 MG capsule Take 1 capsule by mouth Daily. 90 capsule 3     No current facility-administered medications on file prior to visit.      Review of Systems   Constitutional: Negative.    HENT: Negative.    Eyes: Negative.    Respiratory: Negative.    Cardiovascular: Negative.    Gastrointestinal: Positive for constipation.   Endocrine: Negative.    Genitourinary: Negative.    Musculoskeletal: Positive for back pain.   Allergic/Immunologic: Negative.    Neurological: Negative.    Hematological: Negative.    Psychiatric/Behavioral: The patient is nervous/anxious.         Occasional depression       Objective   Physical Exam   Constitutional: She is oriented to person, place, and time. She appears well-developed and well-nourished.   HENT:   Head: Normocephalic and atraumatic.   Eyes: EOM are normal.   Neck: Normal range of motion.   Pulmonary/Chest: Effort normal.   Musculoskeletal: Normal range of motion.   Neurological: She is alert  and oriented to person, place, and time.   Psychiatric: She has a normal mood and affect. Her behavior is normal. Judgment and thought content normal.        There were no vitals taken for this visit.    Assessment/Plan   Diagnoses and all orders for this visit:    Depression, unspecified depression type    Chronic right-sided low back pain with right-sided sciatica    Chronic constipation    Other orders  -     DULoxetine (CYMBALTA) 60 MG capsule; Take 1 capsule by mouth Daily.      Patient w. Depression and low back pain. She will try increasing cymbalta to 60 mg daily. She is encouraged to try water activity and offered formal aquatic therapy for this as well. She has chronic constipation and may try amitiza for this if needed.she will continue to hydrate well w water and maintain a consistent fiber intake. She will f/u w/ her spinal specialist as scheduled.

## 2020-09-09 RX ORDER — LEVOTHYROXINE SODIUM 88 MCG
TABLET ORAL
Qty: 90 TABLET | Refills: 1 | Status: SHIPPED | OUTPATIENT
Start: 2020-09-09 | End: 2021-03-14

## 2020-09-22 ENCOUNTER — TELEPHONE (OUTPATIENT)
Dept: INTERNAL MEDICINE | Facility: CLINIC | Age: 55
End: 2020-09-22

## 2020-09-22 NOTE — TELEPHONE ENCOUNTER
Patient called and stated that  Dr. Gonzalez wanted to know how the recent medication change of cymbalta made her feel. Patient states that it has made her constipated.

## 2020-09-23 RX ORDER — DULOXETIN HYDROCHLORIDE 30 MG/1
30 CAPSULE, DELAYED RELEASE ORAL DAILY
Qty: 30 CAPSULE | Refills: 2 | Status: SHIPPED | OUTPATIENT
Start: 2020-09-23 | End: 2021-01-26

## 2020-09-23 RX ORDER — DULOXETIN HYDROCHLORIDE 30 MG/1
30 CAPSULE, DELAYED RELEASE ORAL DAILY
Qty: 30 CAPSULE | Refills: 2 | Status: SHIPPED | OUTPATIENT
Start: 2020-09-23 | End: 2020-09-23 | Stop reason: SDUPTHER

## 2020-10-13 ENCOUNTER — TELEPHONE (OUTPATIENT)
Dept: INTERNAL MEDICINE | Facility: CLINIC | Age: 55
End: 2020-10-13

## 2020-10-13 NOTE — TELEPHONE ENCOUNTER
PATIENT WAS HUNTING WITH FRIEND WHO BEGAN FEELING ILL ON Saturday AND FRIEND HAS SINCE TESTED POSITIVE     PLEASE ADVISE ON TESTING, QUARANTINING, ETC    [phone]

## 2020-10-13 NOTE — TELEPHONE ENCOUNTER
Pt informed that she needs to quarantine and get tested tomorrow. Pt's voice understanding. Pt has no sx at this time.

## 2021-01-06 NOTE — PROGRESS NOTES
I spoke with her by telephone today and she had some additional questions about the underlying diagnosis of degenerative disk disease with referred pain. I think she understood this better. I do not think this is a hip problem and she has already had hip surgery which really did not help her. I recommended again that she work with Dr. Stein, whom she has an appointment with, to try and get medial branch blocks, SI blocks, and possible ablation, and if that does not work, to consider a spinal cord stimulator trial. I have a followup visit with her in 12/2018.       Soak infected finger(s) in a warm soak of epsom salt OR hydrogen peroxide and warm water 20-30 minutes at a time 3-4 times a day

## 2021-01-14 DIAGNOSIS — E03.9 ADULT HYPOTHYROIDISM: Primary | ICD-10-CM

## 2021-01-14 DIAGNOSIS — Z00.00 HEALTHCARE MAINTENANCE: ICD-10-CM

## 2021-01-19 LAB
ALBUMIN SERPL-MCNC: 4.7 G/DL (ref 3.5–5.2)
ALBUMIN/GLOB SERPL: 1.9 G/DL
ALP SERPL-CCNC: 96 U/L (ref 39–117)
ALT SERPL-CCNC: 35 U/L (ref 1–33)
APPEARANCE UR: CLEAR
AST SERPL-CCNC: 40 U/L (ref 1–32)
BACTERIA #/AREA URNS HPF: NORMAL /HPF
BASOPHILS # BLD AUTO: 0.05 10*3/MM3 (ref 0–0.2)
BASOPHILS NFR BLD AUTO: 1 % (ref 0–1.5)
BILIRUB SERPL-MCNC: 0.4 MG/DL (ref 0–1.2)
BILIRUB UR QL STRIP: NEGATIVE
BUN SERPL-MCNC: 11 MG/DL (ref 6–20)
BUN/CREAT SERPL: 15.3 (ref 7–25)
CALCIUM SERPL-MCNC: 9.7 MG/DL (ref 8.6–10.5)
CASTS URNS MICRO: NORMAL
CHLORIDE SERPL-SCNC: 97 MMOL/L (ref 98–107)
CHOLEST SERPL-MCNC: 209 MG/DL (ref 0–200)
CO2 SERPL-SCNC: 27.6 MMOL/L (ref 22–29)
COLOR UR: YELLOW
CREAT SERPL-MCNC: 0.72 MG/DL (ref 0.57–1)
EOSINOPHIL # BLD AUTO: 0.11 10*3/MM3 (ref 0–0.4)
EOSINOPHIL NFR BLD AUTO: 2.2 % (ref 0.3–6.2)
EPI CELLS #/AREA URNS HPF: NORMAL /HPF
ERYTHROCYTE [DISTWIDTH] IN BLOOD BY AUTOMATED COUNT: 13 % (ref 12.3–15.4)
GLOBULIN SER CALC-MCNC: 2.5 GM/DL
GLUCOSE SERPL-MCNC: 78 MG/DL (ref 65–99)
GLUCOSE UR QL: NEGATIVE
HCT VFR BLD AUTO: 42.9 % (ref 34–46.6)
HDLC SERPL-MCNC: 104 MG/DL (ref 40–60)
HGB BLD-MCNC: 14.3 G/DL (ref 12–15.9)
HGB UR QL STRIP: NEGATIVE
IMM GRANULOCYTES # BLD AUTO: 0.02 10*3/MM3 (ref 0–0.05)
IMM GRANULOCYTES NFR BLD AUTO: 0.4 % (ref 0–0.5)
KETONES UR QL STRIP: NEGATIVE
LDLC SERPL CALC-MCNC: 97 MG/DL (ref 0–100)
LEUKOCYTE ESTERASE UR QL STRIP: NEGATIVE
LYMPHOCYTES # BLD AUTO: 1.76 10*3/MM3 (ref 0.7–3.1)
LYMPHOCYTES NFR BLD AUTO: 35.4 % (ref 19.6–45.3)
MCH RBC QN AUTO: 33.1 PG (ref 26.6–33)
MCHC RBC AUTO-ENTMCNC: 33.3 G/DL (ref 31.5–35.7)
MCV RBC AUTO: 99.3 FL (ref 79–97)
MONOCYTES # BLD AUTO: 0.41 10*3/MM3 (ref 0.1–0.9)
MONOCYTES NFR BLD AUTO: 8.2 % (ref 5–12)
NEUTROPHILS # BLD AUTO: 2.62 10*3/MM3 (ref 1.7–7)
NEUTROPHILS NFR BLD AUTO: 52.8 % (ref 42.7–76)
NITRITE UR QL STRIP: NEGATIVE
NRBC BLD AUTO-RTO: 0 /100 WBC (ref 0–0.2)
PH UR STRIP: 7.5 [PH] (ref 5–8)
PLATELET # BLD AUTO: 185 10*3/MM3 (ref 140–450)
POTASSIUM SERPL-SCNC: 4.1 MMOL/L (ref 3.5–5.2)
PROT SERPL-MCNC: 7.2 G/DL (ref 6–8.5)
PROT UR QL STRIP: NEGATIVE
RBC # BLD AUTO: 4.32 10*6/MM3 (ref 3.77–5.28)
RBC #/AREA URNS HPF: NORMAL /HPF
SODIUM SERPL-SCNC: 136 MMOL/L (ref 136–145)
SP GR UR: 1.01 (ref 1–1.03)
TRIGL SERPL-MCNC: 45 MG/DL (ref 0–150)
TSH SERPL DL<=0.005 MIU/L-ACNC: 2.64 UIU/ML (ref 0.27–4.2)
UROBILINOGEN UR STRIP-MCNC: NORMAL MG/DL
VLDLC SERPL CALC-MCNC: 8 MG/DL (ref 5–40)
WBC # BLD AUTO: 4.97 10*3/MM3 (ref 3.4–10.8)
WBC #/AREA URNS HPF: NORMAL /HPF

## 2021-01-26 ENCOUNTER — OFFICE VISIT (OUTPATIENT)
Dept: INTERNAL MEDICINE | Facility: CLINIC | Age: 56
End: 2021-01-26

## 2021-01-26 VITALS
HEIGHT: 63 IN | BODY MASS INDEX: 29.77 KG/M2 | DIASTOLIC BLOOD PRESSURE: 88 MMHG | SYSTOLIC BLOOD PRESSURE: 162 MMHG | HEART RATE: 74 BPM | WEIGHT: 168 LBS

## 2021-01-26 DIAGNOSIS — K59.09 CHRONIC CONSTIPATION: ICD-10-CM

## 2021-01-26 DIAGNOSIS — M54.41 CHRONIC RIGHT-SIDED LOW BACK PAIN WITH RIGHT-SIDED SCIATICA: ICD-10-CM

## 2021-01-26 DIAGNOSIS — I10 HYPERTENSION, UNSPECIFIED TYPE: ICD-10-CM

## 2021-01-26 DIAGNOSIS — Z00.00 HEALTHCARE MAINTENANCE: Primary | ICD-10-CM

## 2021-01-26 DIAGNOSIS — E03.9 ADULT HYPOTHYROIDISM: ICD-10-CM

## 2021-01-26 DIAGNOSIS — G89.29 CHRONIC RIGHT-SIDED LOW BACK PAIN WITH RIGHT-SIDED SCIATICA: ICD-10-CM

## 2021-01-26 PROCEDURE — 99396 PREV VISIT EST AGE 40-64: CPT | Performed by: INTERNAL MEDICINE

## 2021-01-26 PROCEDURE — 99214 OFFICE O/P EST MOD 30 MIN: CPT | Performed by: INTERNAL MEDICINE

## 2021-01-26 RX ORDER — CYCLOBENZAPRINE HCL 10 MG
TABLET ORAL
COMMUNITY
Start: 2020-12-14 | End: 2021-01-26

## 2021-01-26 RX ORDER — AMITRIPTYLINE HYDROCHLORIDE 25 MG/1
25 TABLET, FILM COATED ORAL NIGHTLY
Qty: 30 TABLET | Refills: 5 | Status: SHIPPED | OUTPATIENT
Start: 2021-01-26 | End: 2022-03-07

## 2021-01-26 RX ORDER — BACLOFEN 10 MG/1
10 TABLET ORAL 3 TIMES DAILY PRN
Qty: 30 TABLET | Refills: 2 | Status: SHIPPED | OUTPATIENT
Start: 2021-01-26 | End: 2022-02-17

## 2021-01-26 RX ORDER — LOSARTAN POTASSIUM 100 MG/1
100 TABLET ORAL DAILY
Qty: 90 TABLET | Refills: 1 | Status: SHIPPED | OUTPATIENT
Start: 2021-01-26 | End: 2021-08-05

## 2021-01-26 RX ORDER — MULTIVIT-MIN/IRON/FOLIC ACID/K 18-600-40
1000 CAPSULE ORAL DAILY
COMMUNITY
End: 2022-05-27

## 2021-01-26 NOTE — PROGRESS NOTES
Subjective   CPE  Back pain  Depression  Hypothyroidism  ibs  Insomnia      Cheri Lopez is a 55 y.o. female who presents for a complete physical exam and to review chronic issues. She struggles with continued back pain. She has had conservative and intervention w/ surgery and pain persists. She has constipation predominant ibs. Taking magnesium w/ prn miralax. Has hypothyroidism. tsh is at goal level. Reviewed cbc, cmp, lipid, tsh, and urine test results w/ patient.         Review of Systems   Constitutional: Positive for fatigue.   HENT: Negative.    Eyes: Negative.    Respiratory: Negative.    Cardiovascular: Negative.    Gastrointestinal: Positive for constipation.   Endocrine: Negative.    Genitourinary: Negative.    Musculoskeletal: Positive for arthralgias and back pain.   Skin: Negative.    Allergic/Immunologic: Negative.    Neurological:        Back pain     Hematological: Negative.    Psychiatric/Behavioral:        Depressed mood         The following portions of the patient's history were reviewed and updated as appropriate: allergies, current medications, past family history, past medical history, past social history, past surgical history and problem list.     Patient Active Problem List   Diagnosis   • Chronic constipation   • Blues   • Adult hypothyroidism   • Encounter for screening for malignant neoplasm of colon   • Acute right-sided low back pain with right-sided sciatica   • DDD (degenerative disc disease), lumbar   • Chronic right-sided low back pain with right-sided sciatica   • Right hip pain   • Sacroiliac joint dysfunction   • Allergic rhinitis due to allergen   • Scoliosis due to degenerative disease of spine in adult patient   • Low back pain   • Neural foraminal stenosis of lumbar spine   • Tear of medial meniscus of left knee, current       Past Medical History:   Diagnosis Date   • Arthritis    • Depression    • GERD (gastroesophageal reflux disease)    • H/O colonoscopy    • Hip  pain    • Hypertension    • Hypothyroidism    • IBS (irritable bowel syndrome)    • Knee pain    • Low back pain    • Pneumonia 10/2011   • PONV (postoperative nausea and vomiting)        Past Surgical History:   Procedure Laterality Date   • BREAST AUGMENTATION  2007   • CARPAL TUNNEL RELEASE WITH CUBITAL TUNNEL RELEASE Right    • COLONOSCOPY N/A 2018    Procedure: COLONOSCOPY to cecum and t.i. with polypectomy;  Surgeon: Rima Luis MD;  Location: [facility] ENDOSCOPY;  Service:    • HIP DEBRIDEMENT Right    • KNEE ARTHROSCOPY Left 2020    Procedure: Left KNEE ARTHROSCOPy, partial medial menisectomy and debridement of arthritis;  Surgeon: Génesis Mg MD;  Location: [facility] OR [facility];  Service: Orthopedics;  Laterality: Left;   • KNEE CARTILAGE SURGERY Right    • SINUS SURGERY     • SKIN BIOPSY      hand - precancerous   • SKIN BIOPSY     • SPINAL FUSION      L4 &L5       Family History   Problem Relation Age of Onset   • Osteoarthritis Mother    • Heart disease Mother    • No Known Problems Brother    • Breast cancer Maternal Aunt    • Cancer Paternal Grandmother    • Malig Hyperthermia Neg Hx        Social History     Socioeconomic History   • Marital status:      Spouse name: Not on file   • Number of children: 0   • Years of education: ms   • Highest education level: Not on file   Occupational History   • Occupation: secratary   Tobacco Use   • Smoking status: Former Smoker     Packs/day: 0.25     Years: 5.00     Pack years: 1.25     Quit date:      Years since quittin.0   • Smokeless tobacco: Never Used   Substance and Sexual Activity   • Alcohol use: Yes     Alcohol/week: 14.0 standard drinks     Types: 14 Glasses of wine per week   • Drug use: Never       Current Outpatient Medications on File Prior to Visit   Medication Sig Dispense Refill   • Ascorbic Acid (VITAMIN C PO) Take 1,000 mg by mouth Daily.     • B Complex Vitamins (VITAMIN B COMPLEX PO) Take 200 mcg by mouth  Daily.     • cycloSPORINE (RESTASIS) 0.05 % ophthalmic emulsion 1 drop 2 (Two) Times a Day.     • DHEA 10 MG capsule Take 1 capsule by mouth Daily.     • estradiol (VIVELLE-DOT) 0.0375 MG/24HR Place 1 patch on the skin 2 (Two) Times a Week.     • hydroCHLOROthiazide (MICROZIDE) 12.5 MG capsule TAKE ONE CAPSULE BY MOUTH DAILY 90 capsule 1   • Magnesium-Potassium (CHANDRAKANT MAGNESIUM-POTASSIUM) 250-100 MG tablet Take 1 tablet by mouth Daily.     • Multiple Vitamins-Minerals (PRESERVISION AREDS PO) Take 1 capsule by mouth 2 (two) times a day.     • Omega-3 Fatty Acids (FISH OIL PO) Take 1 capsule by mouth 2 (two) times a day.     • progesterone (PROMETRIUM) 100 MG capsule Take 100 mg by mouth Daily.     • SYNTHROID 88 MCG tablet TAKE ONE TABLET BY MOUTH DAILY 90 tablet 1   • Vitamin D, Cholecalciferol, 25 MCG (1000 UT) tablet Take 1,000 Units by mouth Daily.     • [DISCONTINUED] cyclobenzaprine (FLEXERIL) 10 MG tablet      • [DISCONTINUED] losartan (Cozaar) 50 MG tablet Take 1 tablet by mouth Daily. 90 tablet 3   • [DISCONTINUED] acetaminophen (TYLENOL) 500 MG tablet Take 1,000 mg by mouth Every 6 (Six) Hours As Needed for Mild Pain .     • [DISCONTINUED] baclofen (LIORESAL) 10 MG tablet Take 1 tablet by mouth 3 (Three) Times a Day As Needed for Muscle Spasms. 30 tablet 1   • [DISCONTINUED] Calcium Carb-Cholecalciferol (CALTRATE 600+D3 PO) Take 1 tablet by mouth Daily.     • [DISCONTINUED] clotrimazole-betamethasone (Lotrisone) 1-0.05 % cream Apply  topically to the appropriate area as directed 2 (Two) Times a Day. 15 g 1   • [DISCONTINUED] DULoxetine (Cymbalta) 30 MG capsule Take 1 capsule by mouth Daily for 90 days. 30 capsule 2   • [DISCONTINUED] HYDROcodone-acetaminophen (NORCO) 5-325 MG per tablet Take 1 tablet by mouth Every 4 (Four) Hours As Needed for Severe Pain . 30 tablet 0   • [DISCONTINUED] ibuprofen (ADVIL,MOTRIN) 800 MG tablet Take 800 mg by mouth 2 (two) times a day.     • [DISCONTINUED] ondansetron  "(Zofran) 4 MG tablet Take 1 tablet by mouth Every 8 (Eight) Hours As Needed for Nausea or Vomiting. 30 tablet 0     No current facility-administered medications on file prior to visit.        Allergies   Allergen Reactions   • Sulfa Antibiotics Other (See Comments)     Elevated liver enzymes     LONG TERM EFFECT ,FOUND IN BLOOD WORK       Immunization History   Administered Date(s) Administered   • Flu Vaccine Quad PF >18YRS 11/01/2019   • Hepatitis A 01/01/2008, 06/01/2008   • Tdap 01/01/2017       Objective     /88   Pulse 74   Ht 160 cm (63\")   Wt 76.2 kg (168 lb)   BMI 29.76 kg/m²     Physical Exam  Vitals signs and nursing note reviewed.   Constitutional:       Appearance: Normal appearance.   HENT:      Head: Normocephalic and atraumatic.      Right Ear: Tympanic membrane normal.      Left Ear: Tympanic membrane normal.      Nose: Nose normal.      Mouth/Throat:      Mouth: Mucous membranes are moist.   Eyes:      Extraocular Movements: Extraocular movements intact.      Pupils: Pupils are equal, round, and reactive to light.   Neck:      Musculoskeletal: Normal range of motion.   Cardiovascular:      Rate and Rhythm: Normal rate and regular rhythm.      Pulses: Normal pulses.      Heart sounds: Normal heart sounds.   Pulmonary:      Effort: Pulmonary effort is normal.      Breath sounds: Normal breath sounds.   Abdominal:      General: Abdomen is flat. Bowel sounds are normal.      Palpations: Abdomen is soft.   Musculoskeletal: Normal range of motion.   Skin:     General: Skin is warm and dry.   Neurological:      General: No focal deficit present.      Mental Status: She is alert and oriented to person, place, and time.   Psychiatric:         Behavior: Behavior normal.         Thought Content: Thought content normal.         Judgment: Judgment normal.         Assessment/Plan   Diagnoses and all orders for this visit:    1. Healthcare maintenance (Primary)    2. Chronic right-sided low back pain " with right-sided sciatica    3. Chronic constipation    4. Adult hypothyroidism    5. Hypertension, unspecified type    Other orders  -     amitriptyline (ELAVIL) 25 MG tablet; Take 1 tablet by mouth Every Night.  Dispense: 30 tablet; Refill: 5  -     baclofen (LIORESAL) 10 MG tablet; Take 1 tablet by mouth 3 (Three) Times a Day As Needed for Muscle Spasms.  Dispense: 30 tablet; Refill: 2  -     losartan (Cozaar) 100 MG tablet; Take 1 tablet by mouth Daily.  Dispense: 90 tablet; Refill: 1        Discussion    Patient presents today for a CPE.  Patient follows a healthy diet.   Patient follows an adequate exercise regimen. Mammogram is up to date.   Colon cancer screening is up to date.   Pap smears are performed by the patient's gynecologist.   Immunizations are up to date.    Patient has continued daily back pain that is prohibitive of sleep at night. She will try amitriptyline hs. She requests refill baclofen and this is given for prn usage w/ back spasm. She is aware of risks and benefits of meds. bp is elevated. Increased losartan to 100 mg. She has chronic constipation. She will try a regiment of miralax such as 1/2 cap 3 times weekly in order to establish a more consistent bowel habit. She can increase if needed. She will try using walking sticks for ambulation and continue to work w/ her physical therapist and neurosurgeon. She is advised to meet w/ a therapist for mental health given depressed mood related to chronic pain. She declines med for this and contracted for safety. She will f/u in this office in 6 mo or prn.            Future Appointments   Date Time Provider Department Center   7/21/2021  8:30 AM [facility]   7/27/2021  2:45 PM Felicia Gonzalez MD [facility]

## 2021-03-14 RX ORDER — HYDROCHLOROTHIAZIDE 12.5 MG/1
CAPSULE, GELATIN COATED ORAL
Qty: 90 CAPSULE | Refills: 0 | Status: SHIPPED | OUTPATIENT
Start: 2021-03-14 | End: 2021-06-11

## 2021-03-14 RX ORDER — LEVOTHYROXINE SODIUM 88 MCG
TABLET ORAL
Qty: 90 TABLET | Refills: 0 | Status: SHIPPED | OUTPATIENT
Start: 2021-03-14 | End: 2021-06-11

## 2021-03-26 ENCOUNTER — BULK ORDERING (OUTPATIENT)
Dept: CASE MANAGEMENT | Facility: OTHER | Age: 56
End: 2021-03-26

## 2021-03-26 DIAGNOSIS — Z23 IMMUNIZATION DUE: ICD-10-CM

## 2021-04-22 ENCOUNTER — OFFICE VISIT (OUTPATIENT)
Dept: ORTHOPEDIC SURGERY | Facility: CLINIC | Age: 56
End: 2021-04-22

## 2021-04-22 VITALS — WEIGHT: 163 LBS | BODY MASS INDEX: 28.88 KG/M2 | HEIGHT: 63 IN | TEMPERATURE: 96.4 F

## 2021-04-22 DIAGNOSIS — Z98.890 S/P ARTHROSCOPY OF KNEE: Primary | ICD-10-CM

## 2021-04-22 PROCEDURE — 99212 OFFICE O/P EST SF 10 MIN: CPT | Performed by: ORTHOPAEDIC SURGERY

## 2021-04-22 RX ORDER — NAPROXEN 500 MG/1
500 TABLET ORAL 2 TIMES DAILY
Qty: 60 TABLET | Refills: 0 | Status: SHIPPED | OUTPATIENT
Start: 2021-04-22 | End: 2022-03-07

## 2021-04-22 NOTE — PROGRESS NOTES
Left Knee Scope follow Up       Patient: Cheri Lopez        YOB: 1965      Chief Complaints: Left knee pain      History of Present Illness: Pt is here f/u knee arthroscopy she states she is doing well states her back seems to be tolerating the change in gait little bit of some posterior pain in the popliteal fossa more may be due to hamstring the dedicated calf pain        Allergies:   Allergies   Allergen Reactions   • Sulfa Antibiotics Other (See Comments)     Elevated liver enzymes     LONG TERM EFFECT ,FOUND IN BLOOD WORK       Medications:   Home Medications:  Current Outpatient Medications on File Prior to Visit   Medication Sig   • amitriptyline (ELAVIL) 25 MG tablet Take 1 tablet by mouth Every Night.   • Ascorbic Acid (VITAMIN C PO) Take 1,000 mg by mouth Daily.   • B Complex Vitamins (VITAMIN B COMPLEX PO) Take 200 mcg by mouth Daily.   • baclofen (LIORESAL) 10 MG tablet Take 1 tablet by mouth 3 (Three) Times a Day As Needed for Muscle Spasms.   • cycloSPORINE (RESTASIS) 0.05 % ophthalmic emulsion 1 drop 2 (Two) Times a Day.   • DHEA 10 MG capsule Take 1 capsule by mouth Daily.   • estradiol (VIVELLE-DOT) 0.0375 MG/24HR Place 1 patch on the skin 2 (Two) Times a Week.   • hydroCHLOROthiazide (MICROZIDE) 12.5 MG capsule TAKE ONE CAPSULE BY MOUTH DAILY   • losartan (Cozaar) 100 MG tablet Take 1 tablet by mouth Daily.   • Magnesium-Potassium (CHANDRAKANT MAGNESIUM-POTASSIUM) 250-100 MG tablet Take 1 tablet by mouth Daily.   • Multiple Vitamins-Minerals (PRESERVISION AREDS PO) Take 1 capsule by mouth 2 (two) times a day.   • Omega-3 Fatty Acids (FISH OIL PO) Take 1 capsule by mouth 2 (two) times a day.   • progesterone (PROMETRIUM) 100 MG capsule Take 100 mg by mouth Daily.   • Synthroid 88 MCG tablet TAKE ONE TABLET BY MOUTH DAILY   • Vitamin D, Cholecalciferol, 25 MCG (1000 UT) tablet Take 1,000 Units by mouth Daily.     No current facility-administered medications on file prior to visit.      Current Medications:  Scheduled Meds:  Continuous Infusions:No current facility-administered medications for this visit.    PRN Meds:.          Physical Exam: 56 y.o. female  General Appearance:    Alert, cooperative, in no acute distress                 There were no vitals filed for this visit.   Patient is alert and oriented ×3 no acute distress normal mood physical exam.  Physical exam of the knee, incisions looked good there is no erythema, calf is soft and non-tender.  No sign or sx of DVT      Assessment  S/P knee scope.  Overall doing well.         Plan: Continue with strengthening, progression of activities I think she is doing fine she can progress her activities as her back allows as long as she is doing fine she can follow-up as needed

## 2021-06-11 RX ORDER — HYDROCHLOROTHIAZIDE 12.5 MG/1
CAPSULE, GELATIN COATED ORAL
Qty: 90 CAPSULE | Refills: 0 | Status: SHIPPED | OUTPATIENT
Start: 2021-06-11 | End: 2021-09-07

## 2021-06-11 RX ORDER — LEVOTHYROXINE SODIUM 88 MCG
TABLET ORAL
Qty: 90 TABLET | Refills: 0 | Status: SHIPPED | OUTPATIENT
Start: 2021-06-11 | End: 2022-05-27

## 2021-07-14 DIAGNOSIS — E03.9 ADULT HYPOTHYROIDISM: Primary | ICD-10-CM

## 2021-07-14 DIAGNOSIS — Z00.00 HEALTHCARE MAINTENANCE: ICD-10-CM

## 2021-07-27 ENCOUNTER — OFFICE VISIT (OUTPATIENT)
Dept: INTERNAL MEDICINE | Facility: CLINIC | Age: 56
End: 2021-07-27

## 2021-07-27 VITALS
HEART RATE: 64 BPM | BODY MASS INDEX: 28.35 KG/M2 | SYSTOLIC BLOOD PRESSURE: 136 MMHG | DIASTOLIC BLOOD PRESSURE: 72 MMHG | HEIGHT: 63 IN | WEIGHT: 160 LBS

## 2021-07-27 DIAGNOSIS — M51.36 DDD (DEGENERATIVE DISC DISEASE), LUMBAR: ICD-10-CM

## 2021-07-27 DIAGNOSIS — M25.551 RIGHT HIP PAIN: ICD-10-CM

## 2021-07-27 DIAGNOSIS — I10 ESSENTIAL HYPERTENSION: ICD-10-CM

## 2021-07-27 DIAGNOSIS — E03.9 ADULT HYPOTHYROIDISM: Primary | ICD-10-CM

## 2021-07-27 PROCEDURE — 99214 OFFICE O/P EST MOD 30 MIN: CPT | Performed by: INTERNAL MEDICINE

## 2021-07-27 NOTE — PROGRESS NOTES
"Chief Complaint   Patient presents with   • Hypothyroidism   • osteoarthritis     s/p hip repair       History of Present Illness   Cheri Lopez is a 56 y.o. female presents for follow up evaluation. She is doing \"ok\" today. She had a recent labral repair. It was noted that she had a surgical suture as well that was removed during the procedure. She has had improvement in pain after the procedure. Gradually increasing weight bearing and has completed 3 sessions with physical therapy. She will have 6 months total therapy.   She tried cymbalta recently for mood. \"my gut gurgled and my sleep was disrupted\". So she stopped the medication.   Has hypothyroidism. She went to a hormone related outpatient provider. She was switched from levothyroxine to armour thyroid. Had thyroid levels tested prior to switch and all levels wnl. She primarily went given decreased libido. Was also started on estradiol/ testosterone as well.           The following portions of the patient's history were reviewed and updated as appropriate: allergies, current medications, past family history, past medical history, past social history, past surgical history and problem list.  Current Outpatient Medications on File Prior to Visit   Medication Sig Dispense Refill   • Ascorbic Acid (VITAMIN C PO) Take 1,000 mg by mouth Daily.     • B Complex Vitamins (VITAMIN B COMPLEX PO) Take 200 mcg by mouth Daily.     • baclofen (LIORESAL) 10 MG tablet Take 1 tablet by mouth 3 (Three) Times a Day As Needed for Muscle Spasms. 30 tablet 2   • cycloSPORINE (RESTASIS) 0.05 % ophthalmic emulsion 1 drop 2 (Two) Times a Day.     • DHEA 10 MG capsule Take 1 capsule by mouth Daily.     • estradiol (VIVELLE-DOT) 0.0375 MG/24HR Place 1 patch on the skin 2 (Two) Times a Week.     • hydroCHLOROthiazide (MICROZIDE) 12.5 MG capsule TAKE ONE CAPSULE BY MOUTH DAILY 90 capsule 0   • losartan (Cozaar) 100 MG tablet Take 1 tablet by mouth Daily. 90 tablet 1   • " Magnesium-Potassium (CHANDRAKANT MAGNESIUM-POTASSIUM) 250-100 MG tablet Take 1 tablet by mouth Daily.     • Multiple Vitamins-Minerals (PRESERVISION AREDS PO) Take 1 capsule by mouth 2 (two) times a day.     • Omega-3 Fatty Acids (FISH OIL PO) Take 1 capsule by mouth 2 (two) times a day.     • progesterone (PROMETRIUM) 100 MG capsule Take 100 mg by mouth Daily.     • amitriptyline (ELAVIL) 25 MG tablet Take 1 tablet by mouth Every Night. 30 tablet 5   • naproxen (NAPROSYN) 500 MG tablet Take 1 tablet by mouth 2 (Two) Times a Day. 60 tablet 0   • Synthroid 88 MCG tablet TAKE ONE TABLET BY MOUTH DAILY 90 tablet 0   • Vitamin D, Cholecalciferol, 25 MCG (1000 UT) tablet Take 1,000 Units by mouth Daily.       No current facility-administered medications on file prior to visit.     Review of Systems   Constitutional: Negative.    HENT: Negative.    Eyes: Negative.    Respiratory: Negative.    Cardiovascular: Negative.    Gastrointestinal: Negative.    Endocrine: Negative.    Genitourinary: Negative.    Musculoskeletal: Negative.    Skin: Negative.    Allergic/Immunologic: Negative.    Neurological: Negative.    Hematological: Negative.    Psychiatric/Behavioral: Negative.        Objective   Physical Exam  Vitals and nursing note reviewed.   Constitutional:       Appearance: Normal appearance.   HENT:      Head: Normocephalic and atraumatic.      Right Ear: Tympanic membrane normal.      Left Ear: Tympanic membrane normal.      Nose: Nose normal.      Mouth/Throat:      Mouth: Mucous membranes are moist.   Eyes:      Extraocular Movements: Extraocular movements intact.      Pupils: Pupils are equal, round, and reactive to light.   Cardiovascular:      Rate and Rhythm: Normal rate and regular rhythm.      Pulses: Normal pulses.      Heart sounds: Normal heart sounds.   Pulmonary:      Effort: Pulmonary effort is normal.   Abdominal:      General: Abdomen is flat.      Palpations: Abdomen is soft.   Musculoskeletal:      Cervical  "back: Normal range of motion.   Skin:     General: Skin is warm and dry.   Neurological:      General: No focal deficit present.      Mental Status: She is alert and oriented to person, place, and time.   Psychiatric:         Mood and Affect: Mood normal.         Behavior: Behavior normal.         Thought Content: Thought content normal.         Judgment: Judgment normal.          /72   Pulse 64   Ht 160 cm (63\")   Wt 72.6 kg (160 lb)   BMI 28.34 kg/m²     Assessment/Plan   Diagnoses and all orders for this visit:    Adult hypothyroidism    Right hip pain    DDD (degenerative disc disease), lumbar    Essential hypertension    patient w/ hypothyroidism. She will continue supplementation either synthroid v armour. Discussed w/ normal levels would certainly do well on continued levothyroxine. She is managing pain well. Will continue physical therapy. Will f/u w/ ortho routinely. She will monitor bp. If needed can either increase hctz or add amlodipine. Discussed concerns of hypertension w/ HRT and will need to monitor closely. She will follow up in 6 months or prn.              "

## 2021-08-05 RX ORDER — LOSARTAN POTASSIUM 100 MG/1
TABLET ORAL
Qty: 90 TABLET | Refills: 1 | Status: SHIPPED | OUTPATIENT
Start: 2021-08-05 | End: 2022-02-10 | Stop reason: SDUPTHER

## 2021-09-07 RX ORDER — HYDROCHLOROTHIAZIDE 12.5 MG/1
CAPSULE, GELATIN COATED ORAL
Qty: 90 CAPSULE | Refills: 0 | Status: SHIPPED | OUTPATIENT
Start: 2021-09-07 | End: 2021-12-08

## 2021-12-07 ENCOUNTER — TELEPHONE (OUTPATIENT)
Dept: INTERNAL MEDICINE | Facility: CLINIC | Age: 56
End: 2021-12-07

## 2021-12-07 NOTE — TELEPHONE ENCOUNTER
Caller: Cheri Lopez    Relationship: Self    Best call back number: [phone]    What is the medical concern/diagnosis: BREAST REDUCTION    What specialty or service is being requested: SURGERY      Any additional details: PATIENT NEEDS ONE THAT WOULD BE IN NETWORK FOR HER INSURANCE

## 2021-12-08 RX ORDER — HYDROCHLOROTHIAZIDE 12.5 MG/1
CAPSULE, GELATIN COATED ORAL
Qty: 90 CAPSULE | Refills: 0 | Status: SHIPPED | OUTPATIENT
Start: 2021-12-08 | End: 2022-03-14

## 2021-12-10 DIAGNOSIS — G89.29 CHRONIC MIDLINE THORACIC BACK PAIN: ICD-10-CM

## 2021-12-10 DIAGNOSIS — N62 MACROMASTIA: Primary | ICD-10-CM

## 2021-12-10 DIAGNOSIS — M54.6 CHRONIC MIDLINE THORACIC BACK PAIN: ICD-10-CM

## 2022-02-10 RX ORDER — LOSARTAN POTASSIUM 100 MG/1
100 TABLET ORAL DAILY
Qty: 90 TABLET | Refills: 1 | Status: SHIPPED | OUTPATIENT
Start: 2022-02-10 | End: 2022-02-17 | Stop reason: SDUPTHER

## 2022-02-16 DIAGNOSIS — E03.9 ADULT HYPOTHYROIDISM: Primary | ICD-10-CM

## 2022-02-16 DIAGNOSIS — Z00.00 HEALTHCARE MAINTENANCE: ICD-10-CM

## 2022-02-17 RX ORDER — LOSARTAN POTASSIUM 100 MG/1
100 TABLET ORAL DAILY
Qty: 90 TABLET | Refills: 1 | Status: SHIPPED | OUTPATIENT
Start: 2022-02-17 | End: 2022-03-07

## 2022-02-17 RX ORDER — LOSARTAN POTASSIUM 100 MG/1
TABLET ORAL
Qty: 90 TABLET | Refills: 1 | Status: SHIPPED | OUTPATIENT
Start: 2022-02-17 | End: 2022-02-17 | Stop reason: SDUPTHER

## 2022-02-17 RX ORDER — BACLOFEN 10 MG/1
TABLET ORAL
Qty: 30 TABLET | Refills: 2 | Status: SHIPPED | OUTPATIENT
Start: 2022-02-17 | End: 2022-12-06 | Stop reason: SDUPTHER

## 2022-02-17 RX ORDER — LOSARTAN POTASSIUM 100 MG/1
100 TABLET ORAL DAILY
Qty: 90 TABLET | Refills: 1 | Status: SHIPPED | OUTPATIENT
Start: 2022-02-17 | End: 2022-02-17 | Stop reason: SDUPTHER

## 2022-02-23 LAB
ALBUMIN SERPL-MCNC: 4.6 G/DL (ref 3.8–4.9)
ALBUMIN/GLOB SERPL: 1.4 {RATIO} (ref 1.2–2.2)
ALP SERPL-CCNC: 135 IU/L (ref 44–121)
ALT SERPL-CCNC: 34 IU/L (ref 0–32)
APPEARANCE UR: CLEAR
AST SERPL-CCNC: 33 IU/L (ref 0–40)
BACTERIA #/AREA URNS HPF: NORMAL /[HPF]
BASOPHILS # BLD AUTO: 0.1 X10E3/UL (ref 0–0.2)
BASOPHILS NFR BLD AUTO: 1 %
BILIRUB SERPL-MCNC: 0.4 MG/DL (ref 0–1.2)
BILIRUB UR QL STRIP: NEGATIVE
BUN SERPL-MCNC: 11 MG/DL (ref 6–24)
BUN/CREAT SERPL: 13 (ref 9–23)
CALCIUM SERPL-MCNC: 9.9 MG/DL (ref 8.7–10.2)
CASTS URNS QL MICRO: NORMAL /LPF
CHLORIDE SERPL-SCNC: 97 MMOL/L (ref 96–106)
CHOLEST SERPL-MCNC: 225 MG/DL (ref 100–199)
CO2 SERPL-SCNC: 26 MMOL/L (ref 20–29)
COLOR UR: YELLOW
CREAT SERPL-MCNC: 0.82 MG/DL (ref 0.57–1)
EOSINOPHIL # BLD AUTO: 0.1 X10E3/UL (ref 0–0.4)
EOSINOPHIL NFR BLD AUTO: 2 %
EPI CELLS #/AREA URNS HPF: NORMAL /HPF (ref 0–10)
ERYTHROCYTE [DISTWIDTH] IN BLOOD BY AUTOMATED COUNT: 13 % (ref 11.7–15.4)
GLOBULIN SER CALC-MCNC: 3.2 G/DL (ref 1.5–4.5)
GLUCOSE SERPL-MCNC: 95 MG/DL (ref 65–99)
GLUCOSE UR QL STRIP: NEGATIVE
HCT VFR BLD AUTO: 42.3 % (ref 34–46.6)
HDLC SERPL-MCNC: 106 MG/DL
HGB BLD-MCNC: 14.4 G/DL (ref 11.1–15.9)
HGB UR QL STRIP: NEGATIVE
IMM GRANULOCYTES # BLD AUTO: 0 X10E3/UL (ref 0–0.1)
IMM GRANULOCYTES NFR BLD AUTO: 0 %
KETONES UR QL STRIP: NEGATIVE
LDLC SERPL CALC-MCNC: 112 MG/DL (ref 0–99)
LEUKOCYTE ESTERASE UR QL STRIP: ABNORMAL
LYMPHOCYTES # BLD AUTO: 2 X10E3/UL (ref 0.7–3.1)
LYMPHOCYTES NFR BLD AUTO: 27 %
MCH RBC QN AUTO: 32.2 PG (ref 26.6–33)
MCHC RBC AUTO-ENTMCNC: 34 G/DL (ref 31.5–35.7)
MCV RBC AUTO: 95 FL (ref 79–97)
MICRO URNS: ABNORMAL
MONOCYTES # BLD AUTO: 0.6 X10E3/UL (ref 0.1–0.9)
MONOCYTES NFR BLD AUTO: 8 %
NEUTROPHILS # BLD AUTO: 4.6 X10E3/UL (ref 1.4–7)
NEUTROPHILS NFR BLD AUTO: 62 %
NITRITE UR QL STRIP: NEGATIVE
PH UR STRIP: 7 [PH] (ref 5–7.5)
PLATELET # BLD AUTO: 240 X10E3/UL (ref 150–450)
POTASSIUM SERPL-SCNC: 4.3 MMOL/L (ref 3.5–5.2)
PROT SERPL-MCNC: 7.8 G/DL (ref 6–8.5)
PROT UR QL STRIP: NEGATIVE
RBC # BLD AUTO: 4.47 X10E6/UL (ref 3.77–5.28)
RBC #/AREA URNS HPF: NORMAL /HPF (ref 0–2)
SODIUM SERPL-SCNC: 136 MMOL/L (ref 134–144)
SP GR UR STRIP: 1.01 (ref 1–1.03)
TRIGL SERPL-MCNC: 38 MG/DL (ref 0–149)
TSH SERPL DL<=0.005 MIU/L-ACNC: 2.42 UIU/ML (ref 0.45–4.5)
UROBILINOGEN UR STRIP-MCNC: 0.2 MG/DL (ref 0.2–1)
VLDLC SERPL CALC-MCNC: 7 MG/DL (ref 5–40)
WBC # BLD AUTO: 7.4 X10E3/UL (ref 3.4–10.8)
WBC #/AREA URNS HPF: NORMAL /HPF (ref 0–5)

## 2022-03-01 ENCOUNTER — OFFICE VISIT (OUTPATIENT)
Dept: INTERNAL MEDICINE | Facility: CLINIC | Age: 57
End: 2022-03-01

## 2022-03-01 VITALS
HEART RATE: 69 BPM | WEIGHT: 163 LBS | BODY MASS INDEX: 28.88 KG/M2 | HEIGHT: 63 IN | SYSTOLIC BLOOD PRESSURE: 140 MMHG | DIASTOLIC BLOOD PRESSURE: 72 MMHG

## 2022-03-01 DIAGNOSIS — E03.9 ADULT HYPOTHYROIDISM: ICD-10-CM

## 2022-03-01 DIAGNOSIS — I10 PRIMARY HYPERTENSION: ICD-10-CM

## 2022-03-01 DIAGNOSIS — M51.36 DDD (DEGENERATIVE DISC DISEASE), LUMBAR: ICD-10-CM

## 2022-03-01 DIAGNOSIS — Z00.00 HEALTHCARE MAINTENANCE: Primary | ICD-10-CM

## 2022-03-01 PROCEDURE — 99396 PREV VISIT EST AGE 40-64: CPT | Performed by: INTERNAL MEDICINE

## 2022-03-01 RX ORDER — VITAMIN E 268 MG
400 CAPSULE ORAL DAILY
COMMUNITY

## 2022-03-01 NOTE — PROGRESS NOTES
Subjective   CPE  Back pain- lumbar DDD  Hypothyroidism        Cheri Lopez is a 56 y.o. female who presents for a complete physical exam. She reports that she is doing well today. She unfortunately, persists with back pain. This is present any time she walks. S/p fusion procedure and hip surgery. Had about a 15% benefitShe does note that when she had a nerve block for surgery she did have significant temporary benefit. She is going to pain management. Had an SI joint injection w/ modest benefit. She is to follow up there. Previously engaged in aquatic therapy but had continued pain throughout those sessions as well.   Has not vaccinated against COVID. She declines all vaccinations at this time.           Review of Systems   Constitutional: Negative.    HENT: Negative.    Eyes: Negative.    Respiratory: Negative.    Cardiovascular: Negative.    Gastrointestinal: Negative.    Endocrine: Negative.    Genitourinary: Negative.    Musculoskeletal: Negative.    Skin: Negative.    Allergic/Immunologic: Negative.    Neurological: Negative.    Hematological: Negative.    Psychiatric/Behavioral: Negative.        The following portions of the patient's history were reviewed and updated as appropriate: allergies, current medications, past family history, past medical history, past social history, past surgical history and problem list.     Patient Active Problem List   Diagnosis   • Chronic constipation   • Blues   • Adult hypothyroidism   • Encounter for screening for malignant neoplasm of colon   • Acute right-sided low back pain with right-sided sciatica   • DDD (degenerative disc disease), lumbar   • Chronic right-sided low back pain with right-sided sciatica   • Right hip pain   • Sacroiliac joint dysfunction   • Allergic rhinitis due to allergen   • Scoliosis due to degenerative disease of spine in adult patient   • Low back pain   • Neural foraminal stenosis of lumbar spine   • Tear of medial meniscus of left knee,  current       Past Medical History:   Diagnosis Date   • Arthritis    • Depression    • GERD (gastroesophageal reflux disease)    • H/O colonoscopy    • Hip pain    • Hypertension    • Hypothyroidism    • IBS (irritable bowel syndrome)    • Knee pain    • Low back pain    • Pneumonia 10/2011   • PONV (postoperative nausea and vomiting)        Past Surgical History:   Procedure Laterality Date   • BREAST AUGMENTATION  2007   • CARPAL TUNNEL RELEASE WITH CUBITAL TUNNEL RELEASE Right    • COLONOSCOPY N/A 2018    Procedure: COLONOSCOPY to cecum and t.i. with polypectomy;  Surgeon: Rima Luis MD;  Location: [facility] ENDOSCOPY;  Service:    • HIP DEBRIDEMENT Right    • KNEE ARTHROSCOPY Left 2020    Procedure: Left KNEE ARTHROSCOPy, partial medial menisectomy and debridement of arthritis;  Surgeon: Génesis Mg MD;  [place] [facility] [place] [facility] [place]  Service: Orthopedics;  Laterality: Left;   • KNEE CARTILAGE SURGERY Right    • SINUS SURGERY     • SKIN BIOPSY      hand - precancerous   • SKIN BIOPSY     • SPINAL FUSION      L4 &L5       Family History   Problem Relation Age of Onset   • Osteoarthritis Mother    • Heart disease Mother    • No Known Problems Brother    • Breast cancer Maternal Aunt    • Cancer Paternal Grandmother    • Malig Hyperthermia Neg Hx        Social History     Socioeconomic History   • Marital status:    • Number of children: 0   • Years of education: ms   Tobacco Use   • Smoking status: Former Smoker     Packs/day: 0.25     Years: 5.00     Pack years: 1.25     Quit date:      Years since quittin.1   • Smokeless tobacco: Never Used   Substance and Sexual Activity   • Alcohol use: Yes     Alcohol/week: 14.0 standard drinks     Types: 14 Glasses of wine per week   • Drug use: Never       Current Outpatient Medications on File Prior to Visit   Medication Sig Dispense Refill   • Ascorbic Acid (VITAMIN C PO) Take 1,000 mg by mouth Daily.     • B Complex Vitamins (VITAMIN B  "COMPLEX PO) Take 200 mcg by mouth Daily.     • baclofen (LIORESAL) 10 MG tablet TAKE ONE TABLET BY MOUTH THREE TIMES A DAY AS NEEDED FOR MUSCLE SPASMS 30 tablet 2   • cycloSPORINE (RESTASIS) 0.05 % ophthalmic emulsion 1 drop 2 (Two) Times a Day.     • DHEA 10 MG capsule Take 1 capsule by mouth Daily.     • estradiol (VIVELLE-DOT) 0.0375 MG/24HR Place 1 patch on the skin 2 (Two) Times a Week.     • hydroCHLOROthiazide (MICROZIDE) 12.5 MG capsule TAKE ONE CAPSULE BY MOUTH DAILY 90 capsule 0   • losartan (COZAAR) 100 MG tablet Take 1 tablet by mouth Daily. 90 tablet 1   • Magnesium-Potassium (CHANDRAKANT MAGNESIUM-POTASSIUM) 250-100 MG tablet Take 1 tablet by mouth Daily.     • Multiple Vitamins-Minerals (PRESERVISION AREDS PO) Take 1 capsule by mouth 2 (two) times a day.     • Omega-3 Fatty Acids (FISH OIL PO) Take 1 capsule by mouth 2 (two) times a day.     • progesterone (PROMETRIUM) 100 MG capsule Take 100 mg by mouth Daily.     • vitamin E 400 UNIT capsule Take 400 Units by mouth Daily.     • amitriptyline (ELAVIL) 25 MG tablet Take 1 tablet by mouth Every Night. 30 tablet 5   • naproxen (NAPROSYN) 500 MG tablet Take 1 tablet by mouth 2 (Two) Times a Day. 60 tablet 0   • Synthroid 88 MCG tablet TAKE ONE TABLET BY MOUTH DAILY 90 tablet 0   • Vitamin D, Cholecalciferol, 25 MCG (1000 UT) tablet Take 1,000 Units by mouth Daily.       No current facility-administered medications on file prior to visit.       Allergies   Allergen Reactions   • Sulfa Antibiotics Other (See Comments)     Elevated liver enzymes     LONG TERM EFFECT ,FOUND IN BLOOD WORK       Immunization History   Administered Date(s) Administered   • Flu Vaccine Quad PF >18YRS 11/01/2019   • Flu Vaccine Split Quad 09/22/2015   • Hepatitis A 01/01/2008, 06/01/2008   • Influenza Quad Vaccine (Inpatient) 11/01/2019   • Influenza, Unspecified 10/01/2020, 11/14/2020   • Tdap 01/01/2017       Objective     /72   Pulse 69   Ht 160 cm (63\")   Wt 73.9 kg (163 " lb)   BMI 28.87 kg/m²     Physical Exam  Vitals and nursing note reviewed.   Constitutional:       Appearance: Normal appearance. She is well-developed.   HENT:      Head: Normocephalic and atraumatic.      Right Ear: Tympanic membrane and external ear normal.      Left Ear: Tympanic membrane and external ear normal.      Nose: Nose normal.      Mouth/Throat:      Mouth: Mucous membranes are moist.   Eyes:      Extraocular Movements: Extraocular movements intact.      Pupils: Pupils are equal, round, and reactive to light.   Cardiovascular:      Rate and Rhythm: Normal rate and regular rhythm.      Pulses: Normal pulses.      Heart sounds: Normal heart sounds.   Pulmonary:      Effort: Pulmonary effort is normal. No respiratory distress.      Breath sounds: Normal breath sounds.   Abdominal:      General: Abdomen is flat. Bowel sounds are normal.      Palpations: Abdomen is soft.   Genitourinary:     Comments: Per gynecology    Musculoskeletal:         General: Normal range of motion.      Cervical back: Normal range of motion and neck supple.   Skin:     General: Skin is warm and dry.   Neurological:      General: No focal deficit present.      Mental Status: She is alert and oriented to person, place, and time.   Psychiatric:         Mood and Affect: Mood normal.         Behavior: Behavior normal.         Thought Content: Thought content normal.         Judgment: Judgment normal.         Assessment/Plan   Diagnoses and all orders for this visit:    1. Healthcare maintenance (Primary)    2. DDD (degenerative disc disease), lumbar    3. Adult hypothyroidism    4. Primary hypertension        Discussion    Patient presents today for a CPE.  Patient follows a healthy diet.   Patient follows an adequate exercise regimen. Mammogram is up to date.   Colon cancer screening is up to date.   Pap smears are performed by the patient's gynecologist. She will f/u w/ spinal specialist and pain management routinely. She will  continue current dosing of thyroid medicatiocicloiroxn. She did not take hctz this morning. Will monitor bp on this medication. She declines all vaccinations at this time. She is current on other hcm. She will f/u 6 mo or prn.               Future Appointments   Date Time Provider Department Center   3/1/2022  3:30 PM Felicia Gonzalez MD [facility]

## 2022-03-07 DIAGNOSIS — L30.9 DERMATITIS: Primary | ICD-10-CM

## 2022-03-07 RX ORDER — LOSARTAN POTASSIUM 50 MG/1
50 TABLET ORAL DAILY
Qty: 90 TABLET | Refills: 1 | Status: SHIPPED | OUTPATIENT
Start: 2022-03-07 | End: 2022-03-08 | Stop reason: SDUPTHER

## 2022-03-07 RX ORDER — CLOTRIMAZOLE AND BETAMETHASONE DIPROPIONATE 10; .64 MG/G; MG/G
1 CREAM TOPICAL 2 TIMES DAILY
Qty: 45 G | Refills: 1 | Status: SHIPPED | OUTPATIENT
Start: 2022-03-07 | End: 2022-06-02

## 2022-03-08 DIAGNOSIS — M19.90 ARTHRITIS: ICD-10-CM

## 2022-03-08 DIAGNOSIS — M25.50 POLYARTHRALGIA: ICD-10-CM

## 2022-03-08 DIAGNOSIS — L40.9 PSORIASIS: Primary | ICD-10-CM

## 2022-03-08 RX ORDER — LOSARTAN POTASSIUM 50 MG/1
50 TABLET ORAL DAILY
Qty: 90 TABLET | Refills: 1 | Status: SHIPPED | OUTPATIENT
Start: 2022-03-08 | End: 2022-11-28

## 2022-03-08 RX ORDER — HYDROCORTISONE BUTYRATE 1 MG/G
1 OINTMENT TOPICAL EVERY 12 HOURS SCHEDULED
Qty: 30 G | Refills: 0 | Status: SHIPPED | OUTPATIENT
Start: 2022-03-08 | End: 2022-06-02

## 2022-03-08 RX ORDER — HYDROCORTISONE BUTYRATE 1 MG/G
1 OINTMENT TOPICAL EVERY 12 HOURS SCHEDULED
Qty: 30 G | Refills: 0 | Status: SHIPPED | OUTPATIENT
Start: 2022-03-08 | End: 2022-03-08 | Stop reason: SDUPTHER

## 2022-03-08 NOTE — TELEPHONE ENCOUNTER
Caller: Cheri Lopez    Relationship: Self    Best call back number: [phone]  Requested Prescriptions:   Requested Prescriptions     Pending Prescriptions Disp Refills   • losartan (Cozaar) 50 MG tablet 90 tablet 1     Sig: Take 1 tablet by mouth Daily.   • hydrocortisone butyrate (Locoid) 0.1 % ointment ointment 30 g 0     Sig: Apply 1 application topically to the appropriate area as directed Every 12 (Twelve) Hours.        Pharmacy where request should be sent: [facility] [address] - [phone]  - [phone] FX     Additional details provided by patient:WAS SENT TO [facility] SHOULD HAVE WENT TO [facility]     Does the patient have less than a 3 day supply:  [x] Yes  [] No    Jose Gregg Rep   03/08/22 13:54 EST

## 2022-03-14 ENCOUNTER — TELEPHONE (OUTPATIENT)
Dept: INTERNAL MEDICINE | Facility: CLINIC | Age: 57
End: 2022-03-14

## 2022-03-14 RX ORDER — HYDROCHLOROTHIAZIDE 12.5 MG/1
CAPSULE, GELATIN COATED ORAL
Qty: 90 CAPSULE | Refills: 0 | Status: SHIPPED | OUTPATIENT
Start: 2022-03-14 | End: 2022-06-13

## 2022-03-14 NOTE — TELEPHONE ENCOUNTER
Caller: Cheri Lopez    Relationship: Self    Best call back number:[phone]    Caller requesting test results:PATIENT     What test was performed: BLOOD WORK     When was the test performed: 3/10/22    Where was the test performed: OFFICE

## 2022-03-17 LAB
ANA SER QL: NEGATIVE
CCP IGA+IGG SERPL IA-ACNC: 7 UNITS (ref 0–19)
CRP SERPL-MCNC: 2 MG/L (ref 0–10)
ERYTHROCYTE [SEDIMENTATION RATE] IN BLOOD BY WESTERGREN METHOD: 8 MM/HR (ref 0–40)
HLA-B27 QL NAA+PROBE: NEGATIVE
RHEUMATOID FACT SERPL-ACNC: 10.1 IU/ML

## 2022-04-22 ENCOUNTER — TELEPHONE (OUTPATIENT)
Dept: INTERNAL MEDICINE | Facility: CLINIC | Age: 57
End: 2022-04-22

## 2022-04-22 NOTE — TELEPHONE ENCOUNTER
Pt is going to ask her spec to write for FMLA        ----- Message from Cheri Lopez sent at 4/22/2022  6:31 AM EDT -----  Regarding: Reasonable Accommodation request at work  Good morning, as my pain remains unresolved, I am seeking an RA from my employer, the [employer], to continue to work from home as I have since my surgery with Dr. Short on 5 May 2020.  As discussed at my last appt. with him, Dr. Short does not believe another fusion would be beneficial.  Working from home allows me to apply heat/cold applications to my lower back and hip; I am able to do my exercises on my office floor or home gym; I take extended breaks; and other physical movements to mitigate my pain.  I am under the care of Reyes Sena, [facility], [phone], for pain management.  I had an SI joint injection on Feb 14, 2022 and an L4/L3 epidural on Apr 13, 2022, with repeat injections scheduled for May 2 & June 3rd, 2022.  The attached paperwork is due back the sender NLT May 6, 2022.  Dr. Short believes the attached paperwork should be completed by Dr. Gonzalez.  If you need anything else from me, or need me to fax or drop off hard   copies of this request, please let me know.  Thank you.     shannan/r     Cheri, [phone]

## 2022-05-27 ENCOUNTER — OFFICE VISIT (OUTPATIENT)
Dept: INTERNAL MEDICINE | Facility: CLINIC | Age: 57
End: 2022-05-27

## 2022-05-27 VITALS
RESPIRATION RATE: 16 BRPM | HEIGHT: 63 IN | BODY MASS INDEX: 28.88 KG/M2 | TEMPERATURE: 97.5 F | SYSTOLIC BLOOD PRESSURE: 138 MMHG | HEART RATE: 84 BPM | OXYGEN SATURATION: 98 % | WEIGHT: 163 LBS | DIASTOLIC BLOOD PRESSURE: 84 MMHG

## 2022-05-27 DIAGNOSIS — R05.9 COUGH: Primary | ICD-10-CM

## 2022-05-27 DIAGNOSIS — J30.2 SEASONAL ALLERGIC RHINITIS, UNSPECIFIED TRIGGER: ICD-10-CM

## 2022-05-27 PROCEDURE — 99213 OFFICE O/P EST LOW 20 MIN: CPT | Performed by: NURSE PRACTITIONER

## 2022-05-27 RX ORDER — THYROID,PORK 90 MG
TABLET ORAL
COMMUNITY
Start: 2022-04-18 | End: 2022-09-13

## 2022-05-27 RX ORDER — LEVOCETIRIZINE DIHYDROCHLORIDE 5 MG/1
5 TABLET, FILM COATED ORAL EVERY EVENING
Qty: 30 TABLET | Refills: 1 | Status: SHIPPED | OUTPATIENT
Start: 2022-05-27 | End: 2022-08-01

## 2022-05-27 RX ORDER — ALBUTEROL SULFATE 90 UG/1
2 AEROSOL, METERED RESPIRATORY (INHALATION) EVERY 4 HOURS PRN
Qty: 16 G | Refills: 1 | Status: SHIPPED | OUTPATIENT
Start: 2022-05-27 | End: 2022-07-26

## 2022-05-27 NOTE — PROGRESS NOTES
Subjective   Cheri Lopze is a 57 y.o. female.   Chief Complaint   Patient presents with   • Shortness of Breath     Vitals:    05/27/22 1335   BP: 138/84   Pulse: 84   Resp: 16   Temp: 97.5 °F (36.4 °C)   SpO2: 98%     No LMP recorded. Patient is postmenopausal.    History of Present Illness  Cheri is a 57 year old female patient  Of Dr Gonzalez who is here for an acute visit. She has had some chest tightness with deep breath and every times she takes a deep breath she coughs that started 24 hours ago. She has had pneumonia in the past and was concerned. She was out of town fishing with her family. She denies any exposure to illness but was around second hand smoke a few times outside. She denies wheezing, fever, chills, or body aches.     The following portions of the patient's history were reviewed and updated as appropriate: allergies, current medications, past family history, past medical history, past social history, past surgical history and problem list.    Review of Systems   Constitutional: Negative for fatigue and fever.   Respiratory: Positive for cough, chest tightness (with deep breath ) and shortness of breath (with deep breath ).    Cardiovascular: Negative.    Allergic/Immunologic: Positive for environmental allergies.       Objective   Physical Exam  Vitals and nursing note reviewed.   Constitutional:       General: She is not in acute distress.     Appearance: Normal appearance. She is well-developed and well-groomed.   Cardiovascular:      Rate and Rhythm: Normal rate and regular rhythm.   Pulmonary:      Effort: Pulmonary effort is normal.      Breath sounds: Normal breath sounds. No wheezing, rhonchi or rales.      Comments: Dry cough noted with deep breath   Skin:     General: Skin is warm and dry.   Neurological:      Mental Status: She is alert and oriented to person, place, and time.   Psychiatric:         Mood and Affect: Mood normal.         Assessment & Plan   Diagnoses and all  orders for this visit:    1. Cough (Primary)    2. Seasonal allergic rhinitis, unspecified trigger    Other orders  -     albuterol sulfate  (90 Base) MCG/ACT inhaler; Inhale 2 puffs Every 4 (Four) Hours As Needed for Wheezing or Shortness of Air.  Dispense: 16 g; Refill: 1  -     levocetirizine (XYZAL) 5 MG tablet; Take 1 tablet by mouth Every Evening.  Dispense: 30 tablet; Refill: 1      Lungs are clear and o2 sat is normal   Discussed with patient her symptoms could be from the start of a viral illness, allergen or lung irritant   If symptoms progress, recommend she take an at home covid test   If no improvement  Or symptoms worsen return next week for a follow up or go to the Urgent care this weekend for evaluation- no in office xray available today   Advised to go to the ER for chest pain , soa, or high fever

## 2022-05-28 ENCOUNTER — APPOINTMENT (OUTPATIENT)
Dept: GENERAL RADIOLOGY | Facility: HOSPITAL | Age: 57
End: 2022-05-28

## 2022-05-28 ENCOUNTER — HOSPITAL ENCOUNTER (OUTPATIENT)
Facility: HOSPITAL | Age: 57
Setting detail: OBSERVATION
Discharge: HOME | End: 2022-06-01
Attending: EMERGENCY MEDICINE | Admitting: STUDENT IN AN ORGANIZED HEALTH CARE EDUCATION/TRAINING PROGRAM

## 2022-05-28 DIAGNOSIS — J18.9 MULTIFOCAL PNEUMONIA: Primary | ICD-10-CM

## 2022-05-28 DIAGNOSIS — I10 HYPERTENSION, UNSPECIFIED TYPE: ICD-10-CM

## 2022-05-28 DIAGNOSIS — R09.02 HYPOXIA: ICD-10-CM

## 2022-05-28 LAB
ALBUMIN SERPL-MCNC: 3.8 G/DL (ref 3.5–5.2)
ALBUMIN/GLOB SERPL: 1.1 G/DL
ALP SERPL-CCNC: 168 U/L (ref 39–117)
ALT SERPL W P-5'-P-CCNC: 44 U/L (ref 1–33)
ANION GAP SERPL CALCULATED.3IONS-SCNC: 11 MMOL/L (ref 5–15)
AST SERPL-CCNC: 47 U/L (ref 1–32)
BASOPHILS # BLD AUTO: 0.06 10*3/MM3 (ref 0–0.2)
BASOPHILS NFR BLD AUTO: 0.5 % (ref 0–1.5)
BILIRUB SERPL-MCNC: 0.4 MG/DL (ref 0–1.2)
BUN SERPL-MCNC: 10 MG/DL (ref 6–20)
BUN/CREAT SERPL: 14.9 (ref 7–25)
CALCIUM SPEC-SCNC: 9.8 MG/DL (ref 8.6–10.5)
CHLORIDE SERPL-SCNC: 99 MMOL/L (ref 98–107)
CO2 SERPL-SCNC: 21 MMOL/L (ref 22–29)
CREAT SERPL-MCNC: 0.67 MG/DL (ref 0.57–1)
DEPRECATED RDW RBC AUTO: 48.4 FL (ref 37–54)
EGFRCR SERPLBLD CKD-EPI 2021: 102.1 ML/MIN/1.73
EOSINOPHIL # BLD AUTO: 0.2 10*3/MM3 (ref 0–0.4)
EOSINOPHIL NFR BLD AUTO: 1.6 % (ref 0.3–6.2)
ERYTHROCYTE [DISTWIDTH] IN BLOOD BY AUTOMATED COUNT: 13.5 % (ref 12.3–15.4)
GLOBULIN UR ELPH-MCNC: 3.6 GM/DL
GLUCOSE SERPL-MCNC: 130 MG/DL (ref 65–99)
HCT VFR BLD AUTO: 38.7 % (ref 34–46.6)
HGB BLD-MCNC: 13.3 G/DL (ref 12–15.9)
IMM GRANULOCYTES # BLD AUTO: 0.03 10*3/MM3 (ref 0–0.05)
IMM GRANULOCYTES NFR BLD AUTO: 0.2 % (ref 0–0.5)
LYMPHOCYTES # BLD AUTO: 0.85 10*3/MM3 (ref 0.7–3.1)
LYMPHOCYTES NFR BLD AUTO: 6.9 % (ref 19.6–45.3)
MCH RBC QN AUTO: 32.9 PG (ref 26.6–33)
MCHC RBC AUTO-ENTMCNC: 34.4 G/DL (ref 31.5–35.7)
MCV RBC AUTO: 95.8 FL (ref 79–97)
MONOCYTES # BLD AUTO: 0.68 10*3/MM3 (ref 0.1–0.9)
MONOCYTES NFR BLD AUTO: 5.6 % (ref 5–12)
NEUTROPHILS NFR BLD AUTO: 10.43 10*3/MM3 (ref 1.7–7)
NEUTROPHILS NFR BLD AUTO: 85.2 % (ref 42.7–76)
NRBC BLD AUTO-RTO: 0 /100 WBC (ref 0–0.2)
NT-PROBNP SERPL-MCNC: 222 PG/ML (ref 0–900)
PLATELET # BLD AUTO: 213 10*3/MM3 (ref 140–450)
PMV BLD AUTO: 9.4 FL (ref 6–12)
POTASSIUM SERPL-SCNC: 4 MMOL/L (ref 3.5–5.2)
PROT SERPL-MCNC: 7.4 G/DL (ref 6–8.5)
RBC # BLD AUTO: 4.04 10*6/MM3 (ref 3.77–5.28)
SODIUM SERPL-SCNC: 131 MMOL/L (ref 136–145)
TROPONIN T SERPL-MCNC: <0.01 NG/ML (ref 0–0.03)
WBC NRBC COR # BLD: 12.25 10*3/MM3 (ref 3.4–10.8)

## 2022-05-28 PROCEDURE — 96365 THER/PROPH/DIAG IV INF INIT: CPT

## 2022-05-28 PROCEDURE — 87040 BLOOD CULTURE FOR BACTERIA: CPT | Performed by: PHYSICIAN ASSISTANT

## 2022-05-28 PROCEDURE — 93005 ELECTROCARDIOGRAM TRACING: CPT | Performed by: EMERGENCY MEDICINE

## 2022-05-28 PROCEDURE — 83880 ASSAY OF NATRIURETIC PEPTIDE: CPT

## 2022-05-28 PROCEDURE — 84484 ASSAY OF TROPONIN QUANT: CPT

## 2022-05-28 PROCEDURE — 99284 EMERGENCY DEPT VISIT MOD MDM: CPT

## 2022-05-28 PROCEDURE — 93010 ELECTROCARDIOGRAM REPORT: CPT | Performed by: INTERNAL MEDICINE

## 2022-05-28 PROCEDURE — 0202U NFCT DS 22 TRGT SARS-COV-2: CPT | Performed by: EMERGENCY MEDICINE

## 2022-05-28 PROCEDURE — 85025 COMPLETE CBC W/AUTO DIFF WBC: CPT

## 2022-05-28 PROCEDURE — 80053 COMPREHEN METABOLIC PANEL: CPT

## 2022-05-28 PROCEDURE — 84145 PROCALCITONIN (PCT): CPT | Performed by: PHYSICIAN ASSISTANT

## 2022-05-28 PROCEDURE — 83605 ASSAY OF LACTIC ACID: CPT | Performed by: PHYSICIAN ASSISTANT

## 2022-05-28 PROCEDURE — 96367 TX/PROPH/DG ADDL SEQ IV INF: CPT

## 2022-05-28 PROCEDURE — 71045 X-RAY EXAM CHEST 1 VIEW: CPT

## 2022-05-28 PROCEDURE — 36415 COLL VENOUS BLD VENIPUNCTURE: CPT

## 2022-05-28 RX ORDER — SODIUM CHLORIDE 0.9 % (FLUSH) 0.9 %
10 SYRINGE (ML) INJECTION AS NEEDED
Status: DISCONTINUED | OUTPATIENT
Start: 2022-05-28 | End: 2022-06-01 | Stop reason: HOSPADM

## 2022-05-29 ENCOUNTER — APPOINTMENT (OUTPATIENT)
Dept: CT IMAGING | Facility: HOSPITAL | Age: 57
End: 2022-05-29

## 2022-05-29 PROBLEM — J18.9 MULTIFOCAL PNEUMONIA: Status: ACTIVE | Noted: 2022-05-29

## 2022-05-29 LAB
B PARAPERT DNA SPEC QL NAA+PROBE: NOT DETECTED
B PERT DNA SPEC QL NAA+PROBE: NOT DETECTED
C PNEUM DNA NPH QL NAA+NON-PROBE: NOT DETECTED
D-LACTATE SERPL-SCNC: 0.9 MMOL/L (ref 0.5–2)
FLUAV SUBTYP SPEC NAA+PROBE: NOT DETECTED
FLUBV RNA ISLT QL NAA+PROBE: NOT DETECTED
HADV DNA SPEC NAA+PROBE: NOT DETECTED
HCOV 229E RNA SPEC QL NAA+PROBE: NOT DETECTED
HCOV HKU1 RNA SPEC QL NAA+PROBE: NOT DETECTED
HCOV NL63 RNA SPEC QL NAA+PROBE: NOT DETECTED
HCOV OC43 RNA SPEC QL NAA+PROBE: NOT DETECTED
HMPV RNA NPH QL NAA+NON-PROBE: NOT DETECTED
HOLD SPECIMEN: NORMAL
HPIV1 RNA ISLT QL NAA+PROBE: NOT DETECTED
HPIV2 RNA SPEC QL NAA+PROBE: NOT DETECTED
HPIV3 RNA NPH QL NAA+PROBE: NOT DETECTED
HPIV4 P GENE NPH QL NAA+PROBE: NOT DETECTED
L PNEUMO1 AG UR QL IA: NEGATIVE
M PNEUMO IGG SER IA-ACNC: NOT DETECTED
MRSA DNA SPEC QL NAA+PROBE: NORMAL
PROCALCITONIN SERPL-MCNC: 0.03 NG/ML (ref 0–0.25)
QT INTERVAL: 338 MS
RHINOVIRUS RNA SPEC NAA+PROBE: NOT DETECTED
RSV RNA NPH QL NAA+NON-PROBE: NOT DETECTED
S PNEUM AG SPEC QL LA: NEGATIVE
SARS-COV-2 RNA NPH QL NAA+NON-PROBE: NOT DETECTED
WHOLE BLOOD HOLD COAG: NORMAL
WHOLE BLOOD HOLD SPECIMEN: NORMAL

## 2022-05-29 PROCEDURE — 94799 UNLISTED PULMONARY SVC/PX: CPT

## 2022-05-29 PROCEDURE — 94640 AIRWAY INHALATION TREATMENT: CPT

## 2022-05-29 PROCEDURE — G0378 HOSPITAL OBSERVATION PER HR: HCPCS

## 2022-05-29 PROCEDURE — 25010000002 ENOXAPARIN PER 10 MG

## 2022-05-29 PROCEDURE — 87449 NOS EACH ORGANISM AG IA: CPT | Performed by: NURSE PRACTITIONER

## 2022-05-29 PROCEDURE — 96367 TX/PROPH/DG ADDL SEQ IV INF: CPT

## 2022-05-29 PROCEDURE — 96365 THER/PROPH/DIAG IV INF INIT: CPT

## 2022-05-29 PROCEDURE — 96372 THER/PROPH/DIAG INJ SC/IM: CPT

## 2022-05-29 PROCEDURE — 96361 HYDRATE IV INFUSION ADD-ON: CPT

## 2022-05-29 PROCEDURE — 87899 AGENT NOS ASSAY W/OPTIC: CPT | Performed by: NURSE PRACTITIONER

## 2022-05-29 PROCEDURE — 94761 N-INVAS EAR/PLS OXIMETRY MLT: CPT

## 2022-05-29 PROCEDURE — 94664 DEMO&/EVAL PT USE INHALER: CPT

## 2022-05-29 PROCEDURE — 0 IOPAMIDOL PER 1 ML: Performed by: EMERGENCY MEDICINE

## 2022-05-29 PROCEDURE — 94760 N-INVAS EAR/PLS OXIMETRY 1: CPT

## 2022-05-29 PROCEDURE — 25010000002 CEFTRIAXONE PER 250 MG: Performed by: PHYSICIAN ASSISTANT

## 2022-05-29 PROCEDURE — 71275 CT ANGIOGRAPHY CHEST: CPT

## 2022-05-29 PROCEDURE — 87641 MR-STAPH DNA AMP PROBE: CPT | Performed by: STUDENT IN AN ORGANIZED HEALTH CARE EDUCATION/TRAINING PROGRAM

## 2022-05-29 PROCEDURE — 25010000002 AZITHROMYCIN PER 500 MG: Performed by: PHYSICIAN ASSISTANT

## 2022-05-29 RX ORDER — GUAIFENESIN 600 MG/1
1200 TABLET, EXTENDED RELEASE ORAL EVERY 12 HOURS SCHEDULED
Status: DISCONTINUED | OUTPATIENT
Start: 2022-05-29 | End: 2022-06-01 | Stop reason: HOSPADM

## 2022-05-29 RX ORDER — LEVOTHYROXINE AND LIOTHYRONINE 57; 13.5 UG/1; UG/1
90 TABLET ORAL
Status: DISCONTINUED | OUTPATIENT
Start: 2022-05-29 | End: 2022-06-01 | Stop reason: HOSPADM

## 2022-05-29 RX ORDER — ACETAMINOPHEN 325 MG/1
650 TABLET ORAL EVERY 4 HOURS PRN
Status: DISCONTINUED | OUTPATIENT
Start: 2022-05-29 | End: 2022-06-01 | Stop reason: HOSPADM

## 2022-05-29 RX ORDER — CYCLOSPORINE 0.5 MG/ML
1 EMULSION OPHTHALMIC EVERY 12 HOURS
Status: DISCONTINUED | OUTPATIENT
Start: 2022-05-29 | End: 2022-06-01 | Stop reason: HOSPADM

## 2022-05-29 RX ORDER — NITROGLYCERIN 0.4 MG/1
0.4 TABLET SUBLINGUAL
Status: DISCONTINUED | OUTPATIENT
Start: 2022-05-29 | End: 2022-06-01 | Stop reason: HOSPADM

## 2022-05-29 RX ORDER — IPRATROPIUM BROMIDE AND ALBUTEROL SULFATE 2.5; .5 MG/3ML; MG/3ML
3 SOLUTION RESPIRATORY (INHALATION)
Status: DISCONTINUED | OUTPATIENT
Start: 2022-05-29 | End: 2022-06-01 | Stop reason: HOSPADM

## 2022-05-29 RX ORDER — ONDANSETRON 4 MG/1
4 TABLET, FILM COATED ORAL EVERY 6 HOURS PRN
Status: DISCONTINUED | OUTPATIENT
Start: 2022-05-29 | End: 2022-06-01 | Stop reason: HOSPADM

## 2022-05-29 RX ORDER — BACLOFEN 10 MG/1
10 TABLET ORAL 3 TIMES DAILY PRN
Status: DISCONTINUED | OUTPATIENT
Start: 2022-05-29 | End: 2022-06-01 | Stop reason: HOSPADM

## 2022-05-29 RX ORDER — ASCORBIC ACID 500 MG
1000 TABLET ORAL DAILY
Status: DISCONTINUED | OUTPATIENT
Start: 2022-05-29 | End: 2022-06-01 | Stop reason: HOSPADM

## 2022-05-29 RX ORDER — HYDROCHLOROTHIAZIDE 12.5 MG/1
12.5 CAPSULE, GELATIN COATED ORAL DAILY
Status: DISCONTINUED | OUTPATIENT
Start: 2022-05-29 | End: 2022-06-01 | Stop reason: HOSPADM

## 2022-05-29 RX ORDER — CYCLOSPORINE 0.5 MG/ML
1 EMULSION OPHTHALMIC EVERY 12 HOURS
COMMUNITY

## 2022-05-29 RX ORDER — LOSARTAN POTASSIUM 50 MG/1
50 TABLET ORAL DAILY
Status: DISCONTINUED | OUTPATIENT
Start: 2022-05-29 | End: 2022-06-01 | Stop reason: HOSPADM

## 2022-05-29 RX ORDER — SODIUM CHLORIDE, SODIUM LACTATE, POTASSIUM CHLORIDE, CALCIUM CHLORIDE 600; 310; 30; 20 MG/100ML; MG/100ML; MG/100ML; MG/100ML
125 INJECTION, SOLUTION INTRAVENOUS CONTINUOUS
Status: ACTIVE | OUTPATIENT
Start: 2022-05-29 | End: 2022-05-29

## 2022-05-29 RX ORDER — PREDNISONE 20 MG/1
20 TABLET ORAL
Status: DISCONTINUED | OUTPATIENT
Start: 2022-05-29 | End: 2022-05-29

## 2022-05-29 RX ORDER — CETIRIZINE HYDROCHLORIDE 10 MG/1
10 TABLET ORAL DAILY
Refills: 1 | Status: DISCONTINUED | OUTPATIENT
Start: 2022-05-29 | End: 2022-06-01 | Stop reason: HOSPADM

## 2022-05-29 RX ORDER — ENOXAPARIN SODIUM 100 MG/ML
40 INJECTION SUBCUTANEOUS EVERY 24 HOURS
Status: DISCONTINUED | OUTPATIENT
Start: 2022-05-29 | End: 2022-06-01 | Stop reason: HOSPADM

## 2022-05-29 RX ORDER — GUAIFENESIN AND CODEINE PHOSPHATE 100; 10 MG/5ML; MG/5ML
5 SOLUTION ORAL EVERY 4 HOURS PRN
Status: DISCONTINUED | OUTPATIENT
Start: 2022-05-29 | End: 2022-06-01 | Stop reason: HOSPADM

## 2022-05-29 RX ORDER — ONDANSETRON 2 MG/ML
4 INJECTION INTRAMUSCULAR; INTRAVENOUS EVERY 6 HOURS PRN
Status: DISCONTINUED | OUTPATIENT
Start: 2022-05-29 | End: 2022-06-01 | Stop reason: HOSPADM

## 2022-05-29 RX ORDER — UREA 10 %
3 LOTION (ML) TOPICAL NIGHTLY PRN
Status: DISCONTINUED | OUTPATIENT
Start: 2022-05-29 | End: 2022-06-01 | Stop reason: HOSPADM

## 2022-05-29 RX ORDER — PROGESTERONE 100 MG/1
100 CAPSULE ORAL NIGHTLY
Status: DISCONTINUED | OUTPATIENT
Start: 2022-05-29 | End: 2022-06-01 | Stop reason: HOSPADM

## 2022-05-29 RX ORDER — IPRATROPIUM BROMIDE AND ALBUTEROL SULFATE 2.5; .5 MG/3ML; MG/3ML
3 SOLUTION RESPIRATORY (INHALATION)
Status: DISCONTINUED | OUTPATIENT
Start: 2022-05-29 | End: 2022-05-29 | Stop reason: SDUPTHER

## 2022-05-29 RX ADMIN — LEVOTHYROXINE, LIOTHYRONINE 90 MG: 57; 13.5 TABLET ORAL at 11:39

## 2022-05-29 RX ADMIN — CYCLOSPORINE 1 DROP: 0.5 EMULSION OPHTHALMIC at 11:39

## 2022-05-29 RX ADMIN — HYDROCHLOROTHIAZIDE 12.5 MG: 12.5 CAPSULE ORAL at 11:39

## 2022-05-29 RX ADMIN — AZITHROMYCIN MONOHYDRATE 500 MG: 500 INJECTION, POWDER, LYOPHILIZED, FOR SOLUTION INTRAVENOUS at 02:39

## 2022-05-29 RX ADMIN — ENOXAPARIN SODIUM 40 MG: 100 INJECTION SUBCUTANEOUS at 09:51

## 2022-05-29 RX ADMIN — SODIUM CHLORIDE 1000 ML: 9 INJECTION, SOLUTION INTRAVENOUS at 00:37

## 2022-05-29 RX ADMIN — CYCLOSPORINE 1 DROP: 0.5 EMULSION OPHTHALMIC at 20:25

## 2022-05-29 RX ADMIN — PROGESTERONE 100 MG: 100 CAPSULE ORAL at 20:25

## 2022-05-29 RX ADMIN — LOSARTAN POTASSIUM 50 MG: 50 TABLET, FILM COATED ORAL at 11:39

## 2022-05-29 RX ADMIN — CEFTRIAXONE 1 G: 1 INJECTION, POWDER, FOR SOLUTION INTRAMUSCULAR; INTRAVENOUS at 01:46

## 2022-05-29 RX ADMIN — IBUPROFEN 600 MG: 400 TABLET, FILM COATED ORAL at 00:33

## 2022-05-29 RX ADMIN — IPRATROPIUM BROMIDE AND ALBUTEROL SULFATE 3 ML: .5; 3 SOLUTION RESPIRATORY (INHALATION) at 15:41

## 2022-05-29 RX ADMIN — IPRATROPIUM BROMIDE AND ALBUTEROL SULFATE 3 ML: .5; 3 SOLUTION RESPIRATORY (INHALATION) at 12:21

## 2022-05-29 RX ADMIN — CETIRIZINE HYDROCHLORIDE 10 MG: 10 TABLET ORAL at 11:39

## 2022-05-29 RX ADMIN — IPRATROPIUM BROMIDE AND ALBUTEROL SULFATE 3 ML: .5; 3 SOLUTION RESPIRATORY (INHALATION) at 20:14

## 2022-05-29 RX ADMIN — OXYCODONE HYDROCHLORIDE AND ACETAMINOPHEN 1000 MG: 500 TABLET ORAL at 11:38

## 2022-05-29 RX ADMIN — SODIUM CHLORIDE, POTASSIUM CHLORIDE, SODIUM LACTATE AND CALCIUM CHLORIDE 125 ML/HR: 600; 310; 30; 20 INJECTION, SOLUTION INTRAVENOUS at 09:51

## 2022-05-29 RX ADMIN — GUAIFENESIN 1200 MG: 600 TABLET, EXTENDED RELEASE ORAL at 11:39

## 2022-05-29 RX ADMIN — IOPAMIDOL 95 ML: 755 INJECTION, SOLUTION INTRAVENOUS at 00:55

## 2022-05-29 RX ADMIN — GUAIFENESIN 1200 MG: 600 TABLET, EXTENDED RELEASE ORAL at 20:25

## 2022-05-30 PROBLEM — I49.1 PREMATURE ATRIAL CONTRACTION: Status: ACTIVE | Noted: 2022-05-30

## 2022-05-30 LAB
ANION GAP SERPL CALCULATED.3IONS-SCNC: 13.6 MMOL/L (ref 5–15)
BUN SERPL-MCNC: 8 MG/DL (ref 6–20)
BUN/CREAT SERPL: 11.8 (ref 7–25)
CALCIUM SPEC-SCNC: 9.9 MG/DL (ref 8.6–10.5)
CHLORIDE SERPL-SCNC: 100 MMOL/L (ref 98–107)
CO2 SERPL-SCNC: 24.4 MMOL/L (ref 22–29)
CREAT SERPL-MCNC: 0.68 MG/DL (ref 0.57–1)
DEPRECATED RDW RBC AUTO: 51.2 FL (ref 37–54)
EGFRCR SERPLBLD CKD-EPI 2021: 101.7 ML/MIN/1.73
ERYTHROCYTE [DISTWIDTH] IN BLOOD BY AUTOMATED COUNT: 13.9 % (ref 12.3–15.4)
GLUCOSE SERPL-MCNC: 109 MG/DL (ref 65–99)
HCT VFR BLD AUTO: 41.3 % (ref 34–46.6)
HGB BLD-MCNC: 13.3 G/DL (ref 12–15.9)
MAGNESIUM SERPL-MCNC: 2.1 MG/DL (ref 1.6–2.6)
MCH RBC QN AUTO: 32 PG (ref 26.6–33)
MCHC RBC AUTO-ENTMCNC: 32.2 G/DL (ref 31.5–35.7)
MCV RBC AUTO: 99.5 FL (ref 79–97)
PLATELET # BLD AUTO: 240 10*3/MM3 (ref 140–450)
PMV BLD AUTO: 9.5 FL (ref 6–12)
POTASSIUM SERPL-SCNC: 4.3 MMOL/L (ref 3.5–5.2)
QT INTERVAL: 368 MS
RBC # BLD AUTO: 4.15 10*6/MM3 (ref 3.77–5.28)
SODIUM SERPL-SCNC: 138 MMOL/L (ref 136–145)
WBC NRBC COR # BLD: 7.71 10*3/MM3 (ref 3.4–10.8)

## 2022-05-30 PROCEDURE — 93005 ELECTROCARDIOGRAM TRACING: CPT | Performed by: NURSE PRACTITIONER

## 2022-05-30 PROCEDURE — 94799 UNLISTED PULMONARY SVC/PX: CPT

## 2022-05-30 PROCEDURE — 96366 THER/PROPH/DIAG IV INF ADDON: CPT

## 2022-05-30 PROCEDURE — 93010 ELECTROCARDIOGRAM REPORT: CPT | Performed by: INTERNAL MEDICINE

## 2022-05-30 PROCEDURE — 80048 BASIC METABOLIC PNL TOTAL CA: CPT

## 2022-05-30 PROCEDURE — 94664 DEMO&/EVAL PT USE INHALER: CPT

## 2022-05-30 PROCEDURE — G0378 HOSPITAL OBSERVATION PER HR: HCPCS

## 2022-05-30 PROCEDURE — 94761 N-INVAS EAR/PLS OXIMETRY MLT: CPT

## 2022-05-30 PROCEDURE — 25010000002 CEFTRIAXONE PER 250 MG

## 2022-05-30 PROCEDURE — 85027 COMPLETE CBC AUTOMATED: CPT

## 2022-05-30 PROCEDURE — 25010000002 ENOXAPARIN PER 10 MG

## 2022-05-30 PROCEDURE — 83735 ASSAY OF MAGNESIUM: CPT | Performed by: NURSE PRACTITIONER

## 2022-05-30 PROCEDURE — 96372 THER/PROPH/DIAG INJ SC/IM: CPT

## 2022-05-30 RX ADMIN — HYDROCHLOROTHIAZIDE 12.5 MG: 12.5 CAPSULE ORAL at 09:19

## 2022-05-30 RX ADMIN — GUAIFENESIN 1200 MG: 600 TABLET, EXTENDED RELEASE ORAL at 09:19

## 2022-05-30 RX ADMIN — CYCLOSPORINE 1 DROP: 0.5 EMULSION OPHTHALMIC at 20:01

## 2022-05-30 RX ADMIN — LEVOTHYROXINE, LIOTHYRONINE 90 MG: 57; 13.5 TABLET ORAL at 09:19

## 2022-05-30 RX ADMIN — IPRATROPIUM BROMIDE AND ALBUTEROL SULFATE 3 ML: .5; 3 SOLUTION RESPIRATORY (INHALATION) at 07:43

## 2022-05-30 RX ADMIN — IPRATROPIUM BROMIDE AND ALBUTEROL SULFATE 3 ML: .5; 3 SOLUTION RESPIRATORY (INHALATION) at 15:10

## 2022-05-30 RX ADMIN — ENOXAPARIN SODIUM 40 MG: 100 INJECTION SUBCUTANEOUS at 09:18

## 2022-05-30 RX ADMIN — IPRATROPIUM BROMIDE AND ALBUTEROL SULFATE 3 ML: .5; 3 SOLUTION RESPIRATORY (INHALATION) at 11:38

## 2022-05-30 RX ADMIN — IPRATROPIUM BROMIDE AND ALBUTEROL SULFATE 3 ML: .5; 3 SOLUTION RESPIRATORY (INHALATION) at 20:13

## 2022-05-30 RX ADMIN — GUAIFENESIN AND CODEINE PHOSPHATE 5 ML: 100; 10 SOLUTION ORAL at 20:02

## 2022-05-30 RX ADMIN — CYCLOSPORINE 1 DROP: 0.5 EMULSION OPHTHALMIC at 09:20

## 2022-05-30 RX ADMIN — PROGESTERONE 100 MG: 100 CAPSULE ORAL at 20:01

## 2022-05-30 RX ADMIN — CETIRIZINE HYDROCHLORIDE 10 MG: 10 TABLET ORAL at 09:19

## 2022-05-30 RX ADMIN — CEFTRIAXONE 1 G: 1 INJECTION, POWDER, FOR SOLUTION INTRAMUSCULAR; INTRAVENOUS at 01:10

## 2022-05-30 RX ADMIN — LOSARTAN POTASSIUM 50 MG: 50 TABLET, FILM COATED ORAL at 09:19

## 2022-05-30 RX ADMIN — OXYCODONE HYDROCHLORIDE AND ACETAMINOPHEN 1000 MG: 500 TABLET ORAL at 09:19

## 2022-05-30 RX ADMIN — GUAIFENESIN 1200 MG: 600 TABLET, EXTENDED RELEASE ORAL at 20:01

## 2022-05-31 ENCOUNTER — APPOINTMENT (OUTPATIENT)
Dept: CARDIOLOGY | Facility: HOSPITAL | Age: 57
End: 2022-05-31

## 2022-05-31 LAB — QT INTERVAL: 361 MS

## 2022-05-31 PROCEDURE — 93306 TTE W/DOPPLER COMPLETE: CPT

## 2022-05-31 PROCEDURE — 96372 THER/PROPH/DIAG INJ SC/IM: CPT

## 2022-05-31 PROCEDURE — 99204 OFFICE O/P NEW MOD 45 MIN: CPT | Performed by: INTERNAL MEDICINE

## 2022-05-31 PROCEDURE — 94799 UNLISTED PULMONARY SVC/PX: CPT

## 2022-05-31 PROCEDURE — 25010000002 CEFTRIAXONE PER 250 MG

## 2022-05-31 PROCEDURE — 94664 DEMO&/EVAL PT USE INHALER: CPT

## 2022-05-31 PROCEDURE — 25010000002 PERFLUTREN (DEFINITY) 8.476 MG IN SODIUM CHLORIDE (PF) 0.9 % 10 ML INJECTION: Performed by: INTERNAL MEDICINE

## 2022-05-31 PROCEDURE — 94761 N-INVAS EAR/PLS OXIMETRY MLT: CPT

## 2022-05-31 PROCEDURE — 25010000002 ENOXAPARIN PER 10 MG

## 2022-05-31 PROCEDURE — G0378 HOSPITAL OBSERVATION PER HR: HCPCS

## 2022-05-31 PROCEDURE — 93306 TTE W/DOPPLER COMPLETE: CPT | Performed by: INTERNAL MEDICINE

## 2022-05-31 RX ADMIN — OXYCODONE HYDROCHLORIDE AND ACETAMINOPHEN 1000 MG: 500 TABLET ORAL at 08:26

## 2022-05-31 RX ADMIN — PERFLUTREN 2 ML: 6.52 INJECTION, SUSPENSION INTRAVENOUS at 17:48

## 2022-05-31 RX ADMIN — LOSARTAN POTASSIUM 50 MG: 50 TABLET, FILM COATED ORAL at 08:26

## 2022-05-31 RX ADMIN — CETIRIZINE HYDROCHLORIDE 10 MG: 10 TABLET ORAL at 08:26

## 2022-05-31 RX ADMIN — HYDROCHLOROTHIAZIDE 12.5 MG: 12.5 CAPSULE ORAL at 08:26

## 2022-05-31 RX ADMIN — IPRATROPIUM BROMIDE AND ALBUTEROL SULFATE 3 ML: .5; 3 SOLUTION RESPIRATORY (INHALATION) at 19:32

## 2022-05-31 RX ADMIN — Medication 3 MG: at 20:26

## 2022-05-31 RX ADMIN — GUAIFENESIN 1200 MG: 600 TABLET, EXTENDED RELEASE ORAL at 08:26

## 2022-05-31 RX ADMIN — Medication 10 ML: at 20:30

## 2022-05-31 RX ADMIN — CYCLOSPORINE 1 DROP: 0.5 EMULSION OPHTHALMIC at 08:27

## 2022-05-31 RX ADMIN — IPRATROPIUM BROMIDE AND ALBUTEROL SULFATE 3 ML: .5; 3 SOLUTION RESPIRATORY (INHALATION) at 15:10

## 2022-05-31 RX ADMIN — IPRATROPIUM BROMIDE AND ALBUTEROL SULFATE 3 ML: .5; 3 SOLUTION RESPIRATORY (INHALATION) at 07:14

## 2022-05-31 RX ADMIN — Medication 10 ML: at 20:25

## 2022-05-31 RX ADMIN — GUAIFENESIN 1200 MG: 600 TABLET, EXTENDED RELEASE ORAL at 20:26

## 2022-05-31 RX ADMIN — PROGESTERONE 100 MG: 100 CAPSULE ORAL at 20:26

## 2022-05-31 RX ADMIN — CEFTRIAXONE 1 G: 1 INJECTION, POWDER, FOR SOLUTION INTRAMUSCULAR; INTRAVENOUS at 01:01

## 2022-05-31 RX ADMIN — LEVOTHYROXINE, LIOTHYRONINE 90 MG: 57; 13.5 TABLET ORAL at 08:26

## 2022-05-31 RX ADMIN — ENOXAPARIN SODIUM 40 MG: 100 INJECTION SUBCUTANEOUS at 08:25

## 2022-05-31 RX ADMIN — IPRATROPIUM BROMIDE AND ALBUTEROL SULFATE 3 ML: .5; 3 SOLUTION RESPIRATORY (INHALATION) at 10:56

## 2022-06-01 ENCOUNTER — READMISSION MANAGEMENT (OUTPATIENT)
Dept: CALL CENTER | Facility: HOSPITAL | Age: 57
End: 2022-06-01

## 2022-06-01 ENCOUNTER — HOSPITAL ENCOUNTER (OUTPATIENT)
Facility: HOSPITAL | Age: 57
Setting detail: OBSERVATION
Discharge: HOME | End: 2022-06-03
Attending: EMERGENCY MEDICINE | Admitting: HOSPITALIST

## 2022-06-01 ENCOUNTER — APPOINTMENT (OUTPATIENT)
Dept: GENERAL RADIOLOGY | Facility: HOSPITAL | Age: 57
End: 2022-06-01

## 2022-06-01 VITALS
TEMPERATURE: 96.5 F | HEIGHT: 63 IN | WEIGHT: 156.9 LBS | OXYGEN SATURATION: 95 % | HEART RATE: 84 BPM | BODY MASS INDEX: 27.8 KG/M2 | SYSTOLIC BLOOD PRESSURE: 116 MMHG | DIASTOLIC BLOOD PRESSURE: 79 MMHG | RESPIRATION RATE: 16 BRPM

## 2022-06-01 DIAGNOSIS — J18.9 MULTIFOCAL PNEUMONIA: Primary | ICD-10-CM

## 2022-06-01 DIAGNOSIS — J96.01 ACUTE RESPIRATORY FAILURE WITH HYPOXIA: ICD-10-CM

## 2022-06-01 PROBLEM — I49.1 PREMATURE ATRIAL CONTRACTION: Status: RESOLVED | Noted: 2022-05-30 | Resolved: 2022-06-01

## 2022-06-01 LAB
AORTIC DIMENSIONLESS INDEX: 0.67 (DI)
BASOPHILS # BLD AUTO: 0.02 10*3/MM3 (ref 0–0.2)
BASOPHILS NFR BLD AUTO: 0.2 % (ref 0–1.5)
BH CV ECHO MEAS - ACS: 1.56 CM
BH CV ECHO MEAS - AO MAX PG: 7.7 MMHG
BH CV ECHO MEAS - AO MEAN PG: 4.6 MMHG
BH CV ECHO MEAS - AO V2 MAX: 138.8 CM/SEC
BH CV ECHO MEAS - AO V2 VTI: 27.8 CM
BH CV ECHO MEAS - AVA(I,D): 2.04 CM2
BH CV ECHO MEAS - EDV(CUBED): 96.6 ML
BH CV ECHO MEAS - EDV(MOD-SP2): 85 ML
BH CV ECHO MEAS - EDV(MOD-SP4): 77 ML
BH CV ECHO MEAS - EF(MOD-BP): 66.4 %
BH CV ECHO MEAS - EF(MOD-SP2): 68.2 %
BH CV ECHO MEAS - EF(MOD-SP4): 61 %
BH CV ECHO MEAS - ESV(CUBED): 22 ML
BH CV ECHO MEAS - ESV(MOD-SP2): 27 ML
BH CV ECHO MEAS - ESV(MOD-SP4): 30 ML
BH CV ECHO MEAS - FS: 38.9 %
BH CV ECHO MEAS - IVS/LVPW: 1.03 CM
BH CV ECHO MEAS - IVSD: 0.81 CM
BH CV ECHO MEAS - LAT PEAK E' VEL: 12.5 CM/SEC
BH CV ECHO MEAS - LV DIASTOLIC VOL/BSA (35-75): 44.2 CM2
BH CV ECHO MEAS - LV MASS(C)D: 117.1 GRAMS
BH CV ECHO MEAS - LV MAX PG: 4.5 MMHG
BH CV ECHO MEAS - LV MEAN PG: 2.21 MMHG
BH CV ECHO MEAS - LV SYSTOLIC VOL/BSA (12-30): 17.2 CM2
BH CV ECHO MEAS - LV V1 MAX: 106.5 CM/SEC
BH CV ECHO MEAS - LV V1 VTI: 20.7 CM
BH CV ECHO MEAS - LVIDD: 4.6 CM
BH CV ECHO MEAS - LVIDS: 2.8 CM
BH CV ECHO MEAS - LVOT AREA: 2.7 CM2
BH CV ECHO MEAS - LVOT DIAM: 1.87 CM
BH CV ECHO MEAS - LVPWD: 0.79 CM
BH CV ECHO MEAS - MED PEAK E' VEL: 8.4 CM/SEC
BH CV ECHO MEAS - MV A MAX VEL: 88.3 CM/SEC
BH CV ECHO MEAS - MV DEC SLOPE: 304.6 CM/SEC2
BH CV ECHO MEAS - MV DEC TIME: 0.2 MSEC
BH CV ECHO MEAS - MV E MAX VEL: 65.6 CM/SEC
BH CV ECHO MEAS - MV E/A: 0.74
BH CV ECHO MEAS - MV MAX PG: 3.4 MMHG
BH CV ECHO MEAS - MV MEAN PG: 0.93 MMHG
BH CV ECHO MEAS - MV P1/2T: 67.4 MSEC
BH CV ECHO MEAS - MV V2 VTI: 23.5 CM
BH CV ECHO MEAS - MVA(P1/2T): 3.3 CM2
BH CV ECHO MEAS - MVA(VTI): 2.41 CM2
BH CV ECHO MEAS - PA ACC TIME: 0.09 SEC
BH CV ECHO MEAS - PA PR(ACCEL): 39.8 MMHG
BH CV ECHO MEAS - PA V2 MAX: 75.2 CM/SEC
BH CV ECHO MEAS - QP/QS: 1.45
BH CV ECHO MEAS - RV MAX PG: 2 MMHG
BH CV ECHO MEAS - RV V1 MAX: 70.7 CM/SEC
BH CV ECHO MEAS - RV V1 VTI: 15.6 CM
BH CV ECHO MEAS - RVOT DIAM: 2.6 CM
BH CV ECHO MEAS - SI(MOD-SP2): 33.3 ML/M2
BH CV ECHO MEAS - SI(MOD-SP4): 27 ML/M2
BH CV ECHO MEAS - SV(LVOT): 56.8 ML
BH CV ECHO MEAS - SV(MOD-SP2): 58 ML
BH CV ECHO MEAS - SV(MOD-SP4): 47 ML
BH CV ECHO MEAS - SV(RVOT): 82.5 ML
BH CV ECHO MEASUREMENTS AVERAGE E/E' RATIO: 6.28
BH CV XLRA - RV BASE: 2.8 CM
BH CV XLRA - RV LENGTH: 6.4 CM
BH CV XLRA - TDI S': 10.4 CM/SEC
DEPRECATED RDW RBC AUTO: 44.6 FL (ref 37–54)
EOSINOPHIL # BLD AUTO: 0.15 10*3/MM3 (ref 0–0.4)
EOSINOPHIL NFR BLD AUTO: 1.3 % (ref 0.3–6.2)
ERYTHROCYTE [DISTWIDTH] IN BLOOD BY AUTOMATED COUNT: 13.2 % (ref 12.3–15.4)
HCT VFR BLD AUTO: 39.2 % (ref 34–46.6)
HGB BLD-MCNC: 13.4 G/DL (ref 12–15.9)
HOLD SPECIMEN: NORMAL
IMM GRANULOCYTES # BLD AUTO: 0.06 10*3/MM3 (ref 0–0.05)
IMM GRANULOCYTES NFR BLD AUTO: 0.5 % (ref 0–0.5)
LYMPHOCYTES # BLD AUTO: 0.88 10*3/MM3 (ref 0.7–3.1)
LYMPHOCYTES NFR BLD AUTO: 7.9 % (ref 19.6–45.3)
MAXIMAL PREDICTED HEART RATE: 163 BPM
MCH RBC QN AUTO: 32.1 PG (ref 26.6–33)
MCHC RBC AUTO-ENTMCNC: 34.2 G/DL (ref 31.5–35.7)
MCV RBC AUTO: 94 FL (ref 79–97)
MONOCYTES # BLD AUTO: 0.27 10*3/MM3 (ref 0.1–0.9)
MONOCYTES NFR BLD AUTO: 2.4 % (ref 5–12)
NEUTROPHILS NFR BLD AUTO: 87.7 % (ref 42.7–76)
NEUTROPHILS NFR BLD AUTO: 9.81 10*3/MM3 (ref 1.7–7)
NRBC BLD AUTO-RTO: 0 /100 WBC (ref 0–0.2)
NT-PROBNP SERPL-MCNC: 24.7 PG/ML (ref 0–900)
PLATELET # BLD AUTO: 219 10*3/MM3 (ref 140–450)
PMV BLD AUTO: 8.7 FL (ref 6–12)
RBC # BLD AUTO: 4.17 10*6/MM3 (ref 3.77–5.28)
SINUS: 3 CM
STRESS TARGET HR: 139 BPM
TROPONIN T SERPL-MCNC: <0.01 NG/ML (ref 0–0.03)
WBC NRBC COR # BLD: 11.19 10*3/MM3 (ref 3.4–10.8)
WHOLE BLOOD HOLD SPECIMEN: NORMAL

## 2022-06-01 PROCEDURE — 83605 ASSAY OF LACTIC ACID: CPT | Performed by: EMERGENCY MEDICINE

## 2022-06-01 PROCEDURE — C9803 HOPD COVID-19 SPEC COLLECT: HCPCS

## 2022-06-01 PROCEDURE — 84145 PROCALCITONIN (PCT): CPT | Performed by: EMERGENCY MEDICINE

## 2022-06-01 PROCEDURE — 85025 COMPLETE CBC W/AUTO DIFF WBC: CPT

## 2022-06-01 PROCEDURE — 25010000002 CEFTRIAXONE PER 250 MG

## 2022-06-01 PROCEDURE — 71046 X-RAY EXAM CHEST 2 VIEWS: CPT

## 2022-06-01 PROCEDURE — 99285 EMERGENCY DEPT VISIT HI MDM: CPT

## 2022-06-01 PROCEDURE — 94799 UNLISTED PULMONARY SVC/PX: CPT

## 2022-06-01 PROCEDURE — 94664 DEMO&/EVAL PT USE INHALER: CPT

## 2022-06-01 PROCEDURE — 25010000002 ENOXAPARIN PER 10 MG

## 2022-06-01 PROCEDURE — G0378 HOSPITAL OBSERVATION PER HR: HCPCS

## 2022-06-01 PROCEDURE — 94761 N-INVAS EAR/PLS OXIMETRY MLT: CPT

## 2022-06-01 PROCEDURE — 84484 ASSAY OF TROPONIN QUANT: CPT | Performed by: EMERGENCY MEDICINE

## 2022-06-01 PROCEDURE — 96372 THER/PROPH/DIAG INJ SC/IM: CPT

## 2022-06-01 PROCEDURE — 80053 COMPREHEN METABOLIC PANEL: CPT | Performed by: EMERGENCY MEDICINE

## 2022-06-01 PROCEDURE — 93005 ELECTROCARDIOGRAM TRACING: CPT | Performed by: EMERGENCY MEDICINE

## 2022-06-01 PROCEDURE — U0003 INFECTIOUS AGENT DETECTION BY NUCLEIC ACID (DNA OR RNA); SEVERE ACUTE RESPIRATORY SYNDROME CORONAVIRUS 2 (SARS-COV-2) (CORONAVIRUS DISEASE [COVID-19]), AMPLIFIED PROBE TECHNIQUE, MAKING USE OF HIGH THROUGHPUT TECHNOLOGIES AS DESCRIBED BY CMS-2020-01-R: HCPCS | Performed by: EMERGENCY MEDICINE

## 2022-06-01 PROCEDURE — 83880 ASSAY OF NATRIURETIC PEPTIDE: CPT | Performed by: EMERGENCY MEDICINE

## 2022-06-01 PROCEDURE — 93010 ELECTROCARDIOGRAM REPORT: CPT | Performed by: INTERNAL MEDICINE

## 2022-06-01 RX ORDER — CEFDINIR 300 MG/1
300 CAPSULE ORAL EVERY 12 HOURS SCHEDULED
Status: DISCONTINUED | OUTPATIENT
Start: 2022-06-01 | End: 2022-06-01 | Stop reason: HOSPADM

## 2022-06-01 RX ORDER — CEFDINIR 300 MG/1
300 CAPSULE ORAL EVERY 12 HOURS SCHEDULED
Qty: 2 CAPSULE | Refills: 0 | Status: SHIPPED | OUTPATIENT
Start: 2022-06-01 | End: 2022-06-03 | Stop reason: HOSPADM

## 2022-06-01 RX ORDER — SODIUM CHLORIDE 0.9 % (FLUSH) 0.9 %
10 SYRINGE (ML) INJECTION AS NEEDED
Status: DISCONTINUED | OUTPATIENT
Start: 2022-06-01 | End: 2022-06-03 | Stop reason: HOSPADM

## 2022-06-01 RX ADMIN — GUAIFENESIN 1200 MG: 600 TABLET, EXTENDED RELEASE ORAL at 09:18

## 2022-06-01 RX ADMIN — IPRATROPIUM BROMIDE AND ALBUTEROL SULFATE 3 ML: .5; 3 SOLUTION RESPIRATORY (INHALATION) at 07:06

## 2022-06-01 RX ADMIN — IPRATROPIUM BROMIDE AND ALBUTEROL SULFATE 3 ML: .5; 3 SOLUTION RESPIRATORY (INHALATION) at 11:01

## 2022-06-01 RX ADMIN — OXYCODONE HYDROCHLORIDE AND ACETAMINOPHEN 1000 MG: 500 TABLET ORAL at 09:18

## 2022-06-01 RX ADMIN — HYDROCHLOROTHIAZIDE 12.5 MG: 12.5 CAPSULE ORAL at 09:18

## 2022-06-01 RX ADMIN — LEVOTHYROXINE, LIOTHYRONINE 90 MG: 57; 13.5 TABLET ORAL at 06:32

## 2022-06-01 RX ADMIN — ENOXAPARIN SODIUM 40 MG: 100 INJECTION SUBCUTANEOUS at 09:18

## 2022-06-01 RX ADMIN — CYCLOSPORINE 1 DROP: 0.5 EMULSION OPHTHALMIC at 09:18

## 2022-06-01 RX ADMIN — CEFTRIAXONE 1 G: 1 INJECTION, POWDER, FOR SOLUTION INTRAMUSCULAR; INTRAVENOUS at 02:36

## 2022-06-01 RX ADMIN — LOSARTAN POTASSIUM 50 MG: 50 TABLET, FILM COATED ORAL at 09:18

## 2022-06-01 RX ADMIN — CETIRIZINE HYDROCHLORIDE 10 MG: 10 TABLET ORAL at 09:18

## 2022-06-01 NOTE — PLAN OF CARE
Goal Outcome Evaluation:  Plan of Care Reviewed With: patient        Progress: improving  Outcome Evaluation:     A&O x4. VSS. SR on telemetry. 1L via n/c.     Up ad-alanna.     IV ABX given.     Daily weights.       No c/o complaints of pain. No c/o shortness of air.   Will continue to monitor.

## 2022-06-01 NOTE — DISCHARGE SUMMARY
Patient Name: Cheri Lopez  : 1965  MRN: [id number]    Date of Admission: 2022  Date of Discharge:  2022  Primary Care Physician: Felicia Gonzalez MD      Chief Complaint:   Shortness of Breath      Discharge Diagnoses     Active Hospital Problems    Diagnosis  POA   • Multifocal pneumonia [J18.9]  Yes   • Hypertension [I10]  Yes   • DDD (degenerative disc disease), lumbar [M51.36]  Yes   • Adult hypothyroidism [E03.9]  Yes      Resolved Hospital Problems    Diagnosis Date Resolved POA   • **Multifocal pneumonia [J18.9] 2022 Yes   • Premature atrial contraction [I49.1] 2022 Yes   • Acute respiratory failure with hypoxia (HCC) [J96.01] 2022 Yes   • Sepsis (HCC) [A41.9] 2022 Yes        Hospital Course     Ms. Lopez is a 57 y.o. female with a history of hypothyroidism, hypertension who presented to [facility] initially complaining of cough with blood tinged sputum and shortness of breath.  Please see the admitting history and physical for further details.  She was found to have multifocal pneumonia causing acute hypoxic respiratory failure and was admitted to the hospital for further evaluation and treatment.      She was started on ceftraixone, nebs, supportive therapy, and her symptoms and hypoxia gradually improved and after several days she was off oxygen. She will be discharged with oral cefdinir twice daily to complete a 5 day course of antibiotics.     Her course was complicated by a short episode of symptomatic svt. She underwent echocardiogram and cardiology evaluation. Echocardiogram was fairly unremarkable with only grade 1 LVDD. It was felt that her svt was likely related to her pneumonia and no further workup or therapy was recommended.     Day of Discharge     Subjective:  No events overnight. She's off oxygen and anxious to get home.    Physical Exam:  Temp:  [96.5 °F (35.8 °C)-98.3 °F (36.8 °C)] 96.5 °F (35.8 °C)  Heart Rate:  [59-99]  84  Resp:  [16-18] 16  BP: (102-116)/(65-80) 116/79  Body mass index is 27.8 kg/m².  Physical Exam  Constitutional:       General: She is not in acute distress.     Appearance: Normal appearance.   Cardiovascular:      Rate and Rhythm: Normal rate and regular rhythm.      Heart sounds: Normal heart sounds.   Pulmonary:      Effort: Pulmonary effort is normal.      Breath sounds: Rhonchi and rales present.   Abdominal:      General: Bowel sounds are normal.      Palpations: Abdomen is soft.   Musculoskeletal:         General: No tenderness.      Right lower leg: No edema.      Left lower leg: No edema.   Neurological:      Mental Status: She is alert.   Psychiatric:         Mood and Affect: Mood normal.         Behavior: Behavior normal.         Consultants     Consult Orders (all) (From admission, onward)     Start     Ordered    05/30/22 1443  Inpatient Cardiology Consult  Once        Specialty:  Cardiology  Provider:  Schuyler Horn Jr., MD    05/30/22 1443    05/29/22 0956  Inpatient Case Management  Consult  Once        Provider:  (Not yet assigned)    05/29/22 0955    05/29/22 0142  [facility] (on-call MD unless specified) Details  Once,   Status:  Canceled        Specialty:  Hospitalist  Provider:  (Not yet assigned)    05/29/22 0141              Procedures     Imaging Results (All)     Procedure Component Value Units Date/Time    CT Angiogram Chest [[id number]] Collected: 05/29/22 0114     Updated: 05/29/22 0123    Narrative:      CT ANGIOGRAM OF THE CHEST     HISTORY: Shortness of breath.     COMPARISON: None available.     TECHNIQUE: Axial CT imaging was obtained through the thorax. IV contrast  was administered. Three-D reformatted images were obtained.     FINDINGS:  No acute pulmonary thromboembolus is seen. Thoracic aorta measures  within normal size limits. There is no evidence of dissection. The  thyroid gland, trachea, and esophagus appear unremarkable. There is no  pleural or pericardial  effusion. There do appear to be some mild  coronary artery calcifications. Hazy infiltrates are identified within  both lungs, with more confluent air is identified in the upper lobes  bilaterally. Appearance is concerning for multifocal pneumonia. There  are bilateral breast implants. Mediastinal lymph nodes do not appear  pathologically enlarged, although there are prominent hilar nodes. For  example, a right hilar node, measures up to 1.9 x 1.3 cm. These are  likely reactive. Images through the upper abdomen do not demonstrate any  acute abnormalities. No acute osseous abnormalities are seen.       Impression:         1. Hazy infiltrates seen throughout both lungs, with more confluent  areas of consolidation noted within the periphery upper lobes  bilaterally. Appearance is concerning for multifocal pneumonia.  Prominent hilar nodes are likely reactive.     Radiation dose reduction techniques were utilized, including automated  exposure control and exposure modulation based on body size.     This report was finalized on 5/29/2022 1:20 AM by Dr. Jane West M.D.       XR Chest 1 View [[id number]] Collected: 05/29/22 0016     Updated: 05/29/22 0020    Narrative:      SINGLE VIEW OF THE CHEST     HISTORY: Fever and shortness of air     COMPARISON: None available.     FINDINGS:  Heart size is within normal limits for technique. Patient appears to  have patchy infiltrates at the lung bases bilaterally, although the exam  is degraded by overlying soft tissues. No pneumothorax or pleural  effusion is seen.       Impression:      Suspected patchy infiltrates at the lung bases bilaterally.     This report was finalized on 5/29/2022 12:17 AM by Dr. Jane West M.D.             Pertinent Labs     Results from last 7 days   Lab Units 05/30/22  0650 05/28/22  2312   WBC 10*3/mm3 7.71 12.25*   HEMOGLOBIN g/dL 13.3 13.3   PLATELETS 10*3/mm3 240 213     Results from last 7 days   Lab Units 05/30/22  0650  05/28/22  2312   SODIUM mmol/L 138 131*   POTASSIUM mmol/L 4.3 4.0   CHLORIDE mmol/L 100 99   CO2 mmol/L 24.4 21.0*   BUN mg/dL 8 10   CREATININE mg/dL 0.68 0.67   GLUCOSE mg/dL 109* 130*   Estimated Creatinine Clearance: 86.3 mL/min (by C-G formula based on SCr of 0.68 mg/dL).  Results from last 7 days   Lab Units 05/28/22  2312   ALBUMIN g/dL 3.80   BILIRUBIN mg/dL 0.4   ALK PHOS U/L 168*   AST (SGOT) U/L 47*   ALT (SGPT) U/L 44*     Results from last 7 days   Lab Units 05/30/22  0650 05/28/22  2312   CALCIUM mg/dL 9.9 9.8   ALBUMIN g/dL  --  3.80   MAGNESIUM mg/dL 2.1  --        Results from last 7 days   Lab Units 05/28/22  2312   TROPONIN T ng/mL <0.010   PROBNP pg/mL 222.0           Invalid input(s): LDLCALC  Results from last 7 days   Lab Units 05/28/22  2344   BLOODCX  No growth at 3 days  No growth at 3 days       Test Results Pending at Discharge     Pending Labs     Order Current Status    Blood Culture - Blood, Arm, Left Preliminary result    Blood Culture - Blood, Arm, Left Preliminary result          Discharge Details        Discharge Medications      New Medications      Instructions Start Date   cefdinir 300 MG capsule  Commonly known as: OMNICEF   Take 1 capsule by mouth Every 12 (Twelve) Hours for 2 doses.         Continue These Medications      Instructions Start Date   albuterol sulfate  (90 Base) MCG/ACT inhaler  Commonly known as: PROVENTIL HFA;VENTOLIN HFA;PROAIR HFA   2 puffs, Inhalation, Every 4 Hours PRN      [place] Thyroid 90 MG tablet  Generic drug: Thyroid   No dose, route, or frequency recorded.      baclofen 10 MG tablet  Commonly known as: LIORESAL   TAKE ONE TABLET BY MOUTH THREE TIMES A DAY AS NEEDED FOR MUSCLE SPASMS      ciclopirox 0.77 % cream  Commonly known as: LOPROX   1 application, Topical, 2 Times Daily      clotrimazole-betamethasone 1-0.05 % cream  Commonly known as: Lotrisone   1 application, Topical, 2 Times Daily      cycloSPORINE 0.05 % ophthalmic  emulsion  Commonly known as: RESTASIS   1 drop, Both Eyes, Every 12 Hours      DHEA 10 MG capsule   1 capsule, Oral, Daily      estradiol 0.0375 MG/24HR patch  Commonly known as: VIVELLE-DOT   1 patch, Transdermal, 2 Times Weekly      FISH OIL PO   1 capsule, Oral, 2 times daily      hydroCHLOROthiazide 12.5 MG capsule  Commonly known as: MICROZIDE   TAKE ONE CAPSULE BY MOUTH DAILY      hydrocortisone butyrate 0.1 % ointment ointment  Commonly known as: Locoid   1 application, Topical, Every 12 Hours Scheduled      levocetirizine 5 MG tablet  Commonly known as: XYZAL   5 mg, Oral, Every Evening      losartan 50 MG tablet  Commonly known as: Cozaar   50 mg, Oral, Daily      Magnesium-Potassium 250-100 MG tablet   1 tablet, Oral, Daily      multivitamin with minerals tablet tablet   1 capsule, Oral, 2 times daily      progesterone 100 MG capsule  Commonly known as: PROMETRIUM   100 mg, Oral, Daily      VITAMIN B COMPLEX PO   200 mcg, Oral, Daily      VITAMIN C PO   1,000 mg, Oral, Daily      vitamin D3 125 MCG (5000 UT) capsule capsule   No dose, route, or frequency recorded.      vitamin E 400 UNIT capsule   400 Units, Oral, Daily             Allergies   Allergen Reactions   • Sulfa Antibiotics Other (See Comments)     Elevated liver enzymes     LONG TERM EFFECT ,FOUND IN BLOOD WORK         Discharge Disposition:  Home or Self Care    Discharge Diet:  No active diet order      Discharge Activity:   Activity Instructions     Activity as Tolerated            CODE STATUS:    Code Status and Medical Interventions:   Ordered at: 05/29/22 0613     Level Of Support Discussed With:    Patient     Code Status (Patient has no pulse and is not breathing):    CPR (Attempt to Resuscitate)     Medical Interventions (Patient has pulse or is breathing):    Full       Future Appointments   Date Time Provider Department Center   9/13/2022  3:00 PM Felicia Gonzalez MD [facility]     Additional Instructions for the Follow-ups that  You Need to Schedule     Call MD With Problems / Concerns   As directed      Instructions: return to the hosptial if you experience chest pain, shortness of breath, abdominal pain, nausea, vomiting, fevers, sweats, chills, or worsening of your symptoms    Order Comments: Instructions: return to the hosptial if you experience chest pain, shortness of breath, abdominal pain, nausea, vomiting, fevers, sweats, chills, or worsening of your symptoms          Discharge Follow-up with PCP   As directed       Currently Documented PCP:    Felicia Gonzalez MD    PCP Phone Number:    [phone]     Follow Up Details: 1-2 weeks            Follow-up Information     Felicia Gonzalez MD .    Specialty: Internal Medicine  Why: 1-2 weeks  Contact information:  [address] [address]  [phone]                         Additional Instructions for the Follow-ups that You Need to Schedule     Call MD With Problems / Concerns   As directed      Instructions: return to the hosptial if you experience chest pain, shortness of breath, abdominal pain, nausea, vomiting, fevers, sweats, chills, or worsening of your symptoms    Order Comments: Instructions: return to the hosptial if you experience chest pain, shortness of breath, abdominal pain, nausea, vomiting, fevers, sweats, chills, or worsening of your symptoms          Discharge Follow-up with PCP   As directed       Currently Documented PCP:    Felicia Gonzalez MD    PCP Phone Number:    [phone]     Follow Up Details: 1-2 weeks           Time Spent on Discharge:  Greater than 30 minutes      Darinel Ling MD  [facility]  06/01/22  14:42 EDT

## 2022-06-01 NOTE — OUTREACH NOTE
Prep Survey    Flowsheet Row Responses   [facility] patient discharged from? [place]   Is LACE score < 7 ? No   Emergency Room discharge w/ pulse ox? No   Eligibility [facility]   Date of Admission 06/28/22   Date of Discharge 06/01/22   Discharge Disposition Home or Self Care   Discharge diagnosis Multifocal pneumonia    Does the patient have one of the following disease processes/diagnoses(primary or secondary)? COPD/Pneumonia   Does the patient have Home health ordered? No   Is there a DME ordered? No   Prep survey completed? Yes          AGUSTINA Molina Registered Nurse

## 2022-06-02 ENCOUNTER — READMISSION MANAGEMENT (OUTPATIENT)
Dept: CALL CENTER | Facility: HOSPITAL | Age: 57
End: 2022-06-02

## 2022-06-02 ENCOUNTER — APPOINTMENT (OUTPATIENT)
Dept: ULTRASOUND IMAGING | Facility: HOSPITAL | Age: 57
End: 2022-06-02

## 2022-06-02 PROBLEM — J12.9 VIRAL PNEUMONIA: Status: ACTIVE | Noted: 2022-06-02

## 2022-06-02 PROBLEM — R79.89 ELEVATED LFTS: Status: ACTIVE | Noted: 2022-06-02

## 2022-06-02 LAB
ALBUMIN SERPL-MCNC: 3.6 G/DL (ref 3.5–5.2)
ALBUMIN SERPL-MCNC: 3.7 G/DL (ref 3.5–5.2)
ALBUMIN/GLOB SERPL: 0.9 G/DL
ALBUMIN/GLOB SERPL: 1.5 G/DL
ALP SERPL-CCNC: 231 U/L (ref 39–117)
ALP SERPL-CCNC: 255 U/L (ref 39–117)
ALT SERPL W P-5'-P-CCNC: 127 U/L (ref 1–33)
ALT SERPL W P-5'-P-CCNC: 148 U/L (ref 1–33)
ANION GAP SERPL CALCULATED.3IONS-SCNC: 13.9 MMOL/L (ref 5–15)
ANION GAP SERPL CALCULATED.3IONS-SCNC: 9 MMOL/L (ref 5–15)
AST SERPL-CCNC: 109 U/L (ref 1–32)
AST SERPL-CCNC: 130 U/L (ref 1–32)
BILIRUB SERPL-MCNC: 0.3 MG/DL (ref 0–1.2)
BILIRUB SERPL-MCNC: 0.3 MG/DL (ref 0–1.2)
BUN SERPL-MCNC: 11 MG/DL (ref 6–20)
BUN SERPL-MCNC: 12 MG/DL (ref 6–20)
BUN/CREAT SERPL: 16.2 (ref 7–25)
BUN/CREAT SERPL: 18 (ref 7–25)
CALCIUM SPEC-SCNC: 8.7 MG/DL (ref 8.6–10.5)
CALCIUM SPEC-SCNC: 9.6 MG/DL (ref 8.6–10.5)
CHLORIDE SERPL-SCNC: 100 MMOL/L (ref 98–107)
CHLORIDE SERPL-SCNC: 102 MMOL/L (ref 98–107)
CO2 SERPL-SCNC: 21.1 MMOL/L (ref 22–29)
CO2 SERPL-SCNC: 22 MMOL/L (ref 22–29)
CREAT SERPL-MCNC: 0.61 MG/DL (ref 0.57–1)
CREAT SERPL-MCNC: 0.74 MG/DL (ref 0.57–1)
D-LACTATE SERPL-SCNC: 1 MMOL/L (ref 0.5–2)
DEPRECATED RDW RBC AUTO: 45.4 FL (ref 37–54)
EGFRCR SERPLBLD CKD-EPI 2021: 104.4 ML/MIN/1.73
EGFRCR SERPLBLD CKD-EPI 2021: 94.5 ML/MIN/1.73
ERYTHROCYTE [DISTWIDTH] IN BLOOD BY AUTOMATED COUNT: 13.1 % (ref 12.3–15.4)
GLOBULIN UR ELPH-MCNC: 2.4 GM/DL
GLOBULIN UR ELPH-MCNC: 3.8 GM/DL
GLUCOSE SERPL-MCNC: 113 MG/DL (ref 65–99)
GLUCOSE SERPL-MCNC: 119 MG/DL (ref 65–99)
HAV IGM SERPL QL IA: NORMAL
HBV CORE IGM SERPL QL IA: NORMAL
HBV SURFACE AG SERPL QL IA: NORMAL
HCT VFR BLD AUTO: 34.8 % (ref 34–46.6)
HCV AB SER DONR QL: NORMAL
HGB BLD-MCNC: 12.1 G/DL (ref 12–15.9)
MCH RBC QN AUTO: 32.9 PG (ref 26.6–33)
MCHC RBC AUTO-ENTMCNC: 34.8 G/DL (ref 31.5–35.7)
MCV RBC AUTO: 94.6 FL (ref 79–97)
PLATELET # BLD AUTO: 230 10*3/MM3 (ref 140–450)
PMV BLD AUTO: 9.1 FL (ref 6–12)
POTASSIUM SERPL-SCNC: 3.6 MMOL/L (ref 3.5–5.2)
POTASSIUM SERPL-SCNC: 4.1 MMOL/L (ref 3.5–5.2)
PROCALCITONIN SERPL-MCNC: 0.06 NG/ML (ref 0–0.25)
PROT SERPL-MCNC: 6.1 G/DL (ref 6–8.5)
PROT SERPL-MCNC: 7.4 G/DL (ref 6–8.5)
QT INTERVAL: 311 MS
RBC # BLD AUTO: 3.68 10*6/MM3 (ref 3.77–5.28)
SARS-COV-2 RNA RESP QL NAA+PROBE: NOT DETECTED
SODIUM SERPL-SCNC: 133 MMOL/L (ref 136–145)
SODIUM SERPL-SCNC: 135 MMOL/L (ref 136–145)
WBC NRBC COR # BLD: 9.93 10*3/MM3 (ref 3.4–10.8)

## 2022-06-02 PROCEDURE — 94799 UNLISTED PULMONARY SVC/PX: CPT

## 2022-06-02 PROCEDURE — 96367 TX/PROPH/DG ADDL SEQ IV INF: CPT

## 2022-06-02 PROCEDURE — 25010000002 CEFTRIAXONE PER 250 MG: Performed by: NURSE PRACTITIONER

## 2022-06-02 PROCEDURE — 80053 COMPREHEN METABOLIC PANEL: CPT | Performed by: NURSE PRACTITIONER

## 2022-06-02 PROCEDURE — G0378 HOSPITAL OBSERVATION PER HR: HCPCS

## 2022-06-02 PROCEDURE — 96375 TX/PRO/DX INJ NEW DRUG ADDON: CPT

## 2022-06-02 PROCEDURE — 25010000002 METHYLPREDNISOLONE PER 40 MG: Performed by: HOSPITALIST

## 2022-06-02 PROCEDURE — 94664 DEMO&/EVAL PT USE INHALER: CPT

## 2022-06-02 PROCEDURE — 80074 ACUTE HEPATITIS PANEL: CPT | Performed by: NURSE PRACTITIONER

## 2022-06-02 PROCEDURE — 96365 THER/PROPH/DIAG IV INF INIT: CPT

## 2022-06-02 PROCEDURE — 76705 ECHO EXAM OF ABDOMEN: CPT

## 2022-06-02 PROCEDURE — 85027 COMPLETE CBC AUTOMATED: CPT | Performed by: NURSE PRACTITIONER

## 2022-06-02 PROCEDURE — 94761 N-INVAS EAR/PLS OXIMETRY MLT: CPT

## 2022-06-02 PROCEDURE — 94640 AIRWAY INHALATION TREATMENT: CPT

## 2022-06-02 RX ORDER — SODIUM CHLORIDE 0.9 % (FLUSH) 0.9 %
10 SYRINGE (ML) INJECTION AS NEEDED
Status: DISCONTINUED | OUTPATIENT
Start: 2022-06-02 | End: 2022-06-03 | Stop reason: HOSPADM

## 2022-06-02 RX ORDER — ACETAMINOPHEN 160 MG/5ML
650 SOLUTION ORAL EVERY 4 HOURS PRN
Status: DISCONTINUED | OUTPATIENT
Start: 2022-06-02 | End: 2022-06-03 | Stop reason: HOSPADM

## 2022-06-02 RX ORDER — HYDROCHLOROTHIAZIDE 12.5 MG/1
12.5 CAPSULE, GELATIN COATED ORAL DAILY
Status: DISCONTINUED | OUTPATIENT
Start: 2022-06-02 | End: 2022-06-03 | Stop reason: HOSPADM

## 2022-06-02 RX ORDER — IPRATROPIUM BROMIDE AND ALBUTEROL SULFATE 2.5; .5 MG/3ML; MG/3ML
3 SOLUTION RESPIRATORY (INHALATION) EVERY 4 HOURS PRN
Status: DISCONTINUED | OUTPATIENT
Start: 2022-06-02 | End: 2022-06-03 | Stop reason: HOSPADM

## 2022-06-02 RX ORDER — LOSARTAN POTASSIUM 50 MG/1
50 TABLET ORAL DAILY
Status: DISCONTINUED | OUTPATIENT
Start: 2022-06-02 | End: 2022-06-03 | Stop reason: HOSPADM

## 2022-06-02 RX ORDER — METHYLPREDNISOLONE SODIUM SUCCINATE 40 MG/ML
40 INJECTION, POWDER, LYOPHILIZED, FOR SOLUTION INTRAMUSCULAR; INTRAVENOUS EVERY 8 HOURS
Status: DISCONTINUED | OUTPATIENT
Start: 2022-06-02 | End: 2022-06-03 | Stop reason: HOSPADM

## 2022-06-02 RX ORDER — PROGESTERONE 100 MG/1
100 CAPSULE ORAL NIGHTLY
Status: DISCONTINUED | OUTPATIENT
Start: 2022-06-02 | End: 2022-06-03 | Stop reason: HOSPADM

## 2022-06-02 RX ORDER — SODIUM CHLORIDE 0.9 % (FLUSH) 0.9 %
10 SYRINGE (ML) INJECTION EVERY 12 HOURS SCHEDULED
Status: DISCONTINUED | OUTPATIENT
Start: 2022-06-02 | End: 2022-06-03 | Stop reason: HOSPADM

## 2022-06-02 RX ORDER — FAMOTIDINE 20 MG/1
20 TABLET, FILM COATED ORAL
Status: DISCONTINUED | OUTPATIENT
Start: 2022-06-02 | End: 2022-06-03 | Stop reason: HOSPADM

## 2022-06-02 RX ORDER — BACLOFEN 10 MG/1
10 TABLET ORAL 3 TIMES DAILY PRN
Status: DISCONTINUED | OUTPATIENT
Start: 2022-06-02 | End: 2022-06-03 | Stop reason: HOSPADM

## 2022-06-02 RX ORDER — ACETAMINOPHEN 650 MG/1
650 SUPPOSITORY RECTAL EVERY 4 HOURS PRN
Status: DISCONTINUED | OUTPATIENT
Start: 2022-06-02 | End: 2022-06-03 | Stop reason: HOSPADM

## 2022-06-02 RX ORDER — ACETAMINOPHEN 325 MG/1
650 TABLET ORAL EVERY 4 HOURS PRN
Status: DISCONTINUED | OUTPATIENT
Start: 2022-06-02 | End: 2022-06-03 | Stop reason: HOSPADM

## 2022-06-02 RX ORDER — CETIRIZINE HYDROCHLORIDE 10 MG/1
10 TABLET ORAL DAILY
Refills: 1 | Status: DISCONTINUED | OUTPATIENT
Start: 2022-06-02 | End: 2022-06-03 | Stop reason: HOSPADM

## 2022-06-02 RX ORDER — LEVOTHYROXINE AND LIOTHYRONINE 57; 13.5 UG/1; UG/1
90 TABLET ORAL
Status: DISCONTINUED | OUTPATIENT
Start: 2022-06-02 | End: 2022-06-03 | Stop reason: HOSPADM

## 2022-06-02 RX ORDER — ONDANSETRON 4 MG/1
4 TABLET, FILM COATED ORAL EVERY 6 HOURS PRN
Status: DISCONTINUED | OUTPATIENT
Start: 2022-06-02 | End: 2022-06-03 | Stop reason: HOSPADM

## 2022-06-02 RX ORDER — ALBUTEROL SULFATE 2.5 MG/3ML
2.5 SOLUTION RESPIRATORY (INHALATION)
Status: DISCONTINUED | OUTPATIENT
Start: 2022-06-02 | End: 2022-06-03 | Stop reason: HOSPADM

## 2022-06-02 RX ORDER — CYCLOSPORINE 0.5 MG/ML
1 EMULSION OPHTHALMIC EVERY 12 HOURS
Status: DISCONTINUED | OUTPATIENT
Start: 2022-06-02 | End: 2022-06-03 | Stop reason: HOSPADM

## 2022-06-02 RX ORDER — ONDANSETRON 2 MG/ML
4 INJECTION INTRAMUSCULAR; INTRAVENOUS EVERY 6 HOURS PRN
Status: DISCONTINUED | OUTPATIENT
Start: 2022-06-02 | End: 2022-06-03 | Stop reason: HOSPADM

## 2022-06-02 RX ORDER — CALCIUM CARBONATE 200(500)MG
2 TABLET,CHEWABLE ORAL 2 TIMES DAILY PRN
Status: DISCONTINUED | OUTPATIENT
Start: 2022-06-02 | End: 2022-06-03 | Stop reason: HOSPADM

## 2022-06-02 RX ORDER — IPRATROPIUM BROMIDE AND ALBUTEROL SULFATE 2.5; .5 MG/3ML; MG/3ML
3 SOLUTION RESPIRATORY (INHALATION)
Status: DISCONTINUED | OUTPATIENT
Start: 2022-06-02 | End: 2022-06-03 | Stop reason: HOSPADM

## 2022-06-02 RX ADMIN — METHYLPREDNISOLONE SODIUM SUCCINATE 40 MG: 40 INJECTION, POWDER, FOR SOLUTION INTRAMUSCULAR; INTRAVENOUS at 14:49

## 2022-06-02 RX ADMIN — IPRATROPIUM BROMIDE AND ALBUTEROL SULFATE 3 ML: .5; 3 SOLUTION RESPIRATORY (INHALATION) at 13:10

## 2022-06-02 RX ADMIN — LEVOTHYROXINE, LIOTHYRONINE 90 MG: 57; 13.5 TABLET ORAL at 07:06

## 2022-06-02 RX ADMIN — IPRATROPIUM BROMIDE AND ALBUTEROL SULFATE 3 ML: .5; 3 SOLUTION RESPIRATORY (INHALATION) at 06:50

## 2022-06-02 RX ADMIN — Medication 10 ML: at 08:53

## 2022-06-02 RX ADMIN — FAMOTIDINE 20 MG: 20 TABLET ORAL at 16:56

## 2022-06-02 RX ADMIN — DOXYCYCLINE 100 MG: 100 INJECTION, POWDER, LYOPHILIZED, FOR SOLUTION INTRAVENOUS at 12:31

## 2022-06-02 RX ADMIN — HYDROCHLOROTHIAZIDE 12.5 MG: 12.5 CAPSULE ORAL at 08:53

## 2022-06-02 RX ADMIN — CYCLOSPORINE 1 DROP: 0.5 EMULSION OPHTHALMIC at 07:05

## 2022-06-02 RX ADMIN — IPRATROPIUM BROMIDE AND ALBUTEROL SULFATE 3 ML: .5; 3 SOLUTION RESPIRATORY (INHALATION) at 16:17

## 2022-06-02 RX ADMIN — ACETAMINOPHEN 650 MG: 325 TABLET ORAL at 04:22

## 2022-06-02 RX ADMIN — CYCLOSPORINE 1 DROP: 0.5 EMULSION OPHTHALMIC at 21:03

## 2022-06-02 RX ADMIN — PROGESTERONE 100 MG: 100 CAPSULE ORAL at 21:01

## 2022-06-02 RX ADMIN — Medication 10 ML: at 21:01

## 2022-06-02 RX ADMIN — LOSARTAN POTASSIUM 50 MG: 50 TABLET, FILM COATED ORAL at 08:53

## 2022-06-02 RX ADMIN — CEFTRIAXONE 1 G: 1 INJECTION, POWDER, FOR SOLUTION INTRAMUSCULAR; INTRAVENOUS at 04:24

## 2022-06-02 RX ADMIN — CETIRIZINE HYDROCHLORIDE 10 MG: 10 TABLET ORAL at 08:52

## 2022-06-02 RX ADMIN — IPRATROPIUM BROMIDE AND ALBUTEROL SULFATE 3 ML: .5; 3 SOLUTION RESPIRATORY (INHALATION) at 19:58

## 2022-06-02 NOTE — H&P
Patient Name:  Cheri Lopez  YOB: 1965  MRN:  [id number]  Admit Date:  6/1/2022  Patient Care Team:  Felicia Gonzalez MD as PCP - General (Internal Medicine)  Idalia Morales MD as Consulting Physician (Obstetrics and Gynecology)      Subjective   History Present Illness     Chief Complaint   Patient presents with   • Shortness of Breath       Ms. Lopez is a 57 y.o. former smoker with a history of hypertension and hypothyroiidsm that presents to [facility] complaining of shortness of breath.  She reports she was discharged from the hospital yesterday following treatment for pneumonia and she felt fine.  She states she went home and performed some tasks and had a sudden onset of chills and rigors.  She states she became short of breath and felt like she could not catch her breath so she called EMS.  She reports nausea and a mild cough, denies vomiting, chest pain, fever, and sore throat.  Per ED, the patient was sating 90% on room air when she arrived.  She was placed on 3L NC and has been weaned down to 2L NC supplemental oxygen.  A COVID swab was negative.  A chest x-ray showed there may be some increasing consolidation at the left lung base when compared to prior exam.  Small bilateral pleural effusions are suspected.  She was admitted at [facility] from 5/28/22 to 6/1/22 and treated for multifocal pneumonia.  She received Rocephin while in the hospital and was discharged on PO Omnicef.      History of Present Illness  Review of Systems   Constitutional: Positive for chills. Negative for fever.   HENT: Negative for congestion and sore throat.    Eyes: Negative for photophobia and visual disturbance.   Respiratory: Positive for cough and shortness of breath.    Cardiovascular: Negative for chest pain, palpitations and leg swelling.   Gastrointestinal: Positive for nausea. Negative for abdominal pain and vomiting.   Endocrine: Negative for polydipsia,  polyphagia and polyuria.   Genitourinary: Negative for dysuria, flank pain and frequency.   Musculoskeletal: Negative for back pain and gait problem.   Skin: Negative for wound.   Neurological: Positive for headaches. Negative for dizziness and numbness.        Personal History     Past Medical History:   Diagnosis Date   • Arthritis    • Depression    • GERD (gastroesophageal reflux disease)    • H/O colonoscopy    • Hip pain    • Hypertension    • Hypothyroidism    • IBS (irritable bowel syndrome)    • Knee pain    • Low back pain    • Pneumonia 10/2011   • PONV (postoperative nausea and vomiting)      Past Surgical History:   Procedure Laterality Date   • BREAST AUGMENTATION  2007   • CARPAL TUNNEL RELEASE WITH CUBITAL TUNNEL RELEASE Right    • COLONOSCOPY N/A 2018    Procedure: COLONOSCOPY to cecum and t.i. with polypectomy;  Surgeon: Rima Luis MD;  Location: [facility] ENDOSCOPY;  Service:    • HIP DEBRIDEMENT Right    • KNEE ARTHROSCOPY Left 2020    Procedure: Left KNEE ARTHROSCOPy, partial medial menisectomy and debridement of arthritis;  Surgeon: Génesis Mg MD;  Location: [facility] OR [facility];  Service: Orthopedics;  Laterality: Left;   • KNEE CARTILAGE SURGERY Right    • SINUS SURGERY     • SKIN BIOPSY      hand - precancerous   • SKIN BIOPSY     • SPINAL FUSION      L4 &L5     Family History   Problem Relation Age of Onset   • Osteoarthritis Mother    • Heart disease Mother    • No Known Problems Brother    • Breast cancer Maternal Aunt    • Cancer Paternal Grandmother    • Malig Hyperthermia Neg Hx      Social History     Tobacco Use   • Smoking status: Former Smoker     Packs/day: 0.25     Years: 5.00     Pack years: 1.25     Quit date:      Years since quittin.4   • Smokeless tobacco: Never Used   Substance Use Topics   • Alcohol use: Yes     Alcohol/week: 14.0 standard drinks     Types: 14 Glasses of wine per week   • Drug use: Never     (Not in a hospital  admission)    Allergies:    Allergies   Allergen Reactions   • Sulfa Antibiotics Other (See Comments)     Elevated liver enzymes     LONG TERM EFFECT ,FOUND IN BLOOD WORK       Objective    Objective     Vital Signs  Temp:  [96.5 °F (35.8 °C)-100.2 °F (37.9 °C)] 99.3 °F (37.4 °C)  Heart Rate:  [] 89  Resp:  [16-26] 26  BP: ()/(53-79) 107/68  SpO2:  [91 %-100 %] 94 %  on  Flow (L/min):  [1-3] 2;   Device (Oxygen Therapy): nasal cannula  Body mass index is 27.82 kg/m².    Physical Exam  Vitals and nursing note reviewed.   Constitutional:       Appearance: Normal appearance.   HENT:      Head: Normocephalic and atraumatic.      Nose: Nose normal.      Mouth/Throat:      Mouth: Mucous membranes are moist.      Pharynx: Oropharynx is clear.   Eyes:      Extraocular Movements: Extraocular movements intact.      Conjunctiva/sclera: Conjunctivae normal.   Cardiovascular:      Rate and Rhythm: Normal rate and regular rhythm.      Pulses: Normal pulses.      Heart sounds: Normal heart sounds.   Pulmonary:      Effort: Pulmonary effort is normal. No respiratory distress.      Breath sounds: Normal breath sounds. No wheezing.   Abdominal:      General: Bowel sounds are normal.      Palpations: Abdomen is soft.   Musculoskeletal:         General: No swelling. Normal range of motion.      Cervical back: Normal range of motion and neck supple.   Skin:     General: Skin is warm and dry.   Neurological:      General: No focal deficit present.      Mental Status: She is alert.   Psychiatric:         Mood and Affect: Mood normal.         Behavior: Behavior normal.         Results Review:  I reviewed the patient's new clinical results.  I reviewed the patient's new imaging results and agree with the interpretation.  I reviewed the patient's other test results and agree with the interpretation  I personally viewed and interpreted the patient's EKG/Telemetry data  Discussed with ED provider.    Lab Results (last 24 hours)      Procedure Component Value Units Date/Time    CBC & Differential [[id number]]  (Abnormal) Collected: 06/01/22 2239    Specimen: Blood Updated: 06/01/22 2247    Narrative:      The following orders were created for panel order CBC & Differential.  Procedure                               Abnormality         Status                     ---------                               -----------         ------                     CBC Auto Differential[[id number]]        Abnormal            Final result                 Please view results for these tests on the individual orders.    Comprehensive Metabolic Panel [[id number]]  (Abnormal) Collected: 06/01/22 2239    Specimen: Blood Updated: 06/02/22 0020     Glucose 119 mg/dL      BUN 12 mg/dL      Creatinine 0.74 mg/dL      Sodium 135 mmol/L      Potassium 3.6 mmol/L      Chloride 100 mmol/L      CO2 21.1 mmol/L      Calcium 9.6 mg/dL      Total Protein 7.4 g/dL      Albumin 3.60 g/dL      ALT (SGPT) 127 U/L      AST (SGOT) 109 U/L      Alkaline Phosphatase 255 U/L      Total Bilirubin 0.3 mg/dL      Globulin 3.8 gm/dL      A/G Ratio 0.9 g/dL      BUN/Creatinine Ratio 16.2     Anion Gap 13.9 mmol/L      eGFR 94.5 mL/min/1.73      Comment: National Kidney Foundation and American Society of Nephrology (ASN) Task Force recommended calculation based on the Chronic Kidney Disease Epidemiology Collaboration (CKD-EPI) equation refit without adjustment for race.       Narrative:      GFR Normal >60  Chronic Kidney Disease <60  Kidney Failure <15      BNP [[id number]]  (Normal) Collected: 06/01/22 2239    Specimen: Blood Updated: 06/01/22 2317     proBNP 24.7 pg/mL     Narrative:      Among patients with dyspnea, NT-proBNP is highly sensitive for the detection of acute congestive heart failure. In addition NT-proBNP of <300 pg/ml effectively rules out acute congestive heart failure with 99% negative predictive value.    Results may be falsely decreased if patient taking Biotin.      Troponin  [[id number]]  (Normal) Collected: 06/01/22 2239    Specimen: Blood Updated: 06/01/22 2323     Troponin T <0.010 ng/mL     Narrative:      Troponin T Reference Range:  <= 0.03 ng/mL-   Negative for AMI  >0.03 ng/mL-     Abnormal for myocardial necrosis.  Clinicians would have to utilize clinical acumen, EKG, Troponin and serial changes to determine if it is an Acute Myocardial Infarction or myocardial injury due to an underlying chronic condition.       Results may be falsely decreased if patient taking Biotin.      CBC Auto Differential [[id number]]  (Abnormal) Collected: 06/01/22 2239    Specimen: Blood Updated: 06/01/22 2247     WBC 11.19 10*3/mm3      RBC 4.17 10*6/mm3      Hemoglobin 13.4 g/dL      Hematocrit 39.2 %      MCV 94.0 fL      MCH 32.1 pg      MCHC 34.2 g/dL      RDW 13.2 %      RDW-SD 44.6 fl      MPV 8.7 fL      Platelets 219 10*3/mm3      Neutrophil % 87.7 %      Lymphocyte % 7.9 %      Monocyte % 2.4 %      Eosinophil % 1.3 %      Basophil % 0.2 %      Immature Grans % 0.5 %      Neutrophils, Absolute 9.81 10*3/mm3      Lymphocytes, Absolute 0.88 10*3/mm3      Monocytes, Absolute 0.27 10*3/mm3      Eosinophils, Absolute 0.15 10*3/mm3      Basophils, Absolute 0.02 10*3/mm3      Immature Grans, Absolute 0.06 10*3/mm3      nRBC 0.0 /100 WBC     Procalcitonin [[id number]]  (Normal) Collected: 06/01/22 2239    Specimen: Blood Updated: 06/02/22 0157     Procalcitonin 0.06 ng/mL     Narrative:      As a Marker for Sepsis (Non-Neonates):    1. <0.5 ng/mL represents a low risk of severe sepsis and/or septic shock.  2. >2 ng/mL represents a high risk of severe sepsis and/or septic shock.    As a Marker for Lower Respiratory Tract Infections that require antibiotic therapy:    PCT on Admission    Antibiotic Therapy       6-12 Hrs later    >0.5                Strongly Recommended  >0.25 - <0.5        Recommended   0.1 - 0.25          Discouraged              Remeasure/reassess PCT  <0.1                Strongly  "Discouraged     Remeasure/reassess PCT    As 28 day mortality risk marker: \"Change in Procalcitonin Result\" (>80% or <=80%) if Day 0 (or Day 1) and Day 4 values are available. Refer to http://www.[facility]-pct-calculator.com    Change in PCT <=80%  A decrease of PCT levels below or equal to 80% defines a positive change in PCT test result representing a higher risk for 28-day all-cause mortality of patients diagnosed with severe sepsis for septic shock.    Change in PCT >80%  A decrease of PCT levels of more than 80% defines a negative change in PCT result representing a lower risk for 28-day all-cause mortality of patients diagnosed with severe sepsis or septic shock.       COVID-19,[facility] IN-HOUSE CEPHEID/ANNA MARIE NP SWAB IN TRANSPORT MEDIA 8-12 HR TAT - Swab, Nasopharynx [[id number]]  (Normal) Collected: 06/01/22 2319    Specimen: Swab from Nasopharynx Updated: 06/02/22 0018     COVID19 Not Detected    Narrative:      Fact sheet for providers: https://www.fda.gov/media/361813/download     Fact sheet for patients: https://www.fda.gov/media/823960/download    Lactic Acid, Plasma [[id number]]  (Normal) Collected: 06/01/22 2355    Specimen: Blood Updated: 06/02/22 0028     Lactate 1.0 mmol/L           Imaging Results (Last 24 Hours)     Procedure Component Value Units Date/Time    XR Chest 2 View [[id number]] Collected: 06/01/22 2328     Updated: 06/01/22 2333    Narrative:      PA AND LATERAL CHEST RADIOGRAPH     HISTORY: Shortness of air     COMPARISON: 05/28/2022     FINDINGS:  Heart size is within normal limits. There is somewhat hazy appearance at  the lung bases bilaterally. Similar findings were present on prior exam,  although consolidation at the left lung base may have increased  slightly. Small bilateral pleural effusions are suspected. No  pneumothorax is seen.       Impression:      There may be some increasing consolidation at the left lung base when  compared to prior exam. Small bilateral pleural effusions are " suspected.     This report was finalized on 6/1/2022 11:30 PM by Dr. Jane West M.D.             Results for orders placed during the hospital encounter of 05/28/22    Adult Transthoracic Echo Complete W/ Cont if Necessary Per Protocol    Interpretation Summary  · Calculated left ventricular EF = 66.4% Estimated left ventricular EF was in agreement with the calculated left ventricular EF. Left ventricular systolic function is normal.  · Left ventricular diastolic function is consistent with (grade I) impaired relaxation.  · Saline test results are negative.      ECG 12 Lead   Preliminary Result   HEART RATE= 121  bpm   RR Interval= 496  ms   WV Interval= 143  ms   P Horizontal Axis= -5  deg   P Front Axis= 41  deg   QRSD Interval= 90  ms   QT Interval= 311  ms   QRS Axis= 8  deg   T Wave Axis= -41  deg   - ABNORMAL ECG -   Sinus tachycardia   Probable left atrial enlargement   Low voltage, precordial leads   Nonspecific T abnormalities, diffuse leads   Electronically Signed By:    Date and Time of Study: 2022-06-01 23:18:32           Assessment/Plan     Active Hospital Problems    Diagnosis  POA   • **Multifocal pneumonia [J18.9]  Yes   • Elevated LFTs [R79.89]  Unknown   • Hypertension [I10]  Yes   • Adult hypothyroidism [E03.9]  Yes       Multifocal Pneumonia  -Appears to be worsening per chest x-ray  -She has been afebrile. WBC, procal, and lactate ok  -Initiate Rocephin 1 gm daily  -Continue supplemental oxygen to keep sats greater than or equal to 92%  -Scheduled and PRN Duonebs  -Mucinex q 12 H  -IS, pulmonary hygiene    Elevated LFTs  -Appears chronic but worse than baseline  -Does not appear medication related  -IVF  -Check hepatitis panel  -Repeat CMP in AM    Hypertension  -Blood pressures stable. Continue home regimen  -Monitor    Hypothyroidism  -Continue thyroid replacement    -I discussed the patients findings and my recommendations with patient.    VTE Prophylaxis - SCDs.  Code Status - Full  code.       IVANNA Funez  [facility]  06/02/22  04:58 EDT

## 2022-06-02 NOTE — OUTREACH NOTE
COPD/PN Week 2 Survey    Flowsheet Row Responses   [facility] facility patient discharged from? [place]   Does the patient have one of the following disease processes/diagnoses(primary or secondary)? COPD/Pneumonia   Week 2 attempt successful? No   Revoke Readmitted          SHERI Molina Registered Nurse

## 2022-06-02 NOTE — PLAN OF CARE
Goal Outcome Evaluation:  Plan of Care Reviewed With: patient        Progress: no change  Outcome Evaluation: pt transferred from CDU today, oxygen on at 1L, no cough noted, c/o mild SOA, iv abx ordered and iv steroids, iv saline locked, encouraged ambulation with oxygen, scds on, regular diet ordered, had liver US today, no acute distress noted

## 2022-06-02 NOTE — ED TRIAGE NOTES
Patient to ED via [facility] from home with complaint of SOA. Was discharged from here around noon today after a dx of pneumonia. 1 hour ago, patient became short of air with oxygen saturation of 87% at home. Was given a duo neb and is on 3 L nasal cannula, as well as some zofran.

## 2022-06-02 NOTE — H&P
This will serve as further attestation to NP note per Aide    I went to bedside about history and physical exam.  I reviewed recent medical records as patient was discharged per Dr. Ling on 6/1/2022.  Patient did fine at home but then ultimately developed symptoms that seem consistent with rigors and chills.  She tried to check her temperature but was unsuccessful secondary to the shaking.  She also became short of breath but did not really have issues with chest pain or abdominal pain nausea vomiting or diarrhea.  She is had a little bit of loose stool secondary to antibiotics but denies any profuse diarrhea.  She came back to the ER due to her concerns.  She is currently stable at 93-94% on room air.  She was treated with Rocephin while inpatient and transitioned over to cefdinir of which she has only 1 pill remaining.    I personally think this patient has COVID-pneumonia but this is most likely a viral pneumonia given its atypical multifocal appearance.  Previous antigens checked on previous admission noted and all unremarkable.  Blood cultures previously check show no growth to date and no need to repeat based on current lab work.  I think this patient should be transition to an antibiotic that has better atypical coverage and I will utilize doxycycline at this time as well as probiotics to help with mildly loose stool.  I also think I will start this patient on a steroid due to my concerns for viral component.  Her LFTs are also elevated which is classic with COVID though she has multiple COVID test negative x2.  Further precautions per hospital but I have already discontinued enhanced precautions based on the negative lab results.    I feel observation admission is appropriate at this time.    98.6, 86, 20, 109/74, 94% room air  Alert noted x3 very good historian very pleasant conversational and appears sickly but nontoxic.  No family at bedside.  Case discussed with RN  Normocephalic PERRLA [facility]  grossly clear no rigidity/JVD  S1-S2 RRR  Mild decrease of breath sounds to bases but very good air entry to bilateral bases and overall clear with the occasional rhonchi that clears with a cough.  No conversational dyspnea noted  Soft nontender positive bowel sounds no rebound no guarding no organomegaly  No edema cyanosis and patient moving all with baseline strength.  Denies any calf swelling erythema or edematous change  Cranial nerves II through XII are intact no focal deficits noted    I think the LFTs are reactive.  While here I will check a liver Doppler and trend CMP.  She has no past history of cholecystectomy.  Hepatitis panel nonreactive.  No symptomatic issues such as nausea vomiting or abdominal pain.  Alcohol abuse is not excessive and patient denies any recent use.    HTN controlled will continue medications    GI prophylaxis with Pepcid    SCDs are fine for DVT prophylaxis    I will trend a couple COVID labs such as CRP, ferritin, procalcitonin.  No sepsis present on admission with normal lactate and WBC    Further recommendations to follow as clinical course unfolds

## 2022-06-02 NOTE — ED PROVIDER NOTES
EMERGENCY DEPARTMENT ENCOUNTER  I wore full protective equipment throughout this patient encounter including a N95 mask, eye shield, gown and gloves. Hand hygiene was performed before donning protective equipment and after removal when leaving the room.    Room Number:  22/22  Date of encounter:  6/2/2022  PCP: Felicia Gonzalez MD    HPI:  Context: Cheri Lopez is a 57 y.o. female who presents to the ED c/o chief complaint of worsening shortness of air this evening.  Patient states she was discharged today with multifocal pneumonia.  She was sent home on Omnicef.  Patient states she was feeling fine and took her evening dose of antibiotics along with Mucinex DM at 8 PM.  At 9 PM, patient developed increasing shortness of air with rigors.  She tried to take her temperature but he states she was too short of air to do so.  She returned to the emergency department to make sure that her pneumonia has not gotten worse.  She states she was discharged today with an oxygen saturation of 92% on room air.  Patient states that she has not been immunized against SARS-CoV-2 but she has had COVID-19 2 or 3 times.  She states she was never diagnosed with pulmonary fibrosis.    MEDICAL HISTORY REVIEW  Reviewed in Epic.  She was discharged from the hospital today for multifocal pneumonia.  Her respiratory viral panel was negative on May 28, 2022.  Here is the summary of her stay:     Ms. Lopez is a 57 y.o. female with a history of hypothyroidism, hypertension who presented to [facility] initially complaining of cough with blood tinged sputum and shortness of breath.  Please see the admitting history and physical for further details.  She was found to have multifocal pneumonia causing acute hypoxic respiratory failure and was admitted to the hospital for further evaluation and treatment.       She was started on ceftraixone, nebs, supportive therapy, and her symptoms and hypoxia gradually improved and after  several days she was off oxygen. She will be discharged with oral cefdinir twice daily to complete a 5 day course of antibiotics.      Her course was complicated by a short episode of symptomatic svt. She underwent echocardiogram and cardiology evaluation. Echocardiogram was fairly unremarkable with only grade 1 LVDD. It was felt that her svt was likely related to her pneumonia and no further workup or therapy was recommended.       PAST MEDICAL HISTORY  Active Ambulatory Problems     Diagnosis Date Noted   • Chronic constipation 09/11/2017   • Blues 09/11/2017   • Adult hypothyroidism 09/11/2017   • Encounter for screening for malignant neoplasm of colon 12/13/2017   • Acute right-sided low back pain with right-sided sciatica 04/17/2018   • DDD (degenerative disc disease), lumbar 07/09/2018   • Chronic right-sided low back pain with right-sided sciatica 07/09/2018   • Right hip pain 07/09/2018   • Sacroiliac joint dysfunction 10/23/2018   • Allergic rhinitis due to allergen 02/18/2015   • Scoliosis due to degenerative disease of spine in adult patient 10/19/2017   • Low back pain 08/27/2015   • Neural foraminal stenosis of lumbar spine 04/20/2020   • Tear of medial meniscus of left knee, current 08/03/2020   • Hypertension    • Multifocal pneumonia 06/01/2022     Resolved Ambulatory Problems     Diagnosis Date Noted   • Other chronic pain 02/18/2019   • Multifocal pneumonia 05/29/2022   • Acute respiratory failure with hypoxia (HCC)    • Sepsis (HCC)    • Premature atrial contraction 05/30/2022     Past Medical History:   Diagnosis Date   • Arthritis    • Depression    • GERD (gastroesophageal reflux disease)    • H/O colonoscopy    • Hip pain    • Hypothyroidism    • IBS (irritable bowel syndrome)    • Knee pain    • Pneumonia 10/2011   • PONV (postoperative nausea and vomiting)        PAST SURGICAL HISTORY  Past Surgical History:   Procedure Laterality Date   • BREAST AUGMENTATION  01/17/2007   • CARPAL TUNNEL  RELEASE WITH CUBITAL TUNNEL RELEASE Right    • COLONOSCOPY N/A 2018    Procedure: COLONOSCOPY to cecum and t.i. with polypectomy;  Surgeon: Rima Luis MD;  Location: [facility] ENDOSCOPY;  Service:    • HIP DEBRIDEMENT Right    • KNEE ARTHROSCOPY Left 2020    Procedure: Left KNEE ARTHROSCOPy, partial medial menisectomy and debridement of arthritis;  Surgeon: Génesis Mg MD;  Location: [facility] OR [facility];  Service: Orthopedics;  Laterality: Left;   • KNEE CARTILAGE SURGERY Right    • SINUS SURGERY     • SKIN BIOPSY      hand - precancerous   • SKIN BIOPSY     • SPINAL FUSION      L4 &L5       FAMILY HISTORY  Family History   Problem Relation Age of Onset   • Osteoarthritis Mother    • Heart disease Mother    • No Known Problems Brother    • Breast cancer Maternal Aunt    • Cancer Paternal Grandmother    • Malig Hyperthermia Neg Hx        SOCIAL HISTORY  Social History     Socioeconomic History   • Marital status:    • Number of children: 0   • Years of education: ms   Tobacco Use   • Smoking status: Former Smoker     Packs/day: 0.25     Years: 5.00     Pack years: 1.25     Quit date:      Years since quittin.4   • Smokeless tobacco: Never Used   Substance and Sexual Activity   • Alcohol use: Yes     Alcohol/week: 14.0 standard drinks     Types: 14 Glasses of wine per week   • Drug use: Never       ALLERGIES  Sulfa antibiotics    The patient's allergies have been reviewed    REVIEW OF SYSTEMS  All systems reviewed and negative except for those discussed in HPI.     PHYSICAL EXAM  I have reviewed the triage vital signs and nursing notes.  ED Triage Vitals   Temp Heart Rate Resp BP SpO2   22 2314 22   100.2 °F (37.9 °C) 104 26 136/72 100 %      Temp src Heart Rate Source Patient Position BP Location FiO2 (%)   22 2314 22 --   Oral Monitor Lying Right arm          General: Mild  distress  HENT: NCAT, PERRL, Nares patent.  Eyes: no scleral icterus.  Neck: trachea midline, no ROM limitations.  CV: Tachycardic without murmur  Respiratory: normal effort, CTAB.  Abdomen: soft, nondistended, NTTP, no rebound tenderness, no guarding or rigidity.  Musculoskeletal: no deformity.  No edema or calf tenderness  Neuro: alert, moves all extremities, follows commands.  Skin: warm, dry.    LAB RESULTS  Recent Results (from the past 24 hour(s))   Comprehensive Metabolic Panel    Collection Time: 06/01/22 10:39 PM    Specimen: Blood   Result Value Ref Range    Glucose 119 (H) 65 - 99 mg/dL    BUN 12 6 - 20 mg/dL    Creatinine 0.74 0.57 - 1.00 mg/dL    Sodium 135 (L) 136 - 145 mmol/L    Potassium 3.6 3.5 - 5.2 mmol/L    Chloride 100 98 - 107 mmol/L    CO2 21.1 (L) 22.0 - 29.0 mmol/L    Calcium 9.6 8.6 - 10.5 mg/dL    Total Protein 7.4 6.0 - 8.5 g/dL    Albumin 3.60 3.50 - 5.20 g/dL    ALT (SGPT) 127 (H) 1 - 33 U/L    AST (SGOT) 109 (H) 1 - 32 U/L    Alkaline Phosphatase 255 (H) 39 - 117 U/L    Total Bilirubin 0.3 0.0 - 1.2 mg/dL    Globulin 3.8 gm/dL    A/G Ratio 0.9 g/dL    BUN/Creatinine Ratio 16.2 7.0 - 25.0    Anion Gap 13.9 5.0 - 15.0 mmol/L    eGFR 94.5 >60.0 mL/min/1.73   BNP    Collection Time: 06/01/22 10:39 PM    Specimen: Blood   Result Value Ref Range    proBNP 24.7 0.0 - 900.0 pg/mL   Troponin    Collection Time: 06/01/22 10:39 PM    Specimen: Blood   Result Value Ref Range    Troponin T <0.010 0.000 - 0.030 ng/mL   Green Top (Gel)    Collection Time: 06/01/22 10:39 PM   Result Value Ref Range    Extra Tube Hold for add-ons.    Lavender Top    Collection Time: 06/01/22 10:39 PM   Result Value Ref Range    Extra Tube hold for add-on    CBC Auto Differential    Collection Time: 06/01/22 10:39 PM    Specimen: Blood   Result Value Ref Range    WBC 11.19 (H) 3.40 - 10.80 10*3/mm3    RBC 4.17 3.77 - 5.28 10*6/mm3    Hemoglobin 13.4 12.0 - 15.9 g/dL    Hematocrit 39.2 34.0 - 46.6 %    MCV 94.0 79.0 -  97.0 fL    MCH 32.1 26.6 - 33.0 pg    MCHC 34.2 31.5 - 35.7 g/dL    RDW 13.2 12.3 - 15.4 %    RDW-SD 44.6 37.0 - 54.0 fl    MPV 8.7 6.0 - 12.0 fL    Platelets 219 140 - 450 10*3/mm3    Neutrophil % 87.7 (H) 42.7 - 76.0 %    Lymphocyte % 7.9 (L) 19.6 - 45.3 %    Monocyte % 2.4 (L) 5.0 - 12.0 %    Eosinophil % 1.3 0.3 - 6.2 %    Basophil % 0.2 0.0 - 1.5 %    Immature Grans % 0.5 0.0 - 0.5 %    Neutrophils, Absolute 9.81 (H) 1.70 - 7.00 10*3/mm3    Lymphocytes, Absolute 0.88 0.70 - 3.10 10*3/mm3    Monocytes, Absolute 0.27 0.10 - 0.90 10*3/mm3    Eosinophils, Absolute 0.15 0.00 - 0.40 10*3/mm3    Basophils, Absolute 0.02 0.00 - 0.20 10*3/mm3    Immature Grans, Absolute 0.06 (H) 0.00 - 0.05 10*3/mm3    nRBC 0.0 0.0 - 0.2 /100 WBC   Procalcitonin    Collection Time: 06/01/22 10:39 PM    Specimen: Blood   Result Value Ref Range    Procalcitonin 0.06 0.00 - 0.25 ng/mL   ECG 12 Lead    Collection Time: 06/01/22 11:18 PM   Result Value Ref Range    QT Interval 311 ms   COVID-19, [facility] IN-HOUSE CEPHEID/ANNA MARIE NP SWAB IN TRANSPORT MEDIA 8-12 HR TAT - Swab, Nasopharynx    Collection Time: 06/01/22 11:19 PM    Specimen: Nasopharynx; Swab   Result Value Ref Range    COVID19 Not Detected Not Detected - Ref. Range   Lactic Acid, Plasma    Collection Time: 06/01/22 11:55 PM    Specimen: Blood   Result Value Ref Range    Lactate 1.0 0.5 - 2.0 mmol/L       I ordered the above labs and reviewed the results.    RADIOLOGY  XR Chest 2 View    Result Date: 6/1/2022  PA AND LATERAL CHEST RADIOGRAPH  HISTORY: Shortness of air  COMPARISON: 05/28/2022  FINDINGS: Heart size is within normal limits. There is somewhat hazy appearance at the lung bases bilaterally. Similar findings were present on prior exam, although consolidation at the left lung base may have increased slightly. Small bilateral pleural effusions are suspected. No pneumothorax is seen.      There may be some increasing consolidation at the left lung base when compared to prior  exam. Small bilateral pleural effusions are suspected.  This report was finalized on 6/1/2022 11:30 PM by Dr. Jane West M.D.        I ordered the above noted radiological studies. I reviewed the images and results. I agree with the radiologist interpretation.    PROCEDURES  Procedures  EKG          EKG time: 2318  Rhythm/Rate: Tachycardia, rate 121  P waves and [place]: normal  QRS, axis: normal   ST and T waves: Specific T wave change    Interpreted Contemporaneously by me, independently viewed  changed compared to prior 05/30/2022    MEDICATIONS GIVEN IN ER  Medications   sodium chloride 0.9 % flush 10 mL (has no administration in time range)       PROGRESS, DATA ANALYSIS, CONSULTS, AND MEDICAL DECISION MAKING  A complete history and physical exam have been performed.  All available laboratory and imaging results have been reviewed by myself prior to disposition.    MDM  After the initial H&P, I discussed pertinent information from history and physical exam with patient/family.  Discussed differential diagnosis.  Discussed plan for ED evaluation/workup/treatment.  All questions answered.  Patient/family is agreeable with plan.  ED Course as of 06/02/22 0256   Wed Jun 01, 2022   2323 Patient presents with increasing shortness of air this evening.  She was just discharged for multifocal pneumonia.  She has a temperature of 100.2 here.  We will check for sepsis and repeat her COVID-19 swab this evening. [DE]   Thu Jun 02, 2022   0030 COVID19: Not Detected [DE]   0030 Lactate: 1.0 [DE]   0118 Patient's oxygen saturation got down to 88% on room air and she became quite short of air with ambulation.  I will contact [facility] for admission. [DE]   0132 I discussed the case with IVANNA Soto for Dr. Paz, [facility].  They are admit patient to the hospital as a MedSurg, observation patient. [DE]      ED Course User Index  [DE] Saw Werner MD       AS OF 02:56 EDT VITALS:    BP - 95/53  HR - 93  TEMP - 100.2 °F (37.9 °C)  (Oral)  O2 SATS - 92%    DIAGNOSIS  Final diagnoses:   Multifocal pneumonia   Acute respiratory failure with hypoxia (HCC)         DISPOSITION  ADMISSION    Discussed treatment plan and reason for admission with pt/family and admitting physician.  Pt/family voiced understanding of the plan for admission for further testing/treatment as needed.           Saw Werner MD  06/02/22 0257

## 2022-06-03 ENCOUNTER — READMISSION MANAGEMENT (OUTPATIENT)
Dept: CALL CENTER | Facility: HOSPITAL | Age: 57
End: 2022-06-03

## 2022-06-03 VITALS
DIASTOLIC BLOOD PRESSURE: 80 MMHG | HEIGHT: 63 IN | WEIGHT: 154.1 LBS | HEART RATE: 80 BPM | BODY MASS INDEX: 27.3 KG/M2 | OXYGEN SATURATION: 95 % | TEMPERATURE: 97.8 F | RESPIRATION RATE: 18 BRPM | SYSTOLIC BLOOD PRESSURE: 128 MMHG

## 2022-06-03 LAB
ALBUMIN SERPL-MCNC: 3.6 G/DL (ref 3.5–5.2)
ALBUMIN/GLOB SERPL: 1.2 G/DL
ALP SERPL-CCNC: 239 U/L (ref 39–117)
ALT SERPL W P-5'-P-CCNC: 224 U/L (ref 1–33)
ANION GAP SERPL CALCULATED.3IONS-SCNC: 9.6 MMOL/L (ref 5–15)
AST SERPL-CCNC: 139 U/L (ref 1–32)
BACTERIA SPEC AEROBE CULT: NORMAL
BACTERIA SPEC AEROBE CULT: NORMAL
BILIRUB SERPL-MCNC: 0.2 MG/DL (ref 0–1.2)
BUN SERPL-MCNC: 13 MG/DL (ref 6–20)
BUN/CREAT SERPL: 25 (ref 7–25)
CALCIUM SPEC-SCNC: 9 MG/DL (ref 8.6–10.5)
CHLORIDE SERPL-SCNC: 102 MMOL/L (ref 98–107)
CO2 SERPL-SCNC: 20.4 MMOL/L (ref 22–29)
CREAT SERPL-MCNC: 0.52 MG/DL (ref 0.57–1)
EGFRCR SERPLBLD CKD-EPI 2021: 108.5 ML/MIN/1.73
GLOBULIN UR ELPH-MCNC: 2.9 GM/DL
GLUCOSE SERPL-MCNC: 142 MG/DL (ref 65–99)
POTASSIUM SERPL-SCNC: 4.1 MMOL/L (ref 3.5–5.2)
PROT SERPL-MCNC: 6.5 G/DL (ref 6–8.5)
SODIUM SERPL-SCNC: 132 MMOL/L (ref 136–145)

## 2022-06-03 PROCEDURE — 80053 COMPREHEN METABOLIC PANEL: CPT | Performed by: HOSPITALIST

## 2022-06-03 PROCEDURE — 94799 UNLISTED PULMONARY SVC/PX: CPT

## 2022-06-03 PROCEDURE — 25010000002 METHYLPREDNISOLONE PER 40 MG: Performed by: HOSPITALIST

## 2022-06-03 PROCEDURE — 36415 COLL VENOUS BLD VENIPUNCTURE: CPT | Performed by: HOSPITALIST

## 2022-06-03 PROCEDURE — 96376 TX/PRO/DX INJ SAME DRUG ADON: CPT

## 2022-06-03 PROCEDURE — G0378 HOSPITAL OBSERVATION PER HR: HCPCS

## 2022-06-03 RX ORDER — PREDNISONE 20 MG/1
40 TABLET ORAL DAILY
Qty: 10 TABLET | Refills: 0 | Status: SHIPPED | OUTPATIENT
Start: 2022-06-03 | End: 2022-06-08

## 2022-06-03 RX ORDER — DOXYCYCLINE HYCLATE 100 MG/1
100 CAPSULE ORAL 2 TIMES DAILY
Qty: 10 CAPSULE | Refills: 0 | Status: SHIPPED | OUTPATIENT
Start: 2022-06-03 | End: 2022-06-08

## 2022-06-03 RX ORDER — FAMOTIDINE 20 MG/1
20 TABLET, FILM COATED ORAL
Qty: 14 TABLET | Refills: 0 | Status: SHIPPED | OUTPATIENT
Start: 2022-06-03 | End: 2022-06-13

## 2022-06-03 RX ADMIN — Medication 10 ML: at 08:41

## 2022-06-03 RX ADMIN — METHYLPREDNISOLONE SODIUM SUCCINATE 40 MG: 40 INJECTION, POWDER, FOR SOLUTION INTRAMUSCULAR; INTRAVENOUS at 00:09

## 2022-06-03 RX ADMIN — METHYLPREDNISOLONE SODIUM SUCCINATE 40 MG: 40 INJECTION, POWDER, FOR SOLUTION INTRAMUSCULAR; INTRAVENOUS at 12:39

## 2022-06-03 RX ADMIN — IPRATROPIUM BROMIDE AND ALBUTEROL SULFATE 3 ML: .5; 3 SOLUTION RESPIRATORY (INHALATION) at 12:43

## 2022-06-03 RX ADMIN — CETIRIZINE HYDROCHLORIDE 10 MG: 10 TABLET ORAL at 08:41

## 2022-06-03 RX ADMIN — DOXYCYCLINE 100 MG: 100 INJECTION, POWDER, LYOPHILIZED, FOR SOLUTION INTRAVENOUS at 12:39

## 2022-06-03 RX ADMIN — FAMOTIDINE 20 MG: 20 TABLET ORAL at 06:38

## 2022-06-03 RX ADMIN — LEVOTHYROXINE, LIOTHYRONINE 90 MG: 57; 13.5 TABLET ORAL at 06:38

## 2022-06-03 RX ADMIN — METHYLPREDNISOLONE SODIUM SUCCINATE 40 MG: 40 INJECTION, POWDER, FOR SOLUTION INTRAMUSCULAR; INTRAVENOUS at 06:34

## 2022-06-03 RX ADMIN — CYCLOSPORINE 1 DROP: 0.5 EMULSION OPHTHALMIC at 06:34

## 2022-06-03 RX ADMIN — IPRATROPIUM BROMIDE AND ALBUTEROL SULFATE 3 ML: .5; 3 SOLUTION RESPIRATORY (INHALATION) at 08:00

## 2022-06-03 RX ADMIN — DOXYCYCLINE 100 MG: 100 INJECTION, POWDER, LYOPHILIZED, FOR SOLUTION INTRAVENOUS at 00:09

## 2022-06-03 NOTE — NURSING NOTE
Patient was given discharge instructions.  All questions answered. Pt waiting on her medications from the retail pharmacy.  Pt ready to go home.

## 2022-06-03 NOTE — PROGRESS NOTES
Continued Stay Note  [facility]     Patient Name: Cheri Lopez  MRN: [id number]  Today's Date: 6/3/2022    Admit Date: 6/1/2022     Discharge Plan     Row Name 06/03/22 1206       Plan    Plan Home no needs    Plan Comments Met with patient who confirmed DC plan is to return home. Stated spouse will assist as needed and will provide transportation at DC. Denies any needs/equipment.               Discharge Codes    No documentation.               Expected Discharge Date and Time     Expected Discharge Date Expected Discharge Time    Aba 3, 2022             Claritza Gonzalez RN

## 2022-06-03 NOTE — OUTREACH NOTE
Prep Survey    Flowsheet Row Responses   [facility] patient discharged from? [place]   Is LACE score < 7 ? Yes   Emergency Room discharge w/ pulse ox? No   Eligibility [facility]   Date of Admission 06/01/22   Date of Discharge 06/03/22   Discharge Disposition Home or Self Care   Discharge diagnosis Multifocal pneumonia    Does the patient have one of the following disease processes/diagnoses(primary or secondary)? COPD/Pneumonia   Does the patient have Home health ordered? No   Is there a DME ordered? No   Prep survey completed? Yes          AGUSTINA Molina Registered Nurse

## 2022-06-03 NOTE — PLAN OF CARE
Goal Outcome Evaluation:  Plan of Care Reviewed With: patient        Progress: improving  Outcome Evaluation: O2 on at 1lioter keeping o2 sats in mid to high 90's. Infrequent cough. Lungs with dimished breath sounds. No acute sob tonight. Extension tubing on o2 to reach to bathroom. Continues on solumedrol and vibramycin

## 2022-06-03 NOTE — DISCHARGE SUMMARY
[facility]    Date of Discharge:  6/3/2022    PCP: Felicia Gonzalez MD    Discharge Diagnosis:   Active Hospital Problems    Diagnosis  POA   • **Multifocal pneumonia [J18.9]  Yes   • Elevated LFTs [R79.89]  Unknown   • Viral pneumonia [J12.9]  Unknown   • Hypertension [I10]  Yes   • Adult hypothyroidism [E03.9]  Yes      Resolved Hospital Problems   No resolved problems to display.          Consults     Date and Time Order Name Status Description    6/2/2022  1:19 AM [facility] (on-call MD unless specified) Details      5/30/2022  2:43 PM Inpatient Cardiology Consult Completed         Hospital Course  57 y.o. female who was just discharged the day before by Dr. Ling and came back due to worsening complaints of chills/rigors as well as dyspnea.  She was formally treated with Rocephin and discharged on Omnicef.  Upon return to the hospital you can clearly see a multifocal pneumonia on imaging and I personally believe this is likely COVID in etiology especially given the abnormal LFTs.  I treated her with IV steroid and also switched her over to IV doxycycline for atypical coverage but also because she is an avid fishing Angler and outdoors type of person.  Patient was brought in and clinical improvement was quite drastic over the last 24 hours.  She is now on room air as she did require some low-dose oxygen.  I think she will continue to clinically improve and I do not feel oxygen is warranted post discharge.  I will transition her to p.o. prednisone and pulse her quickly with just an additional 5 days post discharge with no additional taper.  I will continue the doxycycline to complete a week's dose of treatment.  I anticipate this patient will have a full recovery.  COVID testing was actually negative the last couple times but I question the validity of that test.  All questions been answered to the patient she is very thankful of the care she received while here  as well as amenable to the above plan.  She denies any additional discharge needs that she should follow-up with PCP in the next couple weeks.  Upon that follow-up I would suggest repeat of LFTs which should be completely resolved by then.  There was slight increase over the last 24 hours but given clinical improvement and the fact that I feel this patient is reliable and will follow-up with PCP, I went ahead and granted discharge today.  On day of discharge her ALP is 239, , .  Her total bilirubin is normal at 0.2.  Further work-up of LFTs have demonstrated a normal ultrasound of the liver but she does possess some gallbladder stones and I counseled further surgical evaluation as an outpatient.  Her hepatitis panel is unremarkable.  Again I think these LFTs are secondary to viral illness.  When she does follow-up PCP in the next couple weeks that should be completely resolved.  All questions been answered to patient she is thankful for the care she received while here as well as completely amenable to the above plan for discharge today.     Condition on Discharge: Improved.     Temp:  [97.2 °F (36.2 °C)-98.7 °F (37.1 °C)] 97.8 °F (36.6 °C)  Heart Rate:  [66-92] 80  Resp:  [16-24] 18  BP: ()/(61-80) 128/80  Body mass index is 27.3 kg/m².    Physical Exam  HENT:      Head: Normocephalic.      Nose: Nose normal.      Mouth/Throat:      Mouth: Mucous membranes are moist.      Pharynx: Oropharynx is clear.   Eyes:      General: No scleral icterus.     Conjunctiva/sclera: Conjunctivae normal.   Cardiovascular:      Rate and Rhythm: Normal rate and regular rhythm.   Pulmonary:      Effort: Pulmonary effort is normal. No respiratory distress.      Breath sounds: Normal breath sounds.   Abdominal:      General: Bowel sounds are normal. There is no distension.      Palpations: Abdomen is soft. There is no mass.      Tenderness: There is no abdominal tenderness. There is no guarding or rebound.    Musculoskeletal:         General: No swelling.   Skin:     General: Skin is warm and dry.      Coloration: Skin is not jaundiced.   Neurological:      General: No focal deficit present.      Mental Status: She is alert and oriented to person, place, and time. Mental status is at baseline.   Psychiatric:         Mood and Affect: Mood normal.         Behavior: Behavior normal.         Thought Content: Thought content normal.         Judgment: Judgment normal.       Disposition: Home or Self Care       Discharge Medications      New Medications      Instructions Start Date   doxycycline 100 MG capsule  Commonly known as: VIBRAMYCIN   100 mg, Oral, 2 Times Daily      famotidine 20 MG tablet  Commonly known as: PEPCID   20 mg, Oral, 2 Times Daily Before Meals      predniSONE 20 MG tablet  Commonly known as: DELTASONE   40 mg, Oral, Daily         Continue These Medications      Instructions Start Date   albuterol sulfate  (90 Base) MCG/ACT inhaler  Commonly known as: PROVENTIL HFA;VENTOLIN HFA;PROAIR HFA   2 puffs, Inhalation, Every 4 Hours PRN      [place] Thyroid 90 MG tablet  Generic drug: Thyroid   No dose, route, or frequency recorded.      baclofen 10 MG tablet  Commonly known as: LIORESAL   TAKE ONE TABLET BY MOUTH THREE TIMES A DAY AS NEEDED FOR MUSCLE SPASMS      cycloSPORINE 0.05 % ophthalmic emulsion  Commonly known as: RESTASIS   1 drop, Both Eyes, Every 12 Hours      FISH OIL PO   1 capsule, Oral, 2 times daily      hydroCHLOROthiazide 12.5 MG capsule  Commonly known as: MICROZIDE   TAKE ONE CAPSULE BY MOUTH DAILY      levocetirizine 5 MG tablet  Commonly known as: XYZAL   5 mg, Oral, Every Evening      losartan 50 MG tablet  Commonly known as: Cozaar   50 mg, Oral, Daily      Magnesium-Potassium 250-100 MG tablet   1 tablet, Oral, Daily      multivitamin with minerals tablet tablet   1 capsule, Oral, 2 times daily      progesterone 100 MG capsule  Commonly known as: PROMETRIUM   100 mg, Oral, Daily       VITAMIN B COMPLEX PO   200 mcg, Oral, Daily      VITAMIN C PO   1,000 mg, Oral, Daily      vitamin D3 125 MCG (5000 UT) capsule capsule   No dose, route, or frequency recorded.      vitamin E 400 UNIT capsule   400 Units, Oral, Daily         Stop These Medications    cefdinir 300 MG capsule  Commonly known as: OMNICEF             Additional Instructions for the Follow-ups that You Need to Schedule     Discharge Follow-up with PCP   As directed       Currently Documented PCP:    Felicia Gonzalez MD    PCP Phone Number:    [phone]     Follow Up Details: PCP in 2 weeks            Follow-up Information     Felicia Gonzalez MD .    Specialty: Internal Medicine  Why: PCP in 2 weeks  Contact information:  [address] [address]  [phone]                           Feliberto Oseguera MD  06/03/22  12:07 EDT    Discharge time spent greater than 30 minutes.

## 2022-06-06 ENCOUNTER — TRANSITIONAL CARE MANAGEMENT TELEPHONE ENCOUNTER (OUTPATIENT)
Dept: CALL CENTER | Facility: HOSPITAL | Age: 57
End: 2022-06-06

## 2022-06-06 NOTE — OUTREACH NOTE
Call Center TCM Note    Flowsheet Row Responses   [facility] patient discharged from? [place]   Does the patient have one of the following disease processes/diagnoses(primary or secondary)? COPD/Pneumonia   Was the primary reason for admission: Pneumonia   TCM attempt successful? Yes   Discharge diagnosis Multifocal pneumonia    Meds reviewed with patient/caregiver? Yes   Is the patient having any side effects they believe may be caused by any medication additions or changes? No   Does the patient have all medications ordered at discharge? Yes   Is the patient taking all medications as directed (includes completed medication regime)? Yes   Does the patient have a primary care provider?  Yes   Does the patient have an appointment with their PCP or specialist within 7 days of discharge? Greater than 7 days   Comments regarding PCP TCM APPT with PCP Dr Gonzalez is 06/13/2022.   What is preventing the patient from scheduling follow up appointments within 7 days of discharge? Earlier appointment not available   Has the patient kept scheduled appointments due by today? N/A   Has home health visited the patient within 72 hours of discharge? N/A   Pulse Ox monitoring None   Psychosocial issues? No   Did the patient receive a copy of their discharge instructions? Yes   Nursing interventions Reviewed instructions with patient   What is the patient's perception of their health status since discharge? Improving   If the patient is a current smoker, are they able to teach back resources for cessation? Not a smoker   Is the patient/caregiver able to teach back the hierarchy of who to call/visit for symptoms/problems? PCP, Specialist, Home health nurse, Urgent Care, ED, 911 Yes   Is the patient/caregiver able to teach back signs and symptoms of worsening condition: Fever/chills, Shortness of breath, Chest pain   Is the patient/caregiver able to teach back importance of completing antibiotic course of treatment? Yes   TCM  call completed? Yes   Wrap up additional comments Pt is still weak and tired, but breathing is much better. All new medications in place. No questions at this time. TCM APPT with PCP Dr Gonzalez is 06/13/2022.          Alie Dukes MA    6/6/2022, 15:56 EDT

## 2022-06-13 ENCOUNTER — OFFICE VISIT (OUTPATIENT)
Dept: INTERNAL MEDICINE | Facility: CLINIC | Age: 57
End: 2022-06-13

## 2022-06-13 VITALS
BODY MASS INDEX: 27.64 KG/M2 | HEIGHT: 63 IN | DIASTOLIC BLOOD PRESSURE: 78 MMHG | HEART RATE: 73 BPM | SYSTOLIC BLOOD PRESSURE: 130 MMHG | WEIGHT: 156 LBS

## 2022-06-13 DIAGNOSIS — R79.89 ABNORMAL LFTS: ICD-10-CM

## 2022-06-13 DIAGNOSIS — J18.9 COMMUNITY ACQUIRED PNEUMONIA, UNSPECIFIED LATERALITY: Primary | ICD-10-CM

## 2022-06-13 PROCEDURE — 71046 X-RAY EXAM CHEST 2 VIEWS: CPT | Performed by: INTERNAL MEDICINE

## 2022-06-13 PROCEDURE — 99214 OFFICE O/P EST MOD 30 MIN: CPT | Performed by: INTERNAL MEDICINE

## 2022-06-13 NOTE — PROGRESS NOTES
Transitional Care Follow Up Visit  Subjective     Cheri Lopez is a 57 y.o. female who presents for a transitional care management visit.    Within 48 business hours after discharge our office contacted her via telephone to coordinate her care and needs.      I reviewed and discussed the details of that call along with the discharge summary, hospital problems, inpatient lab results, inpatient diagnostic studies, and consultation reports with Cheri.     Current outpatient and discharge medications have been reconciled for the patient.  Reviewed by: Felicia Gonzalez MD      Date of TCM Phone Call 6/3/2022   [facility]   Date of Admission 6/1/2022   Date of Discharge 6/3/2022   Discharge Disposition Home or Self Care     Risk for Readmission (LACE) Score: 3 (6/3/2022  6:01 AM)      History of Present Illness   Course During Hospital Stay:  Patient admitted to hospital and treated for CAP. She was discharged home. Initially doing well. She had acute shortness of air at home. She was again admitted. Treated w/ additionaly antibiotics as well as iv steroids. She gradually improved. She notes mild ankle edema now much improved from initial presentation. She has an albuterol inhaler and has not recently needed this device. Had abnormal lft in hospital and due to repeat those today.     Patient is scheduled for breast implant removal.        The following portions of the patient's history were reviewed and updated as appropriate: allergies, current medications, past family history, past medical history, past social history, past surgical history and problem list.    Review of Systems   Constitutional: Positive for fatigue.   HENT: Negative.    Eyes: Negative.    Respiratory: Positive for shortness of breath.         Very mild shortness of air but overall lungs feel good.    Cardiovascular: Negative.    Gastrointestinal: Negative.    Endocrine: Negative.    Genitourinary: Negative.   "  Musculoskeletal: Negative.    Allergic/Immunologic: Negative.    Neurological: Negative.    Hematological: Negative.    Psychiatric/Behavioral: Negative.        Objective    bp 130/78 pulse 73 wt 156# ht 63\"  Physical Exam  Vitals and nursing note reviewed.   Constitutional:       Appearance: Normal appearance.   HENT:      Head: Normocephalic and atraumatic.      Right Ear: Tympanic membrane normal.      Left Ear: Tympanic membrane normal.      Nose: Nose normal.      Mouth/Throat:      Mouth: Mucous membranes are moist.   Eyes:      Extraocular Movements: Extraocular movements intact.      Pupils: Pupils are equal, round, and reactive to light.   Cardiovascular:      Rate and Rhythm: Normal rate and regular rhythm.      Pulses: Normal pulses.      Heart sounds: Normal heart sounds.   Pulmonary:      Effort: Pulmonary effort is normal.      Breath sounds: Normal breath sounds.   Abdominal:      General: Abdomen is flat.      Palpations: Abdomen is soft.   Musculoskeletal:         General: Normal range of motion.      Cervical back: Normal range of motion.   Skin:     General: Skin is warm and dry.   Neurological:      General: No focal deficit present.      Mental Status: She is alert and oriented to person, place, and time.   Psychiatric:         Mood and Affect: Mood normal.         Behavior: Behavior normal.         Thought Content: Thought content normal.         Judgment: Judgment normal.       CXR for CAP clearing of B infiltrates. No prior to compare.       Assessment & Plan   Diagnoses and all orders for this visit:    1. Community acquired pneumonia, unspecified laterality (Primary)  -     Comprehensive Metabolic Panel  -     CBC & Differential  -     XR Chest PA & Lateral (In Office)    2. Abnormal LFTs  -     Comprehensive Metabolic Panel  -     CBC & Differential        Patient w/ acute CAP. CXR w apparent almost full resolution of infiltrate. Clinically much improved. She will continue to use an " inhaler in a prn fashion. She had abnormal lft in hospital. Reviewed liver ultrasound. Very small gallstones o/w normal . Will test cbc and CMP. Will monitor unttil return to normal of lft. She likely had a viral hepatitis. She will follow up routinely. Once LFT improved and breathing returns to full baseline will reschedule implant removal.   any

## 2022-06-14 LAB
ALBUMIN SERPL-MCNC: 4.2 G/DL (ref 3.8–4.9)
ALBUMIN/GLOB SERPL: 1.7 {RATIO} (ref 1.2–2.2)
ALP SERPL-CCNC: 145 IU/L (ref 44–121)
ALT SERPL-CCNC: 288 IU/L (ref 0–32)
AST SERPL-CCNC: 235 IU/L (ref 0–40)
BASOPHILS # BLD AUTO: 0.1 X10E3/UL (ref 0–0.2)
BASOPHILS NFR BLD AUTO: 1 %
BILIRUB SERPL-MCNC: 0.2 MG/DL (ref 0–1.2)
BUN SERPL-MCNC: 11 MG/DL (ref 6–24)
BUN/CREAT SERPL: 14 (ref 9–23)
CALCIUM SERPL-MCNC: 9.4 MG/DL (ref 8.7–10.2)
CHLORIDE SERPL-SCNC: 103 MMOL/L (ref 96–106)
CO2 SERPL-SCNC: 22 MMOL/L (ref 20–29)
CREAT SERPL-MCNC: 0.81 MG/DL (ref 0.57–1)
EGFRCR SERPLBLD CKD-EPI 2021: 85 ML/MIN/1.73
EOSINOPHIL # BLD AUTO: 0.3 X10E3/UL (ref 0–0.4)
EOSINOPHIL NFR BLD AUTO: 4 %
ERYTHROCYTE [DISTWIDTH] IN BLOOD BY AUTOMATED COUNT: 12.9 % (ref 11.7–15.4)
GLOBULIN SER CALC-MCNC: 2.5 G/DL (ref 1.5–4.5)
GLUCOSE SERPL-MCNC: 96 MG/DL (ref 65–99)
HCT VFR BLD AUTO: 40.7 % (ref 34–46.6)
HGB BLD-MCNC: 13.7 G/DL (ref 11.1–15.9)
IMM GRANULOCYTES # BLD AUTO: 0.1 X10E3/UL (ref 0–0.1)
IMM GRANULOCYTES NFR BLD AUTO: 1 %
LYMPHOCYTES # BLD AUTO: 1.4 X10E3/UL (ref 0.7–3.1)
LYMPHOCYTES NFR BLD AUTO: 20 %
MCH RBC QN AUTO: 32.5 PG (ref 26.6–33)
MCHC RBC AUTO-ENTMCNC: 33.7 G/DL (ref 31.5–35.7)
MCV RBC AUTO: 96 FL (ref 79–97)
MONOCYTES # BLD AUTO: 0.5 X10E3/UL (ref 0.1–0.9)
MONOCYTES NFR BLD AUTO: 7 %
NEUTROPHILS # BLD AUTO: 4.7 X10E3/UL (ref 1.4–7)
NEUTROPHILS NFR BLD AUTO: 67 %
PLATELET # BLD AUTO: 336 X10E3/UL (ref 150–450)
POTASSIUM SERPL-SCNC: 4.4 MMOL/L (ref 3.5–5.2)
PROT SERPL-MCNC: 6.7 G/DL (ref 6–8.5)
RBC # BLD AUTO: 4.22 X10E6/UL (ref 3.77–5.28)
SODIUM SERPL-SCNC: 139 MMOL/L (ref 134–144)
WBC # BLD AUTO: 7.1 X10E3/UL (ref 3.4–10.8)

## 2022-06-15 DIAGNOSIS — R79.89 ABNORMAL LFTS: Primary | ICD-10-CM

## 2022-06-16 ENCOUNTER — TELEPHONE (OUTPATIENT)
Dept: INTERNAL MEDICINE | Facility: CLINIC | Age: 57
End: 2022-06-16

## 2022-06-16 LAB
ALBUMIN SERPL-MCNC: 4.2 G/DL (ref 3.8–4.9)
ALBUMIN/GLOB SERPL: 1.8 {RATIO} (ref 1.2–2.2)
ALP BONE CFR SERPL: 38 % (ref 14–68)
ALP INTEST CFR SERPL: 2 % (ref 0–18)
ALP LIVER CFR SERPL: 60 % (ref 18–85)
ALP SERPL-CCNC: 179 IU/L (ref 44–121)
ALT SERPL-CCNC: 360 IU/L (ref 0–32)
ANA SER QL: NEGATIVE
AST SERPL-CCNC: 186 IU/L (ref 0–40)
BILIRUB SERPL-MCNC: 0.3 MG/DL (ref 0–1.2)
BUN SERPL-MCNC: 13 MG/DL (ref 6–24)
BUN/CREAT SERPL: 13 (ref 9–23)
CALCIUM SERPL-MCNC: 8.9 MG/DL (ref 8.7–10.2)
CHLORIDE SERPL-SCNC: 103 MMOL/L (ref 96–106)
CO2 SERPL-SCNC: 18 MMOL/L (ref 20–29)
CREAT SERPL-MCNC: 1.02 MG/DL (ref 0.57–1)
EGFRCR SERPLBLD CKD-EPI 2021: 64 ML/MIN/1.73
GGT SERPL-CCNC: 143 IU/L (ref 0–60)
GLOBULIN SER CALC-MCNC: 2.4 G/DL (ref 1.5–4.5)
GLUCOSE SERPL-MCNC: 97 MG/DL (ref 65–99)
HAV IGM SERPL QL IA: NEGATIVE
HBV CORE IGM SERPL QL IA: NEGATIVE
HBV SURFACE AG SERPL QL IA: NEGATIVE
HCV AB S/CO SERPL IA: <0.1 S/CO RATIO (ref 0–0.9)
HCV AB SERPL QL IA: NORMAL
POTASSIUM SERPL-SCNC: 4.3 MMOL/L (ref 3.5–5.2)
PROT SERPL-MCNC: 6.6 G/DL (ref 6–8.5)
SODIUM SERPL-SCNC: 136 MMOL/L (ref 134–144)

## 2022-06-16 NOTE — TELEPHONE ENCOUNTER
Pt informed      ----- Message from Felicia Gonzalez MD sent at 6/16/2022  2:35 PM EDT -----  Prior infection now cleared from lungs  jw

## 2022-06-17 DIAGNOSIS — R79.89 ELEVATED LFTS: Primary | ICD-10-CM

## 2022-06-17 DIAGNOSIS — R79.89 ABNORMAL LFTS: Primary | ICD-10-CM

## 2022-06-20 LAB
Lab: NORMAL
Lab: NORMAL

## 2022-06-21 LAB
ALBUMIN SERPL-MCNC: 4.4 G/DL (ref 3.8–4.9)
ALBUMIN/GLOB SERPL: 2 {RATIO} (ref 1.2–2.2)
ALP SERPL-CCNC: 175 IU/L (ref 44–121)
ALT SERPL-CCNC: 309 IU/L (ref 0–32)
AST SERPL-CCNC: 251 IU/L (ref 0–40)
BILIRUB SERPL-MCNC: 0.5 MG/DL (ref 0–1.2)
BUN SERPL-MCNC: 15 MG/DL (ref 6–24)
BUN/CREAT SERPL: 20 (ref 9–23)
CALCIUM SERPL-MCNC: 9.4 MG/DL (ref 8.7–10.2)
CERULOPLASMIN SERPL-MCNC: 31.3 MG/DL (ref 19–39)
CHLORIDE SERPL-SCNC: 105 MMOL/L (ref 96–106)
CO2 SERPL-SCNC: 19 MMOL/L (ref 20–29)
CREAT SERPL-MCNC: 0.75 MG/DL (ref 0.57–1)
EGFRCR SERPLBLD CKD-EPI 2021: 93 ML/MIN/1.73
ENDOMYSIUM IGA SER QL: NEGATIVE
GLOBULIN SER CALC-MCNC: 2.2 G/DL (ref 1.5–4.5)
GLUCOSE SERPL-MCNC: 90 MG/DL (ref 65–99)
IGA SERPL-MCNC: 332 MG/DL (ref 87–352)
MITOCHONDRIA M2 IGG SER-ACNC: <20 UNITS (ref 0–20)
POTASSIUM SERPL-SCNC: 4.3 MMOL/L (ref 3.5–5.2)
PROT SERPL-MCNC: 6.6 G/DL (ref 6–8.5)
SMA IGG SER-ACNC: 5 UNITS (ref 0–19)
SODIUM SERPL-SCNC: 140 MMOL/L (ref 134–144)
TTG IGA SER-ACNC: <2 U/ML (ref 0–3)

## 2022-06-23 ENCOUNTER — TELEPHONE (OUTPATIENT)
Dept: INTERNAL MEDICINE | Facility: CLINIC | Age: 57
End: 2022-06-23

## 2022-06-23 NOTE — TELEPHONE ENCOUNTER
Caller: Cheri Lopez    Relationship: Self    Best call back number: [phone]    What is the best time to reach you: ANYTIME    Who are you requesting to speak with (clinical staff, provider,  specific staff member): DR. GRACIA    Do you know the name of the person who called:     What was the call regarding: HER BLOOD TEST RESULT    Do you require a callback: YES

## 2022-06-24 LAB
MITOCHONDRIA M2 IGG SER-ACNC: <20 UNITS (ref 0–20)
SMA IGG SER-ACNC: 6 UNITS (ref 0–19)
WRITTEN AUTHORIZATION: NORMAL

## 2022-07-07 ENCOUNTER — HOSPITAL ENCOUNTER (OUTPATIENT)
Dept: CT IMAGING | Facility: HOSPITAL | Age: 57
Discharge: HOME | End: 2022-07-07
Admitting: INTERNAL MEDICINE

## 2022-07-07 DIAGNOSIS — R79.89 ABNORMAL LFTS: Primary | ICD-10-CM

## 2022-07-07 DIAGNOSIS — R79.89 ELEVATED LFTS: ICD-10-CM

## 2022-07-07 PROCEDURE — 74170 CT ABD WO CNTRST FLWD CNTRST: CPT

## 2022-07-07 PROCEDURE — 25010000002 IOPAMIDOL 61 % SOLUTION: Performed by: INTERNAL MEDICINE

## 2022-07-07 RX ADMIN — IOPAMIDOL 100 ML: 612 INJECTION, SOLUTION INTRAVENOUS at 12:07

## 2022-07-15 LAB
ALBUMIN SERPL-MCNC: 4.5 G/DL (ref 3.8–4.9)
ALBUMIN/GLOB SERPL: 1.9 {RATIO} (ref 1.2–2.2)
ALP SERPL-CCNC: 97 IU/L (ref 44–121)
ALT SERPL-CCNC: 63 IU/L (ref 0–32)
AST SERPL-CCNC: 38 IU/L (ref 0–40)
BILIRUB SERPL-MCNC: <0.2 MG/DL (ref 0–1.2)
BUN SERPL-MCNC: 15 MG/DL (ref 6–24)
BUN/CREAT SERPL: 17 (ref 9–23)
CALCIUM SERPL-MCNC: 9.6 MG/DL (ref 8.7–10.2)
CHLORIDE SERPL-SCNC: 104 MMOL/L (ref 96–106)
CO2 SERPL-SCNC: 22 MMOL/L (ref 20–29)
CREAT SERPL-MCNC: 0.86 MG/DL (ref 0.57–1)
EGFRCR SERPLBLD CKD-EPI 2021: 79 ML/MIN/1.73
GLOBULIN SER CALC-MCNC: 2.4 G/DL (ref 1.5–4.5)
GLUCOSE SERPL-MCNC: 98 MG/DL (ref 65–99)
POTASSIUM SERPL-SCNC: 4.8 MMOL/L (ref 3.5–5.2)
PROT SERPL-MCNC: 6.9 G/DL (ref 6–8.5)
SODIUM SERPL-SCNC: 139 MMOL/L (ref 134–144)

## 2022-07-19 ENCOUNTER — TELEPHONE (OUTPATIENT)
Dept: INTERNAL MEDICINE | Facility: CLINIC | Age: 57
End: 2022-07-19

## 2022-07-19 NOTE — TELEPHONE ENCOUNTER
Caller: Cheri Lopez    Relationship: Self    Best call back number: [phone]    Who are you requesting to speak with (clinical staff, provider,  specific staff member): CLINICAL STAFF     What was the call regarding: PATIENT STATES THE [facility] OFFICE HAS CANCELED HER APPT AND BASED ON WHAT THEY CAN DO FOR HER SHE HAD DECIDED NOT TO MAKE THE APPT AND STATES IF THE OFFICE WANTS TO MAKE THE APPT THEY CAN BUT SHE WILL NOT BE MAKING THE APPT HERSELF AGAIN

## 2022-07-21 ENCOUNTER — TELEPHONE (OUTPATIENT)
Dept: INTERNAL MEDICINE | Facility: CLINIC | Age: 57
End: 2022-07-21

## 2022-07-26 ENCOUNTER — OFFICE VISIT (OUTPATIENT)
Dept: INTERNAL MEDICINE | Facility: CLINIC | Age: 57
End: 2022-07-26

## 2022-07-26 VITALS
BODY MASS INDEX: 28 KG/M2 | WEIGHT: 158 LBS | HEIGHT: 63 IN | DIASTOLIC BLOOD PRESSURE: 86 MMHG | OXYGEN SATURATION: 98 % | HEART RATE: 76 BPM | SYSTOLIC BLOOD PRESSURE: 130 MMHG

## 2022-07-26 DIAGNOSIS — I10 PRIMARY HYPERTENSION: ICD-10-CM

## 2022-07-26 DIAGNOSIS — N62 LARGE BREASTS: Primary | ICD-10-CM

## 2022-07-26 DIAGNOSIS — R79.89 ABNORMAL LFTS: ICD-10-CM

## 2022-07-26 PROCEDURE — 99214 OFFICE O/P EST MOD 30 MIN: CPT | Performed by: INTERNAL MEDICINE

## 2022-07-26 NOTE — PROGRESS NOTES
Chief Complaint   Patient presents with   • surgery clearance     For breast augmentation   • viral hepatitis       History of Present Illness   Cheri Lopez is a 57 y.o. female presents for preoperative evaluation. Patient recently w/ unknown viral hepatitis. Likely covid. cmp has now trended to normal. Additional liver testing has been negative for autoimmune or other sources for this. She is in office today for preoperative evaluation for breast reduction surgery. She is feeling well today. She has normotensive blood pressure. Energy is good w/ good tolerance of PT and aquatherapy for her back. She met w/ surgeon yesterday to review surgical protocol. She has had several back surgeries w/ no history of reaction to anesthesia or related complications.   Has hypothyroidism. Yesterday switched from armour thyroid to synthroid therapy.         The following portions of the patient's history were reviewed and updated as appropriate: allergies, current medications, past family history, past medical history, past social history, past surgical history and problem list.  Current Outpatient Medications on File Prior to Visit   Medication Sig Dispense Refill   • [place] Thyroid 90 MG tablet      • Ascorbic Acid (VITAMIN C PO) Take 1,000 mg by mouth Daily.     • B Complex Vitamins (VITAMIN B COMPLEX PO) Take 200 mcg by mouth Daily.     • baclofen (LIORESAL) 10 MG tablet TAKE ONE TABLET BY MOUTH THREE TIMES A DAY AS NEEDED FOR MUSCLE SPASMS 30 tablet 2   • cycloSPORINE (RESTASIS) 0.05 % ophthalmic emulsion Administer 1 drop to both eyes Every 12 (Twelve) Hours.     • levocetirizine (XYZAL) 5 MG tablet Take 1 tablet by mouth Every Evening. 30 tablet 1   • losartan (Cozaar) 50 MG tablet Take 1 tablet by mouth Daily. 90 tablet 1   • Magnesium-Potassium 250-100 MG tablet Take 1 tablet by mouth Daily.     • Multiple Vitamins-Minerals (PRESERVISION AREDS PO) Take 1 capsule by mouth 2 (two) times a day.     • Omega-3 Fatty  Acids (FISH OIL PO) Take 1 capsule by mouth 2 (two) times a day.     • progesterone (PROMETRIUM) 100 MG capsule Take 100 mg by mouth Daily.     • vitamin D3 125 MCG (5000 UT) capsule capsule      • vitamin E 400 UNIT capsule Take 400 Units by mouth Daily.     • [DISCONTINUED] albuterol sulfate  (90 Base) MCG/ACT inhaler Inhale 2 puffs Every 4 (Four) Hours As Needed for Wheezing or Shortness of Air. 16 g 1     No current facility-administered medications on file prior to visit.     Review of Systems   Constitutional: Negative.    HENT: Negative.    Eyes: Negative.    Respiratory: Negative.    Cardiovascular: Negative.    Gastrointestinal: Negative.  Negative for constipation and diarrhea.   Endocrine: Negative.    Genitourinary: Negative.    Musculoskeletal: Positive for back pain.   Skin: Negative.    Allergic/Immunologic: Negative.    Neurological: Negative.    Hematological: Negative.    Psychiatric/Behavioral:        Irritation regarding medical care recently.        Objective   Physical Exam  Vitals and nursing note reviewed.   Constitutional:       Appearance: Normal appearance.   HENT:      Head: Normocephalic and atraumatic.      Right Ear: Tympanic membrane normal.      Left Ear: Tympanic membrane normal.      Nose: Nose normal.      Mouth/Throat:      Mouth: Mucous membranes are moist.   Eyes:      Extraocular Movements: Extraocular movements intact.      Pupils: Pupils are equal, round, and reactive to light.   Cardiovascular:      Rate and Rhythm: Normal rate and regular rhythm.      Pulses: Normal pulses.      Heart sounds: Normal heart sounds.   Pulmonary:      Effort: Pulmonary effort is normal.      Breath sounds: Normal breath sounds.   Abdominal:      General: Abdomen is flat. Bowel sounds are normal.      Palpations: Abdomen is soft.   Musculoskeletal:         General: Normal range of motion.      Cervical back: Normal range of motion.   Skin:     General: Skin is warm and dry.  "  Neurological:      General: No focal deficit present.      Mental Status: She is alert and oriented to person, place, and time.   Psychiatric:         Mood and Affect: Mood normal.         Behavior: Behavior normal.         Thought Content: Thought content normal.         Judgment: Judgment normal.          /86   Pulse 76   Ht 160 cm (63\")   Wt 71.7 kg (158 lb)   SpO2 98%   BMI 27.99 kg/m²     Assessment & Plan   Diagnoses and all orders for this visit:    Large breasts    Abnormal LFTs    Primary hypertension    patient w/ large breast that contribute to midthoracic back pain. Failed reduction through dietary measures and is now to have reductive surgery for many benefits. She is monitoring bp at home. She will maintain a low sodium diet w/ excellent hydration. She had a recent viral infection w/ abnormal lft that have trended close to normal. Will repeat levels today. If all close or wnl may proceed w/ surgery w/ usual precautions.            Answers for HPI/ROS submitted by the patient on 7/25/2022  Please describe your symptoms.: none.  pre-op well-check.  Have you had these symptoms before?: No  How long have you been having these symptoms?: 1-4 days  What is the primary reason for your visit?: Other      "

## 2022-07-27 LAB
ALBUMIN SERPL-MCNC: 4.2 G/DL (ref 3.8–4.9)
ALBUMIN/GLOB SERPL: 1.8 {RATIO} (ref 1.2–2.2)
ALP SERPL-CCNC: 76 IU/L (ref 44–121)
ALT SERPL-CCNC: 51 IU/L (ref 0–32)
AST SERPL-CCNC: 21 IU/L (ref 0–40)
BASOPHILS # BLD AUTO: 0 X10E3/UL (ref 0–0.2)
BASOPHILS NFR BLD AUTO: 0 %
BILIRUB SERPL-MCNC: <0.2 MG/DL (ref 0–1.2)
BUN SERPL-MCNC: 13 MG/DL (ref 6–24)
BUN/CREAT SERPL: 16 (ref 9–23)
CALCIUM SERPL-MCNC: 9.3 MG/DL (ref 8.7–10.2)
CHLORIDE SERPL-SCNC: 102 MMOL/L (ref 96–106)
CO2 SERPL-SCNC: 25 MMOL/L (ref 20–29)
CREAT SERPL-MCNC: 0.81 MG/DL (ref 0.57–1)
EGFRCR SERPLBLD CKD-EPI 2021: 85 ML/MIN/1.73
EOSINOPHIL # BLD AUTO: 0 X10E3/UL (ref 0–0.4)
EOSINOPHIL NFR BLD AUTO: 0 %
ERYTHROCYTE [DISTWIDTH] IN BLOOD BY AUTOMATED COUNT: 12.2 % (ref 11.7–15.4)
GLOBULIN SER CALC-MCNC: 2.4 G/DL (ref 1.5–4.5)
GLUCOSE SERPL-MCNC: 95 MG/DL (ref 65–99)
HCT VFR BLD AUTO: 37.8 % (ref 34–46.6)
HGB BLD-MCNC: 12.5 G/DL (ref 11.1–15.9)
IMM GRANULOCYTES # BLD AUTO: 0 X10E3/UL (ref 0–0.1)
IMM GRANULOCYTES NFR BLD AUTO: 1 %
LYMPHOCYTES # BLD AUTO: 2.6 X10E3/UL (ref 0.7–3.1)
LYMPHOCYTES NFR BLD AUTO: 33 %
MCH RBC QN AUTO: 30.9 PG (ref 26.6–33)
MCHC RBC AUTO-ENTMCNC: 33.1 G/DL (ref 31.5–35.7)
MCV RBC AUTO: 94 FL (ref 79–97)
MONOCYTES # BLD AUTO: 0.6 X10E3/UL (ref 0.1–0.9)
MONOCYTES NFR BLD AUTO: 8 %
NEUTROPHILS # BLD AUTO: 4.5 X10E3/UL (ref 1.4–7)
NEUTROPHILS NFR BLD AUTO: 58 %
PLATELET # BLD AUTO: 213 X10E3/UL (ref 150–450)
POTASSIUM SERPL-SCNC: 4 MMOL/L (ref 3.5–5.2)
PROT SERPL-MCNC: 6.6 G/DL (ref 6–8.5)
RBC # BLD AUTO: 4.04 X10E6/UL (ref 3.77–5.28)
SODIUM SERPL-SCNC: 139 MMOL/L (ref 134–144)
WBC # BLD AUTO: 7.7 X10E3/UL (ref 3.4–10.8)

## 2022-08-01 RX ORDER — LEVOCETIRIZINE DIHYDROCHLORIDE 5 MG/1
TABLET, FILM COATED ORAL
Qty: 30 TABLET | Refills: 1 | Status: SHIPPED | OUTPATIENT
Start: 2022-08-01 | End: 2022-10-31 | Stop reason: SDUPTHER

## 2022-08-04 ENCOUNTER — TELEPHONE (OUTPATIENT)
Dept: INTERNAL MEDICINE | Facility: CLINIC | Age: 57
End: 2022-08-04

## 2022-08-04 NOTE — TELEPHONE ENCOUNTER
Patient called and is very upset that her surgery clearance has not been sent to her doctors office. Her surgery is next week and she states Dr. Gonzalez promised her it would be done by then. Please call patient back ASAP at [phone].

## 2022-09-13 ENCOUNTER — OFFICE VISIT (OUTPATIENT)
Dept: INTERNAL MEDICINE | Facility: CLINIC | Age: 57
End: 2022-09-13

## 2022-09-13 VITALS
WEIGHT: 157 LBS | SYSTOLIC BLOOD PRESSURE: 136 MMHG | DIASTOLIC BLOOD PRESSURE: 84 MMHG | BODY MASS INDEX: 27.82 KG/M2 | HEART RATE: 69 BPM | HEIGHT: 63 IN

## 2022-09-13 DIAGNOSIS — I10 HYPERTENSION, UNSPECIFIED TYPE: ICD-10-CM

## 2022-09-13 DIAGNOSIS — R79.89 ELEVATED LFTS: ICD-10-CM

## 2022-09-13 DIAGNOSIS — E03.9 ADULT HYPOTHYROIDISM: ICD-10-CM

## 2022-09-13 DIAGNOSIS — G89.18 POST-OPERATIVE PAIN: Primary | ICD-10-CM

## 2022-09-13 PROCEDURE — 99214 OFFICE O/P EST MOD 30 MIN: CPT | Performed by: INTERNAL MEDICINE

## 2022-09-13 RX ORDER — GUAIFENESIN AND CODEINE PHOSPHATE 100; 10 MG/5ML; MG/5ML
5 SOLUTION ORAL 3 TIMES DAILY PRN
Qty: 125 ML | Refills: 1 | Status: SHIPPED | OUTPATIENT
Start: 2022-09-13 | End: 2023-03-14

## 2022-09-13 RX ORDER — LEVOTHYROXINE SODIUM 88 UG/1
TABLET ORAL
COMMUNITY
Start: 2022-08-04 | End: 2022-09-15 | Stop reason: SDUPTHER

## 2022-09-13 NOTE — PROGRESS NOTES
Chief Complaint   Patient presents with   • Hypertension   • Hyperlipidemia   • post operative       History of Present Illness   Cheri Lopez is a 57 y.o. female presents for a follow up evaluation. She is s/p breast implant removal. She is doing fairly well from this. She does have a small area that has delayed healing.   Has insomnia. peviously w/ benefit w/ tylenol pm.   Patient has htn. bp has been fairly normotensive but does escalate w/ pain.   Recently d/c armour and continued on synthroid.           The following portions of the patient's history were reviewed and updated as appropriate: allergies, current medications, past family history, past medical history, past social history, past surgical history and problem list.  Current Outpatient Medications on File Prior to Visit   Medication Sig Dispense Refill   • Ascorbic Acid (VITAMIN C PO) Take 1,000 mg by mouth Daily.     • B Complex Vitamins (VITAMIN B COMPLEX PO) Take 200 mcg by mouth Daily.     • baclofen (LIORESAL) 10 MG tablet TAKE ONE TABLET BY MOUTH THREE TIMES A DAY AS NEEDED FOR MUSCLE SPASMS 30 tablet 2   • cycloSPORINE (RESTASIS) 0.05 % ophthalmic emulsion Administer 1 drop to both eyes Every 12 (Twelve) Hours.     • levocetirizine (XYZAL) 5 MG tablet TAKE ONE TABLET BY MOUTH EVERY EVENING 30 tablet 1   • levothyroxine (SYNTHROID, LEVOTHROID) 88 MCG tablet      • losartan (Cozaar) 50 MG tablet Take 1 tablet by mouth Daily. 90 tablet 1   • Magnesium-Potassium 250-100 MG tablet Take 1 tablet by mouth Daily.     • Multiple Vitamins-Minerals (PRESERVISION AREDS PO) Take 1 capsule by mouth 2 (two) times a day.     • progesterone (PROMETRIUM) 100 MG capsule Take 100 mg by mouth Daily.     • vitamin D3 125 MCG (5000 UT) capsule capsule      • vitamin E 400 UNIT capsule Take 400 Units by mouth Daily.     • Omega-3 Fatty Acids (FISH OIL PO) Take 1 capsule by mouth 2 (two) times a day.     • [DISCONTINUED] [place] Thyroid 90 MG tablet        No  "current facility-administered medications on file prior to visit.     Review of Systems   Constitutional: Negative.    HENT: Negative.    Eyes: Negative.    Respiratory: Negative.    Cardiovascular: Negative.    Gastrointestinal: Negative.    Endocrine: Negative.    Genitourinary: Negative.    Musculoskeletal: Negative.    Allergic/Immunologic: Negative.    Neurological: Negative.    Hematological: Negative.    Psychiatric/Behavioral: Negative.        Objective   Physical Exam  Vitals and nursing note reviewed.   Constitutional:       Appearance: Normal appearance.   HENT:      Head: Normocephalic and atraumatic.      Right Ear: Tympanic membrane normal.      Left Ear: Tympanic membrane normal.      Nose: Nose normal.      Mouth/Throat:      Mouth: Mucous membranes are moist.   Eyes:      Extraocular Movements: Extraocular movements intact.      Pupils: Pupils are equal, round, and reactive to light.   Cardiovascular:      Rate and Rhythm: Normal rate and regular rhythm.      Pulses: Normal pulses.      Heart sounds: Normal heart sounds.   Pulmonary:      Effort: Pulmonary effort is normal.      Breath sounds: Normal breath sounds.   Abdominal:      General: Abdomen is flat.      Palpations: Abdomen is soft.   Musculoskeletal:         General: Normal range of motion.      Cervical back: Normal range of motion.   Skin:     General: Skin is warm and dry.   Neurological:      General: No focal deficit present.      Mental Status: She is alert and oriented to person, place, and time.   Psychiatric:         Mood and Affect: Mood normal.         Behavior: Behavior normal.         Thought Content: Thought content normal.         Judgment: Judgment normal.          /84   Pulse 69   Ht 160 cm (63\")   Wt 71.2 kg (157 lb)   BMI 27.81 kg/m²     Assessment & Plan   Diagnoses and all orders for this visit:    Post-operative pain    Adult hypothyroidism  -     TSH  -     T4, free  -     CBC & Differential  -     " Comprehensive Metabolic Panel    Hypertension, unspecified type  -     TSH  -     T4, free  -     CBC & Differential  -     Comprehensive Metabolic Panel    Elevated LFTs  -     TSH  -     T4, free  -     CBC & Differential  -     Comprehensive Metabolic Panel    Other orders  -     levothyroxine (SYNTHROID, LEVOTHROID) 88 MCG tablet  -     guaiFENesin-codeine (GUAIFENESIN AC) 100-10 MG/5ML liquid; Take 5 mL by mouth 3 (Three) Times a Day As Needed for Cough.        Patient s/p breast implant removal. She is healing fairly well today. She will monitor mild erythema at surgical site. She will have thyroid testing as she recently d/c [place] thyroid. She will try cheratusin for pain and mild residual cough. She will monitor bp at home once pain is regulated. Had elevated lft. Will retest this. She will follow up routinely.

## 2022-09-14 LAB
ALBUMIN SERPL-MCNC: 4.7 G/DL (ref 3.5–5.2)
ALBUMIN/GLOB SERPL: 2.1 G/DL
ALP SERPL-CCNC: 103 U/L (ref 39–117)
ALT SERPL-CCNC: 22 U/L (ref 1–33)
AST SERPL-CCNC: 23 U/L (ref 1–32)
BASOPHILS # BLD AUTO: 0.05 10*3/MM3 (ref 0–0.2)
BASOPHILS NFR BLD AUTO: 0.6 % (ref 0–1.5)
BILIRUB SERPL-MCNC: 0.3 MG/DL (ref 0–1.2)
BUN SERPL-MCNC: 17 MG/DL (ref 6–20)
BUN/CREAT SERPL: 24.6 (ref 7–25)
CALCIUM SERPL-MCNC: 9.5 MG/DL (ref 8.6–10.5)
CHLORIDE SERPL-SCNC: 100 MMOL/L (ref 98–107)
CO2 SERPL-SCNC: 26.6 MMOL/L (ref 22–29)
CREAT SERPL-MCNC: 0.69 MG/DL (ref 0.57–1)
EGFRCR-CYS SERPLBLD CKD-EPI 2021: 101.4 ML/MIN/1.73
EOSINOPHIL # BLD AUTO: 0.11 10*3/MM3 (ref 0–0.4)
EOSINOPHIL NFR BLD AUTO: 1.3 % (ref 0.3–6.2)
ERYTHROCYTE [DISTWIDTH] IN BLOOD BY AUTOMATED COUNT: 14.3 % (ref 12.3–15.4)
GLOBULIN SER CALC-MCNC: 2.2 GM/DL
GLUCOSE SERPL-MCNC: 83 MG/DL (ref 65–99)
HCT VFR BLD AUTO: 40.6 % (ref 34–46.6)
HGB BLD-MCNC: 12.9 G/DL (ref 12–15.9)
IMM GRANULOCYTES # BLD AUTO: 0.02 10*3/MM3 (ref 0–0.05)
IMM GRANULOCYTES NFR BLD AUTO: 0.2 % (ref 0–0.5)
LYMPHOCYTES # BLD AUTO: 2.43 10*3/MM3 (ref 0.7–3.1)
LYMPHOCYTES NFR BLD AUTO: 29.2 % (ref 19.6–45.3)
MCH RBC QN AUTO: 30.7 PG (ref 26.6–33)
MCHC RBC AUTO-ENTMCNC: 31.8 G/DL (ref 31.5–35.7)
MCV RBC AUTO: 96.7 FL (ref 79–97)
MONOCYTES # BLD AUTO: 0.61 10*3/MM3 (ref 0.1–0.9)
MONOCYTES NFR BLD AUTO: 7.3 % (ref 5–12)
NEUTROPHILS # BLD AUTO: 5.1 10*3/MM3 (ref 1.7–7)
NEUTROPHILS NFR BLD AUTO: 61.4 % (ref 42.7–76)
NRBC BLD AUTO-RTO: 0 /100 WBC (ref 0–0.2)
PLATELET # BLD AUTO: 178 10*3/MM3 (ref 140–450)
POTASSIUM SERPL-SCNC: 4 MMOL/L (ref 3.5–5.2)
PROT SERPL-MCNC: 6.9 G/DL (ref 6–8.5)
RBC # BLD AUTO: 4.2 10*6/MM3 (ref 3.77–5.28)
SODIUM SERPL-SCNC: 138 MMOL/L (ref 136–145)
T4 FREE SERPL-MCNC: 1.51 NG/DL (ref 0.93–1.7)
TSH SERPL DL<=0.005 MIU/L-ACNC: 2.26 UIU/ML (ref 0.27–4.2)
WBC # BLD AUTO: 8.32 10*3/MM3 (ref 3.4–10.8)

## 2022-09-15 RX ORDER — LEVOTHYROXINE SODIUM 88 UG/1
88 TABLET ORAL
Qty: 90 TABLET | Refills: 1 | Status: SHIPPED | OUTPATIENT
Start: 2022-09-15 | End: 2022-09-16 | Stop reason: SDUPTHER

## 2022-09-16 ENCOUNTER — TELEPHONE (OUTPATIENT)
Dept: INTERNAL MEDICINE | Facility: CLINIC | Age: 57
End: 2022-09-16

## 2022-09-16 RX ORDER — LEVOTHYROXINE SODIUM 88 UG/1
88 TABLET ORAL
Qty: 90 TABLET | Refills: 1 | Status: SHIPPED | OUTPATIENT
Start: 2022-09-16 | End: 2023-03-14 | Stop reason: SDUPTHER

## 2022-09-16 NOTE — TELEPHONE ENCOUNTER
Caller: Cheri Lopez    Relationship: Self    Best call back number: [phone]     What is the best time to reach you: ANY TIME    Who are you requesting to speak with (clinical staff, provider,  specific staff member): CLINICAL STAFF    What was the call regarding: PATIENT CALLED REQUESTING HER levothyroxine (SYNTHROID, LEVOTHROID) 88 MCG tablet PRESCRIPTION BE TRANSFERRED TO [facility].    [address] - [phone]  - [phone]   [phone]    Do you require a callback: NO

## 2022-09-19 ENCOUNTER — TELEPHONE (OUTPATIENT)
Dept: INTERNAL MEDICINE | Facility: CLINIC | Age: 57
End: 2022-09-19

## 2022-09-19 NOTE — TELEPHONE ENCOUNTER
Pharmacy Name: [facility] - [place], [place] - [address] - [phone]  - [phone] FX     What medication are you calling in regards to: SYNTHROID, LEVOTHROID 88 MCG     What question does the pharmacy have: NEEDS [facility] REQUEST TO BE CANCELED SO [facility] CAN FILL. PATIENT IS COMPLETELY OUT OF MEDICATION    Additional notes: INSURANCE WILL COVER GENERIC OR NAME BRAND

## 2022-10-03 ENCOUNTER — OFFICE VISIT (OUTPATIENT)
Dept: INTERNAL MEDICINE | Facility: CLINIC | Age: 57
End: 2022-10-03

## 2022-10-03 VITALS
SYSTOLIC BLOOD PRESSURE: 130 MMHG | HEIGHT: 63 IN | DIASTOLIC BLOOD PRESSURE: 84 MMHG | TEMPERATURE: 97.1 F | WEIGHT: 157 LBS | BODY MASS INDEX: 27.82 KG/M2

## 2022-10-03 DIAGNOSIS — N30.90 CYSTITIS: Primary | ICD-10-CM

## 2022-10-03 LAB
BILIRUB BLD-MCNC: NEGATIVE MG/DL
CLARITY, POC: CLEAR
COLOR UR: ABNORMAL
EXPIRATION DATE: ABNORMAL
GLUCOSE UR STRIP-MCNC: ABNORMAL MG/DL
KETONES UR QL: NEGATIVE
LEUKOCYTE EST, POC: ABNORMAL
Lab: ABNORMAL
NITRITE UR-MCNC: POSITIVE MG/ML
PH UR: 5.5 [PH] (ref 5–8)
PROT UR STRIP-MCNC: NEGATIVE MG/DL
RBC # UR STRIP: NEGATIVE /UL
SP GR UR: 1 (ref 1–1.03)
UROBILINOGEN UR QL: NORMAL

## 2022-10-03 PROCEDURE — 99213 OFFICE O/P EST LOW 20 MIN: CPT | Performed by: STUDENT IN AN ORGANIZED HEALTH CARE EDUCATION/TRAINING PROGRAM

## 2022-10-03 PROCEDURE — 81003 URINALYSIS AUTO W/O SCOPE: CPT | Performed by: STUDENT IN AN ORGANIZED HEALTH CARE EDUCATION/TRAINING PROGRAM

## 2022-10-03 RX ORDER — NITROFURANTOIN 25; 75 MG/1; MG/1
100 CAPSULE ORAL 2 TIMES DAILY
Qty: 10 CAPSULE | Refills: 0 | Status: SHIPPED | OUTPATIENT
Start: 2022-10-03 | End: 2022-10-08

## 2022-10-03 NOTE — PROGRESS NOTES
Isidro Flowers D.O.  Internal Medicine  [facility]  [address], [address]  [place] [address]  [phone]      Chief Complaint  Urinary Tract Infection (Painful urination)    SUBJECTIVE    History of Present Illness    Cheri Lopez is a 57 y.o. female who presents to the office today as an established patient of Dr Felicia Gonzalez MD here today for an acute care visit.     States she didn't drink much water on 9/30/22 and the next day symptoms began.   She has had urgency , burning with urination as well as frequency.   She has been taking [employer] brand Azo medication. She felt slightly febrile last night but didn't check her temperature at home. Is feeling fine aside from urinary symptoms. Last UTI was 10 or more years ago.     Allergies   Allergen Reactions   • Sulfa Antibiotics Other (See Comments)     Elevated liver enzymes     LONG TERM EFFECT ,FOUND IN BLOOD WORK        Outpatient Medications Marked as Taking for the 10/3/22 encounter (Office Visit) with Isidro Flowers, DO   Medication Sig Dispense Refill   • Ascorbic Acid (VITAMIN C PO) Take 1,000 mg by mouth Daily.     • B Complex Vitamins (VITAMIN B COMPLEX PO) Take 200 mcg by mouth Daily.     • baclofen (LIORESAL) 10 MG tablet TAKE ONE TABLET BY MOUTH THREE TIMES A DAY AS NEEDED FOR MUSCLE SPASMS 30 tablet 2   • cycloSPORINE (RESTASIS) 0.05 % ophthalmic emulsion Administer 1 drop to both eyes Every 12 (Twelve) Hours.     • guaiFENesin-codeine (GUAIFENESIN AC) 100-10 MG/5ML liquid Take 5 mL by mouth 3 (Three) Times a Day As Needed for Cough. 125 mL 1   • levocetirizine (XYZAL) 5 MG tablet TAKE ONE TABLET BY MOUTH EVERY EVENING 30 tablet 1   • levothyroxine (SYNTHROID, LEVOTHROID) 88 MCG tablet Take 1 tablet by mouth Every Morning. 90 tablet 1   • losartan (Cozaar) 50 MG tablet Take 1 tablet by mouth Daily. 90 tablet 1   • Magnesium-Potassium 250-100 MG tablet Take 1 tablet by mouth Daily.     • Multiple Vitamins-Minerals  "(PRESERVISION AREDS PO) Take 1 capsule by mouth 2 (two) times a day.     • progesterone (PROMETRIUM) 100 MG capsule Take 100 mg by mouth Daily.     • vitamin D3 125 MCG (5000 UT) capsule capsule      • vitamin E 400 UNIT capsule Take 400 Units by mouth Daily.          Past Medical History:   Diagnosis Date   • Arthritis    • Depression    • GERD (gastroesophageal reflux disease)    • H/O colonoscopy    • Hip pain    • Hypertension    • Hypothyroidism    • IBS (irritable bowel syndrome)    • Knee pain    • Low back pain    • Pneumonia 10/2011   • PONV (postoperative nausea and vomiting)        OBJECTIVE    Vital Signs:   /84   Temp 97.1 °F (36.2 °C) (Infrared)   Ht 160 cm (63\")   Wt 71.2 kg (157 lb)   BMI 27.81 kg/m²     Physical Exam  Vitals reviewed.   Constitutional:       General: She is not in acute distress.     Appearance: Normal appearance. She is not ill-appearing.   Eyes:      General: No scleral icterus.  Pulmonary:      Effort: Pulmonary effort is normal. No respiratory distress.   Abdominal:      General: Bowel sounds are normal. There is no distension.      Palpations: Abdomen is soft.      Tenderness: There is abdominal tenderness (suprapubic). There is no right CVA tenderness, left CVA tenderness or guarding.   Neurological:      Mental Status: She is alert.   Psychiatric:         Mood and Affect: Mood normal.         Behavior: Behavior normal.         Thought Content: Thought content normal.                             ASSESSMENT & PLAN     Diagnoses and all orders for this visit:    1. Cystitis (Primary)  -pt presents to office today for acute care visit; 2-3 days of symptoms consistent with urinary tract infection  -on exam she is in no acute distress, no CVA tenderness but does have mild suprapubic tenderness to palpation, normotensive and afebrile  -urine dipstick positive for nitrite and leuk esterase , consistent with UTI  -last UTI was more than 10 years ago per her report   -will " treat empirically with macrobid for 5 days   -advised pt to contact office if fever/chills/CVA tenderness develop  -     POC Urinalysis Dipstick, Automated  -     nitrofurantoin, macrocrystal-monohydrate, (Macrobid) 100 MG capsule; Take 1 capsule by mouth 2 (Two) Times a Day for 5 days.  Dispense: 10 capsule; Refill: 0            The following social determinates of health impact the patient's medical decision making: No social determinates of health were factored in to today's visit.     Follow Up  No follow-ups on file.    Patient/family had no further questions at this time and verbalized understanding of the plan discussed today.

## 2022-10-31 ENCOUNTER — TELEPHONE (OUTPATIENT)
Dept: INTERNAL MEDICINE | Facility: CLINIC | Age: 57
End: 2022-10-31

## 2022-10-31 RX ORDER — LEVOCETIRIZINE DIHYDROCHLORIDE 5 MG/1
5 TABLET, FILM COATED ORAL EVERY EVENING
Qty: 30 TABLET | Refills: 2 | Status: SHIPPED | OUTPATIENT
Start: 2022-10-31 | End: 2023-03-08

## 2022-11-28 RX ORDER — LOSARTAN POTASSIUM 50 MG/1
TABLET ORAL
Qty: 90 TABLET | Refills: 1 | Status: SHIPPED | OUTPATIENT
Start: 2022-11-28 | End: 2023-03-14 | Stop reason: SDUPTHER

## 2022-12-06 ENCOUNTER — OFFICE VISIT (OUTPATIENT)
Dept: INTERNAL MEDICINE | Facility: CLINIC | Age: 57
End: 2022-12-06

## 2022-12-06 VITALS
HEART RATE: 79 BPM | HEIGHT: 63 IN | BODY MASS INDEX: 29.06 KG/M2 | SYSTOLIC BLOOD PRESSURE: 142 MMHG | WEIGHT: 164 LBS | DIASTOLIC BLOOD PRESSURE: 88 MMHG | TEMPERATURE: 96.9 F | OXYGEN SATURATION: 99 %

## 2022-12-06 DIAGNOSIS — G56.02 LEFT CARPAL TUNNEL SYNDROME: Primary | ICD-10-CM

## 2022-12-06 DIAGNOSIS — M25.512 ACUTE PAIN OF LEFT SHOULDER: ICD-10-CM

## 2022-12-06 DIAGNOSIS — L72.3 SEBACEOUS CYST: ICD-10-CM

## 2022-12-06 PROCEDURE — 99214 OFFICE O/P EST MOD 30 MIN: CPT | Performed by: INTERNAL MEDICINE

## 2022-12-06 RX ORDER — MUPIROCIN CALCIUM 20 MG/G
1 CREAM TOPICAL 3 TIMES DAILY
Qty: 30 G | Refills: 0 | Status: SHIPPED | OUTPATIENT
Start: 2022-12-06 | End: 2023-03-14

## 2022-12-06 RX ORDER — BACLOFEN 10 MG/1
10 TABLET ORAL 3 TIMES DAILY PRN
Qty: 45 TABLET | Refills: 3 | Status: SHIPPED | OUTPATIENT
Start: 2022-12-06

## 2022-12-06 NOTE — PROGRESS NOTES
Chief Complaint   Patient presents with   • numbness in left hand     Bump in groin area        History of Present Illness   Cheri Lopez is a 57 y.o. female presents for acute needs. Patient reports new onset of left hand numbness. She notes this predominantly when driving. Can also happen when keyboarding. Notes at night. Wears a brace w/ some benefit. Additional concern of muscular strain in left neck/ shoulder. Patient has known diffuse ddd. No strength issues at this time.   Additional cern             The following portions of the patient's history were reviewed and updated as appropriate: allergies, current medications, past family history, past medical history, past social history, past surgical history and problem list.  Current Outpatient Medications on File Prior to Visit   Medication Sig Dispense Refill   • Ascorbic Acid (VITAMIN C PO) Take 1,000 mg by mouth Daily.     • B Complex Vitamins (VITAMIN B COMPLEX PO) Take 200 mcg by mouth Daily.     • cycloSPORINE (RESTASIS) 0.05 % ophthalmic emulsion Administer 1 drop to both eyes Every 12 (Twelve) Hours.     • guaiFENesin-codeine (GUAIFENESIN AC) 100-10 MG/5ML liquid Take 5 mL by mouth 3 (Three) Times a Day As Needed for Cough. 125 mL 1   • levocetirizine (XYZAL) 5 MG tablet Take 1 tablet by mouth Every Evening. 30 tablet 2   • levothyroxine (SYNTHROID, LEVOTHROID) 88 MCG tablet Take 1 tablet by mouth Every Morning. 90 tablet 1   • losartan (COZAAR) 50 MG tablet TAKE ONE TABLET BY MOUTH DAILY 90 tablet 1   • Magnesium-Potassium 250-100 MG tablet Take 1 tablet by mouth Daily.     • Multiple Vitamins-Minerals (PRESERVISION AREDS PO) Take 1 capsule by mouth 2 (two) times a day.     • Omega-3 Fatty Acids (FISH OIL PO) Take 1 capsule by mouth 2 (two) times a day.     • progesterone (PROMETRIUM) 100 MG capsule Take 100 mg by mouth Daily.     • vitamin D3 125 MCG (5000 UT) capsule capsule      • vitamin E 400 UNIT capsule Take 400 Units by mouth Daily.     •  "[DISCONTINUED] baclofen (LIORESAL) 10 MG tablet TAKE ONE TABLET BY MOUTH THREE TIMES A DAY AS NEEDED FOR MUSCLE SPASMS 30 tablet 2     No current facility-administered medications on file prior to visit.     Review of Systems   Constitutional: Negative.    HENT: Negative.    Eyes: Negative.    Respiratory: Negative.    Cardiovascular: Negative.    Gastrointestinal: Negative.    Endocrine: Negative.    Genitourinary: Negative.    Musculoskeletal: Positive for arthralgias and back pain.        Back and left hip pain   Skin:        New papule groin   Neurological:        Numbness left hand   Hematological: Negative.    Psychiatric/Behavioral: Negative.        Objective   Physical Exam  Vitals and nursing note reviewed.   Constitutional:       Appearance: Normal appearance.   HENT:      Head: Normocephalic and atraumatic.      Right Ear: Tympanic membrane normal.      Left Ear: Tympanic membrane normal.      Nose: Nose normal.      Mouth/Throat:      Mouth: Mucous membranes are moist.   Eyes:      Extraocular Movements: Extraocular movements intact.      Pupils: Pupils are equal, round, and reactive to light.   Cardiovascular:      Rate and Rhythm: Normal rate and regular rhythm.      Pulses: Normal pulses.   Pulmonary:      Effort: Pulmonary effort is normal.   Abdominal:      General: Abdomen is flat.   Musculoskeletal:         General: Normal range of motion.      Cervical back: Normal range of motion.   Skin:     General: Skin is warm and dry.   Neurological:      General: No focal deficit present.      Mental Status: She is alert and oriented to person, place, and time.   Psychiatric:         Mood and Affect: Mood normal.         Behavior: Behavior normal.         Thought Content: Thought content normal.         Judgment: Judgment normal.          /88   Pulse 79   Temp 96.9 °F (36.1 °C) (Infrared)   Ht 160 cm (63\")   Wt 74.4 kg (164 lb)   SpO2 99%   BMI 29.05 kg/m²     Assessment & Plan   Diagnoses and " all orders for this visit:    Left carpal tunnel syndrome    Acute pain of left shoulder    Sebaceous cyst    Other orders  -     baclofen (LIORESAL) 10 MG tablet; Take 1 tablet by mouth 3 (Three) Times a Day As Needed for Muscle Spasms.    patient with left hand numbness. Distribution c/w carpal tunnel syndrome. She is to increase wearing her brace. Gave rx for new brace. She will also get a sciatic nerve pillow for pain in sciatic distribution. To start massage therapy for neck and shoulder tension. Baclofen prn. Sebaceous cyst in groin. Has already drained. Will use compression to are and prn mupirocin ointment. she will f/u in a prn fashion.     Patient advised flu, covid-19, and shingrix vaccinations         Answers for HPI/ROS submitted by the patient on 12/5/2022  Please describe your symptoms.: I have numbness and pain in my left hand/arm when sleeping, driving and typing at work.  I also have a hard lump/bump that is on my upper leg of unknown origin.  Have you had these symptoms before?: Yes  How long have you been having these symptoms?: Greater than 2 weeks  Please list any medications you are currently taking for this condition.: none  Please describe any probable cause for these symptoms. : carpal tunnel syndrome?, bug bite?  What is the primary reason for your visit?: Other

## 2023-02-20 ENCOUNTER — OFFICE VISIT (OUTPATIENT)
Dept: INTERNAL MEDICINE | Facility: CLINIC | Age: 58
End: 2023-02-20
Payer: COMMERCIAL

## 2023-02-20 VITALS
WEIGHT: 157 LBS | OXYGEN SATURATION: 99 % | BODY MASS INDEX: 27.82 KG/M2 | HEART RATE: 71 BPM | DIASTOLIC BLOOD PRESSURE: 82 MMHG | SYSTOLIC BLOOD PRESSURE: 124 MMHG | HEIGHT: 63 IN | TEMPERATURE: 98.4 F

## 2023-02-20 DIAGNOSIS — L03.113 CELLULITIS OF RIGHT HAND: Primary | ICD-10-CM

## 2023-02-20 DIAGNOSIS — S61.431A PUNCTURE WOUND OF RIGHT HAND WITHOUT FOREIGN BODY, INITIAL ENCOUNTER: ICD-10-CM

## 2023-02-20 PROCEDURE — 90471 IMMUNIZATION ADMIN: CPT | Performed by: STUDENT IN AN ORGANIZED HEALTH CARE EDUCATION/TRAINING PROGRAM

## 2023-02-20 PROCEDURE — 90714 TD VACC NO PRESV 7 YRS+ IM: CPT | Performed by: STUDENT IN AN ORGANIZED HEALTH CARE EDUCATION/TRAINING PROGRAM

## 2023-02-20 PROCEDURE — 99214 OFFICE O/P EST MOD 30 MIN: CPT | Performed by: STUDENT IN AN ORGANIZED HEALTH CARE EDUCATION/TRAINING PROGRAM

## 2023-02-20 RX ORDER — CEPHALEXIN 500 MG/1
500 CAPSULE ORAL 4 TIMES DAILY
Qty: 40 CAPSULE | Refills: 0 | Status: SHIPPED | OUTPATIENT
Start: 2023-02-20 | End: 2023-03-02

## 2023-02-20 RX ORDER — LEVOFLOXACIN 750 MG/1
750 TABLET ORAL DAILY
Qty: 10 TABLET | Refills: 0 | Status: SHIPPED | OUTPATIENT
Start: 2023-02-20 | End: 2023-03-02

## 2023-02-20 NOTE — PROGRESS NOTES
Isidro Flowers D.O.  Internal Medicine  [facility]  [address], [address]  [place] [address]  [phone]      Chief Complaint  right hand swollen  (Notice on Friday 2/17/2023, warm to touch)    SUBJECTIVE    History of Present Illness    Cheri Lopez is a 57 y.o. female who presents to the office today as an established patient of Dr Felicia Gonzalez MD here today for an acute care visit.     Symptoms started on 2/17/23 with with soreness of the right hand on the back side.   The next day the hand was visibly swollen and very painful. Making a fist hurts. It has been warn to touch. No chills but she has not felt hot. The swelling and redness may have gone down some over the last 24 hrs. She reports that in the days before this [place], poked her hands with fishing hooks multiple times, was holding fish and exposed to lake water.     Allergies   Allergen Reactions   • Sulfa Antibiotics Other (See Comments)     Elevated liver enzymes     LONG TERM EFFECT ,FOUND IN BLOOD WORK        Outpatient Medications Marked as Taking for the 2/20/23 encounter (Office Visit) with Isidro Flowers, DO   Medication Sig Dispense Refill   • Ascorbic Acid (VITAMIN C PO) Take 1,000 mg by mouth Daily.     • B Complex Vitamins (VITAMIN B COMPLEX PO) Take 200 mcg by mouth Daily.     • baclofen (LIORESAL) 10 MG tablet Take 1 tablet by mouth 3 (Three) Times a Day As Needed for Muscle Spasms. 45 tablet 3   • cycloSPORINE (RESTASIS) 0.05 % ophthalmic emulsion Administer 1 drop to both eyes Every 12 (Twelve) Hours.     • guaiFENesin-codeine (GUAIFENESIN AC) 100-10 MG/5ML liquid Take 5 mL by mouth 3 (Three) Times a Day As Needed for Cough. 125 mL 1   • levocetirizine (XYZAL) 5 MG tablet Take 1 tablet by mouth Every Evening. 30 tablet 2   • levothyroxine (SYNTHROID, LEVOTHROID) 88 MCG tablet Take 1 tablet by mouth Every Morning. 90 tablet 1   • losartan (COZAAR) 50 MG tablet TAKE ONE TABLET BY  "MOUTH DAILY 90 tablet 1   • Magnesium-Potassium 250-100 MG tablet Take 1 tablet by mouth Daily.     • Multiple Vitamins-Minerals (PRESERVISION AREDS PO) Take 1 capsule by mouth 2 (two) times a day.     • mupirocin (Bactroban) 2 % cream Apply 1 application topically to the appropriate area as directed 3 (Three) Times a Day. 30 g 0   • Omega-3 Fatty Acids (FISH OIL PO) Take 1 capsule by mouth 2 (two) times a day.     • progesterone (PROMETRIUM) 100 MG capsule Take 100 mg by mouth Daily.     • vitamin D3 125 MCG (5000 UT) capsule capsule      • vitamin E 400 UNIT capsule Take 400 Units by mouth Daily.          Past Medical History:   Diagnosis Date   • Arthritis    • Depression    • GERD (gastroesophageal reflux disease)    • H/O colonoscopy    • Hip pain    • Hypertension    • Hypothyroidism    • IBS (irritable bowel syndrome)    • Knee pain    • Low back pain    • Pneumonia 10/2011   • PONV (postoperative nausea and vomiting)        OBJECTIVE    Vital Signs:   /82   Pulse 71   Temp 98.4 °F (36.9 °C) (Oral)   Ht 160 cm (63\")   Wt 71.2 kg (157 lb)   SpO2 99%   BMI 27.81 kg/m²     Physical Exam  Vitals reviewed.   Constitutional:       General: She is not in acute distress.     Appearance: She is not ill-appearing.   Pulmonary:      Effort: Pulmonary effort is normal. No respiratory distress.   Musculoskeletal:        Arms:       Comments: Right hand with swelling in the encircled areas with increased warmth and slightly erythematous skin. This area is tender to palpation. Several scabbed puncture sites are present. This is all shown in the image below.    Neurological:      Mental Status: She is alert.   Psychiatric:         Mood and Affect: Mood normal.         Behavior: Behavior normal.         Thought Content: Thought content normal.                                     ASSESSMENT & PLAN     Diagnoses and all orders for this visit:    1. Cellulitis of right hand (Primary)  2. Puncture wound of right " hand  -pt presents to office today for acute care visit  -she was fishing several days prior to symptoms starting and reports having hand punctures with fishing hooks as well as exposure of her hands to [place] water  -starting 2/17/23 she noticed pain of the right hand followed the next day by swelling and increased skin redness and warmth  -clinical picture is consistent with cellulitis of the right hand  -this presentation is complicated by exposure to water source; will initiate treatment with cephalexin and levofloxacin for 10 days   -she should priya the area of redness; if area of redness and swelling fails to clinically improve in the next 24-48 hrs OR if redness begins to expand despite antibiotic treatment she should report to ER for further eval   -will provide Td booster today as well   -     cephalexin (Keflex) 500 MG capsule; Take 1 capsule by mouth 4 (Four) Times a Day for 10 days.  Dispense: 40 capsule; Refill: 0  -     levoFLOXacin (Levaquin) 750 MG tablet; Take 1 tablet by mouth Daily for 10 days.  Dispense: 10 tablet; Refill: 0            The following social determinates of health impact the patient's medical decision making: No social determinates of health were factored in to today's visit.     Follow Up  No follow-ups on file.    Patient/family had no further questions at this time and verbalized understanding of the plan discussed today.

## 2023-02-22 ENCOUNTER — TELEPHONE (OUTPATIENT)
Dept: INTERNAL MEDICINE | Facility: CLINIC | Age: 58
End: 2023-02-22
Payer: COMMERCIAL

## 2023-02-22 NOTE — TELEPHONE ENCOUNTER
Caller: Cheri Lopez    Relationship: Self    Best call back number: [phone]    What is the best time to reach you: ANY     Who are you requesting to speak with (clinical staff, provider,  specific staff member): DR LESTER OR GEE     What was the call regarding: ADDITIONAL QUESTIONS REGARDING VISIT ON 2/20, SHE STILL HAS A LOT OF PAIN IN HAND, WANTS TO KNOW IF THIS IS NORMAL.     Do you require a callback: YES

## 2023-03-02 DIAGNOSIS — I10 HYPERTENSION, UNSPECIFIED TYPE: Primary | ICD-10-CM

## 2023-03-02 DIAGNOSIS — R79.89 ELEVATED LFTS: ICD-10-CM

## 2023-03-02 DIAGNOSIS — E03.9 ADULT HYPOTHYROIDISM: ICD-10-CM

## 2023-03-08 RX ORDER — LEVOCETIRIZINE DIHYDROCHLORIDE 5 MG/1
TABLET, FILM COATED ORAL
Qty: 90 TABLET | Refills: 0 | Status: SHIPPED | OUTPATIENT
Start: 2023-03-08 | End: 2023-03-10

## 2023-03-09 LAB
ALBUMIN SERPL-MCNC: 4.7 G/DL (ref 3.5–5.2)
ALBUMIN/GLOB SERPL: 2 G/DL
ALP SERPL-CCNC: 79 U/L (ref 39–117)
ALT SERPL-CCNC: 36 U/L (ref 1–33)
APPEARANCE UR: CLEAR
AST SERPL-CCNC: 31 U/L (ref 1–32)
BACTERIA #/AREA URNS HPF: NORMAL /HPF
BASOPHILS # BLD AUTO: 0.05 10*3/MM3 (ref 0–0.2)
BASOPHILS NFR BLD AUTO: 0.8 % (ref 0–1.5)
BILIRUB SERPL-MCNC: 0.3 MG/DL (ref 0–1.2)
BILIRUB UR QL STRIP: NEGATIVE
BUN SERPL-MCNC: 8 MG/DL (ref 6–20)
BUN/CREAT SERPL: 10.1 (ref 7–25)
CALCIUM SERPL-MCNC: 9.8 MG/DL (ref 8.6–10.5)
CASTS URNS QL MICRO: NORMAL /LPF
CHLORIDE SERPL-SCNC: 101 MMOL/L (ref 98–107)
CHOLEST SERPL-MCNC: 192 MG/DL (ref 0–200)
CO2 SERPL-SCNC: 26.6 MMOL/L (ref 22–29)
COLOR UR: YELLOW
CREAT SERPL-MCNC: 0.79 MG/DL (ref 0.57–1)
EGFRCR SERPLBLD CKD-EPI 2021: 87.4 ML/MIN/1.73
EOSINOPHIL # BLD AUTO: 0.06 10*3/MM3 (ref 0–0.4)
EOSINOPHIL NFR BLD AUTO: 1 % (ref 0.3–6.2)
EPI CELLS #/AREA URNS HPF: NORMAL /HPF (ref 0–10)
ERYTHROCYTE [DISTWIDTH] IN BLOOD BY AUTOMATED COUNT: 13.8 % (ref 12.3–15.4)
GLOBULIN SER CALC-MCNC: 2.3 GM/DL
GLUCOSE SERPL-MCNC: 87 MG/DL (ref 65–99)
GLUCOSE UR QL STRIP: NEGATIVE
HCT VFR BLD AUTO: 42.1 % (ref 34–46.6)
HDLC SERPL-MCNC: 82 MG/DL (ref 40–60)
HGB BLD-MCNC: 14.2 G/DL (ref 12–15.9)
HGB UR QL STRIP: NEGATIVE
IMM GRANULOCYTES # BLD AUTO: 0.02 10*3/MM3 (ref 0–0.05)
IMM GRANULOCYTES NFR BLD AUTO: 0.3 % (ref 0–0.5)
KETONES UR QL STRIP: NEGATIVE
LDLC SERPL CALC-MCNC: 103 MG/DL (ref 0–100)
LEUKOCYTE ESTERASE UR QL STRIP: NEGATIVE
LYMPHOCYTES # BLD AUTO: 2.04 10*3/MM3 (ref 0.7–3.1)
LYMPHOCYTES NFR BLD AUTO: 33.4 % (ref 19.6–45.3)
MCH RBC QN AUTO: 31.8 PG (ref 26.6–33)
MCHC RBC AUTO-ENTMCNC: 33.7 G/DL (ref 31.5–35.7)
MCV RBC AUTO: 94.2 FL (ref 79–97)
MICRO URNS: NORMAL
MICRO URNS: NORMAL
MONOCYTES # BLD AUTO: 0.5 10*3/MM3 (ref 0.1–0.9)
MONOCYTES NFR BLD AUTO: 8.2 % (ref 5–12)
NEUTROPHILS # BLD AUTO: 3.44 10*3/MM3 (ref 1.7–7)
NEUTROPHILS NFR BLD AUTO: 56.3 % (ref 42.7–76)
NITRITE UR QL STRIP: NEGATIVE
NRBC BLD AUTO-RTO: 0 /100 WBC (ref 0–0.2)
PH UR STRIP: 7 [PH] (ref 5–7.5)
PLATELET # BLD AUTO: 208 10*3/MM3 (ref 140–450)
POTASSIUM SERPL-SCNC: 4.2 MMOL/L (ref 3.5–5.2)
PROT SERPL-MCNC: 7 G/DL (ref 6–8.5)
PROT UR QL STRIP: NEGATIVE
RBC # BLD AUTO: 4.47 10*6/MM3 (ref 3.77–5.28)
RBC #/AREA URNS HPF: NORMAL /HPF (ref 0–2)
SODIUM SERPL-SCNC: 140 MMOL/L (ref 136–145)
SP GR UR STRIP: 1 (ref 1–1.03)
TRIGL SERPL-MCNC: 31 MG/DL (ref 0–150)
TSH SERPL DL<=0.005 MIU/L-ACNC: 1.5 UIU/ML (ref 0.27–4.2)
URINALYSIS REFLEX: NORMAL
UROBILINOGEN UR STRIP-MCNC: 0.2 MG/DL (ref 0.2–1)
VLDLC SERPL CALC-MCNC: 7 MG/DL (ref 5–40)
WBC # BLD AUTO: 6.11 10*3/MM3 (ref 3.4–10.8)
WBC #/AREA URNS HPF: NORMAL /HPF (ref 0–5)

## 2023-03-10 RX ORDER — LEVOCETIRIZINE DIHYDROCHLORIDE 5 MG/1
TABLET, FILM COATED ORAL
Qty: 90 TABLET | Refills: 0 | Status: SHIPPED | OUTPATIENT
Start: 2023-03-10 | End: 2023-03-14 | Stop reason: SDUPTHER

## 2023-03-14 ENCOUNTER — OFFICE VISIT (OUTPATIENT)
Dept: INTERNAL MEDICINE | Facility: CLINIC | Age: 58
End: 2023-03-14
Payer: COMMERCIAL

## 2023-03-14 VITALS
DIASTOLIC BLOOD PRESSURE: 82 MMHG | WEIGHT: 148 LBS | BODY MASS INDEX: 26.22 KG/M2 | HEART RATE: 83 BPM | HEIGHT: 63 IN | SYSTOLIC BLOOD PRESSURE: 126 MMHG | OXYGEN SATURATION: 97 %

## 2023-03-14 DIAGNOSIS — J30.9 ALLERGIC RHINITIS, UNSPECIFIED SEASONALITY, UNSPECIFIED TRIGGER: ICD-10-CM

## 2023-03-14 DIAGNOSIS — M51.36 DDD (DEGENERATIVE DISC DISEASE), LUMBAR: ICD-10-CM

## 2023-03-14 DIAGNOSIS — M79.89 SWELLING OF BOTH HANDS: ICD-10-CM

## 2023-03-14 DIAGNOSIS — M48.07 SPINAL STENOSIS OF LUMBOSACRAL REGION: ICD-10-CM

## 2023-03-14 DIAGNOSIS — Z00.00 HEALTHCARE MAINTENANCE: Primary | ICD-10-CM

## 2023-03-14 DIAGNOSIS — I10 HYPERTENSION, UNSPECIFIED TYPE: ICD-10-CM

## 2023-03-14 DIAGNOSIS — E03.9 HYPOTHYROIDISM, UNSPECIFIED TYPE: ICD-10-CM

## 2023-03-14 DIAGNOSIS — M25.551 RIGHT HIP PAIN: ICD-10-CM

## 2023-03-14 PROCEDURE — 99396 PREV VISIT EST AGE 40-64: CPT | Performed by: INTERNAL MEDICINE

## 2023-03-14 RX ORDER — LEVOTHYROXINE SODIUM 88 UG/1
88 TABLET ORAL
Qty: 90 TABLET | Refills: 2 | Status: SHIPPED | OUTPATIENT
Start: 2023-03-14

## 2023-03-14 RX ORDER — LOSARTAN POTASSIUM 50 MG/1
50 TABLET ORAL DAILY
Qty: 90 TABLET | Refills: 1 | Status: SHIPPED | OUTPATIENT
Start: 2023-03-14

## 2023-03-14 RX ORDER — LEVOCETIRIZINE DIHYDROCHLORIDE 5 MG/1
5 TABLET, FILM COATED ORAL EVERY EVENING
Qty: 90 TABLET | Refills: 0 | Status: SHIPPED | OUTPATIENT
Start: 2023-03-14

## 2023-03-14 RX ORDER — ESTRADIOL 0.05 MG/D
1 FILM, EXTENDED RELEASE TRANSDERMAL 2 TIMES WEEKLY
COMMUNITY
Start: 2022-11-03

## 2023-03-14 NOTE — PROGRESS NOTES
"Subjective   CPE  Hypothyroid  htn  Allergic rhinitis  Spinal stenosis    Cheri Lopez is a 57 y.o. female who presents for a complete physical exam, to review chronic issues, and to discuss acute needs. Patient had hand swelling in feb. She had been fishing prior to this. She was diagnosed w/ cellulitis of the right hand at that time. She was treated with keflex and levaquin for 10 days. Improvement in symptoms after that. Now having left hand swelling and pain without redness. Swelling up into left wrist. This has been present for 4-5 weeks per patient.   Patient w/ htn. bp not routinely tested. But \"I think\" it has been normal.   Has hypothyroidism. She is euthyroid.   Allergic rhinitis. Much improved w/ xyzal.   Patient has spinal stenosis and advanced oa. S/p spinal laminectomies and labral repair. She receives epidural injections and pain medication per pain management and physical therapy. She does well working from home as this minimizes back strain and avoids a 45 min commute that decreases productivity and adds to back strain and further pain.         Review of Systems   Constitutional: Negative.    HENT: Negative.    Eyes: Negative.    Respiratory: Negative.    Cardiovascular: Negative.    Gastrointestinal: Negative.    Endocrine: Negative.    Genitourinary: Negative.    Musculoskeletal: Negative.    Skin: Negative.    Allergic/Immunologic: Negative.    Neurological: Negative.    Hematological: Negative.    Psychiatric/Behavioral: Negative.        The following portions of the patient's history were reviewed and updated as appropriate: allergies, current medications, past family history, past medical history, past social history, past surgical history and problem list.     Patient Active Problem List   Diagnosis   • Chronic constipation   • Blues   • Adult hypothyroidism   • Encounter for screening for malignant neoplasm of colon   • Acute right-sided low back pain with right-sided sciatica   • DDD " (degenerative disc disease), lumbar   • Chronic right-sided low back pain with right-sided sciatica   • Right hip pain   • Sacroiliac joint dysfunction   • Allergic rhinitis due to allergen   • Scoliosis due to degenerative disease of spine in adult patient   • Low back pain   • Neural foraminal stenosis of lumbar spine   • Tear of medial meniscus of left knee, current   • Hypertension   • Multifocal pneumonia   • Elevated LFTs   • Viral pneumonia       Past Medical History:   Diagnosis Date   • Arthritis    • Depression    • GERD (gastroesophageal reflux disease)    • H/O colonoscopy    • Hip pain    • Hypertension    • Hypothyroidism    • IBS (irritable bowel syndrome)    • Knee pain    • Low back pain    • Pneumonia 10/2011   • PONV (postoperative nausea and vomiting)        Past Surgical History:   Procedure Laterality Date   • BREAST AUGMENTATION  01/17/2007   • CARPAL TUNNEL RELEASE WITH CUBITAL TUNNEL RELEASE Right    • COLONOSCOPY N/A 1/17/2018    Procedure: COLONOSCOPY to cecum and t.i. with polypectomy;  Surgeon: Rima Luis MD;  Location: [facility] ENDOSCOPY;  Service:    • HIP DEBRIDEMENT Right    • KNEE ARTHROSCOPY Left 8/12/2020    Procedure: Left KNEE ARTHROSCOPy, partial medial menisectomy and debridement of arthritis;  Surgeon: Génesis Mg MD;  Location: [facility] OR [facility];  Service: Orthopedics;  Laterality: Left;   • KNEE CARTILAGE SURGERY Right    • SINUS SURGERY     • SKIN BIOPSY      hand - precancerous   • SKIN BIOPSY     • SPINAL FUSION      L4 &L5       Family History   Problem Relation Age of Onset   • Osteoarthritis Mother    • Heart disease Mother    • No Known Problems Brother    • Breast cancer Maternal Aunt    • Cancer Paternal Grandmother    • Malig Hyperthermia Neg Hx        Social History     Socioeconomic History   • Marital status:    • Number of children: 0   • Years of education: ms   Tobacco Use   • Smoking status: Former     Packs/day: 0.25     Years: 5.00     Pack  years: 1.25     Types: Cigarettes     Quit date: 2005     Years since quittin.2   • Smokeless tobacco: Never   • Tobacco comments:     social smoker - never really smoked much   Substance and Sexual Activity   • Alcohol use: Yes     Alcohol/week: 14.0 standard drinks     Types: 14 Glasses of wine per week   • Drug use: Never   • Sexual activity: Yes     Partners: Male     Birth control/protection: Vasectomy       Current Outpatient Medications on File Prior to Visit   Medication Sig Dispense Refill   • Ascorbic Acid (VITAMIN C PO) Take 1,000 mg by mouth Daily.     • B Complex Vitamins (VITAMIN B COMPLEX PO) Take 200 mcg by mouth Daily.     • baclofen (LIORESAL) 10 MG tablet Take 1 tablet by mouth 3 (Three) Times a Day As Needed for Muscle Spasms. 45 tablet 3   • cycloSPORINE (RESTASIS) 0.05 % ophthalmic emulsion Administer 1 drop to both eyes Every 12 (Twelve) Hours.     • estradiol (ESTRING) 2 MG vaginal ring Insert 2 mg into the vagina.     • estradiol (MINIVELLE, VIVELLE-DOT) 0.05 MG/24HR patch Place 1 patch on the skin as directed by provider 2 (Two) Times a Week.     • Magnesium-Potassium 250-100 MG tablet Take 1 tablet by mouth Daily.     • Multiple Vitamins-Minerals (PRESERVISION AREDS PO) Take 1 tablet by mouth 2 (two) times a day.     • progesterone (PROMETRIUM) 100 MG capsule Take 1 capsule by mouth Daily.     • vitamin D3 125 MCG (5000 UT) capsule capsule      • vitamin E 400 UNIT capsule Take 1 capsule by mouth Daily.     • [DISCONTINUED] levocetirizine (XYZAL) 5 MG tablet TAKE ONE TABLET BY MOUTH EVERY EVENING 90 tablet 0   • [DISCONTINUED] levothyroxine (SYNTHROID, LEVOTHROID) 88 MCG tablet Take 1 tablet by mouth Every Morning. 90 tablet 1   • [DISCONTINUED] losartan (COZAAR) 50 MG tablet TAKE ONE TABLET BY MOUTH DAILY 90 tablet 1   • [DISCONTINUED] guaiFENesin-codeine (GUAIFENESIN AC) 100-10 MG/5ML liquid Take 5 mL by mouth 3 (Three) Times a Day As Needed for Cough. 125 mL 1   •  "[DISCONTINUED] mupirocin (Bactroban) 2 % cream Apply 1 application topically to the appropriate area as directed 3 (Three) Times a Day. 30 g 0   • [DISCONTINUED] Omega-3 Fatty Acids (FISH OIL PO) Take 1 capsule by mouth 2 (two) times a day. (Patient not taking: Reported on 3/14/2023)       No current facility-administered medications on file prior to visit.       Allergies   Allergen Reactions   • Sulfa Antibiotics Other (See Comments)     Elevated liver enzymes     LONG TERM EFFECT ,FOUND IN BLOOD WORK       Immunization History   Administered Date(s) Administered   • Flu Vaccine Split Quad 09/22/2015   • Flublok 18+yrs 11/14/2020   • Fluzone Quad >6mos (Multi-dose) 11/01/2019   • Hepatitis A 01/01/2008, 06/01/2008   • Influenza Quad Vaccine (Inpatient) 11/01/2019   • Influenza, Unspecified 10/01/2020, 11/14/2020   • TD Preservative Free 02/20/2023   • Tdap 01/01/2017       Objective     /82   Pulse 83   Ht 160 cm (63\")   Wt 67.1 kg (148 lb)   SpO2 97%   BMI 26.22 kg/m²     Physical Exam  Vitals and nursing note reviewed.   Constitutional:       Appearance: Normal appearance.   HENT:      Head: Normocephalic and atraumatic.      Right Ear: Tympanic membrane normal.      Left Ear: Tympanic membrane normal.      Nose: Nose normal.      Mouth/Throat:      Mouth: Mucous membranes are moist.   Eyes:      Extraocular Movements: Extraocular movements intact.      Pupils: Pupils are equal, round, and reactive to light.   Cardiovascular:      Rate and Rhythm: Normal rate and regular rhythm.      Pulses: Normal pulses.      Heart sounds: Normal heart sounds.   Pulmonary:      Effort: Pulmonary effort is normal.      Breath sounds: Normal breath sounds.   Abdominal:      General: Abdomen is flat.      Palpations: Abdomen is soft.   Musculoskeletal:      Cervical back: Normal range of motion.   Skin:     General: Skin is warm and dry.   Neurological:      General: No focal deficit present.      Mental Status: She is " alert and oriented to person, place, and time.   Psychiatric:         Mood and Affect: Mood normal.         Behavior: Behavior normal.         Thought Content: Thought content normal.         Judgment: Judgment normal.         Assessment & Plan   Diagnoses and all orders for this visit:    1. Healthcare maintenance (Primary)    2. Hypertension, unspecified type    3. Hypothyroidism, unspecified type    4. Allergic rhinitis, unspecified seasonality, unspecified trigger    5. Spinal stenosis of lumbosacral region    6. Right hip pain    7. DDD (degenerative disc disease), lumbar    8. Swelling of both hands  -     CBC & Differential  -     IRAIS With / DsDNA, RNP, Sjogrens A / B, Smith  -     C-reactive Protein  -     Sedimentation Rate  -     Cyclic Citrul Peptide Antibody, IgG / IgA  -     Rheumatoid Factor    Other orders  -     levothyroxine (SYNTHROID, LEVOTHROID) 88 MCG tablet; Take 1 tablet by mouth Every Morning.  Dispense: 90 tablet; Refill: 2  -     losartan (COZAAR) 50 MG tablet; Take 1 tablet by mouth Daily.  Dispense: 90 tablet; Refill: 1  -     levocetirizine (XYZAL) 5 MG tablet; Take 1 tablet by mouth Every Evening.  Dispense: 90 tablet; Refill: 0        Discussion    Patient presents today for a CPE.  Patient follows a healthy diet.   Patient follows an adequate exercise regimen. Mammogram is up to date.   Pap smears are performed by the patient's gynecologist.  She will continue current dosing losartan for bp regulation. carlos has hypothyroidism and will continue synthroid. She has spinal stenosis and advanced ddd of the hip. She should continue working from her home as much as possible to avoid flairing of symptoms. She has mild hand edema. No erythema or joint tenderness. Given previous inflammation of right hand will test listed labs today. She will f/u in 6 months.        No future appointments.

## 2023-03-15 LAB
ANA SER QL: NEGATIVE
BASOPHILS # BLD AUTO: 0.04 10*3/MM3 (ref 0–0.2)
BASOPHILS NFR BLD AUTO: 0.6 % (ref 0–1.5)
CCP IGA+IGG SERPL IA-ACNC: 2 UNITS (ref 0–19)
CRP SERPL-MCNC: <0.3 MG/DL (ref 0–0.5)
EOSINOPHIL # BLD AUTO: 0.06 10*3/MM3 (ref 0–0.4)
EOSINOPHIL NFR BLD AUTO: 0.9 % (ref 0.3–6.2)
ERYTHROCYTE [DISTWIDTH] IN BLOOD BY AUTOMATED COUNT: 13.9 % (ref 12.3–15.4)
ERYTHROCYTE [SEDIMENTATION RATE] IN BLOOD BY WESTERGREN METHOD: 14 MM/HR (ref 0–30)
HCT VFR BLD AUTO: 41.7 % (ref 34–46.6)
HGB BLD-MCNC: 14 G/DL (ref 12–15.9)
IMM GRANULOCYTES # BLD AUTO: 0.02 10*3/MM3 (ref 0–0.05)
IMM GRANULOCYTES NFR BLD AUTO: 0.3 % (ref 0–0.5)
LYMPHOCYTES # BLD AUTO: 1.81 10*3/MM3 (ref 0.7–3.1)
LYMPHOCYTES NFR BLD AUTO: 26.6 % (ref 19.6–45.3)
MCH RBC QN AUTO: 31.6 PG (ref 26.6–33)
MCHC RBC AUTO-ENTMCNC: 33.6 G/DL (ref 31.5–35.7)
MCV RBC AUTO: 94.1 FL (ref 79–97)
MONOCYTES # BLD AUTO: 0.57 10*3/MM3 (ref 0.1–0.9)
MONOCYTES NFR BLD AUTO: 8.4 % (ref 5–12)
NEUTROPHILS # BLD AUTO: 4.31 10*3/MM3 (ref 1.7–7)
NEUTROPHILS NFR BLD AUTO: 63.2 % (ref 42.7–76)
NRBC BLD AUTO-RTO: 0 /100 WBC (ref 0–0.2)
PLATELET # BLD AUTO: 209 10*3/MM3 (ref 140–450)
RBC # BLD AUTO: 4.43 10*6/MM3 (ref 3.77–5.28)
RHEUMATOID FACT SERPL-ACNC: <10 IU/ML
WBC # BLD AUTO: 6.81 10*3/MM3 (ref 3.4–10.8)

## 2023-03-23 ENCOUNTER — TELEPHONE (OUTPATIENT)
Dept: INTERNAL MEDICINE | Facility: CLINIC | Age: 58
End: 2023-03-23
Payer: COMMERCIAL

## 2023-03-23 NOTE — TELEPHONE ENCOUNTER
Suggested pt call other places and ask if they have a wait list  Pt states she will go to the ER

## 2023-03-23 NOTE — TELEPHONE ENCOUNTER
Caller: Cheri Lopez    Relationship: Self    Best call back number: [phone]    What was the call regarding: PATIENT STATED THAT SHE HAS AN APPOINTMENT WITH [facility], BUT NOT UNTIL June.    SHE STATED THAT HER HAND IS SWOLLEN AND PAINFUL AND CANNOT WAIT THAT LONG.    PLEASE CALL TO [facility].      Do you require a callback: YES

## 2023-04-07 ENCOUNTER — TELEPHONE (OUTPATIENT)
Dept: INTERNAL MEDICINE | Facility: CLINIC | Age: 58
End: 2023-04-07

## 2023-04-07 NOTE — TELEPHONE ENCOUNTER
Caller: Cheri Lopez    Relationship: Self    Phone number:[phone]    Requested Prescriptions:     GUAISFEN ESIN-CODEINE 100/10MG/5ML    Pharmacy where request should be sent: [facility] - [place] [address] - [phone]  - [phone] FX     Last office visit with prescribing clinician: 3/14/2023   Last telemedicine visit with prescribing clinician: 9/14/2023   Next office visit with prescribing clinician: 9/19/2023     Additional details provided by patient: PATIENT STATED THIS WAS LAST PRESCRIBED LAST FALL     Does the patient have less than a 3 day supply:  [x] Yes  [] No    Would you like a call back once the refill request has been completed: [x] Yes [] No    If the office needs to give you a call back, can they leave a voicemail: [x] Yes [] No    Jose Nicolas Rep   04/07/23 14:31 EDT

## 2023-04-28 ENCOUNTER — OFFICE VISIT (OUTPATIENT)
Dept: INTERNAL MEDICINE | Facility: CLINIC | Age: 58
End: 2023-04-28
Payer: COMMERCIAL

## 2023-04-28 VITALS
SYSTOLIC BLOOD PRESSURE: 124 MMHG | HEART RATE: 78 BPM | HEIGHT: 63 IN | WEIGHT: 144 LBS | DIASTOLIC BLOOD PRESSURE: 78 MMHG | TEMPERATURE: 99.1 F | BODY MASS INDEX: 25.52 KG/M2

## 2023-04-28 DIAGNOSIS — J01.90 ACUTE NON-RECURRENT SINUSITIS, UNSPECIFIED LOCATION: Primary | ICD-10-CM

## 2023-04-28 RX ORDER — AMOXICILLIN AND CLAVULANATE POTASSIUM 875; 125 MG/1; MG/1
1 TABLET, FILM COATED ORAL 2 TIMES DAILY
Qty: 14 TABLET | Refills: 0 | Status: SHIPPED | OUTPATIENT
Start: 2023-04-28 | End: 2023-05-05

## 2023-04-28 NOTE — PROGRESS NOTES
Stephane Doherty M.D.  Internal Medicine  [facility]  [address], [address]  [place] [address]  [phone]      Chief Complaint  Cough (Dx w/ UTI yesterday taking Macrobid/ Took steroid dose pack on April 11th /), Nasal Congestion, and Fever    SUBJECTIVE    History of Present Illness    Cheri Lopez is a 58 y.o. female with hypothyroidism, hypertension, allergies, spinal stenosis, degenerative disc disease who presents to the office today as an established patient of Dr Felicia Gonzalez MD here today for an acute care visit.     She got back from [place] yesterday. She went to see  in [facility] for UTI with Macrobid. She fished in the rain all Wedneday. She woke up with fever, chills, body ache. She accidentally took double the dose of Advil. She went to  on 4/11 and was given steroid and tessalon perels. She woke up with earache and green nasal discharge. She also has headache. UTI symptoms are resolved.     She had pneumonia last year and 2011. She has some cough.     Review of Systems    Allergies   Allergen Reactions   • Sulfa Antibiotics Other (See Comments)     Elevated liver enzymes     LONG TERM EFFECT ,FOUND IN BLOOD WORK        Outpatient Medications Marked as Taking for the 4/28/23 encounter (Office Visit) with Stephane Doherty MD   Medication Sig Dispense Refill   • Ascorbic Acid (VITAMIN C PO) Take 1,000 mg by mouth Daily.     • B Complex Vitamins (VITAMIN B COMPLEX PO) Take 200 mcg by mouth Daily.     • baclofen (LIORESAL) 10 MG tablet Take 1 tablet by mouth 3 (Three) Times a Day As Needed for Muscle Spasms. 45 tablet 3   • cycloSPORINE (RESTASIS) 0.05 % ophthalmic emulsion Administer 1 drop to both eyes Every 12 (Twelve) Hours.     • estradiol (ESTRING) 2 MG vaginal ring Insert 2 mg into the vagina.     • estradiol (MINIVELLE, VIVELLE-DOT) 0.05 MG/24HR patch Place 1 patch on the skin as directed by provider 2 (Two) Times a Week.     • levocetirizine (XYZAL) 5 MG  tablet Take 1 tablet by mouth Every Evening. 90 tablet 0   • levothyroxine (SYNTHROID, LEVOTHROID) 88 MCG tablet Take 1 tablet by mouth Every Morning. 90 tablet 2   • losartan (COZAAR) 50 MG tablet Take 1 tablet by mouth Daily. 90 tablet 1   • Magnesium-Potassium 250-100 MG tablet Take 1 tablet by mouth Daily.     • Multiple Vitamins-Minerals (PRESERVISION AREDS PO) Take 1 tablet by mouth 2 (two) times a day.     • progesterone (PROMETRIUM) 100 MG capsule Take 1 capsule by mouth Daily.     • vitamin D3 125 MCG (5000 UT) capsule capsule      • vitamin E 400 UNIT capsule Take 1 capsule by mouth Daily.          Past Medical History:   Diagnosis Date   • Arthritis    • Depression    • GERD (gastroesophageal reflux disease)    • H/O colonoscopy    • Hip pain    • Hypertension    • Hypothyroidism    • IBS (irritable bowel syndrome)    • Knee pain    • Low back pain    • Pneumonia 10/2011   • PONV (postoperative nausea and vomiting)      Past Surgical History:   Procedure Laterality Date   • BREAST AUGMENTATION  01/17/2007   • CARPAL TUNNEL RELEASE WITH CUBITAL TUNNEL RELEASE Right    • COLONOSCOPY N/A 1/17/2018    Procedure: COLONOSCOPY to cecum and t.i. with polypectomy;  Surgeon: Rima Luis MD;  Location: [facility] ENDOSCOPY;  Service:    • HIP DEBRIDEMENT Right    • KNEE ARTHROSCOPY Left 8/12/2020    Procedure: Left KNEE ARTHROSCOPy, partial medial menisectomy and debridement of arthritis;  Surgeon: Génesis Mg MD;  Location: [facility] OR [facility];  Service: Orthopedics;  Laterality: Left;   • KNEE CARTILAGE SURGERY Right    • SINUS SURGERY     • SKIN BIOPSY      hand - precancerous   • SKIN BIOPSY     • SPINAL FUSION      L4 &L5     Family History   Problem Relation Age of Onset   • Osteoarthritis Mother    • Heart disease Mother    • No Known Problems Brother    • Breast cancer Maternal Aunt    • Cancer Paternal Grandmother    • Malig Hyperthermia Neg Hx     reports that she quit smoking about 18 years ago. Her  "smoking use included cigarettes. She has a 1.25 pack-year smoking history. She has never used smokeless tobacco. She reports current alcohol use of about 14.0 standard drinks per week. She reports that she does not use drugs.    OBJECTIVE    Vital Signs:   /78   Pulse 78   Temp 99.1 °F (37.3 °C)   Ht 160 cm (62.99\")   Wt 65.3 kg (144 lb)   BMI 25.51 kg/m²     Physical Exam  Constitutional:       Appearance: Normal appearance. She is normal weight.   HENT:      Right Ear: Tympanic membrane normal.      Left Ear: Tympanic membrane normal.      Nose: Congestion present.      Mouth/Throat:      Mouth: Mucous membranes are moist.      Pharynx: Posterior oropharyngeal erythema present.   Cardiovascular:      Rate and Rhythm: Normal rate and regular rhythm.      Heart sounds: Normal heart sounds. No murmur heard.  Pulmonary:      Effort: Pulmonary effort is normal.      Breath sounds: Normal breath sounds.   Skin:     General: Skin is warm and dry.   Neurological:      Mental Status: She is alert.   Psychiatric:         Mood and Affect: Mood normal.         Behavior: Behavior normal.         Thought Content: Thought content normal.            The following data was reviewed by: Stephane Doherty MD on 04/28/2023:  Common labs        3/8/2023    08:04 3/14/2023    14:16 3/24/2023    14:59   Common Labs   Glucose 87       BUN 8       Creatinine 0.79       Sodium 140    138        Potassium 4.2    3.9        Chloride 101    105        Calcium 9.8       Total Protein 7.0       Albumin 4.7       Total Bilirubin 0.3       Alkaline Phosphatase 79       AST (SGOT) 31       ALT (SGPT) 36       WBC 6.11   6.81      Hemoglobin 14.2   14.0      Hematocrit 42.1   41.7      Platelets 208   209      Total Cholesterol 192       Triglycerides 31       HDL Cholesterol 82       LDL Cholesterol  103           This result is from an external source.     Data reviewed: recent PCP note             ASSESSMENT & PLAN     Diagnoses and all " orders for this visit:    1. Acute non-recurrent sinusitis, unspecified location (Primary)  -     amoxicillin-clavulanate (Augmentin) 875-125 MG per tablet; Take 1 tablet by mouth 2 (Two) Times a Day for 7 days.  Dispense: 14 tablet; Refill: 0      Cheri Lopez is a 58 y.o. female with hypothyroidism, hypertension, allergies, spinal stenosis, degenerative disc disease, history of pneumonia who is here today for URI symptoms since April 11.  She does have mild cough but mainly has sinus congestion and low-grade fever.  We will treat for sinusitis with Augmentin.  Discussed use of over-the-counter antihistamine, nasal steroid, nasal lavage and Mucinex. Patient counseled to seek medical care for new or worsening symptoms or failure of symptoms to improve.         Health Maintenance Due   Topic Date Due   • COVID-19 Vaccine (1) Never done   • ZOSTER VACCINE (1 of 2) Never done        Follow Up  No follow-ups on file.    Patient/family had no further questions at this time and verbalized understanding of the plan discussed today.

## 2023-05-05 DIAGNOSIS — M54.9 CHRONIC BILATERAL BACK PAIN, UNSPECIFIED BACK LOCATION: ICD-10-CM

## 2023-05-05 DIAGNOSIS — G89.29 CHRONIC BILATERAL BACK PAIN, UNSPECIFIED BACK LOCATION: ICD-10-CM

## 2023-05-05 DIAGNOSIS — M48.00 SPINAL STENOSIS, UNSPECIFIED SPINAL REGION: Primary | ICD-10-CM

## 2023-05-17 NOTE — TELEPHONE ENCOUNTER
Patient called around 0900 this am with c/o back hurting at myelogram site.  She had taken Tramadol last evening with not much relief and it makes her nauseated.  She also has Zofran at home.  Today she has not taken anything and I encouraged her to reposition for comfort and told her to try some ice or Ibuprofen today.  She said she would.  Patient called back again around 1200 and said after she was up and took a shower she now has a mild H/A,  I suggested that she continue her bedrest through today and continue to push fluids.  If H/A continues tomorrow to call back.   tablet take 1 tablet by oral route every day 9/22/22   Historical Provider, MD   gabapentin (NEURONTIN) 300 MG capsule Take 1 capsule by mouth 3 times daily. 8/19/22   Historical Provider, MD   metFORMIN (GLUCOPHAGE) 500 MG tablet Take 2 tablets by mouth 2 times daily (with meals) 8/19/22   Historical Provider, MD   omeprazole (PRILOSEC) 20 MG delayed release capsule take 1 capsule by oral route every day before a meal 8/8/22   Historical Provider, MD   potassium chloride (KLOR-CON M) 20 MEQ extended release tablet Take 1 tablet by mouth daily 8/29/22   Jean Gutierrez MD   docusate sodium (COLACE) 100 MG capsule Take 1 capsule by mouth daily as needed for Constipation  Patient taking differently: Take 1 capsule by mouth daily as needed for Constipation Takes OTC 4/29/21   Tracy Rahman MD   ibuprofen (ADVIL;MOTRIN) 800 MG tablet Take 1 tablet by mouth every 8 hours as needed for Pain    Historical Provider, MD   VITAMIN D PO Take 1,000 Units by mouth daily    Historical Provider, MD   folic acid (FOLVITE) 1 MG tablet Take 1 tablet by mouth daily    Historical Provider, MD   dicyclomine (BENTYL) 20 MG tablet Take 1 tablet by mouth 3 times daily as needed (abdominal pain) 8/13/20   Jose Luis Douglass MD   spironolactone (ALDACTONE) 25 MG tablet Take 1 tablet by mouth daily 5/28/20   Historical Provider, MD       Physical Exam: Need 8 Elements   Physical Exam  Constitutional:       Appearance: She is ill-appearing. HENT:      Head: Normocephalic and atraumatic. Nose: Nose normal.      Mouth/Throat:      Mouth: Mucous membranes are dry. Pharynx: Oropharynx is clear. Eyes:      Extraocular Movements: Extraocular movements intact. Pupils: Pupils are equal, round, and reactive to light. Cardiovascular:      Rate and Rhythm: Normal rate and regular rhythm. Pulses: Normal pulses. Heart sounds: Normal heart sounds.    Pulmonary:      Effort: Pulmonary effort is normal.      Breath sounds: Normal

## 2023-05-22 ENCOUNTER — TELEPHONE (OUTPATIENT)
Dept: INTERNAL MEDICINE | Facility: CLINIC | Age: 58
End: 2023-05-22
Payer: COMMERCIAL

## 2023-05-22 RX ORDER — LEVOTHYROXINE SODIUM 88 UG/1
88 TABLET ORAL
Qty: 90 TABLET | Refills: 2 | Status: SHIPPED | OUTPATIENT
Start: 2023-05-22

## 2023-05-22 RX ORDER — LOSARTAN POTASSIUM 50 MG/1
50 TABLET ORAL DAILY
Qty: 90 TABLET | Refills: 1 | Status: SHIPPED | OUTPATIENT
Start: 2023-05-22

## 2023-05-22 NOTE — TELEPHONE ENCOUNTER
"Pt calling stating that she would like a call back from [facility]-pt would not state what for but said it wasn't about a prescription but is \"kind of urgent\".      Best call back number [phone]  "
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
## 2023-08-03 ENCOUNTER — OFFICE VISIT (OUTPATIENT)
Dept: NEUROSURGERY | Facility: CLINIC | Age: 58
End: 2023-08-03
Payer: COMMERCIAL

## 2023-08-03 ENCOUNTER — PREP FOR SURGERY (OUTPATIENT)
Dept: OTHER | Facility: HOSPITAL | Age: 58
End: 2023-08-03
Payer: COMMERCIAL

## 2023-08-03 DIAGNOSIS — M54.41 CHRONIC RIGHT-SIDED LOW BACK PAIN WITH RIGHT-SIDED SCIATICA: Primary | ICD-10-CM

## 2023-08-03 DIAGNOSIS — G89.29 CHRONIC RIGHT-SIDED LOW BACK PAIN WITH RIGHT-SIDED SCIATICA: Primary | ICD-10-CM

## 2023-08-03 DIAGNOSIS — M25.551 RIGHT HIP PAIN: ICD-10-CM

## 2023-08-03 PROCEDURE — 99214 OFFICE O/P EST MOD 30 MIN: CPT | Performed by: NEUROLOGICAL SURGERY

## 2023-08-03 RX ORDER — CEFAZOLIN SODIUM 2 G/100ML
2 INJECTION, SOLUTION INTRAVENOUS ONCE
OUTPATIENT
Start: 2023-08-16 | End: 2023-08-03

## 2023-09-07 DIAGNOSIS — I10 HYPERTENSION, UNSPECIFIED TYPE: Primary | ICD-10-CM

## 2023-09-07 DIAGNOSIS — R79.89 ELEVATED LFTS: ICD-10-CM

## 2023-09-07 DIAGNOSIS — E03.9 ADULT HYPOTHYROIDISM: ICD-10-CM

## 2023-09-14 LAB
ALBUMIN SERPL-MCNC: 4.8 G/DL (ref 3.5–5.2)
ALBUMIN/GLOB SERPL: 2 G/DL
ALP SERPL-CCNC: 89 U/L (ref 39–117)
ALT SERPL-CCNC: 38 U/L (ref 1–33)
AST SERPL-CCNC: 32 U/L (ref 1–32)
BILIRUB SERPL-MCNC: 0.4 MG/DL (ref 0–1.2)
BUN SERPL-MCNC: 11 MG/DL (ref 6–20)
BUN/CREAT SERPL: 15.3 (ref 7–25)
CALCIUM SERPL-MCNC: 10.1 MG/DL (ref 8.6–10.5)
CHLORIDE SERPL-SCNC: 101 MMOL/L (ref 98–107)
CHOLEST SERPL-MCNC: 187 MG/DL (ref 0–200)
CO2 SERPL-SCNC: 27.2 MMOL/L (ref 22–29)
CREAT SERPL-MCNC: 0.72 MG/DL (ref 0.57–1)
EGFRCR SERPLBLD CKD-EPI 2021: 97.1 ML/MIN/1.73
GLOBULIN SER CALC-MCNC: 2.4 GM/DL
GLUCOSE SERPL-MCNC: 87 MG/DL (ref 65–99)
HDLC SERPL-MCNC: 90 MG/DL (ref 40–60)
LDLC SERPL CALC-MCNC: 91 MG/DL (ref 0–100)
POTASSIUM SERPL-SCNC: 5.2 MMOL/L (ref 3.5–5.2)
PROT SERPL-MCNC: 7.2 G/DL (ref 6–8.5)
SODIUM SERPL-SCNC: 136 MMOL/L (ref 136–145)
TRIGL SERPL-MCNC: 27 MG/DL (ref 0–150)
TSH SERPL DL<=0.005 MIU/L-ACNC: 2.73 UIU/ML (ref 0.27–4.2)
VLDLC SERPL CALC-MCNC: 6 MG/DL (ref 5–40)

## 2023-09-19 ENCOUNTER — OFFICE VISIT (OUTPATIENT)
Dept: INTERNAL MEDICINE | Facility: CLINIC | Age: 58
End: 2023-09-19
Payer: COMMERCIAL

## 2023-09-19 VITALS
SYSTOLIC BLOOD PRESSURE: 132 MMHG | WEIGHT: 143 LBS | DIASTOLIC BLOOD PRESSURE: 84 MMHG | BODY MASS INDEX: 25.34 KG/M2 | HEIGHT: 63 IN

## 2023-09-19 DIAGNOSIS — I10 HYPERTENSION, UNSPECIFIED TYPE: ICD-10-CM

## 2023-09-19 DIAGNOSIS — I73.00 RAYNAUD'S DISEASE WITHOUT GANGRENE: ICD-10-CM

## 2023-09-19 DIAGNOSIS — M54.41 CHRONIC RIGHT-SIDED LOW BACK PAIN WITH RIGHT-SIDED SCIATICA: Primary | ICD-10-CM

## 2023-09-19 DIAGNOSIS — G89.29 CHRONIC RIGHT-SIDED LOW BACK PAIN WITH RIGHT-SIDED SCIATICA: Primary | ICD-10-CM

## 2023-09-19 DIAGNOSIS — E03.9 ADULT HYPOTHYROIDISM: ICD-10-CM

## 2023-09-19 PROCEDURE — 99214 OFFICE O/P EST MOD 30 MIN: CPT | Performed by: INTERNAL MEDICINE

## 2023-09-19 RX ORDER — AMLODIPINE BESYLATE 2.5 MG/1
2.5 TABLET ORAL DAILY
Qty: 90 TABLET | Refills: 1 | Status: SHIPPED | OUTPATIENT
Start: 2023-09-19

## 2023-09-19 NOTE — PROGRESS NOTES
Chief Complaint   Patient presents with    Hypertension    Hypothyroidism    Back Pain       Hypertension    Hypothyroidism    Back Pain     Cheri Lopez is a 58 y.o. female presents for follow up evaluation. Patient in general is doing well. She has been struggling with chronic low back pain. She does have l2/l3 nerve compression and is scheduled for a discectomy in the fall.   Patient has c/o raynauds symptoms. Tips of fingers turn white and get painful and numb.   Left hand carpal tunnel symptoms. Wearing brace for sleep. Typing on lap top and can not use her brace w/ this.   Patient has hypertension. Bp at home in general normotensive. She did have an acute elevation on one occasion. She took extra losartan w/ benefit.       The following portions of the patient's history were reviewed and updated as appropriate: allergies, current medications, past family history, past medical history, past social history, past surgical history and problem list.  Current Outpatient Medications on File Prior to Visit   Medication Sig Dispense Refill    Ascorbic Acid (VITAMIN C PO) Take 1,000 mg by mouth Daily.      B Complex Vitamins (VITAMIN B COMPLEX PO) Take 200 mcg by mouth Daily.      baclofen (LIORESAL) 10 MG tablet Take 1 tablet by mouth 3 (Three) Times a Day As Needed for Muscle Spasms. 45 tablet 3    cycloSPORINE (RESTASIS) 0.05 % ophthalmic emulsion Administer 1 drop to both eyes Every 12 (Twelve) Hours.      estradiol (ESTRING) 2 MG vaginal ring Insert 2 mg into the vagina.      levocetirizine (XYZAL) 5 MG tablet Take 1 tablet by mouth Every Evening. 90 tablet 0    levothyroxine (SYNTHROID, LEVOTHROID) 88 MCG tablet Take 1 tablet by mouth Every Morning. 90 tablet 2    losartan (COZAAR) 50 MG tablet Take 1 tablet by mouth Daily. 90 tablet 1    Magnesium-Potassium 250-100 MG tablet Take 1 tablet by mouth Daily.      Multiple Vitamins-Minerals (PRESERVISION AREDS PO) Take 1 tablet by mouth 2 (two) times a day.       progesterone (PROMETRIUM) 100 MG capsule Take 1 capsule by mouth Daily.      vitamin D3 125 MCG (5000 UT) capsule capsule       vitamin E 400 UNIT capsule Take 1 capsule by mouth Daily.       No current facility-administered medications on file prior to visit.     Review of Systems   Constitutional: Negative.    HENT: Negative.     Eyes: Negative.    Respiratory: Negative.     Cardiovascular: Negative.    Gastrointestinal: Negative.    Endocrine: Negative.    Genitourinary: Negative.    Musculoskeletal:  Positive for back pain.   Allergic/Immunologic: Negative.    Neurological: Negative.    Hematological: Negative.    Psychiatric/Behavioral: Negative.       Objective   Physical Exam  Vitals and nursing note reviewed.   Constitutional:       Appearance: Normal appearance. She is well-developed.   HENT:      Head: Normocephalic and atraumatic.      Right Ear: Tympanic membrane and external ear normal.      Left Ear: Tympanic membrane and external ear normal.      Nose: Nose normal.      Mouth/Throat:      Mouth: Mucous membranes are moist.   Eyes:      Extraocular Movements: Extraocular movements intact.      Pupils: Pupils are equal, round, and reactive to light.   Cardiovascular:      Rate and Rhythm: Normal rate and regular rhythm.      Pulses: Normal pulses.      Heart sounds: Normal heart sounds.   Pulmonary:      Effort: Pulmonary effort is normal. No respiratory distress.      Breath sounds: Normal breath sounds.   Abdominal:      General: Abdomen is flat.      Palpations: Abdomen is soft.   Musculoskeletal:         General: Normal range of motion.      Cervical back: Normal range of motion and neck supple.   Skin:     General: Skin is warm and dry.   Neurological:      General: No focal deficit present.      Mental Status: She is alert and oriented to person, place, and time.   Psychiatric:         Mood and Affect: Mood normal.         Behavior: Behavior normal.         Thought Content: Thought content normal.  "        Judgment: Judgment normal.        /84   Ht 160 cm (63\")   Wt 64.9 kg (143 lb)   BMI 25.33 kg/m²     Assessment & Plan   Diagnoses and all orders for this visit:    Chronic right-sided low back pain with right-sided sciatica    Hypertension, unspecified type    Adult hypothyroidism    Raynaud's disease without gangrene    Other orders  -     amLODIPine (NORVASC) 2.5 MG tablet; Take 1 tablet by mouth Daily.      Patient w/ chronic back pain. She is to work on modifying her work space for maximal functionality and reduced body stressors. She will add amlodipine for bp and raynauds benefits. She will continue synthroid for hypothyroidism. She will f/u w/ neurosurgery. To have corrective procedure in the future. She will f/u routinely.   Patient declines all vaccinations at this time.   Counseled w/ healthy weight.        Answers submitted by the patient for this visit:  Primary Reason for Visit (Submitted on 9/12/2023)  What is the primary reason for your visit?: Physical    "

## 2023-10-24 ENCOUNTER — OFFICE VISIT (OUTPATIENT)
Dept: NEUROSURGERY | Facility: CLINIC | Age: 58
End: 2023-10-24
Payer: COMMERCIAL

## 2023-10-24 DIAGNOSIS — M54.41 CHRONIC RIGHT-SIDED LOW BACK PAIN WITH RIGHT-SIDED SCIATICA: Primary | ICD-10-CM

## 2023-10-24 DIAGNOSIS — G89.29 CHRONIC RIGHT-SIDED LOW BACK PAIN WITH RIGHT-SIDED SCIATICA: Primary | ICD-10-CM

## 2023-10-24 PROCEDURE — 99214 OFFICE O/P EST MOD 30 MIN: CPT | Performed by: NEUROLOGICAL SURGERY

## 2023-10-24 NOTE — H&P (VIEW-ONLY)
Subjective   Patient ID: Cheri Lopez is a 58 y.o. female is here today for PRE OP follow-up.    Today patient reports symptoms are unchanged, patient is having low back pain that radiates to B/L legs along with N/T    History of Present Illness    This patient returns today she continues with pain in her back with radiation to her legs.  Radiates mainly into her right hip with numbness and tingling down both of her legs.  This occurs mostly when she walks.    The following portions of the patient's history were reviewed and updated as appropriate: allergies, current medications, past family history, past medical history, past social history, past surgical history, and problem list.    Review of Systems    I reviewed the review of systems listed by the patient and discussed by my MA    Objective     There were no vitals filed for this visit.  There is no height or weight on file to calculate BMI.    Tobacco Use: Medium Risk (10/24/2023)    Patient History     Smoking Tobacco Use: Former     Smokeless Tobacco Use: Never     Passive Exposure: Not on file          Physical Exam  Neurological:      Mental Status: She is alert and oriented to person, place, and time.       Neurologic Exam     Mental Status   Oriented to person, place, and time.           Assessment & Plan   Independent Review of Radiographic Studies:      I personally reviewed the images from the following studies.    I reviewed her plain films, myelogram, and CT scan all of which were done in July myself.  There is pretty good nerve root filling throughout the lumbar spine.  There was just a little pressure on the thecal sac on the right side.  On the post myelographic CT scan there was a little lateral recess narrowing on the right at L2-3.    Medical Decision Making:      We have previously discussed a right L2-3 laminectomy with lb.  He comes in today with a number of questions regarding the surgery.  I again emphasized the significant  decrease in good result chances.  She has symptoms on both sides and this will clearly not help anything on the left.  There is no pressure on the nerves on the left.  I told the patient about the risks, complications and expected outcome of the lumbar surgery.  I explained that there was an 65% chance of getting rid of the pain in the leg.  I explained that there would still be back pain after the surgery.  Initially this will be quite severe but will improve over time.  There is a 2 or 3% chance of infection, bleeding, CSF leak, damage to the nerve as a result of surgery, paralysis, as well as anesthetic risk.  There is a 5% chance of late instability which could require a fusion later on and a 10% chance of recurrent disc herniation.  We discussed the postoperative hospital and home course.  She has decided she would like to proceed.    She will need to be scheduled for a: Right lumbar 2 to lumbar 3 laminectomy and possible discectomy with metrx    Diagnoses and all orders for this visit:    1. Chronic right-sided low back pain with right-sided sciatica (Primary)      Return for 2-3 week post op.

## 2023-11-07 ENCOUNTER — PRE-ADMISSION TESTING (OUTPATIENT)
Dept: PREADMISSION TESTING | Facility: HOSPITAL | Age: 58
End: 2023-11-07
Payer: COMMERCIAL

## 2023-11-07 VITALS
SYSTOLIC BLOOD PRESSURE: 144 MMHG | HEIGHT: 63 IN | TEMPERATURE: 98 F | RESPIRATION RATE: 18 BRPM | WEIGHT: 141.1 LBS | OXYGEN SATURATION: 100 % | HEART RATE: 70 BPM | BODY MASS INDEX: 25 KG/M2 | DIASTOLIC BLOOD PRESSURE: 78 MMHG

## 2023-11-07 LAB
ANION GAP SERPL CALCULATED.3IONS-SCNC: 10.1 MMOL/L (ref 5–15)
BUN SERPL-MCNC: 17 MG/DL (ref 6–20)
BUN/CREAT SERPL: 24.6 (ref 7–25)
CALCIUM SPEC-SCNC: 9.8 MG/DL (ref 8.6–10.5)
CHLORIDE SERPL-SCNC: 104 MMOL/L (ref 98–107)
CO2 SERPL-SCNC: 25.9 MMOL/L (ref 22–29)
CREAT SERPL-MCNC: 0.69 MG/DL (ref 0.57–1)
DEPRECATED RDW RBC AUTO: 46.5 FL (ref 37–54)
EGFRCR SERPLBLD CKD-EPI 2021: 100.7 ML/MIN/1.73
ERYTHROCYTE [DISTWIDTH] IN BLOOD BY AUTOMATED COUNT: 13.2 % (ref 12.3–15.4)
GLUCOSE SERPL-MCNC: 87 MG/DL (ref 65–99)
HCT VFR BLD AUTO: 40.7 % (ref 34–46.6)
HGB BLD-MCNC: 13.3 G/DL (ref 12–15.9)
MCH RBC QN AUTO: 31.1 PG (ref 26.6–33)
MCHC RBC AUTO-ENTMCNC: 32.7 G/DL (ref 31.5–35.7)
MCV RBC AUTO: 95.3 FL (ref 79–97)
PLATELET # BLD AUTO: 197 10*3/MM3 (ref 140–450)
PMV BLD AUTO: 10.1 FL (ref 6–12)
POTASSIUM SERPL-SCNC: 4.2 MMOL/L (ref 3.5–5.2)
QT INTERVAL: 395 MS
QTC INTERVAL: 408 MS
RBC # BLD AUTO: 4.27 10*6/MM3 (ref 3.77–5.28)
SODIUM SERPL-SCNC: 140 MMOL/L (ref 136–145)
WBC NRBC COR # BLD: 6.42 10*3/MM3 (ref 3.4–10.8)

## 2023-11-07 PROCEDURE — 36415 COLL VENOUS BLD VENIPUNCTURE: CPT

## 2023-11-07 PROCEDURE — 93005 ELECTROCARDIOGRAM TRACING: CPT

## 2023-11-07 PROCEDURE — 80048 BASIC METABOLIC PNL TOTAL CA: CPT

## 2023-11-07 PROCEDURE — 85027 COMPLETE CBC AUTOMATED: CPT

## 2023-11-07 RX ORDER — VITAMIN E (DL,TOCOPHERYL ACET) 45 MG/0.25
1 DROPS ORAL DAILY
COMMUNITY
Start: 2023-05-05

## 2023-11-07 NOTE — DISCHARGE INSTRUCTIONS
Take the following medications the morning of surgery:  LEVOTHYROXINE    ARRIVE FOR SURGERY AT 8:00 AM      If you are on prescription narcotic pain medication to control your pain you may also take that medication the morning of surgery.    General Instructions:  Do not eat solid food after midnight the night before surgery.  You may drink clear liquids day of surgery but must stop at least one hour before your hospital arrival time.  It is beneficial for you to have a clear drink that contains carbohydrates the day of surgery.  We suggest a 12 to 20 ounce bottle of Gatorade or Powerade for non-diabetic patients or a 12 to 20 ounce bottle of G2 or Powerade Zero for diabetic patients. (Pediatric patients, are not advised to drink a 12 to 20 ounce carbohydrate drink)    Clear liquids are liquids you can see through.  Nothing red in color.     Plain water                               Sports drinks  Sodas                                   Gelatin (Jell-O)  Fruit juices without pulp such as white grape juice and apple juice  Popsicles that contain no fruit or yogurt  Tea or coffee (no cream or milk added)  Gatorade / Powerade  G2 / Powerade Zero    Infants may have breast milk up to four hours before surgery.  Infants drinking formula may drink formula up to six hours before surgery.   Patients who avoid smoking, chewing tobacco and alcohol for 4 weeks prior to surgery have a reduced risk of post-operative complications.  Quit smoking as many days before surgery as you can.  Do not smoke, use chewing tobacco or drink alcohol the day of surgery.   If applicable bring your C-PAP/ BI-PAP machine in with you to preop day of surgery.  Bring any papers given to you in the doctor’s office.  Wear clean comfortable clothes.  Do not wear contact lenses, false eyelashes or make-up.  Bring a case for your glasses.   Bring crutches or walker if applicable.  Remove all piercings.  Leave jewelry and any other valuables at home.  Hair  extensions with metal clips must be removed prior to surgery.  The Pre-Admission Testing nurse will instruct you to bring medications if unable to obtain an accurate list in Pre-Admission Testing.        If you were given a blood bank ID arm band remember to bring it with you the day of surgery.    Preventing a Surgical Site Infection:  For 2 to 3 days before surgery, avoid shaving with a razor because the razor can irritate skin and make it easier to develop an infection.    Any areas of open skin can increase the risk of a post-operative wound infection by allowing bacteria to enter and travel throughout the body.  Notify your surgeon if you have any skin wounds / rashes even if it is not near the expected surgical site.  The area will need assessed to determine if surgery should be delayed until it is healed.  The night prior to surgery shower using a fresh bar of anti-bacterial soap (such as Dial) and clean washcloth.  Sleep in a clean bed with clean clothing.  Do not allow pets to sleep with you.  Shower on the morning of surgery using a fresh bar of anti-bacterial soap (such as Dial) and clean washcloth.  Dry with a clean towel and dress in clean clothing.  Ask your surgeon if you will be receiving antibiotics prior to surgery.  Make sure you, your family, and all healthcare providers clean their hands with soap and water or an alcohol based hand  before caring for you or your wound.    CHLORHEXIDINE CLOTH INSTRUCTIONS  The morning of surgery follow these instructions using the Chlorhexidine cloths you've been given.  These steps reduce bacteria on the body.  Do not use the cloths near your eyes, ears mouth, genitalia or on open wounds.  Throw the cloths away after use but do not try to flush them down a toilet.      Open and remove one cloth at a time from the package.    Leave the cloth unfolded and begin the bathing.  Massage the skin with the cloths using gentle pressure to remove bacteria.  Do  not scrub harshly.   Follow the steps below with one 2% CHG cloth per area (6 total cloths).  One cloth for neck, shoulders and chest.  One cloth for both arms, hands, fingers and underarms (do underarms last).  One cloth for the abdomen followed by groin.  One cloth for right leg and foot including between the toes.  One cloth for left leg and foot including between the toes.  The last cloth is to be used for the back of the neck, back and buttocks.    Allow the CHG to air dry 3 minutes on the skin which will give it time to work and decrease the chance of irritation.  The skin may feel sticky until it is dry.  Do not rinse with water or any other liquid or you will lose the beneficial effects of the CHG.  If mild skin irritation occurs, do rinse the skin to remove the CHG.  Report this to the nurse at time of admission.  Do not apply lotions, creams, ointments, deodorants or perfumes after using the clothes. Dress in clean clothes before coming to the hospital.      Day of surgery:  Your arrival time is approximately two hours before your scheduled surgery time.  Upon arrival, a Pre-op nurse and Anesthesiologist will review your health history, obtain vital signs, and answer questions you may have.  The only belongings needed at this time will be a list of your home medications and if applicable your C-PAP/BI-PAP machine.  A Pre-op nurse will start an IV and you may receive medication in preparation for surgery, including something to help you relax.     Please be aware that surgery does come with discomfort.  We want to make every effort to control your discomfort so please discuss any uncontrolled symptoms with your nurse.   Your doctor will most likely have prescribed pain medications.      If you are going home after surgery you will receive individualized written care instructions before being discharged.  A responsible adult must drive you to and from the hospital on the day of your surgery and stay with you  for 24 hours.  Discharge prescriptions can be filled by the hospital pharmacy during regular pharmacy hours.  If you are having surgery late in the day/evening your prescription may be e-prescribed to your pharmacy.  Please verify your pharmacy hours or chose a 24 hour pharmacy to avoid not having access to your prescription because your pharmacy has closed for the day.    If you are staying overnight following surgery, you will be transported to your hospital room following the recovery period.  [facility] has all private rooms.    If you have any questions please call Pre-Admission Testing at [phone].  Deductibles and co-payments are collected on the day of service. Please be prepared to pay the required co-pay, deductible or deposit on the day of service as defined by your plan.    Call your surgeon immediately if you experience any of the following symptoms:  Sore Throat  Shortness of Breath or difficulty breathing  Cough  Chills  Body soreness or muscle pain  Headache  Fever  New loss of taste or smell  Do not arrive for your surgery ill.  Your procedure will need to be rescheduled to another time.  You will need to call your physician before the day of surgery to avoid any unnecessary exposure to hospital staff as well as other patients.

## 2023-11-15 ENCOUNTER — ANESTHESIA (OUTPATIENT)
Dept: PERIOP | Facility: HOSPITAL | Age: 58
End: 2023-11-15
Payer: COMMERCIAL

## 2023-11-15 ENCOUNTER — APPOINTMENT (OUTPATIENT)
Dept: GENERAL RADIOLOGY | Facility: HOSPITAL | Age: 58
End: 2023-11-15
Payer: COMMERCIAL

## 2023-11-15 ENCOUNTER — ANESTHESIA EVENT (OUTPATIENT)
Dept: PERIOP | Facility: HOSPITAL | Age: 58
End: 2023-11-15
Payer: COMMERCIAL

## 2023-11-15 ENCOUNTER — HOSPITAL ENCOUNTER (OUTPATIENT)
Facility: HOSPITAL | Age: 58
Setting detail: HOSPITAL OUTPATIENT SURGERY
Discharge: HOME | End: 2023-11-15
Attending: NEUROLOGICAL SURGERY | Admitting: NEUROLOGICAL SURGERY
Payer: COMMERCIAL

## 2023-11-15 VITALS
RESPIRATION RATE: 16 BRPM | DIASTOLIC BLOOD PRESSURE: 85 MMHG | OXYGEN SATURATION: 100 % | HEART RATE: 65 BPM | TEMPERATURE: 97.9 F | SYSTOLIC BLOOD PRESSURE: 156 MMHG

## 2023-11-15 DIAGNOSIS — M54.41 CHRONIC RIGHT-SIDED LOW BACK PAIN WITH RIGHT-SIDED SCIATICA: ICD-10-CM

## 2023-11-15 DIAGNOSIS — G89.29 CHRONIC RIGHT-SIDED LOW BACK PAIN WITH RIGHT-SIDED SCIATICA: ICD-10-CM

## 2023-11-15 PROCEDURE — 63030 LAMOT DCMPRN NRV RT 1 LMBR: CPT | Performed by: SPECIALIST/TECHNOLOGIST, OTHER

## 2023-11-15 PROCEDURE — 25010000002 PROPOFOL 200 MG/20ML EMULSION: Performed by: NURSE ANESTHETIST, CERTIFIED REGISTERED

## 2023-11-15 PROCEDURE — 25010000002 PROPOFOL 10 MG/ML EMULSION: Performed by: NURSE ANESTHETIST, CERTIFIED REGISTERED

## 2023-11-15 PROCEDURE — 25010000002 ONDANSETRON PER 1 MG: Performed by: NURSE ANESTHETIST, CERTIFIED REGISTERED

## 2023-11-15 PROCEDURE — 25810000003 LACTATED RINGERS PER 1000 ML: Performed by: ANESTHESIOLOGY

## 2023-11-15 PROCEDURE — 63030 LAMOT DCMPRN NRV RT 1 LMBR: CPT | Performed by: NEUROLOGICAL SURGERY

## 2023-11-15 PROCEDURE — 25010000002 FENTANYL CITRATE (PF) 50 MCG/ML SOLUTION: Performed by: NURSE ANESTHETIST, CERTIFIED REGISTERED

## 2023-11-15 PROCEDURE — 25010000002 VANCOMYCIN 1 G RECONSTITUTED SOLUTION 1 EACH VIAL: Performed by: NEUROLOGICAL SURGERY

## 2023-11-15 PROCEDURE — 25010000002 MAGNESIUM SULFATE PER 500 MG OF MAGNESIUM: Performed by: NURSE ANESTHETIST, CERTIFIED REGISTERED

## 2023-11-15 PROCEDURE — 76000 FLUOROSCOPY <1 HR PHYS/QHP: CPT

## 2023-11-15 PROCEDURE — 25010000002 MIDAZOLAM PER 1 MG: Performed by: ANESTHESIOLOGY

## 2023-11-15 PROCEDURE — C1713 ANCHOR/SCREW BN/BN,TIS/BN: HCPCS | Performed by: NEUROLOGICAL SURGERY

## 2023-11-15 PROCEDURE — 25010000002 DEXAMETHASONE SODIUM PHOSPHATE 20 MG/5ML SOLUTION: Performed by: NURSE ANESTHETIST, CERTIFIED REGISTERED

## 2023-11-15 PROCEDURE — 25010000002 SUGAMMADEX 200 MG/2ML SOLUTION: Performed by: NURSE ANESTHETIST, CERTIFIED REGISTERED

## 2023-11-15 PROCEDURE — 25010000002 DROPERIDOL PER 5 MG: Performed by: NURSE ANESTHETIST, CERTIFIED REGISTERED

## 2023-11-15 PROCEDURE — 25010000002 HYDROMORPHONE PER 4 MG: Performed by: NURSE ANESTHETIST, CERTIFIED REGISTERED

## 2023-11-15 PROCEDURE — 25010000002 CEFAZOLIN IN DEXTROSE 2-4 GM/100ML-% SOLUTION: Performed by: NEUROLOGICAL SURGERY

## 2023-11-15 DEVICE — FLOSEAL WITH RECOTHROM - 5ML
Type: IMPLANTABLE DEVICE | Site: SPINE LUMBAR | Status: FUNCTIONAL
Brand: FLOSEAL HEMOSTATIC MATRIX

## 2023-11-15 DEVICE — SSC BONE WAX
Type: IMPLANTABLE DEVICE | Site: SPINE LUMBAR | Status: FUNCTIONAL
Brand: SSC BONE WAX

## 2023-11-15 RX ORDER — ONDANSETRON 2 MG/ML
INJECTION INTRAMUSCULAR; INTRAVENOUS AS NEEDED
Status: DISCONTINUED | OUTPATIENT
Start: 2023-11-15 | End: 2023-11-15 | Stop reason: SURG

## 2023-11-15 RX ORDER — FLUMAZENIL 0.1 MG/ML
0.2 INJECTION INTRAVENOUS AS NEEDED
Status: DISCONTINUED | OUTPATIENT
Start: 2023-11-15 | End: 2023-11-15 | Stop reason: HOSPADM

## 2023-11-15 RX ORDER — SODIUM CHLORIDE 0.9 % (FLUSH) 0.9 %
3-10 SYRINGE (ML) INJECTION AS NEEDED
Status: DISCONTINUED | OUTPATIENT
Start: 2023-11-15 | End: 2023-11-15 | Stop reason: HOSPADM

## 2023-11-15 RX ORDER — FENTANYL CITRATE 50 UG/ML
50 INJECTION, SOLUTION INTRAMUSCULAR; INTRAVENOUS ONCE AS NEEDED
Status: DISCONTINUED | OUTPATIENT
Start: 2023-11-15 | End: 2023-11-15 | Stop reason: HOSPADM

## 2023-11-15 RX ORDER — LIDOCAINE HYDROCHLORIDE 10 MG/ML
0.5 INJECTION, SOLUTION INFILTRATION; PERINEURAL ONCE AS NEEDED
Status: DISCONTINUED | OUTPATIENT
Start: 2023-11-15 | End: 2023-11-15 | Stop reason: HOSPADM

## 2023-11-15 RX ORDER — PROMETHAZINE HYDROCHLORIDE 25 MG/1
25 TABLET ORAL ONCE AS NEEDED
Status: DISCONTINUED | OUTPATIENT
Start: 2023-11-15 | End: 2023-11-15 | Stop reason: HOSPADM

## 2023-11-15 RX ORDER — LIDOCAINE HYDROCHLORIDE 20 MG/ML
INJECTION, SOLUTION INFILTRATION; PERINEURAL AS NEEDED
Status: DISCONTINUED | OUTPATIENT
Start: 2023-11-15 | End: 2023-11-15 | Stop reason: SURG

## 2023-11-15 RX ORDER — SODIUM CHLORIDE 0.9 % (FLUSH) 0.9 %
3 SYRINGE (ML) INJECTION EVERY 12 HOURS SCHEDULED
Status: DISCONTINUED | OUTPATIENT
Start: 2023-11-15 | End: 2023-11-15 | Stop reason: HOSPADM

## 2023-11-15 RX ORDER — IPRATROPIUM BROMIDE AND ALBUTEROL SULFATE 2.5; .5 MG/3ML; MG/3ML
3 SOLUTION RESPIRATORY (INHALATION) ONCE AS NEEDED
Status: DISCONTINUED | OUTPATIENT
Start: 2023-11-15 | End: 2023-11-15 | Stop reason: HOSPADM

## 2023-11-15 RX ORDER — ROCURONIUM BROMIDE 10 MG/ML
INJECTION, SOLUTION INTRAVENOUS AS NEEDED
Status: DISCONTINUED | OUTPATIENT
Start: 2023-11-15 | End: 2023-11-15 | Stop reason: SURG

## 2023-11-15 RX ORDER — MAGNESIUM SULFATE HEPTAHYDRATE 500 MG/ML
INJECTION, SOLUTION INTRAMUSCULAR; INTRAVENOUS AS NEEDED
Status: DISCONTINUED | OUTPATIENT
Start: 2023-11-15 | End: 2023-11-15 | Stop reason: SURG

## 2023-11-15 RX ORDER — HYDROMORPHONE HYDROCHLORIDE 1 MG/ML
0.5 INJECTION, SOLUTION INTRAMUSCULAR; INTRAVENOUS; SUBCUTANEOUS
Status: DISCONTINUED | OUTPATIENT
Start: 2023-11-15 | End: 2023-11-15 | Stop reason: HOSPADM

## 2023-11-15 RX ORDER — PROMETHAZINE HYDROCHLORIDE 25 MG/1
25 TABLET ORAL EVERY 6 HOURS PRN
Qty: 15 TABLET | Refills: 0 | Status: SHIPPED | OUTPATIENT
Start: 2023-11-15

## 2023-11-15 RX ORDER — DIPHENHYDRAMINE HYDROCHLORIDE 50 MG/ML
12.5 INJECTION INTRAMUSCULAR; INTRAVENOUS
Status: DISCONTINUED | OUTPATIENT
Start: 2023-11-15 | End: 2023-11-15 | Stop reason: HOSPADM

## 2023-11-15 RX ORDER — SODIUM CHLORIDE, SODIUM LACTATE, POTASSIUM CHLORIDE, CALCIUM CHLORIDE 600; 310; 30; 20 MG/100ML; MG/100ML; MG/100ML; MG/100ML
9 INJECTION, SOLUTION INTRAVENOUS CONTINUOUS
Status: DISCONTINUED | OUTPATIENT
Start: 2023-11-15 | End: 2023-11-15 | Stop reason: HOSPADM

## 2023-11-15 RX ORDER — FENTANYL CITRATE 50 UG/ML
INJECTION, SOLUTION INTRAMUSCULAR; INTRAVENOUS AS NEEDED
Status: DISCONTINUED | OUTPATIENT
Start: 2023-11-15 | End: 2023-11-15 | Stop reason: SURG

## 2023-11-15 RX ORDER — LABETALOL HYDROCHLORIDE 5 MG/ML
5 INJECTION, SOLUTION INTRAVENOUS
Status: DISCONTINUED | OUTPATIENT
Start: 2023-11-15 | End: 2023-11-15 | Stop reason: HOSPADM

## 2023-11-15 RX ORDER — FAMOTIDINE 10 MG/ML
20 INJECTION, SOLUTION INTRAVENOUS ONCE
Status: COMPLETED | OUTPATIENT
Start: 2023-11-15 | End: 2023-11-15

## 2023-11-15 RX ORDER — NALOXONE HCL 0.4 MG/ML
0.2 VIAL (ML) INJECTION AS NEEDED
Status: DISCONTINUED | OUTPATIENT
Start: 2023-11-15 | End: 2023-11-15 | Stop reason: HOSPADM

## 2023-11-15 RX ORDER — DROPERIDOL 2.5 MG/ML
0.62 INJECTION, SOLUTION INTRAMUSCULAR; INTRAVENOUS
Status: DISCONTINUED | OUTPATIENT
Start: 2023-11-15 | End: 2023-11-15 | Stop reason: HOSPADM

## 2023-11-15 RX ORDER — CEFAZOLIN SODIUM 2 G/100ML
2 INJECTION, SOLUTION INTRAVENOUS ONCE
Status: COMPLETED | OUTPATIENT
Start: 2023-11-15 | End: 2023-11-15

## 2023-11-15 RX ORDER — HYDROCODONE BITARTRATE AND ACETAMINOPHEN 5; 325 MG/1; MG/1
1 TABLET ORAL EVERY 4 HOURS PRN
Qty: 25 TABLET | Refills: 0 | Status: SHIPPED | OUTPATIENT
Start: 2023-11-15

## 2023-11-15 RX ORDER — KETAMINE HCL IN NACL, ISO-OSM 100MG/10ML
SYRINGE (ML) INJECTION AS NEEDED
Status: DISCONTINUED | OUTPATIENT
Start: 2023-11-15 | End: 2023-11-15 | Stop reason: SURG

## 2023-11-15 RX ORDER — HYDROCODONE BITARTRATE AND ACETAMINOPHEN 5; 325 MG/1; MG/1
1 TABLET ORAL ONCE AS NEEDED
Status: COMPLETED | OUTPATIENT
Start: 2023-11-15 | End: 2023-11-15

## 2023-11-15 RX ORDER — MIDAZOLAM HYDROCHLORIDE 1 MG/ML
1 INJECTION INTRAMUSCULAR; INTRAVENOUS
Status: DISCONTINUED | OUTPATIENT
Start: 2023-11-15 | End: 2023-11-15 | Stop reason: HOSPADM

## 2023-11-15 RX ORDER — DEXAMETHASONE SODIUM PHOSPHATE 4 MG/ML
INJECTION, SOLUTION INTRA-ARTICULAR; INTRALESIONAL; INTRAMUSCULAR; INTRAVENOUS; SOFT TISSUE AS NEEDED
Status: DISCONTINUED | OUTPATIENT
Start: 2023-11-15 | End: 2023-11-15 | Stop reason: SURG

## 2023-11-15 RX ORDER — FENTANYL CITRATE 50 UG/ML
50 INJECTION, SOLUTION INTRAMUSCULAR; INTRAVENOUS
Status: DISCONTINUED | OUTPATIENT
Start: 2023-11-15 | End: 2023-11-15 | Stop reason: HOSPADM

## 2023-11-15 RX ORDER — OXYCODONE AND ACETAMINOPHEN 7.5; 325 MG/1; MG/1
1 TABLET ORAL EVERY 4 HOURS PRN
Status: DISCONTINUED | OUTPATIENT
Start: 2023-11-15 | End: 2023-11-15 | Stop reason: HOSPADM

## 2023-11-15 RX ORDER — SCOLOPAMINE TRANSDERMAL SYSTEM 1 MG/1
1 PATCH, EXTENDED RELEASE TRANSDERMAL ONCE
Status: DISCONTINUED | OUTPATIENT
Start: 2023-11-15 | End: 2023-11-15 | Stop reason: HOSPADM

## 2023-11-15 RX ORDER — PROPOFOL 10 MG/ML
INJECTION, EMULSION INTRAVENOUS AS NEEDED
Status: DISCONTINUED | OUTPATIENT
Start: 2023-11-15 | End: 2023-11-15 | Stop reason: SURG

## 2023-11-15 RX ORDER — DROPERIDOL 2.5 MG/ML
INJECTION, SOLUTION INTRAMUSCULAR; INTRAVENOUS AS NEEDED
Status: DISCONTINUED | OUTPATIENT
Start: 2023-11-15 | End: 2023-11-15 | Stop reason: SURG

## 2023-11-15 RX ORDER — PROMETHAZINE HYDROCHLORIDE 25 MG/1
25 SUPPOSITORY RECTAL ONCE AS NEEDED
Status: DISCONTINUED | OUTPATIENT
Start: 2023-11-15 | End: 2023-11-15 | Stop reason: HOSPADM

## 2023-11-15 RX ORDER — CEPHALEXIN 500 MG/1
500 CAPSULE ORAL 4 TIMES DAILY
Qty: 20 CAPSULE | Refills: 0 | Status: SHIPPED | OUTPATIENT
Start: 2023-11-15 | End: 2023-11-20

## 2023-11-15 RX ORDER — ONDANSETRON 2 MG/ML
4 INJECTION INTRAMUSCULAR; INTRAVENOUS ONCE AS NEEDED
Status: DISCONTINUED | OUTPATIENT
Start: 2023-11-15 | End: 2023-11-15 | Stop reason: HOSPADM

## 2023-11-15 RX ORDER — HYDRALAZINE HYDROCHLORIDE 20 MG/ML
5 INJECTION INTRAMUSCULAR; INTRAVENOUS
Status: DISCONTINUED | OUTPATIENT
Start: 2023-11-15 | End: 2023-11-15 | Stop reason: HOSPADM

## 2023-11-15 RX ORDER — EPHEDRINE SULFATE 50 MG/ML
5 INJECTION, SOLUTION INTRAVENOUS ONCE AS NEEDED
Status: DISCONTINUED | OUTPATIENT
Start: 2023-11-15 | End: 2023-11-15 | Stop reason: HOSPADM

## 2023-11-15 RX ADMIN — SUGAMMADEX 200 MG: 100 INJECTION, SOLUTION INTRAVENOUS at 13:14

## 2023-11-15 RX ADMIN — FAMOTIDINE 20 MG: 10 INJECTION INTRAVENOUS at 09:34

## 2023-11-15 RX ADMIN — HYDROCODONE BITARTRATE AND ACETAMINOPHEN 1 TABLET: 5; 325 TABLET ORAL at 15:42

## 2023-11-15 RX ADMIN — SODIUM CHLORIDE, POTASSIUM CHLORIDE, SODIUM LACTATE AND CALCIUM CHLORIDE: 600; 310; 30; 20 INJECTION, SOLUTION INTRAVENOUS at 13:04

## 2023-11-15 RX ADMIN — DROPERIDOL 0.62 MG: 2.5 INJECTION, SOLUTION INTRAMUSCULAR; INTRAVENOUS at 12:21

## 2023-11-15 RX ADMIN — FENTANYL CITRATE 100 MCG: 50 INJECTION, SOLUTION INTRAMUSCULAR; INTRAVENOUS at 12:10

## 2023-11-15 RX ADMIN — DEXAMETHASONE SODIUM PHOSPHATE 6 MG: 4 INJECTION, SOLUTION INTRAMUSCULAR; INTRAVENOUS at 12:21

## 2023-11-15 RX ADMIN — Medication 30 MG: at 12:24

## 2023-11-15 RX ADMIN — SCOPALAMINE 1 PATCH: 1 PATCH, EXTENDED RELEASE TRANSDERMAL at 09:34

## 2023-11-15 RX ADMIN — ONDANSETRON 4 MG: 2 INJECTION INTRAMUSCULAR; INTRAVENOUS at 13:11

## 2023-11-15 RX ADMIN — SODIUM CHLORIDE, POTASSIUM CHLORIDE, SODIUM LACTATE AND CALCIUM CHLORIDE 9 ML/HR: 600; 310; 30; 20 INJECTION, SOLUTION INTRAVENOUS at 09:28

## 2023-11-15 RX ADMIN — FENTANYL CITRATE 50 MCG: 50 INJECTION, SOLUTION INTRAMUSCULAR; INTRAVENOUS at 12:34

## 2023-11-15 RX ADMIN — LIDOCAINE HYDROCHLORIDE 100 MG: 20 INJECTION, SOLUTION INFILTRATION; PERINEURAL at 12:14

## 2023-11-15 RX ADMIN — PROPOFOL 200 MCG/KG/MIN: 10 INJECTION, EMULSION INTRAVENOUS at 12:21

## 2023-11-15 RX ADMIN — MIDAZOLAM 1 MG: 1 INJECTION INTRAMUSCULAR; INTRAVENOUS at 11:18

## 2023-11-15 RX ADMIN — ROCURONIUM BROMIDE 50 MG: 10 INJECTION, SOLUTION INTRAVENOUS at 12:14

## 2023-11-15 RX ADMIN — SODIUM CHLORIDE, POTASSIUM CHLORIDE, SODIUM LACTATE AND CALCIUM CHLORIDE 9 ML/HR: 600; 310; 30; 20 INJECTION, SOLUTION INTRAVENOUS at 09:33

## 2023-11-15 RX ADMIN — MAGNESIUM SULFATE HEPTAHYDRATE 2 G: 500 INJECTION, SOLUTION INTRAMUSCULAR; INTRAVENOUS at 12:24

## 2023-11-15 RX ADMIN — FENTANYL CITRATE 50 MCG: 50 INJECTION, SOLUTION INTRAMUSCULAR; INTRAVENOUS at 13:55

## 2023-11-15 RX ADMIN — PROPOFOL 150 MG: 10 INJECTION, EMULSION INTRAVENOUS at 12:14

## 2023-11-15 RX ADMIN — CEFAZOLIN SODIUM 2 G: 2 INJECTION, SOLUTION INTRAVENOUS at 12:01

## 2023-11-15 RX ADMIN — HYDROMORPHONE HYDROCHLORIDE 0.5 MG: 1 INJECTION, SOLUTION INTRAMUSCULAR; INTRAVENOUS; SUBCUTANEOUS at 14:24

## 2023-11-15 NOTE — OP NOTE
Preop diagnosis: Herniated disc right L2-3 with lumbar radiculopathy    Postop diagnosis: same    Procedure performed: Right L2-3 laminectomy, medial facetectomy discectomy and foraminotomies using minimal access spinal technologies microsurgical technique microsurgical instrumentation    Spinal Surgery Levels Completed:1 Level     Surgeon: Jatinder Christian M.D.    Assistant: Jessy Awan CFA who was instrumental in helping with visualization of neural structures, hemostasis, and retraction of neural structures.  Her skilled assistance was necessary for the success of this case    Indications for the procedure:  This is a patient with severe pain in the legs.  Previous imaging had shown neural compression at the L2-3 levels.  As a result of this and the failure of conservative therapy the patient elected to proceed with surgery.    Operative summary:  After induction of general anesthesia the patient was intubated and placed on the operating table in the prone position on a Xavier table.  All pressure points were padded including peripheral points of entrapment.  The back was prepped with Chloraprep and then draped with Ioban, half sheets, and a split sheet.      The L2-3 level was localized with intraoperative fluoroscopy an incision was made on the right just above the pedicle.  Successive dilating tubes up to 18 mm by 5 cm were placed over that area.  Soft tissue was then removed from the supralaminar space.  The inferior laminar arch of L2 as well as the superior laminar arch of L3 and the medial aspect of facet were removed with the Hildebran drill.  The remainder of the operation was done under high-power magnification of the operating microscope using microsurgical technique and microsurgical instrumentation.  The ligamentum flavum was opened and removed out to the level of the pedicle using the Kerrison rongeurs.  This exposed the lateral thecal sac and the nerve root of L3.  Once the lateral recess was  opened the dissection was carried up to the L2 pedicle completely decompressing the superior nerve root.    Once this was done the L3 nerve root was mobilized medially to expose the disc.  There was evidence of a small disc herniation just under the nerve root which was removed.  Following this the bleeding was controlled with bipolar cautery.    Once the entire decompression was completed we were able to explore under the nerve root and the thecal sac using the Rodriguez ball probe to be sure there was no evidence of residual compression.  There being none bleeding was controlled again using the bipolar cautery, FloSeal, and thrombin and Gelfoam.  The area was then copiously irrigated with vancomycin solution and the tube was removed. The paraspinous musculature was injected with 100 cc 1/8% Marcaine with 1:200,000 epinephrine solution.    Another gram of Kefzol was given prior to closure.    The incision was then closed in layers and dressed and the patient was taken to the recovery room in stable condition there were no apparent complications.  Sponge, instrument, and needle counts were correct at the end of the procedure.

## 2023-11-15 NOTE — ANESTHESIA POSTPROCEDURE EVALUATION
Patient: Cheri Lopez    Procedure Summary       Date: 11/15/23 Room / Location: [facility] OR [address] [place] [address] MAIN OR    Anesthesia Start: 1206 Anesthesia Stop: 1338    Procedure: Right lumbar 2 to lumbar 3 laminectomy with possible discectomy using metrx (Right: Spine Lumbar) Diagnosis:       Chronic right-sided low back pain with right-sided sciatica      (Chronic right-sided low back pain with right-sided sciatica [M54.41, G89.29])    Surgeons: Jatinder Christian MD Provider: Radha Zurita MD    Anesthesia Type: general ASA Status: 2            Anesthesia Type: general    Vitals  Vitals Value Taken Time   /77 11/15/23 1600   Temp 36.6 °C (97.9 °F) 11/15/23 1335   Pulse 63 11/15/23 1609   Resp 18 11/15/23 1530   SpO2 99 % 11/15/23 1609   Vitals shown include unfiled device data.        Post Anesthesia Care and Evaluation    Patient location during evaluation: PHASE II  Patient participation: complete - patient participated  Level of consciousness: awake and alert  Pain management: adequate    Airway patency: patent  Anesthetic complications: No anesthetic complications    Cardiovascular status: acceptable  Respiratory status: acceptable  Hydration status: acceptable    Comments: /85   Pulse 65   Temp 36.6 °C (97.9 °F) (Oral)   Resp 16   SpO2 100%

## 2023-11-15 NOTE — BRIEF OP NOTE
LUMBAR DISCECTOMY POSTERIOR WITH METRIX  Progress Note    Cheri Lopez  11/15/2023    Pre-op Diagnosis:   Chronic right-sided low back pain with right-sided sciatica [M54.41, G89.29]       Post-Op Diagnosis Codes:     * Chronic right-sided low back pain with right-sided sciatica [M54.41, G89.29]    Procedure/CPT® Codes:        Procedure(s):  Right lumbar 2 to lumbar 3 laminectomy with possible discectomy using metrx              Surgeon(s):  Jatinder Christian MD    Anesthesia: General    Staff:   Circulator: Michael Alexis RN; Rufina Noland RN  Scrub Person: Ashley Hauser  Nursing Assistant: Pasha Kamara  Assistant: Jessy Awan CSA  Assistant: Jessy Awan CSA      Estimated Blood Loss: 100ml    Urine Voided: * No values recorded between 11/15/2023 12:06 PM and 11/15/2023  1:17 PM *    Specimens:                None          Drains: * No LDAs found *    Findings: Small disc herniation        Complications: None    Jatinder Christian MD     Date: 11/15/2023  Time: 13:24 EST

## 2023-11-15 NOTE — ANESTHESIA PREPROCEDURE EVALUATION
Anesthesia Evaluation     Patient summary reviewed and Nursing notes reviewed   history of anesthetic complications:  PONV  NPO Solid Status: > 8 hours  NPO Liquid Status: > 2 hours           Airway   Mallampati: II  Neck ROM: full  No difficulty expected  Dental - normal exam     Pulmonary     breath sounds clear to auscultation  (+) pneumonia , a smoker Former,  Cardiovascular     Rhythm: regular    (+) hypertension      Neuro/Psych  (+) numbness, psychiatric history  GI/Hepatic/Renal/Endo    (+) GERD, liver disease history of elevated LFT, thyroid problem     Musculoskeletal     (+) back pain  Abdominal    Substance History      OB/GYN          Other   arthritis,                 Anesthesia Plan    ASA 2     general     (Prone position discussed)  intravenous induction     Anesthetic plan, risks, benefits, and alternatives have been provided, discussed and informed consent has been obtained with: patient.    CODE STATUS:

## 2023-11-15 NOTE — DISCHARGE INSTRUCTIONS
LUMBAR SURGERY - ERWIN AUSTIN M.D.  [address], [address]  [place] [address]  [phone]    Instructions & Care After Your Lumbar Surgery    1. No sitting except on the commode.  You may lie on a firm couch but not on a waterbed or recliner.  You may lie in any position that is comfortable, using only one pillow under your head. Either stand at a counter or lie on your side for meals. Three weeks after surgery you may begin sitting for 30 minutes 3 times per day.    2. No driving for three weeks.  You may ride in the car in a passenger seat that reclines or lying down in the back seat.      3. No bending. If you drop something allow someone else to pick it up.    4. Don’t lift anything heavier than a coffee cup or paperback book.    5. Gradually increase your activity each day.  You should get out of bed every hour during the day.  Walk outside as soon as you feel up to it.  Walk short distances frequently rather than making a long trip.  Frequency is more important than distance.  Your goal is to be walking 2 to 3 miles per day when you return for your post-operative visit. (Never do this in one trip.)    6. You may climb stairs.    7. Remove your bandage the second day after surgery and leave it off.  If you notice any redness, swelling or drainage, call the office.  There are steri-strips across the incision.  If these are still present ten days after surgery, remove them gently.      8. You may shower five days after surgery.  Keep the incision dry until then.  Don’t let the water beat directly on the incision and gently pat it dry.    9. Physical Therapy will be arranged at your post-operative visit if needed.    10. Your prescription for pain medication may be filled for half the original amount prior to your return office visit.  Due to changes in Federal Law in order to have this medication refilled you must contact the office four days prior to the date and make arrangements to pick the  prescription up in the office.  This prescription refill cannot be called in to the pharmacy. Your prescription will be ready for pick-up the day the refill is due.    Don’t be alarmed if you experience some of your pre-operative symptoms after going home.  This is not uncommon and normally goes away in a few days but may last longer.  If you have any questions or concerns, please call our office.       Scopolamine Patch  This patch has been applied to the skin behind one of your ears.  It may stay in place up to 24 hours. You may remove it at any time after your surgery; however, it should be removed after you are up and walking around the next day.  This medicine reduces stomach upset. Side effects may include: dry mouth, dizziness, sleepiness, constipation, or upset stomach.  An allergy would show up as: a rash, itching, wheezing or shortness of breath.  Follow these instructions:  Do not drink alcohol, drive or operate machinery while taking this medicine.  Wear only 1 patch at a time. You can leave the patch on for up to 24 hours.  When you remove the patch, fold it in half with the sticky sides together and throw it away. Wash your hands and the area under the patch.  Do not touch your eye with your hand if it has touched the patch.  Wash your hands well before and after touching the patch.  Sit or stand slowly to avoid dizziness.  Call your doctor if you have:  Any sign of allergy  No relief  Trouble passing urine  Any new or severe symptoms

## 2023-11-15 NOTE — ANESTHESIA PROCEDURE NOTES
Airway  Urgency: elective    Date/Time: 11/15/2023 12:19 PM  Airway not difficult    General Information and Staff    Patient location during procedure: OR  Anesthesiologist: Radha Zurita MD  CRNA/CAA: Judy Arias CRNA    Indications and Patient Condition  Indications for airway management: airway protection    Preoxygenated: yes  MILS maintained throughout  Mask difficulty assessment: 2 - vent by mask + OA or adjuvant +/- NMBA    Final Airway Details  Final airway type: endotracheal airway      Successful airway: ETT  Cuffed: yes   Successful intubation technique: direct laryngoscopy  Facilitating devices/methods: intubating stylet  Endotracheal tube insertion site: oral  Blade: Garcia  Blade size: 2  ETT size (mm): 7.0  Cormack-Lehane Classification: grade I - full view of glottis  Placement verified by: chest auscultation and capnometry   Cuff volume (mL): 5  Measured from: teeth  ETT/EBT  to teeth (cm): 21  Number of attempts at approach: 1  Assessment: lips, teeth, and gum same as pre-op and atraumatic intubation

## 2023-11-16 ENCOUNTER — TELEPHONE (OUTPATIENT)
Dept: NEUROSURGERY | Facility: CLINIC | Age: 58
End: 2023-11-16
Payer: COMMERCIAL

## 2023-11-16 NOTE — TELEPHONE ENCOUNTER
"S/w patient and informed per     \"I removed some bone from the back of her spine and then removed a very small disc herniation pressing on the nerve.  We can discuss this in more detail at her postop visit.\"    Patient expressed understanding         "

## 2023-11-16 NOTE — TELEPHONE ENCOUNTER
Patient had right lumbar 2 to lumbar 3 laminectomy with possible discectomy using metrx yesterday. She would like to know details what was exactly done?     She said her  was told but he doesn't hear well.

## 2023-11-21 ENCOUNTER — TELEPHONE (OUTPATIENT)
Dept: NEUROSURGERY | Facility: CLINIC | Age: 58
End: 2023-11-21
Payer: COMMERCIAL

## 2023-11-29 NOTE — PROGRESS NOTES
Subjective   Patient ID: Cheri Lopez is a 58 y.o. female is here today via telephone for 2 week PO follow-up. Patient had a Right lumbar 2 to lumbar 3 laminectomy with possible discectomy using metrx  on 11/15/2023    You have chosen to receive care through a telephone visit. Do you consent to use a telephone visit for your medical care today? Yes     We had a video visit today.  The patient was at home and I was in the office.  We talked for 5 minutes.    History of Present Illness    I talked to this patient today on the phone.  She is doing well.  She has almost no pain.  The preoperative pain is markedly improved.    The following portions of the patient's history were reviewed and updated as appropriate: allergies, current medications, past family history, past medical history, past social history, past surgical history, and problem list.    Review of Systems    I reviewed the review of systems listed by the patient and discussed by my MA    Objective     There were no vitals filed for this visit.  There is no height or weight on file to calculate BMI.    Tobacco Use: Medium Risk (11/16/2023)    Patient History     Smoking Tobacco Use: Former     Smokeless Tobacco Use: Never     Passive Exposure: Not on file          Physical Exam  Neurological:      Mental Status: She is alert and oriented to person, place, and time.       Neurologic Exam     Mental Status   Oriented to person, place, and time.           Assessment & Plan   Independent Review of Radiographic Studies:      I personally reviewed the images from the following studies.    There is no new imaging to review    Medical Decision Making:      I told the patient she can start sitting 3-4 times a day for 20 or 30 minutes at a time.  She will start some physical therapy and I will see her in the office in about a month.  I told her she can get her incision wet and get some soap on it to help the Steri-Strips fall off.    Diagnoses and all orders  for this visit:    1. Follow-up examination following surgery (Primary)  -     Ambulatory Referral to Physical Therapy      Return in about 4 weeks (around 12/28/2023).

## 2023-11-30 ENCOUNTER — CLINICAL SUPPORT (OUTPATIENT)
Dept: NEUROSURGERY | Facility: CLINIC | Age: 58
End: 2023-11-30
Payer: COMMERCIAL

## 2023-11-30 DIAGNOSIS — Z09 FOLLOW-UP EXAMINATION FOLLOWING SURGERY: Primary | ICD-10-CM

## 2023-12-28 ENCOUNTER — OFFICE VISIT (OUTPATIENT)
Dept: NEUROSURGERY | Facility: CLINIC | Age: 58
End: 2023-12-28
Payer: COMMERCIAL

## 2023-12-28 VITALS
DIASTOLIC BLOOD PRESSURE: 76 MMHG | RESPIRATION RATE: 20 BRPM | WEIGHT: 141 LBS | BODY MASS INDEX: 24.98 KG/M2 | SYSTOLIC BLOOD PRESSURE: 122 MMHG | HEIGHT: 63 IN

## 2023-12-28 DIAGNOSIS — Z09 FOLLOW-UP EXAMINATION FOLLOWING SURGERY: Primary | ICD-10-CM

## 2023-12-28 PROCEDURE — 99024 POSTOP FOLLOW-UP VISIT: CPT | Performed by: NEUROLOGICAL SURGERY

## 2023-12-28 NOTE — PROGRESS NOTES
"Subjective   Patient ID: Cheri Lopez is a 58 y.o. female is here today for a 4 weeks post op follow-up.    History of Present Illness    This patient returns today.  She is doing well.  She has almost no pain at all.  She has over the last couple of weeks a little bit of pain in her hip when she walks a long distance but otherwise feels good.    The following portions of the patient's history were reviewed and updated as appropriate: allergies, current medications, past family history, past medical history, past social history, past surgical history, and problem list.    Review of Systems   Constitutional:  Negative for fever.   Musculoskeletal:  Negative for back pain.   Neurological:  Negative for weakness and numbness.   All other systems reviewed and are negative.      I reviewed the review of systems listed by the patient and discussed by my MA    Objective     Vitals:    12/28/23 0830   BP: 122/76   BP Location: Left arm   Patient Position: Sitting   Cuff Size: Adult   Resp: 20   Weight: 64 kg (141 lb)   Height: 160 cm (62.99\")   PainSc: 0-No pain     Body mass index is 24.98 kg/m².    Tobacco Use: Medium Risk (12/28/2023)    Patient History     Smoking Tobacco Use: Former     Smokeless Tobacco Use: Never     Passive Exposure: Not on file          Physical Exam  Neurological:      Mental Status: She is alert and oriented to person, place, and time.       Neurologic Exam     Mental Status   Oriented to person, place, and time.           Assessment & Plan   Independent Review of Radiographic Studies:      I personally reviewed the images from the following studies.    There is no new imaging to review    Medical Decision Making:      I told the patient and her  that she can gradually go back to normal activities.  She should avoid strenuously heavy lifting.  I told her anything that stresses her back she should return to very gradually and slowly.  Will call if anything happens in the " future.    Diagnoses and all orders for this visit:    1. Follow-up examination following surgery (Primary)      Return if symptoms worsen or fail to improve.

## 2024-01-31 NOTE — PROGRESS NOTES
New Left Knee      Patient: Cheri Lopez        YOB: 1965    Medical Record Number: [id number]        Chief Complaints: Left knee pain      History of Present Illness: This is a 58-year-old female who underwent knee arthroscopy in 2021 who did well until recently she had back surgery in November she has been a little limited in what she can do she is increasing her activities and now has some return of her symptoms primarily anterior and medial.  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 (Patient taking differently: Take 0.5 tablets by mouth Daily. TAKING FOR REHNAUDS)    Ascorbic Acid (VITAMIN C PO) Take 1,000 mg by mouth Daily.    B Complex Vitamins (VITAMIN B COMPLEX PO) Take 200 mcg by mouth Daily.    cycloSPORINE (RESTASIS) 0.05 % ophthalmic emulsion Administer 1 drop to both eyes Every 12 (Twelve) Hours.    estradiol (ESTRING) 2 MG vaginal ring Insert 2 mg into the vagina.    levothyroxine (SYNTHROID, LEVOTHROID) 88 MCG tablet Take 1 tablet by mouth Every Morning.    losartan (COZAAR) 50 MG tablet Take 1 tablet by mouth Daily.    Magnesium-Potassium 250-100 MG tablet Take 1 tablet by mouth Daily.    Multiple Vitamins-Minerals (PRESERVISION AREDS PO) Take 1 tablet by mouth 2 (two) times a day. HOLD FOR SURGERY    Probiotic Product (Probiotic 10 Ultra Strength) capsule Take 1 tablet by mouth Daily.    progesterone (PROMETRIUM) 100 MG capsule Take 1 capsule by mouth Daily.    vitamin D3 125 MCG (5000 UT) capsule capsule Take 1 capsule by  mouth Daily.    vitamin E 400 UNIT capsule Take 1 capsule by mouth Daily. HOLD FOR SURGERY    baclofen (LIORESAL) 10 MG tablet Take 1 tablet by mouth 3 (Three) Times a Day As Needed for Muscle Spasms. (Patient not taking: Reported on 2/1/2024)    HYDROcodone-acetaminophen (NORCO) 5-325 MG per tablet Take 1 tablet by mouth Every 4 (Four) Hours As Needed for Moderate Pain    promethazine (PHENERGAN) 25 MG tablet Take 1 tablet by mouth Every 6 (Six) Hours As Needed for Nausea or Vomiting.     No current facility-administered medications on file prior to visit.     Current Medications:  Scheduled Meds:  Continuous Infusions:No current facility-administered medications for this visit.    PRN Meds:.    Past Medical History:   Diagnosis Date    Ankle sprain 1998    Arthritis     Arthritis of back     Back pain     CTS (carpal tunnel syndrome)     Depression     Fracture, tibia and fibula 1994    GERD (gastroesophageal reflux disease)     H/O colonoscopy     Hip arthrosis 2016    Hip pain     History of cellulitis 03/2023    HAND    Hypertension     Hypothyroidism     IBS (irritable bowel syndrome)     Knee pain     Knee swelling 2010    Low back pain     Low back strain 2003    Lumbosacral disc disease     Pneumonia     RECURRENT PNEUMONIA WITH HOSPITALIZATIONS    PONV (postoperative nausea and vomiting)     VERY SEVERE    Tear of meniscus of knee 2010, 2020        Past Surgical History:   Procedure Laterality Date    BACK SURGERY  5/5/20, nov 15, 2023    BREAST AUGMENTATION  01/17/2007    BREAST IMPLANT REMOVAL  08/2022    CARPAL TUNNEL RELEASE WITH CUBITAL TUNNEL RELEASE Right     COLONOSCOPY N/A 01/17/2018    Procedure: COLONOSCOPY to cecum and t.i. with polypectomy;  Surgeon: Rima Luis MD;  Location: [facility] ENDOSCOPY;  Service:     HAND SURGERY  2914    HIP DEBRIDEMENT Right     2016 &2021    KNEE ARTHROSCOPY Left 08/12/2020    Procedure: Left KNEE ARTHROSCOPy, partial medial menisectomy and debridement of  "arthritis;  Surgeon: Génesis Mg MD;  Location: [facility];  Service: Orthopedics;  Laterality: Left;    KNEE CARTILAGE SURGERY Right     LUMBAR DISCECTOMY Right 11/15/2023    Procedure: Right lumbar 2 to lumbar 3 laminectomy with possible discectomy using metrx;  Surgeon: Jatinder Christian MD;  Location: [facility];  Service: Neurosurgery;  Laterality: Right;    SINUS SURGERY      DEVIATED SEPTUM    SKIN BIOPSY      hand - precancerous    SKIN BIOPSY      SPINAL FUSION  2020    L4 &L5        Social History     Occupational History    Occupation: [employer]   Tobacco Use    Smoking status: Former     Packs/day: 0.25     Years: 5.00     Additional pack years: 0.00     Total pack years: 1.25     Types: Cigarettes     Start date: 1983     Quit date: 2005     Years since quittin.0    Smokeless tobacco: Never    Tobacco comments:     social smoker - never really smoked much   Vaping Use    Vaping Use: Never used   Substance and Sexual Activity    Alcohol use: Yes     Alcohol/week: 7.0 standard drinks of alcohol     Types: 4 Glasses of wine, 3 Drinks containing 0.5 oz of alcohol per week    Drug use: Never    Sexual activity: Yes     Partners: Male     Birth control/protection: Vasectomy      Social History     Social History Narrative    Not on file        Family History   Problem Relation Age of Onset    Osteoarthritis Mother     Heart disease Mother     No Known Problems Brother     Breast cancer Maternal Aunt     Cancer Paternal Grandmother     Malig Hyperthermia Neg Hx              Review of Systems:     Review of Systems      Physical Exam: 58 y.o. female  General Appearance:    Alert, cooperative, in no acute distress                   Vitals:    24 1503   Temp: 98.6 °F (37 °C)   Weight: 63.6 kg (140 lb 4.8 oz)   Height: 160 cm (63\")   PainSc:   3      Patient is alert and read ×3 no acute distress appears her above-listed at height weight and age.  Affect is normal respiratory rate " is normal unlabored. Heart rate regular rate rhythm, sclera, dentition and hearing are normal for the purpose of this exam.        Ortho Exam  Physical exam of the left knee reveals no effusion, no erythema.  The patient has no palpable tenderness along the medial joint line, no tenderness about the lateral joint line.  Patient does have crepitus with patellofemoral range of motion.  They also have subjective symptoms anteriorly during exam.  The patient has a negative bounce home, negative Derek and a stable ligamentous exam.  Quad tone is reasonable and symmetric.  There are no overlying skin changes no lymphedema no lymphadenopathy.  There is good hip range of motion which is full symmetric and asymptomatic and a normal ankle exam.  Hamstrings and IT band are tight bilaterally.   Large Joint Arthrocentesis: L knee  Date/Time: 2/1/2024 3:32 PM  Consent given by: patient  Site marked: site marked  Timeout: Immediately prior to procedure a time out was called to verify the correct patient, procedure, equipment, support staff and site/side marked as required   Supporting Documentation  Indications: pain   Procedure Details  Location: knee - L knee  Preparation: Patient was prepped and draped in the usual sterile fashion  Needle gauge: 21G.  Approach: anteromedial  Medications administered: 1 mL methylPREDNISolone acetate 80 MG/ML; 2 mL lidocaine PF 2% 2 %  Patient tolerance: patient tolerated the procedure well with no immediate complications                 Radiology:   AP, Lateral and merchant views of the left knee  were ordered/reviewed to evauateknee pain.  I did compare to x-rays in 2020 she has been very mild arthritis on the left medial compartment with lipping of the medial femoral condyle medial tibial plateau she has mild patellofemoral arthritis as well we did review her intraoperative pictures she did have a lot of her meniscus taken out and had some degenerative changes in that compartment  already  Imaging Results (Most Recent)       Procedure Component Value Units Date/Time    XR Knee 3 View Left [[id number]] Resulted: 02/01/24 1438     Updated: 02/01/24 1438    Impression:      Ordering physician's impression is located in the Encounter Note dated 02/01/24. X-ray performed in the DR room.            Assessment/Plan:      Left knee pain I suspect this is degenerative changes even in view the fact she still has good space on her x-rays.  Plan is to proceed with an MRI and have her really work on quad and core strengthening should she fail to improve could proceed pursue other means of testing.  She has done an abundant amount of physical therapy with her back she will incorporate some quad and hamstrings as well                            Answers submitted by the patient for this visit:  Other (Submitted on 1/31/2024)  Please describe your symptoms.: Discomfort and swelling behind left knee  Have you had these symptoms before?: Yes  How long have you been having these symptoms?: Greater than 2 weeks  Please describe any probable cause for these symptoms. : More standing and walking post laminectomy.  Primary Reason for Visit (Submitted on 1/31/2024)  What is the primary reason for your visit?: Other

## 2024-02-01 ENCOUNTER — OFFICE VISIT (OUTPATIENT)
Dept: ORTHOPEDIC SURGERY | Facility: CLINIC | Age: 59
End: 2024-02-01
Payer: COMMERCIAL

## 2024-02-01 VITALS — BODY MASS INDEX: 24.86 KG/M2 | WEIGHT: 140.3 LBS | HEIGHT: 63 IN | TEMPERATURE: 98.6 F

## 2024-02-01 DIAGNOSIS — M17.12 PRIMARY LOCALIZED OSTEOARTHROSIS OF LEFT LOWER LEG: ICD-10-CM

## 2024-02-01 DIAGNOSIS — M71.22 SYNOVIAL CYST OF LEFT POPLITEAL SPACE: ICD-10-CM

## 2024-02-01 DIAGNOSIS — M25.562 LEFT KNEE PAIN, UNSPECIFIED CHRONICITY: Primary | ICD-10-CM

## 2024-02-01 RX ADMIN — LIDOCAINE HYDROCHLORIDE 2 ML: 20 INJECTION, SOLUTION EPIDURAL; INFILTRATION; INTRACAUDAL; PERINEURAL at 15:32

## 2024-02-01 RX ADMIN — METHYLPREDNISOLONE ACETATE 1 ML: 80 INJECTION, SUSPENSION INTRA-ARTICULAR; INTRALESIONAL; INTRAMUSCULAR; SOFT TISSUE at 15:32

## 2024-02-02 RX ORDER — LIDOCAINE HYDROCHLORIDE 20 MG/ML
2 INJECTION, SOLUTION EPIDURAL; INFILTRATION; INTRACAUDAL; PERINEURAL
Status: COMPLETED | OUTPATIENT
Start: 2024-02-01 | End: 2024-02-01

## 2024-02-02 RX ORDER — METHYLPREDNISOLONE ACETATE 80 MG/ML
1 INJECTION, SUSPENSION INTRA-ARTICULAR; INTRALESIONAL; INTRAMUSCULAR; SOFT TISSUE
Status: COMPLETED | OUTPATIENT
Start: 2024-02-01 | End: 2024-02-01

## 2024-03-07 DIAGNOSIS — I10 HYPERTENSION, UNSPECIFIED TYPE: Primary | ICD-10-CM

## 2024-03-07 DIAGNOSIS — E03.9 ADULT HYPOTHYROIDISM: ICD-10-CM

## 2024-03-07 DIAGNOSIS — R79.89 ELEVATED LFTS: ICD-10-CM

## 2024-03-14 LAB
ALBUMIN SERPL-MCNC: 4.8 G/DL (ref 3.5–5.2)
ALBUMIN/GLOB SERPL: 1.8 G/DL
ALP SERPL-CCNC: 86 U/L (ref 39–117)
ALT SERPL-CCNC: 29 U/L (ref 1–33)
APPEARANCE UR: CLEAR
AST SERPL-CCNC: 22 U/L (ref 1–32)
BACTERIA #/AREA URNS HPF: NORMAL /HPF
BACTERIA UR CULT: NORMAL
BACTERIA UR CULT: NORMAL
BASOPHILS # BLD AUTO: 0.06 10*3/MM3 (ref 0–0.2)
BASOPHILS NFR BLD AUTO: 1.1 % (ref 0–1.5)
BILIRUB SERPL-MCNC: 0.2 MG/DL (ref 0–1.2)
BILIRUB UR QL STRIP: NEGATIVE
BUN SERPL-MCNC: 12 MG/DL (ref 6–20)
BUN/CREAT SERPL: 13.6 (ref 7–25)
CALCIUM SERPL-MCNC: 9.7 MG/DL (ref 8.6–10.5)
CASTS URNS QL MICRO: NORMAL /LPF
CHLORIDE SERPL-SCNC: 101 MMOL/L (ref 98–107)
CHOLEST SERPL-MCNC: 223 MG/DL (ref 0–200)
CO2 SERPL-SCNC: 24 MMOL/L (ref 22–29)
COLOR UR: YELLOW
CREAT SERPL-MCNC: 0.88 MG/DL (ref 0.57–1)
EGFRCR SERPLBLD CKD-EPI 2021: 76.3 ML/MIN/1.73
EOSINOPHIL # BLD AUTO: 0.14 10*3/MM3 (ref 0–0.4)
EOSINOPHIL NFR BLD AUTO: 2.6 % (ref 0.3–6.2)
EPI CELLS #/AREA URNS HPF: NORMAL /HPF (ref 0–10)
ERYTHROCYTE [DISTWIDTH] IN BLOOD BY AUTOMATED COUNT: 13.4 % (ref 12.3–15.4)
GLOBULIN SER CALC-MCNC: 2.6 GM/DL
GLUCOSE SERPL-MCNC: 82 MG/DL (ref 65–99)
GLUCOSE UR QL STRIP: NEGATIVE
HCT VFR BLD AUTO: 44.9 % (ref 34–46.6)
HDLC SERPL-MCNC: 75 MG/DL (ref 40–60)
HGB BLD-MCNC: 14.7 G/DL (ref 12–15.9)
HGB UR QL STRIP: NEGATIVE
IMM GRANULOCYTES # BLD AUTO: 0.02 10*3/MM3 (ref 0–0.05)
IMM GRANULOCYTES NFR BLD AUTO: 0.4 % (ref 0–0.5)
KETONES UR QL STRIP: NEGATIVE
LDLC SERPL CALC-MCNC: 136 MG/DL (ref 0–100)
LEUKOCYTE ESTERASE UR QL STRIP: ABNORMAL
LYMPHOCYTES # BLD AUTO: 1.67 10*3/MM3 (ref 0.7–3.1)
LYMPHOCYTES NFR BLD AUTO: 30.8 % (ref 19.6–45.3)
MCH RBC QN AUTO: 31.2 PG (ref 26.6–33)
MCHC RBC AUTO-ENTMCNC: 32.7 G/DL (ref 31.5–35.7)
MCV RBC AUTO: 95.3 FL (ref 79–97)
MICRO URNS: ABNORMAL
MONOCYTES # BLD AUTO: 0.5 10*3/MM3 (ref 0.1–0.9)
MONOCYTES NFR BLD AUTO: 9.2 % (ref 5–12)
NEUTROPHILS # BLD AUTO: 3.03 10*3/MM3 (ref 1.7–7)
NEUTROPHILS NFR BLD AUTO: 55.9 % (ref 42.7–76)
NITRITE UR QL STRIP: NEGATIVE
NRBC BLD AUTO-RTO: 0 /100 WBC (ref 0–0.2)
PH UR STRIP: 6.5 [PH] (ref 5–7.5)
PLATELET # BLD AUTO: 265 10*3/MM3 (ref 140–450)
POTASSIUM SERPL-SCNC: 5.1 MMOL/L (ref 3.5–5.2)
PROT SERPL-MCNC: 7.4 G/DL (ref 6–8.5)
PROT UR QL STRIP: NEGATIVE
RBC # BLD AUTO: 4.71 10*6/MM3 (ref 3.77–5.28)
RBC #/AREA URNS HPF: NORMAL /HPF (ref 0–2)
SODIUM SERPL-SCNC: 139 MMOL/L (ref 136–145)
SP GR UR STRIP: 1.01 (ref 1–1.03)
TRIGL SERPL-MCNC: 69 MG/DL (ref 0–150)
TSH SERPL DL<=0.005 MIU/L-ACNC: 2.15 UIU/ML (ref 0.27–4.2)
URINALYSIS REFLEX: ABNORMAL
UROBILINOGEN UR STRIP-MCNC: 0.2 MG/DL (ref 0.2–1)
VLDLC SERPL CALC-MCNC: 12 MG/DL (ref 5–40)
WBC # BLD AUTO: 5.42 10*3/MM3 (ref 3.4–10.8)
WBC #/AREA URNS HPF: NORMAL /HPF (ref 0–5)

## 2024-03-19 ENCOUNTER — OFFICE VISIT (OUTPATIENT)
Dept: INTERNAL MEDICINE | Facility: CLINIC | Age: 59
End: 2024-03-19
Payer: COMMERCIAL

## 2024-03-19 VITALS
SYSTOLIC BLOOD PRESSURE: 120 MMHG | WEIGHT: 142 LBS | HEART RATE: 69 BPM | DIASTOLIC BLOOD PRESSURE: 74 MMHG | BODY MASS INDEX: 25.16 KG/M2 | HEIGHT: 63 IN | OXYGEN SATURATION: 99 %

## 2024-03-19 DIAGNOSIS — M48.07 SPINAL STENOSIS OF LUMBOSACRAL REGION: ICD-10-CM

## 2024-03-19 DIAGNOSIS — Z00.00 HEALTHCARE MAINTENANCE: Primary | ICD-10-CM

## 2024-03-19 DIAGNOSIS — I10 HYPERTENSION, UNSPECIFIED TYPE: ICD-10-CM

## 2024-03-19 DIAGNOSIS — E03.9 ADULT HYPOTHYROIDISM: ICD-10-CM

## 2024-03-19 DIAGNOSIS — Z13.6 ENCOUNTER FOR SCREENING FOR CARDIOVASCULAR DISORDERS: ICD-10-CM

## 2024-03-19 DIAGNOSIS — Z78.0 MENOPAUSE: ICD-10-CM

## 2024-03-19 PROCEDURE — 99396 PREV VISIT EST AGE 40-64: CPT | Performed by: INTERNAL MEDICINE

## 2024-03-19 NOTE — PROGRESS NOTES
Subjective   CPE  Spinal stenosis  Htn  Hypothyroid    Cheri Lopez is a 58 y.o. female who presents for a complete physical exam, to review chronic issues, and to discuss any acute needs. Patient is doing well today. She is s/p laminectomy. She has much improved pain almost immediately after procedure. Functionality is much improved. Patient had dental challenges w/ a decayed root. She was taking higher doses tylenol ibuprofen at that time.   Patient has htn. Now off amlodipine. Bp is normotensive  Elevated ldl on labs  Tsh is at goal level        Review of Systems   Constitutional: Negative.    HENT: Negative.     Eyes: Negative.    Respiratory: Negative.     Cardiovascular: Negative.    Gastrointestinal: Negative.    Endocrine: Negative.    Genitourinary: Negative.    Musculoskeletal: Negative.    Skin: Negative.    Allergic/Immunologic: Negative.    Neurological: Negative.    Hematological: Negative.    Psychiatric/Behavioral: Negative.         The following portions of the patient's history were reviewed and updated as appropriate: allergies, current medications, past family history, past medical history, past social history, past surgical history, and problem list.     Patient Active Problem List   Diagnosis    Chronic constipation    Blues    Adult hypothyroidism    Encounter for screening for malignant neoplasm of colon    Acute right-sided low back pain with right-sided sciatica    DDD (degenerative disc disease), lumbar    Chronic right-sided low back pain with right-sided sciatica    Right hip pain    Sacroiliac joint dysfunction    Allergic rhinitis due to allergen    Scoliosis due to degenerative disease of spine in adult patient    Low back pain    Neural foraminal stenosis of lumbar spine    Tear of medial meniscus of left knee, current    Hypertension    Multifocal pneumonia    Elevated LFTs    Viral pneumonia       Past Medical History:   Diagnosis Date    Ankle sprain 1998    Arthritis      Arthritis of back     Back pain     CTS (carpal tunnel syndrome)     Depression     Fracture, tibia and fibula 1994    GERD (gastroesophageal reflux disease)     H/O colonoscopy     Hip arthrosis 2016    Hip pain     History of cellulitis 03/2023    HAND    Hypertension     Hypothyroidism     IBS (irritable bowel syndrome)     Knee pain     Knee swelling 2010    Low back pain     Low back strain 2003    Lumbosacral disc disease     Pneumonia     RECURRENT PNEUMONIA WITH HOSPITALIZATIONS    PONV (postoperative nausea and vomiting)     VERY SEVERE    Tear of meniscus of knee 2010, 2020       Past Surgical History:   Procedure Laterality Date    BACK SURGERY  5/5/20, nov 15, 2023    BREAST AUGMENTATION  01/17/2007    BREAST IMPLANT REMOVAL  08/2022    CARPAL TUNNEL RELEASE WITH CUBITAL TUNNEL RELEASE Right     COLONOSCOPY N/A 01/17/2018    Procedure: COLONOSCOPY to cecum and t.i. with polypectomy;  Surgeon: Rima Luis MD;  Location: [facility] ENDOSCOPY;  Service:     HAND SURGERY  2914    HIP DEBRIDEMENT Right     2016 &2021    KNEE ARTHROSCOPY Left 08/12/2020    Procedure: Left KNEE ARTHROSCOPy, partial medial menisectomy and debridement of arthritis;  Surgeon: Génesis Mg MD;  Location: [facility];  Service: Orthopedics;  Laterality: Left;    KNEE CARTILAGE SURGERY Right     LUMBAR DISCECTOMY Right 11/15/2023    Procedure: Right lumbar 2 to lumbar 3 laminectomy with possible discectomy using metrx;  Surgeon: Jatinder Christian MD;  [facility] OR;  Service: Neurosurgery;  Laterality: Right;    SINUS SURGERY      DEVIATED SEPTUM    SKIN BIOPSY      hand - precancerous    SKIN BIOPSY      SPINAL FUSION  05/2020    L4 &L5       Family History   Problem Relation Age of Onset    Osteoarthritis Mother     Heart disease Mother     No Known Problems Brother     Breast cancer Maternal Aunt     Cancer Paternal Grandmother     Malig Hyperthermia Neg Hx        Social History     Socioeconomic History     Marital status:     Number of children: 0    Years of education: ms   Tobacco Use    Smoking status: Former     Current packs/day: 0.00     Average packs/day: 0.3 packs/day for 21.7 years (5.4 ttl pk-yrs)     Types: Cigarettes     Start date: 1983     Quit date: 2005     Years since quittin.2    Smokeless tobacco: Never    Tobacco comments:     social smoker - never really smoked much   Vaping Use    Vaping status: Never Used   Substance and Sexual Activity    Alcohol use: Yes     Alcohol/week: 7.0 standard drinks of alcohol     Types: 4 Glasses of wine, 3 Drinks containing 0.5 oz of alcohol per week    Drug use: Never    Sexual activity: Yes     Partners: Male     Birth control/protection: Vasectomy       Current Outpatient Medications on File Prior to Visit   Medication Sig Dispense Refill    Ascorbic Acid (VITAMIN C PO) Take 1,000 mg by mouth Daily.      B Complex Vitamins (VITAMIN B COMPLEX PO) Take 200 mcg by mouth Daily.      baclofen (LIORESAL) 10 MG tablet Take 1 tablet by mouth 3 (Three) Times a Day As Needed for Muscle Spasms. 45 tablet 3    cycloSPORINE (RESTASIS) 0.05 % ophthalmic emulsion Administer 1 drop to both eyes Every 12 (Twelve) Hours.      estradiol (ESTRING) 2 MG vaginal ring Insert 2 mg into the vagina.      levothyroxine (SYNTHROID, LEVOTHROID) 88 MCG tablet Take 1 tablet by mouth Every Morning. 90 tablet 2    losartan (COZAAR) 50 MG tablet Take 1 tablet by mouth Daily. 90 tablet 1    Magnesium-Potassium 250-100 MG tablet Take 1 tablet by mouth Daily.      Multiple Vitamins-Minerals (PRESERVISION AREDS PO) Take 1 tablet by mouth 2 (two) times a day. HOLD FOR SURGERY      Probiotic Product (Probiotic 10 Ultra Strength) capsule Take 1 tablet by mouth Daily.      progesterone (PROMETRIUM) 100 MG capsule Take 1 capsule by mouth Daily.      vitamin D3 125 MCG (5000 UT) capsule capsule Take 1 capsule by mouth Daily.      vitamin E 400 UNIT capsule Take 1 capsule by mouth Daily.  "HOLD FOR SURGERY      [DISCONTINUED] amLODIPine (NORVASC) 2.5 MG tablet Take 1 tablet by mouth Daily. (Patient taking differently: Take 0.5 tablets by mouth Daily. TAKING FOR REHNAUDS) 90 tablet 1    [DISCONTINUED] HYDROcodone-acetaminophen (NORCO) 5-325 MG per tablet Take 1 tablet by mouth Every 4 (Four) Hours As Needed for Moderate Pain 25 tablet 0    [DISCONTINUED] promethazine (PHENERGAN) 25 MG tablet Take 1 tablet by mouth Every 6 (Six) Hours As Needed for Nausea or Vomiting. 15 tablet 0     No current facility-administered medications on file prior to visit.       Allergies   Allergen Reactions    Sulfa Antibiotics Other (See Comments)     Elevated liver enzymes     LONG TERM EFFECT ,FOUND IN BLOOD WORK       Immunization History   Administered Date(s) Administered    Flu Vaccine Split Quad 09/22/2015    Flublok 18+yrs 11/14/2020    Fluzone Quad >6mos (Multi-dose) 11/01/2019    Hepatitis A 01/01/2008, 06/01/2008    Influenza Quad Vaccine (Inpatient) 11/01/2019    Influenza, Unspecified 10/01/2020, 11/14/2020    TD Preservative Free (Tenivac) 02/20/2023    Tdap 01/01/2017       Objective     /74   Pulse 69   Ht 160 cm (63\")   Wt 64.4 kg (142 lb)   SpO2 99%   BMI 25.15 kg/m²     Physical Exam  Vitals and nursing note reviewed.   Constitutional:       Appearance: Normal appearance. She is well-developed.   HENT:      Head: Normocephalic and atraumatic.      Right Ear: Tympanic membrane and external ear normal.      Left Ear: Tympanic membrane and external ear normal.      Nose: Nose normal.      Mouth/Throat:      Mouth: Mucous membranes are moist.   Eyes:      Extraocular Movements: Extraocular movements intact.      Pupils: Pupils are equal, round, and reactive to light.   Cardiovascular:      Rate and Rhythm: Normal rate and regular rhythm.      Pulses: Normal pulses.      Heart sounds: Normal heart sounds.   Pulmonary:      Effort: Pulmonary effort is normal. No respiratory distress.      Breath " sounds: Normal breath sounds.   Abdominal:      General: Abdomen is flat. Bowel sounds are normal.      Palpations: Abdomen is soft.   Musculoskeletal:         General: Normal range of motion.      Cervical back: Normal range of motion and neck supple.   Skin:     General: Skin is warm and dry.   Neurological:      General: No focal deficit present.      Mental Status: She is alert and oriented to person, place, and time.   Psychiatric:         Mood and Affect: Mood normal.         Behavior: Behavior normal.         Thought Content: Thought content normal.         Judgment: Judgment normal.         Assessment & Plan   Diagnoses and all orders for this visit:    1. Healthcare maintenance (Primary)    2. Encounter for screening for cardiovascular disorders  -     CT Cardiac Calcium Score Without Dye  -     Vascular Screening (Bundle) CAR    3. Hypertension, unspecified type    4. Adult hypothyroidism    5. Menopause  -     DEXA Bone Density Axial    6. Spinal stenosis of lumbosacral region        Discussion    Patient presents today for a CPE.  She is doing well today. She has hypertension. Bp is normotensive today. She will continue to monitor and will avoid hypotension. She will continue current dosing of losartan. She will continue synthoid for thyroid replacment. She has spinal stenosis s/p surgery. Encouraged healthy stability strengthening. Avoid strain to the back. F/u spinal specialists routinely.       Patient follows a healthy diet.   Patient follows an adequate exercise regimen. Mammogram is up to date.   Colon cancer screening is up to date.   Pap smears are performed by the patient's gynecologist.   Immunizations are up to date.           Future Appointments   Date Time Provider Department Center   9/16/2024  9:00 AM [facility]   9/20/2024  8:30 AM Felicia Gonzalez MD [facility]   4/3/2025  8:00 AM [facility]   4/7/2025  7:30 AM Felicia Gonzalez MD  [facility]

## 2024-03-25 RX ORDER — BACLOFEN 10 MG/1
10 TABLET ORAL 3 TIMES DAILY PRN
Qty: 45 TABLET | Refills: 2 | Status: SHIPPED | OUTPATIENT
Start: 2024-03-25

## 2024-04-01 ENCOUNTER — HOSPITAL ENCOUNTER (OUTPATIENT)
Facility: HOSPITAL | Age: 59
Discharge: HOME | End: 2024-04-01
Admitting: INTERNAL MEDICINE
Payer: COMMERCIAL

## 2024-04-01 PROCEDURE — 77080 DXA BONE DENSITY AXIAL: CPT

## 2024-04-16 RX ORDER — AMLODIPINE BESYLATE 2.5 MG/1
2.5 TABLET ORAL DAILY
Qty: 90 TABLET | Refills: 1 | OUTPATIENT
Start: 2024-04-16

## 2024-06-10 RX ORDER — LEVOTHYROXINE SODIUM 88 UG/1
88 TABLET ORAL
Qty: 90 TABLET | Refills: 2 | Status: SHIPPED | OUTPATIENT
Start: 2024-06-10

## 2024-06-10 RX ORDER — LEVOTHYROXINE SODIUM 88 UG/1
88 TABLET ORAL
Qty: 90 TABLET | Refills: 2 | Status: SHIPPED | OUTPATIENT
Start: 2024-06-10 | End: 2024-06-10 | Stop reason: SDUPTHER

## 2024-07-12 ENCOUNTER — HOSPITAL ENCOUNTER (OUTPATIENT)
Dept: CARDIOLOGY | Facility: HOSPITAL | Age: 59
Discharge: HOME | End: 2024-07-12

## 2024-07-12 ENCOUNTER — HOSPITAL ENCOUNTER (OUTPATIENT)
Dept: CT IMAGING | Facility: HOSPITAL | Age: 59
Discharge: HOME | End: 2024-07-12

## 2024-07-12 LAB
BH CV VAS SCREENING CAROTID CCA LEFT: 91 CM/SEC
BH CV VAS SCREENING CAROTID CCA RIGHT: 87 CM/SEC
BH CV VAS SCREENING CAROTID ICA LEFT: 138 CM/SEC
BH CV VAS SCREENING CAROTID ICA RIGHT: 106 CM/SEC
BH CV XLRA MEAS - MID AO DIAM: 1.96 CM
BH CV XLRA MEAS - PAD LEFT ABI PT: 1.17
BH CV XLRA MEAS - PAD LEFT ARM: 118 MMHG
BH CV XLRA MEAS - PAD LEFT LEG PT: 138 MMHG
BH CV XLRA MEAS - PAD RIGHT ABI PT: 1.11
BH CV XLRA MEAS - PAD RIGHT ARM: 114 MMHG
BH CV XLRA MEAS - PAD RIGHT LEG PT: 131 MMHG
BH CV XLRA MEAS LEFT DIST CCA EDV: 24.9 CM/SEC
BH CV XLRA MEAS LEFT DIST CCA PSV: 91.3 CM/SEC
BH CV XLRA MEAS LEFT ICA/CCA RATIO: 1.5
BH CV XLRA MEAS LEFT PROX ICA EDV: -45.5 CM/SEC
BH CV XLRA MEAS LEFT PROX ICA PSV: -138 CM/SEC
BH CV XLRA MEAS RIGHT DIST CCA EDV: 21.1 CM/SEC
BH CV XLRA MEAS RIGHT DIST CCA PSV: 87 CM/SEC
BH CV XLRA MEAS RIGHT ICA/CCA RATIO: 1.2
BH CV XLRA MEAS RIGHT PROX ICA EDV: -38.5 CM/SEC
BH CV XLRA MEAS RIGHT PROX ICA PSV: -106 CM/SEC

## 2024-07-12 PROCEDURE — 93799 UNLISTED CV SVC/PROCEDURE: CPT

## 2024-07-12 PROCEDURE — 75571 CT HRT W/O DYE W/CA TEST: CPT

## 2024-07-16 NOTE — PROGRESS NOTES
Patient: Cheri Lopez  YOB: 1965  Date of Service: 7/16/2024    Chief Complaints: Left knee pain    Subjective:    History of Present Illness: Pt is seen in the office today with complaints of I last saw her in February we did inject her she states her knees worse maybe a little bit different she had her back operated on in that is doing okay still having some right hip pain her past medical history as listed below she still remains very active        Allergies:   Allergies   Allergen Reactions    Sulfa Antibiotics Other (See Comments)     Elevated liver enzymes     LONG TERM EFFECT ,FOUND IN BLOOD WORK       Medications:   Home Medications:  Current Outpatient Medications on File Prior to Visit   Medication Sig    Ascorbic Acid (VITAMIN C PO) Take 1,000 mg by mouth Daily.    B Complex Vitamins (VITAMIN B COMPLEX PO) Take 200 mcg by mouth Daily.    baclofen (LIORESAL) 10 MG tablet TAKE ONE TABLET BY MOUTH THREE TIMES DAILY AS NEEDED FOR MUSCLE SPASMS    cycloSPORINE (RESTASIS) 0.05 % ophthalmic emulsion Administer 1 drop to both eyes Every 12 (Twelve) Hours.    estradiol (ESTRING) 2 MG vaginal ring Insert 2 mg into the vagina.    levothyroxine (SYNTHROID, LEVOTHROID) 88 MCG tablet Take 1 tablet by mouth Every Morning.    losartan (COZAAR) 50 MG tablet Take 1 tablet by mouth Daily.    Magnesium-Potassium 250-100 MG tablet Take 1 tablet by mouth Daily.    Multiple Vitamins-Minerals (PRESERVISION AREDS PO) Take 1 tablet by mouth 2 (two) times a day. HOLD FOR SURGERY    Probiotic Product (Probiotic 10 Ultra Strength) capsule Take 1 tablet by mouth Daily.    progesterone (PROMETRIUM) 100 MG capsule Take 1 capsule by mouth Daily.    vitamin D3 125 MCG (5000 UT) capsule capsule Take 1 capsule by mouth Daily.    vitamin E 400 UNIT capsule Take 1 capsule by mouth Daily. HOLD FOR SURGERY     No current facility-administered medications on file prior to visit.     Current Medications:  Scheduled  Meds:  Continuous Infusions:No current facility-administered medications for this visit.    PRN Meds:.    I have reviewed the patient's medical history in detail and updated the computerized patient record.  Review and summarization of old records include:    Past Medical History:   Diagnosis Date    Ankle sprain 1998    Arthritis     Arthritis of back     Back pain     CTS (carpal tunnel syndrome)     Depression     Fracture, tibia and fibula 1994    GERD (gastroesophageal reflux disease)     H/O colonoscopy     Hip arthrosis 2016    Hip pain     History of cellulitis 03/2023    HAND    Hypertension     Hypothyroidism     IBS (irritable bowel syndrome)     Knee pain     Knee swelling 2010    Low back pain     Low back strain 2003    Lumbosacral disc disease     Pneumonia     RECURRENT PNEUMONIA WITH HOSPITALIZATIONS    PONV (postoperative nausea and vomiting)     VERY SEVERE    Tear of meniscus of knee 2010, 2020        Past Surgical History:   Procedure Laterality Date    BACK SURGERY  5/5/20, nov 15, 2023    BREAST AUGMENTATION  01/17/2007    BREAST IMPLANT REMOVAL  08/2022    CARPAL TUNNEL RELEASE WITH CUBITAL TUNNEL RELEASE Right     COLONOSCOPY N/A 01/17/2018    Procedure: COLONOSCOPY to cecum and t.i. with polypectomy;  Surgeon: Rima Luis MD;  Location: [facility] ENDOSCOPY;  Service:     HAND SURGERY  2914    HIP DEBRIDEMENT Right     2016 &2021    KNEE ARTHROSCOPY Left 08/12/2020    Procedure: Left KNEE ARTHROSCOPy, partial medial menisectomy and debridement of arthritis;  Surgeon: Génesis Mg MD;  Location: [facility] OR [facility] [place];  Service: Orthopedics;  Laterality: Left;    KNEE CARTILAGE SURGERY Right     LUMBAR DISCECTOMY Right 11/15/2023    Procedure: Right lumbar 2 to lumbar 3 laminectomy with possible discectomy using metrx;  Surgeon: Jatinder Christian MD;  Location: [facility] OR;  Service: Neurosurgery;  Laterality: Right;    SINUS SURGERY      DEVIATED SEPTUM    SKIN BIOPSY      hand - precancerous     SKIN BIOPSY      SPINAL FUSION  2020    L4 &L5        Social History     Occupational History    Occupation: secratary   Tobacco Use    Smoking status: Former     Current packs/day: 0.00     Average packs/day: 0.3 packs/day for 21.7 years (5.4 ttl pk-yrs)     Types: Cigarettes     Start date: 1983     Quit date: 2005     Years since quittin.5    Smokeless tobacco: Never    Tobacco comments:     social smoker - never really smoked much   Vaping Use    Vaping status: Never Used   Substance and Sexual Activity    Alcohol use: Yes     Alcohol/week: 7.0 standard drinks of alcohol     Types: 4 Glasses of wine, 3 Drinks containing 0.5 oz of alcohol per week    Drug use: Never    Sexual activity: Yes     Partners: Male     Birth control/protection: Vasectomy      Social History     Social History Narrative    Not on file        Family History   Problem Relation Age of Onset    Osteoarthritis Mother     Heart disease Mother     No Known Problems Brother     Breast cancer Maternal Aunt     Cancer Paternal Grandmother     Malig Hyperthermia Neg Hx        ROS: 14 point review of systems was performed and was negative except for documented findings in HPI and today's encounter.     Allergies:   Allergies   Allergen Reactions    Sulfa Antibiotics Other (See Comments)     Elevated liver enzymes     LONG TERM EFFECT ,FOUND IN BLOOD WORK     Constitutional:  Denies fever, shaking or chills   Eyes:  Denies change in visual acuity   HENT:  Denies nasal congestion or sore throat   Respiratory:  Denies cough or shortness of breath   Cardiovascular:  Denies chest pain or severe LE edema   GI:  Denies abdominal pain, nausea, vomiting, bloody stools or diarrhea   Musculoskeletal:  Numbness, tingling, or loss of motor function only as noted above in history of present illness.  : Denies painful urination or hematuria  Integument:  Denies rash, lesion or ulceration   Neurologic:  Denies headache or focal  Gastric Intubation/Gastric Lavage weakness  Endocrine:  Denies lymphadenopathy  Psych:  Denies confusion or change in mental status   Hem:  Denies active bleeding      Physical Exam: 59 y.o. female  Wt Readings from Last 3 Encounters:   03/19/24 64.4 kg (142 lb)   02/01/24 63.6 kg (140 lb 4.8 oz)   12/28/23 64 kg (141 lb)       There is no height or weight on file to calculate BMI.    There were no vitals filed for this visit.  Vital signs reviewed.   General Appearance:    Alert, cooperative, in no acute distress                    Ortho exam    Physical exam of the left knee reveals no effusion no redness.  The patient does have tenderness about the medial joint line.  No tenderness about the lateral joint line.  A negative bounce home and a positive medial Derek.  There is some pain medially  with a lateral Derek.  Patient has a stable ligamentous exam.  Quads are reasonable and symmetric bilaterally.  Calf is soft and nontender.  There is no overlying skin changes no lymphedema lymphadenopathy.  Patient has good hip range of motion full symmetric and asymptomatic and a normal ankle exam.              Assessment:   Left knee pain status post knee arthroscopy in 2021.  She has a little bit of arthritis she did get relief from injection in February for about 2 months.  I think it is reasonable to do 1 more injection she is going to get back on her quad and core strengthening should her symptoms persist beyond that I would probably repeat her MRI  Plan:   Follow up as indicated.  Ice, elevate, and rest as needed.  Discussed conservative measures of pain control including ice, bracing.  Génesis Mg M.D.    Large Joint Arthrocentesis: L knee  Date/Time: 7/18/2024 10:14 AM  Consent given by: patient  Site marked: site marked  Timeout: Immediately prior to procedure a time out was called to verify the correct patient, procedure, equipment, support staff and site/side marked as required   Supporting Documentation  Indications: pain   Procedure  Details  Location: knee - L knee  Preparation: Patient was prepped and draped in the usual sterile fashion  Needle gauge: 21G.  Medications administered: 1 mL methylPREDNISolone acetate 80 MG/ML; 2 mL lidocaine PF 1% 1 %  Patient tolerance: patient tolerated the procedure well with no immediate complications         Laceration Repair Tracheal Intubation

## 2024-07-18 ENCOUNTER — OFFICE VISIT (OUTPATIENT)
Dept: ORTHOPEDIC SURGERY | Facility: CLINIC | Age: 59
End: 2024-07-18
Payer: COMMERCIAL

## 2024-07-18 VITALS — HEIGHT: 63 IN | TEMPERATURE: 98.6 F | WEIGHT: 147.2 LBS | BODY MASS INDEX: 26.08 KG/M2

## 2024-07-18 DIAGNOSIS — S83.242D ACUTE MEDIAL MENISCUS TEAR OF LEFT KNEE, SUBSEQUENT ENCOUNTER: ICD-10-CM

## 2024-07-18 DIAGNOSIS — M17.12 PRIMARY OSTEOARTHRITIS OF LEFT KNEE: Primary | ICD-10-CM

## 2024-07-18 RX ORDER — METHYLPREDNISOLONE ACETATE 80 MG/ML
1 INJECTION, SUSPENSION INTRA-ARTICULAR; INTRALESIONAL; INTRAMUSCULAR; SOFT TISSUE
Status: COMPLETED | OUTPATIENT
Start: 2024-07-18 | End: 2024-07-18

## 2024-07-18 RX ORDER — LIDOCAINE HYDROCHLORIDE 10 MG/ML
2 INJECTION, SOLUTION EPIDURAL; INFILTRATION; INTRACAUDAL; PERINEURAL
Status: COMPLETED | OUTPATIENT
Start: 2024-07-18 | End: 2024-07-18

## 2024-07-18 RX ADMIN — METHYLPREDNISOLONE ACETATE 1 ML: 80 INJECTION, SUSPENSION INTRA-ARTICULAR; INTRALESIONAL; INTRAMUSCULAR; SOFT TISSUE at 10:14

## 2024-07-18 RX ADMIN — LIDOCAINE HYDROCHLORIDE 2 ML: 10 INJECTION, SOLUTION EPIDURAL; INFILTRATION; INTRACAUDAL; PERINEURAL at 10:14

## 2024-08-28 NOTE — PROGRESS NOTES
"Subjective   Patient ID: Cheri Lopez is a 59 y.o. female is here today for follow-up worsening back pain.    History of Present Illness    This patient returns today.  The last time I saw her she was doing pretty well but apparently over the last year she has developed more pain in her back.  It is across her lower back.  It radiates into her left hip and her right hip.  More so on the left than the right.  Her surgery was in November of last year and was a right L2-3 laminectomy and discectomy.    The following portions of the patient's history were reviewed and updated as appropriate: allergies, current medications, past family history, past medical history, past social history, past surgical history, and problem list.    Review of Systems   Constitutional:  Negative for fever.   Musculoskeletal:  Positive for back pain. Negative for gait problem.   Neurological:  Positive for weakness and numbness.   All other systems reviewed and are negative.      I reviewed the review of systems listed by the patient and discussed by my MA    Objective     Vitals:    08/29/24 1308   BP: 124/78   BP Location: Left arm   Patient Position: Sitting   Cuff Size: Adult   Resp: 20   Weight: 66.7 kg (147 lb)   Height: 160 cm (62.99\")   PainSc:   5   PainLoc: Back     Body mass index is 26.05 kg/m².    Tobacco Use: Medium Risk (8/29/2024)    Patient History     Smoking Tobacco Use: Former     Smokeless Tobacco Use: Never     Passive Exposure: Not on file          Physical Exam  Neurological:      Mental Status: She is alert and oriented to person, place, and time.       Neurologic Exam     Mental Status   Oriented to person, place, and time.           Assessment & Plan   Independent Review of Radiographic Studies:      I personally reviewed the images from the following studies.    No new imaging to review    Medical Decision Making:      I told the patient we will go ahead and check some plain films and an MRI.  Will see her " back once those have been done.    Diagnoses and all orders for this visit:    1. DDD (degenerative disc disease), lumbar (Primary)  -     MRI Lumbar Spine With & Without Contrast; Future  -     XR Spine Lumbar Complete With Flex & Ext; Future      Return for After radiology test.

## 2024-08-29 ENCOUNTER — OFFICE VISIT (OUTPATIENT)
Dept: NEUROSURGERY | Facility: CLINIC | Age: 59
End: 2024-08-29
Payer: COMMERCIAL

## 2024-08-29 VITALS
HEIGHT: 63 IN | WEIGHT: 147 LBS | SYSTOLIC BLOOD PRESSURE: 124 MMHG | BODY MASS INDEX: 26.05 KG/M2 | RESPIRATION RATE: 20 BRPM | DIASTOLIC BLOOD PRESSURE: 78 MMHG

## 2024-08-29 DIAGNOSIS — M51.36 DDD (DEGENERATIVE DISC DISEASE), LUMBAR: Primary | ICD-10-CM

## 2024-08-29 PROCEDURE — 99214 OFFICE O/P EST MOD 30 MIN: CPT | Performed by: NEUROLOGICAL SURGERY

## 2024-09-12 DIAGNOSIS — I10 HYPERTENSION, UNSPECIFIED TYPE: Primary | ICD-10-CM

## 2024-09-12 DIAGNOSIS — E03.9 ADULT HYPOTHYROIDISM: ICD-10-CM

## 2024-09-12 DIAGNOSIS — R79.89 ELEVATED LFTS: ICD-10-CM

## 2024-09-16 LAB
ALBUMIN SERPL-MCNC: 4.4 G/DL (ref 3.5–5.2)
ALBUMIN/GLOB SERPL: 1.8 G/DL
ALP SERPL-CCNC: 99 U/L (ref 39–117)
ALT SERPL-CCNC: 45 U/L (ref 1–33)
AST SERPL-CCNC: 55 U/L (ref 1–32)
BASOPHILS # BLD AUTO: 0.05 10*3/MM3 (ref 0–0.2)
BASOPHILS NFR BLD AUTO: 1 % (ref 0–1.5)
BILIRUB SERPL-MCNC: 0.5 MG/DL (ref 0–1.2)
BUN SERPL-MCNC: 9 MG/DL (ref 6–20)
BUN/CREAT SERPL: 13.6 (ref 7–25)
CALCIUM SERPL-MCNC: 9.7 MG/DL (ref 8.6–10.5)
CHLORIDE SERPL-SCNC: 104 MMOL/L (ref 98–107)
CHOLEST SERPL-MCNC: 173 MG/DL (ref 0–200)
CO2 SERPL-SCNC: 27.8 MMOL/L (ref 22–29)
CREAT SERPL-MCNC: 0.66 MG/DL (ref 0.57–1)
EGFRCR SERPLBLD CKD-EPI 2021: 101.2 ML/MIN/1.73
EOSINOPHIL # BLD AUTO: 0.09 10*3/MM3 (ref 0–0.4)
EOSINOPHIL NFR BLD AUTO: 1.8 % (ref 0.3–6.2)
ERYTHROCYTE [DISTWIDTH] IN BLOOD BY AUTOMATED COUNT: 13 % (ref 12.3–15.4)
GLOBULIN SER CALC-MCNC: 2.4 GM/DL
GLUCOSE SERPL-MCNC: 85 MG/DL (ref 65–99)
HCT VFR BLD AUTO: 41.7 % (ref 34–46.6)
HDLC SERPL-MCNC: 108 MG/DL (ref 40–60)
HGB BLD-MCNC: 14.1 G/DL (ref 12–15.9)
IMM GRANULOCYTES # BLD AUTO: 0.02 10*3/MM3 (ref 0–0.05)
IMM GRANULOCYTES NFR BLD AUTO: 0.4 % (ref 0–0.5)
LDLC SERPL CALC-MCNC: 58 MG/DL (ref 0–100)
LYMPHOCYTES # BLD AUTO: 1.71 10*3/MM3 (ref 0.7–3.1)
LYMPHOCYTES NFR BLD AUTO: 34.8 % (ref 19.6–45.3)
MCH RBC QN AUTO: 31.8 PG (ref 26.6–33)
MCHC RBC AUTO-ENTMCNC: 33.8 G/DL (ref 31.5–35.7)
MCV RBC AUTO: 93.9 FL (ref 79–97)
MONOCYTES # BLD AUTO: 0.5 10*3/MM3 (ref 0.1–0.9)
MONOCYTES NFR BLD AUTO: 10.2 % (ref 5–12)
NEUTROPHILS # BLD AUTO: 2.54 10*3/MM3 (ref 1.7–7)
NEUTROPHILS NFR BLD AUTO: 51.8 % (ref 42.7–76)
NRBC BLD AUTO-RTO: 0 /100 WBC (ref 0–0.2)
PLATELET # BLD AUTO: 180 10*3/MM3 (ref 140–450)
POTASSIUM SERPL-SCNC: 4.8 MMOL/L (ref 3.5–5.2)
PROT SERPL-MCNC: 6.8 G/DL (ref 6–8.5)
RBC # BLD AUTO: 4.44 10*6/MM3 (ref 3.77–5.28)
SODIUM SERPL-SCNC: 138 MMOL/L (ref 136–145)
TRIGL SERPL-MCNC: 27 MG/DL (ref 0–150)
TSH SERPL DL<=0.005 MIU/L-ACNC: 1.93 UIU/ML (ref 0.27–4.2)
VLDLC SERPL CALC-MCNC: 7 MG/DL (ref 5–40)
WBC # BLD AUTO: 4.91 10*3/MM3 (ref 3.4–10.8)

## 2024-09-20 ENCOUNTER — OFFICE VISIT (OUTPATIENT)
Dept: INTERNAL MEDICINE | Facility: CLINIC | Age: 59
End: 2024-09-20
Payer: COMMERCIAL

## 2024-09-20 VITALS
OXYGEN SATURATION: 97 % | HEART RATE: 73 BPM | WEIGHT: 146 LBS | HEIGHT: 63 IN | BODY MASS INDEX: 25.87 KG/M2 | SYSTOLIC BLOOD PRESSURE: 128 MMHG | DIASTOLIC BLOOD PRESSURE: 78 MMHG

## 2024-09-20 DIAGNOSIS — M48.061 NEURAL FORAMINAL STENOSIS OF LUMBAR SPINE: ICD-10-CM

## 2024-09-20 DIAGNOSIS — R79.89 ELEVATED LFTS: ICD-10-CM

## 2024-09-20 DIAGNOSIS — I10 HYPERTENSION, UNSPECIFIED TYPE: ICD-10-CM

## 2024-09-20 DIAGNOSIS — E03.9 ADULT HYPOTHYROIDISM: ICD-10-CM

## 2024-09-20 DIAGNOSIS — M51.36 DDD (DEGENERATIVE DISC DISEASE), LUMBAR: Primary | ICD-10-CM

## 2024-09-20 PROCEDURE — 99214 OFFICE O/P EST MOD 30 MIN: CPT | Performed by: INTERNAL MEDICINE

## 2024-09-30 ENCOUNTER — HOSPITAL ENCOUNTER (OUTPATIENT)
Dept: GENERAL RADIOLOGY | Facility: HOSPITAL | Age: 59
Discharge: HOME | End: 2024-09-30
Payer: COMMERCIAL

## 2024-09-30 ENCOUNTER — HOSPITAL ENCOUNTER (OUTPATIENT)
Dept: MRI IMAGING | Facility: HOSPITAL | Age: 59
Discharge: HOME | End: 2024-09-30
Payer: COMMERCIAL

## 2024-09-30 DIAGNOSIS — M51.369 DDD (DEGENERATIVE DISC DISEASE), LUMBAR: ICD-10-CM

## 2024-09-30 PROCEDURE — 72114 X-RAY EXAM L-S SPINE BENDING: CPT

## 2024-09-30 PROCEDURE — 72158 MRI LUMBAR SPINE W/O & W/DYE: CPT

## 2024-09-30 PROCEDURE — 0 GADOBENATE DIMEGLUMINE 529 MG/ML SOLUTION: Performed by: NEUROLOGICAL SURGERY

## 2024-09-30 PROCEDURE — A9577 INJ MULTIHANCE: HCPCS | Performed by: NEUROLOGICAL SURGERY

## 2024-09-30 RX ADMIN — GADOBENATE DIMEGLUMINE 15 ML: 529 INJECTION, SOLUTION INTRAVENOUS at 18:21

## 2024-10-02 ENCOUNTER — TELEPHONE (OUTPATIENT)
Dept: NEUROSURGERY | Facility: CLINIC | Age: 59
End: 2024-10-02
Payer: COMMERCIAL

## 2024-10-02 NOTE — TELEPHONE ENCOUNTER
Left message for patient regarding scheduling a sooner appointment, per Dr Christian. Patient is to call the office and ask for Jerri.

## 2024-10-02 NOTE — TELEPHONE ENCOUNTER
10/2/2024-LM FOR PATIENT TO CALL THE OFFICE TO GET A SOONER APPT WITH DR. AUSTIN ----- Message from Jatinder Austin sent at 10/2/2024  6:51 AM EDT -----  This patient needs a follow-up appointment after their imaging study.

## 2024-10-15 ENCOUNTER — OFFICE VISIT (OUTPATIENT)
Dept: NEUROSURGERY | Facility: CLINIC | Age: 59
End: 2024-10-15
Payer: COMMERCIAL

## 2024-10-15 DIAGNOSIS — M54.41 CHRONIC RIGHT-SIDED LOW BACK PAIN WITH RIGHT-SIDED SCIATICA: Primary | ICD-10-CM

## 2024-10-15 DIAGNOSIS — G89.29 CHRONIC RIGHT-SIDED LOW BACK PAIN WITH RIGHT-SIDED SCIATICA: Primary | ICD-10-CM

## 2024-10-15 PROCEDURE — 99214 OFFICE O/P EST MOD 30 MIN: CPT | Performed by: NEUROLOGICAL SURGERY

## 2024-10-15 NOTE — PROGRESS NOTES
Subjective   Patient ID: Cheri Lopez is a 59 y.o. female is here today for follow-up with a new MRI/XR L-spine done on 9/30/2024.    Today patient states that she has worsening low back pain while sitting, along with mild bladder incontinence    History of Present Illness    This patient has been having pain in her back.  It radiates into her both of her hips but not really down her legs.  She had surgery in November of last year on the right side at L2-3.  Since she was here at the end of August it is gotten worse and she is having a little bladder incontinence as well.  Most of the pain is now in her hip particularly on the right.    The following portions of the patient's history were reviewed and updated as appropriate: allergies, current medications, past family history, past medical history, past social history, past surgical history, and problem list.    Review of Systems   Constitutional:  Negative for chills and fever.   HENT:  Negative for congestion.    Genitourinary:  Positive for urgency. Negative for difficulty urinating and dysuria.   Musculoskeletal:  Positive for back pain and myalgias.   Neurological:  Positive for weakness and numbness.       I reviewed the review of systems listed by the patient and discussed by my MA    Objective     There were no vitals filed for this visit.  There is no height or weight on file to calculate BMI.    Tobacco Use: Medium Risk (10/15/2024)    Patient History     Smoking Tobacco Use: Former     Smokeless Tobacco Use: Never     Passive Exposure: Not on file          Physical Exam  Constitutional:       Appearance: She is well-developed.   HENT:      Head: Normocephalic and atraumatic.   Eyes:      Conjunctiva/sclera: Conjunctivae normal.      Pupils: Pupils are equal, round, and reactive to light.   Neck:      Vascular: No carotid bruit.               Assessment & Plan   Independent Review of Radiographic Studies:      I personally reviewed the images from  the following studies.    I reviewed her plain films and her MRI done on 30 September.  The plain films show a fusion at L4-5 that appears to be solid.  There is no evidence of abnormal movement on flexion and extension.  On the MRI that was done the same day that level looks for the most part okay and for the most part the imaging looks okay on the sagittal images.  On the axial images T12-L1 and L1-2 are fairly open.  L2-3 does show a little right-sided lateral recess stenosis and foraminal stenosis.  This is a level where she had previous surgery in 2022.  L3-4 is more open.  L4-5 generally looks okay.  L5-S1 mostly looks okay as well.    Medical Decision Making:      Patient about the imaging.  I told her that from my point of view a lot of her symptoms are probably coming from L2-3 but to do surgery at that level would not be a good idea as she would have to have a fusion.  This would leave a unfused level in between 2 fused levels which would not be smart.  I did suggest some lumbar epidurals by pain management and some lumbar PT.  I told her to call me if it is not getting better and we will reevaluate probably with the myelogram.    Diagnoses and all orders for this visit:    1. Chronic right-sided low back pain with right-sided sciatica (Primary)  -     Ambulatory Referral to Pain Management  -     Ambulatory Referral to Physical Therapy for Evaluation & Treatment      Return for Recheck and call after treatment or consultation.

## 2024-10-28 RX ORDER — BACLOFEN 10 MG/1
10 TABLET ORAL 3 TIMES DAILY PRN
Qty: 45 TABLET | Refills: 2 | Status: SHIPPED | OUTPATIENT
Start: 2024-10-28

## 2024-10-29 ENCOUNTER — OFFICE VISIT (OUTPATIENT)
Dept: PAIN MEDICINE | Facility: CLINIC | Age: 59
End: 2024-10-29
Payer: COMMERCIAL

## 2024-10-29 ENCOUNTER — PREP FOR SURGERY (OUTPATIENT)
Dept: SURGERY | Facility: SURGERY CENTER | Age: 59
End: 2024-10-29
Payer: COMMERCIAL

## 2024-10-29 VITALS
TEMPERATURE: 96.9 F | WEIGHT: 145 LBS | OXYGEN SATURATION: 97 % | HEART RATE: 67 BPM | RESPIRATION RATE: 18 BRPM | SYSTOLIC BLOOD PRESSURE: 136 MMHG | DIASTOLIC BLOOD PRESSURE: 81 MMHG | BODY MASS INDEX: 25.69 KG/M2 | HEIGHT: 63 IN

## 2024-10-29 DIAGNOSIS — M48.061 LUMBAR FORAMINAL STENOSIS: Primary | ICD-10-CM

## 2024-10-29 DIAGNOSIS — M54.16 LUMBAR RADICULOPATHY: ICD-10-CM

## 2024-10-29 PROCEDURE — 99214 OFFICE O/P EST MOD 30 MIN: CPT | Performed by: NURSE PRACTITIONER

## 2024-10-29 RX ORDER — ESTRADIOL 0.05 MG/D
PATCH, EXTENDED RELEASE TRANSDERMAL
COMMUNITY
Start: 2024-09-16

## 2024-10-29 RX ORDER — SODIUM FLUORIDE1.1%, POTASSIUM NITRATE 5% 57.5; 5.8 MG/ML; MG/ML
GEL, DENTIFRICE DENTAL
COMMUNITY
Start: 2024-07-15

## 2024-10-29 RX ORDER — PROGESTERONE 100 MG/1
100 CAPSULE ORAL NIGHTLY
COMMUNITY
Start: 2024-10-16

## 2024-10-29 NOTE — PROGRESS NOTES
CHIEF COMPLAINT  Ms. Lopez has had back pain since 2003. She had back surgery in May 2020 and Nov. 2023. She states that the pain has gotten progressively worse in the last 3 months. She is currently in PT for the back pain.     Subjective   Cheri Lopez is a 59 y.o. female.   She presents to the office for evaluation of back pain. She was referred here by Dr. Christian.    Ms. Lopez's back pain returned in July of 2024. She states the pain was located in her right low back and right gluteal pain.     Today her pain is 6/10VAS in severity. She describes her pain as intermittent right gluteal pain with some minimal pain that radiates into her right lateral hip. She describes this as aching pain with occasional shooting and sharp pain. She denies any left sided pain. Her pain is worsened by walking, changing position in bed, bending, lifting, and twisting; it is improved by being stationary (standing or sitting without moving).     Hx of Right hip surgery x 2, most recently in ~June of 2021 or 2022 with Dr. Fallon. She states her right hip has been ruled out as the cause of her pain since her pain returned in July 2024.     Past pain medications: Tramadol (causes nausea), Toradol injections (unsure how much relief she had with these)     Current pain medications: Baclofen 10 mg at night (managed by her PCP).      Past therapies:  Physical Therapy: Yes, currently in PT, has had 2 sessions, has seen some improvement.   Chiropractor: Yes, years ago  Massage Therapy: Yes, helpful  TENS: Yes, temporary relief in the past (has not used since prior to her fusion)  Dry Needling: Yes, helpful   Neck or back surgery:   5/5/2020: L4-L5 posterior lumbar decompression and fusion performed by Dr. Short  11/15/2023: Right L2-L3 laminectomy performed by Dr. Christian  Past pain management: Dr. Rossi Stein at [facility] pain [facility], Dr. Sena (now out of network).      Previous Injections:   Multiple injections with   Sena     2/13/2019-phase 1 SCS trialing ([employer])-documented 75% relief with improvement in activity in her post procedure follow up. Referral to Dr. Gauthier for permanent placement. She did not proceed with a SCS phase 2 implant, states she was not happy with the level of relief from the trial.   1/30/2019-right SI joint injection  12/5/2018-right L2-L5 MBB-no relief  10/31/2018-right L2-L5 MBB-80% relief x 8 hours  10/3/2018-right SI joint injection-no relief    5/2/2018-LESI at L4-5-no relief ( [facility])     Back Pain  This is a chronic problem. The current episode started more than 1 year ago. The problem occurs constantly. The problem has been worse since onset. The pain is present in the gluteal and lumbar spine. The quality of the pain is described as aching, shooting and stabbing. The pain radiates to the right buttock. Associated symptoms include numbness (bilateral thighs) and weakness (right leg). She has tried heat, chiropractic manipulation and NSAIDs (PT, dry needling) for the symptoms.        PEG Assessment   What number best describes your pain on average in the past week?5  What number best describes how, during the past week, pain has interfered with your enjoyment of life?8  What number best describes how, during the past week, pain has interfered with your general activity?  5      Current Outpatient Medications:     Ascorbic Acid (VITAMIN C PO), Take 1,000 mg by mouth Daily., Disp: , Rfl:     B Complex Vitamins (VITAMIN B COMPLEX PO), Take 200 mcg by mouth Daily., Disp: , Rfl:     baclofen (LIORESAL) 10 MG tablet, TAKE ONE TABLET BY MOUTH THREE TIMES DAILY AS NEEDED FOR MUSCLE SPASMS, Disp: 45 tablet, Rfl: 2    cycloSPORINE (RESTASIS) 0.05 % ophthalmic emulsion, Administer 1 drop to both eyes Every 12 (Twelve) Hours., Disp: , Rfl:     estradiol (ESTRING) 2 MG vaginal ring, Insert 2 mg into the vagina., Disp: , Rfl:     estradiol (MINIVELLE, VIVELLE-DOT) 0.05 MG/24HR  patch, PLACE ONE PATCH ONTO THE SKIN TWICE A WEEK, Disp: , Rfl:     levothyroxine (SYNTHROID, LEVOTHROID) 88 MCG tablet, Take 1 tablet by mouth Every Morning., Disp: 90 tablet, Rfl: 2    Magnesium-Potassium 250-100 MG tablet, Take 1 tablet by mouth Daily., Disp: , Rfl:     Multiple Vitamins-Minerals (PRESERVISION AREDS PO), Take 1 tablet by mouth 2 (two) times a day. HOLD FOR SURGERY, Disp: , Rfl:     Probiotic Product (Probiotic 10 Ultra Strength) capsule, Take 1 tablet by mouth Daily., Disp: , Rfl:     Progesterone (PROMETRIUM) 100 MG capsule, Take 1 capsule by mouth Every Night. nightly, Disp: , Rfl:     Sodium Fluoride 5000 Enamel 1.1-5 % gel, USE AS DIRECTED DO NOT SWALLOW, Disp: , Rfl:     vitamin D3 125 MCG (5000 UT) capsule capsule, Take 1 capsule by mouth Daily., Disp: , Rfl:     vitamin E 400 UNIT capsule, Take 1 capsule by mouth Daily. HOLD FOR SURGERY, Disp: , Rfl:     The following portions of the patient's history were reviewed and updated as appropriate: allergies, current medications, past family history, past medical history, past social history, past surgical history, and problem list.      REVIEW OF PERTINENT MEDICAL DATA    Office visit from 10/15/2024 Dr. Christian reviewed.  Patient presents with worsening low back pain while sitting along with mild bladder incontinence.  Her symptoms are likely coming from L2-L3, but to do surgery at that level she would have to have a fusion.  This would leave an unfused level between 2 fused levels.  Recommend lumbar epidurals by pain management and lumbar physical therapy.  If she does not improve reevaluate with myelogram    Narrative & Impression   MRI OF THE LUMBAR SPINE WITH AND WITHOUT CONTRAST ON 09/30/2024     CLINICAL HISTORY: This is a 59-year-old female patient who had prior  lumbar spine surgery at L4-5 on 05/05/2020 and then subsequently had  lumbar spine surgery at L2-3 on 11/15/2023. Patient has degenerative  disc disease in the lumbar spine and  has new onset low back pain which  radiates down bilateral lower extremities with some numbness radiating  from the lateral aspect of the legs to her knees.     TECHNIQUE: Sagittal T1, proton density and STIR and postcontrast  sagittal T1-weighted images were obtained of the lumbar spine. In  addition, axial T2 weighted images were obtained from T12 to S2 and  thin-cut axial T1 and T2 and postcontrast T1 weighted images were  obtained angled through the interspaces from L1 to S1.     This is correlated to a previous postmyelogram CT lumbar spine on  07/13/2023 and on 08/17/2018. This is also correlated to an outside  lumbar spine MRI on 12/06/2022.     FINDINGS: The distal thoracic cord and the conus is normal in signal  intensity. The conus terminates at the level of the mid body of the L2  lumbar level which is within normal limits.     Patient has a mild levoscoliotic curvature in the upper to mid lumbar  spine with apex at the L2-3 lumbar level and a focal dextroscoliotic  curvature of the lower lumbar spine with apex at L4-5.     At T12-L1, the disc space and facets are normal in appearance with no  canal or foraminal narrowing.      At L1-2, there is mild disc space narrowing, degenerative endplate  changes and a 2 mm retrolisthesis of L1 with respect to L2 and a minimal  posterior disc bulge.  The facets are normal.  There is no canal or  foraminal narrowing.     At L2-3, this patient has had a right partial hemilaminectomy at L2 and  right medial facetectomy at L2-3. There is mild disc space narrowing ad  mild degenerative endplate changes.  There is 1-2 mm retrolisthesis of  L2 with respect to L3 and a mild posterior disc bulge but there is no  canal narrowing.  There is mild spurring of the foramina, right greater  than left, and there is minimal left and there is mild right foraminal  narrowing.     At L3-4, the posterior disc margin and facets are normal with no canal  or foraminal narrowing.     At  L4-5, the patient has had previous bilateral laminectomies at L4 and  bilateral medial facetectomies at L4-5.  There is a rectangular implant  extending from the anterior central to the right posterior disc margin.   There are bilateral rods bridging the posterior elements at L4-5  anchored by bilateral pedicle screws at L4 and L5. Overall, the  posterior disc margin is normal.  There is no canal or lateral recess  narrowing.  There is minimal spurring into the right foramen and mild  right foraminal narrowing.     At L5-S1, the disc space and facets are normal in appearance with no  canal, lateral recess or foraminal narrowing.     IMPRESSION:     1. This patient has had 2 prior lumbar spine surgeries. On 11/15/2023  patient underwent surgery at L2-3 with a right partial hemilaminectomy  at L2 and right medial facetectomy at L2-3 and there is mild disc space  narrowing, degenerative endplate changes and a 1-2 mm retrolisthesis of  L2 with respect to L3 but there is no canal narrowing.  There is minimal  left and mild right foraminal narrowing at L2-3. On 05/05/2020, the  patient underwent surgery at L4-5 and there has been previous bilateral  laminectomies at L4 and bilateral medial facetectomies at L4-5 and  placement of a rectangular implant from central to right posterior disc  margin.  There is bilateral jaye and pedicle screw fixation at L4-5 with  good alignment of the hardware and there is no residual canal narrowing  and there is perhaps minimal right and no left foraminal narrowing at  L4-5. The remainder of the lumbar spine MRI is unremarkable.       This report was finalized on 10/1/2024 1:55 PM by Dr. Erick Briceño M.D  on Workstation: [id number]       Review of Systems   Gastrointestinal:  Negative for constipation and diarrhea.   Genitourinary:  Negative for difficulty urinating.   Musculoskeletal:  Positive for back pain.   Neurological:  Positive for weakness (right leg) and numbness (bilateral  "thighs).   Psychiatric/Behavioral:  Positive for sleep disturbance. Negative for suicidal ideas. The patient is not nervous/anxious.        --  The aforementioned information the Chief Complaint section and above subjective data including any HPI data, and also the Review of Systems data, has been personally reviewed and affirmed.  --    Vitals:    10/29/24 1504   BP: 136/81   Pulse: 67   Resp: 18   Temp: 96.9 °F (36.1 °C)   SpO2: 97%   Weight: 65.8 kg (145 lb)   Height: 160 cm (63\")   PainSc:   6   PainLoc: Back     Objective   Physical Exam  Vitals and nursing note reviewed.   Constitutional:       Appearance: Normal appearance. She is well-developed.   Eyes:      General: Lids are normal.   Cardiovascular:      Rate and Rhythm: Normal rate.      Pulses:           Dorsalis pedis pulses are 2+ on the right side and 2+ on the left side.   Pulmonary:      Effort: Pulmonary effort is normal.   Musculoskeletal:      Lumbar back: No tenderness. Decreased range of motion. Positive right straight leg raise test. Negative left straight leg raise test.      Comments:   +Right facet, - Left  -Jim Thigh Thrust  -Jim Ganeslen's  -Jim SI Compression  -Jim SI Distraction   Neurological:      Mental Status: She is alert and oriented to person, place, and time.      Motor: No weakness.      Deep Tendon Reflexes:      Reflex Scores:       Patellar reflexes are 1+ on the right side and 1+ on the left side.       Achilles reflexes are 1+ on the right side and 1+ on the left side.  Psychiatric:         Attention and Perception: Attention normal.         Mood and Affect: Mood normal.         Speech: Speech normal.         Behavior: Behavior normal.         Judgment: Judgment normal.       Assessment & Plan   Diagnoses and all orders for this visit:    1. Lumbar foraminal stenosis (Primary)    2. Lumbar radiculopathy      --- Cheri Lopez reports a pain score of 6.  Given her pain assessment as noted, treatment options were " discussed and the following options were decided upon as a follow-up plan to address the patient's pain: steroid injections.    --- Request records from Dr. Sena's office   --- Right L2-3 TFESI  Reviewed the procedure at length with the patient.  Included in the review was expectations, complications, risk and benefits.The procedure was described in detail and the risks, benefits and alternatives were discussed with the patient (including but not limited to: bleeding, infection, nerve damage, worsening of pain, inability to perform injection, paralysis, seizures, coma, no pain relief and death) who agreed to proceed.  Discussed the potential for sedation if warranted/wanted.  The procedure will plan to be performed at [facility] with fluoroscopic guidance(unless ultrasound is indicated) and could potentially have steroids and contrast dye used. Questions were answered and in a way the patient could understand.  Patient verbalized understanding and wishes to proceed.  This intervention will be ordered.  Discussed with patient that all procedures are part of a multimodal plan of care and include either formal PT or a home exercise program.  Patient has no evidence of coagulopathy or current infection.    --- Follow-up after procedure     OTTO REPORT    As the clinician, I personally reviewed the OTTO from 10/29/2024 while the patient was in the office today.    Dictated utilizing Dragon dictation.

## 2024-10-31 ENCOUNTER — OFFICE VISIT (OUTPATIENT)
Dept: INTERNAL MEDICINE | Facility: CLINIC | Age: 59
End: 2024-10-31
Payer: COMMERCIAL

## 2024-10-31 VITALS
DIASTOLIC BLOOD PRESSURE: 76 MMHG | BODY MASS INDEX: 25.34 KG/M2 | WEIGHT: 143 LBS | OXYGEN SATURATION: 98 % | HEIGHT: 63 IN | SYSTOLIC BLOOD PRESSURE: 141 MMHG | HEART RATE: 77 BPM

## 2024-10-31 DIAGNOSIS — M25.572 CHRONIC PAIN OF LEFT ANKLE: ICD-10-CM

## 2024-10-31 DIAGNOSIS — G89.29 CHRONIC PAIN OF LEFT ANKLE: ICD-10-CM

## 2024-10-31 DIAGNOSIS — M79.671 RIGHT FOOT PAIN: Primary | ICD-10-CM

## 2024-10-31 PROCEDURE — 99213 OFFICE O/P EST LOW 20 MIN: CPT | Performed by: STUDENT IN AN ORGANIZED HEALTH CARE EDUCATION/TRAINING PROGRAM

## 2024-10-31 NOTE — PROGRESS NOTES
Answers submitted by the patient for this visit:  Other (Submitted on 10/25/2024)  Please describe your symptoms.: left ankle swelling/tenderness; right foot swelling/tenderness  Have you had these symptoms before?: Yes  How long have you been having these symptoms?: Greater than 2 weeks  Please describe any probable cause for these symptoms. : don't know!  Primary Reason for Visit (Submitted on 10/25/2024)  What is the primary reason for your visit?: Problem Not Listed    Stephane Doherty M.D.  Internal Medicine  [address], [address]  [place] [address]  [phone]      Chief Complaint  Joint Swelling (Left ankle swelling also in foot and toes x 2 months /)    SUBJECTIVE    History of Present Illness    Cheri Lopez is a 59 y.o. female with hypothyroidism, degenerative disc disease, hypertension who presents to the office today as an established patient of Dr Felicia Gonzalez MD here today for an acute care visit.     She is here for bilateral foot pain and swelling for weeks    Right dorsal foot swelling and left ankle swelling since this summer.    No trauma. Doing PT for back pain. Tender. Aches all the time. Putting on shoes make it worse.  Flexing toes makes It worse. No exercise other than PT. no numbness or tingling.  No sensation of walking on a stone.    Left medial ankles. Medial ankle is tender. Left ankle not as bothersome walking. Both swollen.     Normally wears tennis shoes but wearing flip flops because of swelling.     Tried heat.     Patient blood work in September was significant for slightly elevated liver enzymes.  Kidney function was normal.  TSH was within normal meds.  Blood work otherwise normal.    Review of Systems    Allergies   Allergen Reactions    Sulfa Antibiotics Other (See Comments)     Elevated liver enzymes     LONG TERM EFFECT ,FOUND IN BLOOD WORK        Outpatient Medications Marked as Taking for the 10/31/24 encounter (Office Visit) with  Stephane Doherty MD   Medication Sig Dispense Refill    Ascorbic Acid (VITAMIN C PO) Take 1,000 mg by mouth Daily.      B Complex Vitamins (VITAMIN B COMPLEX PO) Take 200 mcg by mouth Daily.      baclofen (LIORESAL) 10 MG tablet TAKE ONE TABLET BY MOUTH THREE TIMES DAILY AS NEEDED FOR MUSCLE SPASMS 45 tablet 2    cycloSPORINE (RESTASIS) 0.05 % ophthalmic emulsion Administer 1 drop to both eyes Every 12 (Twelve) Hours.      estradiol (ESTRING) 2 MG vaginal ring Insert 2 mg into the vagina.      estradiol (MINIVELLE, VIVELLE-DOT) 0.05 MG/24HR patch PLACE ONE PATCH ONTO THE SKIN TWICE A WEEK      levothyroxine (SYNTHROID, LEVOTHROID) 88 MCG tablet Take 1 tablet by mouth Every Morning. 90 tablet 2    Magnesium-Potassium 250-100 MG tablet Take 1 tablet by mouth Daily.      Multiple Vitamins-Minerals (PRESERVISION AREDS PO) Take 1 tablet by mouth 2 (two) times a day. HOLD FOR SURGERY      Probiotic Product (Probiotic 10 Ultra Strength) capsule Take 1 tablet by mouth Daily.      Progesterone (PROMETRIUM) 100 MG capsule Take 1 capsule by mouth Every Night. nightly      Sodium Fluoride 5000 Enamel 1.1-5 % gel USE AS DIRECTED DO NOT SWALLOW      vitamin D3 125 MCG (5000 UT) capsule capsule Take 1 capsule by mouth Daily.      vitamin E 400 UNIT capsule Take 1 capsule by mouth Daily. HOLD FOR SURGERY          Past Medical History:   Diagnosis Date    Ankle sprain 1998    Arthritis     Arthritis of back     Back pain     Chronic pain disorder March 2015    CTS (carpal tunnel syndrome)     Depression     Fracture, fibula     Fracture, tibia and fibula 1994    GERD (gastroesophageal reflux disease)     H/O colonoscopy     Hip arthrosis 2016    Hip pain     History of cellulitis 03/2023    HAND    Hypertension     Hypothyroidism     IBS (irritable bowel syndrome)     Knee pain     Knee swelling 2010    Low back pain     Low back strain 2003    Lumbosacral disc disease     Peripheral neuropathy     Pneumonia     RECURRENT PNEUMONIA WITH  "HOSPITALIZATIONS    PONV (postoperative nausea and vomiting)     VERY SEVERE    Tear of meniscus of knee 2010, 2020     Past Surgical History:   Procedure Laterality Date    BREAST AUGMENTATION  01/17/2007    BREAST IMPLANT REMOVAL  08/2022    CARPAL TUNNEL RELEASE WITH CUBITAL TUNNEL RELEASE Right     COLONOSCOPY N/A 01/17/2018    Procedure: COLONOSCOPY to cecum and t.i. with polypectomy;  Surgeon: Rima Luis MD;  Location: [facility] ENDOSCOPY;  Service:     HAND SURGERY  2914    HIP DEBRIDEMENT Right     2016 &2021    KNEE ARTHROSCOPY Left 08/12/2020    Procedure: Left KNEE ARTHROSCOPy, partial medial menisectomy and debridement of arthritis;  Surgeon: Génesis Mg MD;  Location: [facility] OR OSC;  Service: Orthopedics;  Laterality: Left;    KNEE CARTILAGE SURGERY Right     LUMBAR DISCECTOMY Right 11/15/2023    Procedure: Right lumbar 2 to lumbar 3 laminectomy with possible discectomy using metrx;  Surgeon: Jatinder Christian MD;  Location: [facility] MAIN OR;  Service: Neurosurgery;  Laterality: Right;    SINUS SURGERY      DEVIATED SEPTUM    SKIN BIOPSY      hand - precancerous    SKIN BIOPSY      SPINAL FUSION  05/2020    L4 &L5     Family History   Problem Relation Age of Onset    Osteoarthritis Mother     Heart disease Mother     Arthritis Mother     No Known Problems Brother     Breast cancer Maternal Aunt     Cancer Paternal Grandmother     Arthritis Maternal Grandfather     Stroke Maternal Grandmother     Arthritis Maternal Aunt     Malig Hyperthermia Neg Hx     reports that she quit smoking about 19 years ago. Her smoking use included cigarettes. She started smoking about 41 years ago. She has a 5.4 pack-year smoking history. She has been exposed to tobacco smoke. She has never used smokeless tobacco. She reports current alcohol use of about 7.0 standard drinks of alcohol per week. She reports that she does not use drugs.    OBJECTIVE    Vital Signs:   /76   Pulse 77   Ht 160 cm (62.99\")   Wt 64.9 " kg (143 lb)   SpO2 98%   BMI 25.34 kg/m²     Physical Exam  Constitutional:       General: She is not in acute distress.     Appearance: Normal appearance.   Pulmonary:      Effort: Pulmonary effort is normal. No respiratory distress.   Musculoskeletal:      Right lower leg: No edema.      Left lower leg: No edema.        Legs:       Comments: Pain on plantarflexion and dorsiflexion of toes.  Tenderness between the second and third metatarsal.   Neurological:      Mental Status: She is alert. Mental status is at baseline.   Psychiatric:         Mood and Affect: Mood normal.         Behavior: Behavior normal.         Thought Content: Thought content normal.            The following data was reviewed by: Stephane Doherty MD on 10/31/2024:  CMP          11/7/2023    14:01 3/12/2024    08:15 9/16/2024    08:36   CMP   Glucose 87  82  85    BUN 17  12  9    Creatinine 0.69  0.88  0.66    EGFR 100.7      Sodium 140  139  138    Potassium 4.2  5.1  4.8    Chloride 104  101  104    Calcium 9.8  9.7  9.7    Total Protein  7.4  6.8    Albumin  4.8  4.4    Globulin  2.6  2.4    Total Bilirubin  0.2  0.5    Alkaline Phosphatase  86  99    AST (SGOT)  22  55    ALT (SGPT)  29  45    BUN/Creatinine Ratio 24.6  13.6  13.6    Anion Gap 10.1        CBC w/diff          11/7/2023    14:01 3/12/2024    08:15 9/16/2024    08:36   CBC w/Diff   WBC 6.42  5.42  4.91    RBC 4.27  4.71  4.44    Hemoglobin 13.3  14.7  14.1    Hematocrit 40.7  44.9  41.7    MCV 95.3  95.3  93.9    MCH 31.1  31.2  31.8    MCHC 32.7  32.7  33.8    RDW 13.2  13.4  13.0    Platelets 197  265  180    Neutrophil Rel %  55.9  51.8    Lymphocyte Rel %  30.8  34.8    Monocyte Rel %  9.2  10.2    Eosinophil Rel %  2.6  1.8    Basophil Rel %  1.1  1.0      Lipid Panel          3/12/2024    08:15 9/16/2024    08:36   Lipid Panel   Total Cholesterol 223  173    Triglycerides 69  27    HDL Cholesterol 75  108    VLDL Cholesterol 12  7    LDL Cholesterol  136  58      TSH           3/12/2024    08:15 9/16/2024    08:36   TSH   TSH 2.150  1.930        Data reviewed : Recent PCP note.              ASSESSMENT & PLAN     Diagnoses and all orders for this visit:    1. Right foot pain (Primary)  -     Ambulatory Referral to Podiatry    2. Chronic pain of left ankle  -     Ambulatory Referral to Podiatry      Patient here for right dorsal foot pain and swelling and medial left ankle pain and swelling for months.  No inciting event or injury.  Patient limiting NSAID and Tylenol use due to elevated liver enzymes.  Try over-the-counter diclofenac.  Try ice, elevation.  No obvious aggravating factors.  Referring to podiatry.    Health Maintenance Due   Topic Date Due    ZOSTER VACCINE (1 of 2) Never done    INFLUENZA VACCINE  08/01/2024    COVID-19 Vaccine (1 - 2023-24 season) Never done        Follow Up  No follow-ups on file.    Patient/family had no further questions at this time and verbalized understanding of the plan discussed today.

## 2024-11-05 ENCOUNTER — TRANSCRIBE ORDERS (OUTPATIENT)
Dept: SURGERY | Facility: SURGERY CENTER | Age: 59
End: 2024-11-05
Payer: COMMERCIAL

## 2024-11-05 DIAGNOSIS — Z41.9 SURGERY, ELECTIVE: Primary | ICD-10-CM

## 2024-11-05 PROBLEM — M54.16 LUMBAR RADICULOPATHY: Status: ACTIVE | Noted: 2024-10-29

## 2024-11-05 PROBLEM — M48.061 LUMBAR FORAMINAL STENOSIS: Status: ACTIVE | Noted: 2024-10-29

## 2024-12-03 ENCOUNTER — OFFICE VISIT (OUTPATIENT)
Dept: PAIN MEDICINE | Facility: CLINIC | Age: 59
End: 2024-12-03
Payer: COMMERCIAL

## 2024-12-03 VITALS
BODY MASS INDEX: 26.15 KG/M2 | SYSTOLIC BLOOD PRESSURE: 158 MMHG | DIASTOLIC BLOOD PRESSURE: 83 MMHG | OXYGEN SATURATION: 96 % | RESPIRATION RATE: 16 BRPM | HEART RATE: 66 BPM | TEMPERATURE: 97.2 F | HEIGHT: 63 IN | WEIGHT: 147.6 LBS

## 2024-12-03 DIAGNOSIS — M48.061 LUMBAR FORAMINAL STENOSIS: Primary | ICD-10-CM

## 2024-12-03 DIAGNOSIS — M54.16 LUMBAR RADICULOPATHY: ICD-10-CM

## 2024-12-03 PROCEDURE — 99213 OFFICE O/P EST LOW 20 MIN: CPT | Performed by: NURSE PRACTITIONER

## 2024-12-03 RX ORDER — BACLOFEN 10 MG/1
10 TABLET ORAL 3 TIMES DAILY PRN
Qty: 45 TABLET | Refills: 2 | Status: SHIPPED | OUTPATIENT
Start: 2024-12-03

## 2024-12-03 NOTE — PROGRESS NOTES
CHIEF COMPLAINT  Back pain  Pt reports stabilized pain.    Subjective   Cheri Lopez is a 59 y.o. female  who presents for follow-up.  She has a history of back pain.  Today her pain is 4/10VAS in severity. She is now approximately 6 weeks into PT and has seen improvement in her posterior right buttock and hip pain, but she notes persistent pain that is still affecting her gait. She states 4 weeks ago she was also diagnosed with a stress fracture in her right foot. She was evaluated by podiatry and is now wearing a carbon fiber insert in her shoe. She is having imaging updated on this tomorrow.     She denies any pain radiating down her lower extremity. She does have some numbness in her right lateral thigh as well as tightness in her right IT band, she is working on this with PT.     Hx of Right hip surgery x 2, most recently in ~June of 2021 or 2022 with Dr. Fallon. She states her right hip has been ruled out as the cause of her pain since her pain returned in July 2024.      Past pain medications: Tramadol (causes nausea), Toradol injections (unsure how much relief she had with these)     Current pain medications: Baclofen 10 mg at night (managed by her PCP).      Past therapies:  Physical Therapy: Yes, currently in PT, has had 2 sessions, has seen some improvement.   Chiropractor: Yes, years ago  Massage Therapy: Yes, helpful  TENS: Yes, temporary relief in the past (has not used since prior to her fusion)  Dry Needling: Yes, helpful   Neck or back surgery:   5/5/2020: L4-L5 posterior lumbar decompression and fusion performed by Dr. Short  11/15/2023: Right L2-L3 laminectomy performed by Dr. Christian  Past pain management: Dr. Rossi Stein at [facility] pain [facility], Dr. Sena (now out of network).      Previous Injections:   Multiple injections with Dr. Sena      2/13/2019-phase 1 SCS trialing ([employer])-documented 75% relief with improvement in activity in her post procedure follow up.  Referral to Dr. Gauthier for permanent placement. She did not proceed with a SCS phase 2 implant, states she was not happy with the level of relief from the trial.   1/30/2019-right SI joint injection  12/5/2018-right L2-L5 MBB-no relief  10/31/2018-right L2-L5 MBB-80% relief x 8 hours  10/3/2018-right SI joint injection-no relief     5/2/2018-LESI at L4-5-no relief ([facility])    Back Pain  This is a chronic problem. The current episode started more than 1 year ago. The problem occurs constantly. The problem is unchanged. The pain is present in the gluteal and lumbar spine. The quality of the pain is described as aching, shooting and stabbing. The pain radiates to the right buttock. The pain is at a severity of 4/10. The symptoms are aggravated by standing (walking). Associated symptoms include numbness (legs) and weakness (right leg). Pertinent negatives include no abdominal pain, chest pain, fever or headaches. She has tried heat, chiropractic manipulation and NSAIDs (PT, dry needling) for the symptoms.      PEG Assessment   What number best describes your pain on average in the past week?5  What number best describes how, during the past week, pain has interfered with your enjoyment of life?8  What number best describes how, during the past week, pain has interfered with your general activity?  8    The following portions of the patient's history were reviewed and updated as appropriate: allergies, current medications, past family history, past medical history, past social history, past surgical history, and problem list.    Review of Systems   Constitutional:  Negative for activity change, fatigue and fever.   Respiratory:  Negative for cough and chest tightness.    Cardiovascular:  Negative for chest pain.   Gastrointestinal:  Negative for abdominal pain, constipation and diarrhea.   Musculoskeletal:  Positive for back pain.   Neurological:  Positive for weakness (right leg) and numbness  "(legs). Negative for dizziness, light-headedness and headaches.   Psychiatric/Behavioral:  Positive for sleep disturbance. Negative for agitation and suicidal ideas. The patient is not nervous/anxious.        --  The aforementioned information the Chief Complaint section and above subjective data including any HPI data, and also the Review of Systems data, has been personally reviewed and affirmed.  --    Vitals:    12/03/24 0810   BP: 158/83   BP Location: Left arm   Patient Position: Sitting   Cuff Size: Adult   Pulse: 66   Resp: 16   Temp: 97.2 °F (36.2 °C)   TempSrc: Temporal   SpO2: 96%   Weight: 67 kg (147 lb 9.6 oz)   Height: 160 cm (62.99\")   PainSc:   4     Objective   Physical Exam  Vitals and nursing note reviewed.   Constitutional:       Appearance: Normal appearance. She is well-developed.   Eyes:      General: Lids are normal.   Cardiovascular:      Rate and Rhythm: Normal rate.      Pulses:           Dorsalis pedis pulses are 2+ on the right side and 2+ on the left side.   Pulmonary:      Effort: Pulmonary effort is normal.   Musculoskeletal:      Lumbar back: No tenderness. Decreased range of motion. Positive right straight leg raise test. Negative left straight leg raise test.   Neurological:      Mental Status: She is alert and oriented to person, place, and time.      Motor: No weakness.      Deep Tendon Reflexes:      Reflex Scores:       Patellar reflexes are 1+ on the right side and 1+ on the left side.       Achilles reflexes are 1+ on the right side and 1+ on the left side.  Psychiatric:         Attention and Perception: Attention normal.         Mood and Affect: Mood normal.         Speech: Speech normal.         Behavior: Behavior normal.         Judgment: Judgment normal.       Assessment & Plan   Diagnoses and all orders for this visit:    1. Lumbar foraminal stenosis (Primary)    2. Lumbar radiculopathy      Cheri Lopez reports a pain score of 4.  Given her pain assessment as " noted, treatment options were discussed and the following options were decided upon as a follow-up plan to address the patient's pain: steroid injections.    --- In the interim since her last office visit Ms. Lopez has been diagnosed with a stress fracture in her right foot. She is going to discuss with her Podiatrist at her office visit tomorrow to ensure he is aware that she is going to have a steroid exposure.   --- From a pain management standpoint it is OK to proceed with Right L2-3 TFESI  Reviewed the procedure at length with the patient.  Included in the review was expectations, complications, risk and benefits.The procedure was described in detail and the risks, benefits and alternatives were discussed with the patient (including but not limited to: bleeding, infection, nerve damage, worsening of pain, inability to perform injection, paralysis, seizures, coma, no pain relief and death) who agreed to proceed.  Discussed the potential for sedation if warranted/wanted.  The procedure will plan to be performed at [facility] with fluoroscopic guidance(unless ultrasound is indicated) and could potentially have steroids and contrast dye used. Questions were answered and in a way the patient could understand.  Patient verbalized understanding and wishes to proceed.  This intervention will be ordered.  Discussed with patient that all procedures are part of a multimodal plan of care and include either formal PT or a home exercise program.  Patient has no evidence of coagulopathy or current infection.    --- Follow-up after procedure     Dictated utilizing Dragon dictation.

## 2024-12-03 NOTE — H&P (VIEW-ONLY)
CHIEF COMPLAINT  Back pain  Pt reports stabilized pain.    Subjective   Cheri Lopez is a 59 y.o. female  who presents for follow-up.  She has a history of back pain.  Today her pain is 4/10VAS in severity. She is now approximately 6 weeks into PT and has seen improvement in her posterior right buttock and hip pain, but she notes persistent pain that is still affecting her gait. She states 4 weeks ago she was also diagnosed with a stress fracture in her right foot. She was evaluated by podiatry and is now wearing a carbon fiber insert in her shoe. She is having imaging updated on this tomorrow.     She denies any pain radiating down her lower extremity. She does have some numbness in her right lateral thigh as well as tightness in her right IT band, she is working on this with PT.     Hx of Right hip surgery x 2, most recently in ~June of 2021 or 2022 with Dr. Fallon. She states her right hip has been ruled out as the cause of her pain since her pain returned in July 2024.      Past pain medications: Tramadol (causes nausea), Toradol injections (unsure how much relief she had with these)     Current pain medications: Baclofen 10 mg at night (managed by her PCP).      Past therapies:  Physical Therapy: Yes, currently in PT, has had 2 sessions, has seen some improvement.   Chiropractor: Yes, years ago  Massage Therapy: Yes, helpful  TENS: Yes, temporary relief in the past (has not used since prior to her fusion)  Dry Needling: Yes, helpful   Neck or back surgery:   5/5/2020: L4-L5 posterior lumbar decompression and fusion performed by Dr. Short  11/15/2023: Right L2-L3 laminectomy performed by Dr. Christian  Past pain management: Dr. Rossi Stein at [facility] [place] pain management, Dr. Sena (now out of network).      Previous Injections:   Multiple injections with Dr. Sena      2/13/2019-phase 1 SCS trialing ([employer])-documented 75% relief with improvement in activity in her post procedure follow up.  Referral to Dr. Gauthier for permanent placement. She did not proceed with a SCS phase 2 implant, states she was not happy with the level of relief from the trial.   1/30/2019-right SI joint injection  12/5/2018-right L2-L5 MBB-no relief  10/31/2018-right L2-L5 MBB-80% relief x 8 hours  10/3/2018-right SI joint injection-no relief     5/2/2018-LESI at L4-5-no relief ([facility])    Back Pain  This is a chronic problem. The current episode started more than 1 year ago. The problem occurs constantly. The problem is unchanged. The pain is present in the gluteal and lumbar spine. The quality of the pain is described as aching, shooting and stabbing. The pain radiates to the right buttock. The pain is at a severity of 4/10. The symptoms are aggravated by standing (walking). Associated symptoms include numbness (legs) and weakness (right leg). Pertinent negatives include no abdominal pain, chest pain, fever or headaches. She has tried heat, chiropractic manipulation and NSAIDs (PT, dry needling) for the symptoms.      PEG Assessment   What number best describes your pain on average in the past week?5  What number best describes how, during the past week, pain has interfered with your enjoyment of life?8  What number best describes how, during the past week, pain has interfered with your general activity?  8    The following portions of the patient's history were reviewed and updated as appropriate: allergies, current medications, past family history, past medical history, past social history, past surgical history, and problem list.    Review of Systems   Constitutional:  Negative for activity change, fatigue and fever.   Respiratory:  Negative for cough and chest tightness.    Cardiovascular:  Negative for chest pain.   Gastrointestinal:  Negative for abdominal pain, constipation and diarrhea.   Musculoskeletal:  Positive for back pain.   Neurological:  Positive for weakness (right leg) and numbness  "(legs). Negative for dizziness, light-headedness and headaches.   Psychiatric/Behavioral:  Positive for sleep disturbance. Negative for agitation and suicidal ideas. The patient is not nervous/anxious.        --  The aforementioned information the Chief Complaint section and above subjective data including any HPI data, and also the Review of Systems data, has been personally reviewed and affirmed.  --    Vitals:    12/03/24 0810   BP: 158/83   BP Location: Left arm   Patient Position: Sitting   Cuff Size: Adult   Pulse: 66   Resp: 16   Temp: 97.2 °F (36.2 °C)   TempSrc: Temporal   SpO2: 96%   Weight: 67 kg (147 lb 9.6 oz)   Height: 160 cm (62.99\")   PainSc:   4     Objective   Physical Exam  Vitals and nursing note reviewed.   Constitutional:       Appearance: Normal appearance. She is well-developed.   Eyes:      General: Lids are normal.   Cardiovascular:      Rate and Rhythm: Normal rate.      Pulses:           Dorsalis pedis pulses are 2+ on the right side and 2+ on the left side.   Pulmonary:      Effort: Pulmonary effort is normal.   Musculoskeletal:      Lumbar back: No tenderness. Decreased range of motion. Positive right straight leg raise test. Negative left straight leg raise test.   Neurological:      Mental Status: She is alert and oriented to person, place, and time.      Motor: No weakness.      Deep Tendon Reflexes:      Reflex Scores:       Patellar reflexes are 1+ on the right side and 1+ on the left side.       Achilles reflexes are 1+ on the right side and 1+ on the left side.  Psychiatric:         Attention and Perception: Attention normal.         Mood and Affect: Mood normal.         Speech: Speech normal.         Behavior: Behavior normal.         Judgment: Judgment normal.       Assessment & Plan   Diagnoses and all orders for this visit:    1. Lumbar foraminal stenosis (Primary)    2. Lumbar radiculopathy      Cheri Lopez reports a pain score of 4.  Given her pain assessment as " noted, treatment options were discussed and the following options were decided upon as a follow-up plan to address the patient's pain: steroid injections.    --- In the interim since her last office visit Ms. Lopez has been diagnosed with a stress fracture in her right foot. She is going to discuss with her Podiatrist at her office visit tomorrow to ensure he is aware that she is going to have a steroid exposure.   --- From a pain management standpoint it is OK to proceed with Right L2-3 TFESI  Reviewed the procedure at length with the patient.  Included in the review was expectations, complications, risk and benefits.The procedure was described in detail and the risks, benefits and alternatives were discussed with the patient (including but not limited to: bleeding, infection, nerve damage, worsening of pain, inability to perform injection, paralysis, seizures, coma, no pain relief and death) who agreed to proceed.  Discussed the potential for sedation if warranted/wanted.  The procedure will plan to be performed at [facility] with fluoroscopic guidance(unless ultrasound is indicated) and could potentially have steroids and contrast dye used. Questions were answered and in a way the patient could understand.  Patient verbalized understanding and wishes to proceed.  This intervention will be ordered.  Discussed with patient that all procedures are part of a multimodal plan of care and include either formal PT or a home exercise program.  Patient has no evidence of coagulopathy or current infection.    --- Follow-up after procedure     Dictated utilizing Dragon dictation.

## 2024-12-10 ENCOUNTER — HOSPITAL ENCOUNTER (OUTPATIENT)
Dept: GENERAL RADIOLOGY | Facility: SURGERY CENTER | Age: 59
Setting detail: HOSPITAL OUTPATIENT SURGERY
End: 2024-12-10
Payer: COMMERCIAL

## 2024-12-10 ENCOUNTER — HOSPITAL ENCOUNTER (OUTPATIENT)
Facility: SURGERY CENTER | Age: 59
Setting detail: HOSPITAL OUTPATIENT SURGERY
Discharge: HOME | End: 2024-12-10
Attending: ANESTHESIOLOGY | Admitting: ANESTHESIOLOGY
Payer: COMMERCIAL

## 2024-12-10 VITALS
HEIGHT: 63 IN | SYSTOLIC BLOOD PRESSURE: 161 MMHG | HEART RATE: 61 BPM | RESPIRATION RATE: 16 BRPM | TEMPERATURE: 97.9 F | WEIGHT: 147 LBS | OXYGEN SATURATION: 98 % | DIASTOLIC BLOOD PRESSURE: 82 MMHG | BODY MASS INDEX: 26.05 KG/M2

## 2024-12-10 DIAGNOSIS — Z41.9 SURGERY, ELECTIVE: ICD-10-CM

## 2024-12-10 PROCEDURE — 64483 NJX AA&/STRD TFRM EPI L/S 1: CPT | Performed by: ANESTHESIOLOGY

## 2024-12-10 PROCEDURE — 25010000002 LIDOCAINE PF 1% 1 % SOLUTION 5 ML VIAL: Performed by: ANESTHESIOLOGY

## 2024-12-10 PROCEDURE — 25010000002 DEXAMETHASONE SODIUM PHOSPHATE 100 MG/10ML SOLUTION 10 ML VIAL: Performed by: ANESTHESIOLOGY

## 2024-12-10 PROCEDURE — 77002 NEEDLE LOCALIZATION BY XRAY: CPT

## 2024-12-10 PROCEDURE — 25010000002 METHYLPREDNISOLONE PER 80 MG: Performed by: ANESTHESIOLOGY

## 2024-12-10 PROCEDURE — 76000 FLUOROSCOPY <1 HR PHYS/QHP: CPT

## 2024-12-10 PROCEDURE — 25510000001 IOPAMIDOL 61 % SOLUTION 30 ML VIAL: Performed by: ANESTHESIOLOGY

## 2024-12-10 PROCEDURE — 25010000002 BUPIVACAINE (PF) 0.5 % SOLUTION 10 ML VIAL: Performed by: ANESTHESIOLOGY

## 2024-12-10 NOTE — DISCHARGE INSTRUCTIONS
[facility] - Post-procedure Instructions          --  While there are no absolute restrictions, it is recommended that you do not perform strenuous activity today. In the morning, you may resume your level of activity as before your block.    --  If you have a band-aid at your injection site, please remove it later today. Observe the area for any redness, swelling, pus-like drainage, or a temperature over 101°. If any of these symptoms occur, please call your doctor at [phone]. If after office hours, leave a message and the on-call provider will return your call.    --  Ice may be applied to your injection site. It is recommended you avoid direct heat (heating pad; hot tub) for 1-2 days.    --  Call [facility] at [phone] if you experience persistent headache, persistent bleeding from the injection site, or severe pain not relieved by heat or oral medication.    --  Do not make important decisions today.    --  Due to the effects of the block and/or the I.V. Sedation, DO NOT drive or operate hazardous machinery for 12 hours.  Local anesthetics may cause numbness after procedure and precautions must be taken with regards to operating equipment as well as with walking, even if ambulating with assistance of another person or with an assistive device.    --  Do not drink alcohol for 12 hours.    -- You may return to work tomorrow, or as directed by your referring doctor.    --  Occasionally you may notice a slight increase in your pain after the procedure. This should start to improve within the next 24-48 hours. Radiofrequency ablation procedure pain may last 3-4 weeks.    --  It may take as long as 3-4 days before you notice a gradual improvement in your pain and/or other symptoms.    -- You may continue to take your prescribed pain medication as needed.    --  Some normal possible side effects of steroid use could include fluid retention, increased blood sugar, dull headache,  increased sweating, increased appetite, mood swings and flushing.    --  Diabetics are recommended to watch their blood glucose level closely for 24-48 hours after the injection.    --  Must stay in PACU for 20 min upon arrival and prove no leg weakness before being discharged.    --  IN THE EVENT OF A LIFE THREATENING EMERGENCY, (CHEST PAIN, BREATHING DIFFICULTIES, PARALYSIS…) YOU SHOULD GO TO YOUR NEAREST EMERGENCY ROOM.    --  You should be contacted by our office within 2-3 days to schedule follow up or next appointment date.  If not contacted within 7 days, please call the office at [phone]

## 2024-12-10 NOTE — OP NOTE
Lumbar Transforaminal Epidural Steroid Injection (one level Unilateral)  [facility]      PREOPERATIVE DIAGNOSIS:  right Lumbar Radiculopathy and foraminal stenosis    POSTOPERATIVE DIAGNOSIS:  Same as preop diagnosis    PROCEDURE:  CPT 53368 --  Diagnostic Transforaminal Epidural Steroid Injection at the L2 level, on the right     PRE-PROCEDURE DISCUSSION WITH PATIENT:    Risks and complications were discussed with the patient prior to starting the procedure and informed consent was obtained.  We discussed various topics including but not limited to bleeding, infection, injury, nerve injury, paralysis, coma, death, postprocedural painful flare-up, postprocedural site soreness, and a lack of pain relief.  We discussed the diagnostic aspect of transforaminal epidural / selective nerve root blockade.    SURGEON:  Ron Kilpatrick MD    REASON FOR PROCEDURE:    Degenerative changes are noted in the area., Stenotic area is noted, and is likely contributing to this chronic &/or recurrent pain. , and Radiating pattern of pain is likely consistent with degenerative changes in the area.    SEDATION:  Patient declined administration of moderate sedation    ANESTHETIC:  Lidocaine 1 % 0.2ml  STEROID:   dexamethasone 10mg    DESCRIPTON OF PROCEDURE:  After obtaining informed consent, an I.V. was not started in the preoperative area. The patient taken to the operating room and was placed in the prone position with a pillow under the abdomen.  All pressure points were well padded.  EKG, blood pressure, and pulse oximeter were monitored.  The lumbar area was prepped with Chloraprep and draped in a sterile fashion. Under fluoroscopic guidance in an oblique dimension on the above mentioned side, the transverse process of the aforementioned vertebra at the junction of the body at 6 o'clock position was identified. Skin and subcutaneous tissue was anesthetized with 1% lidocaine. A 22-gauge spinal needle was  introduced under fluoroscopic guidance at the above junction into the foramen without parasthesias and into the epidural space. After confirming the position of the needle with PA, lateral, and oblique fluoroscopic views, aspiration was checked and was clear of blood or CSF.  Next, 1 mL of Omnipaque was injected. After seeing adequate spread on the corresponding nerve root, a total volume 3mL of injectate containing 1ml of the above mentioned local anesthetic, 1 ml saline,  and the above mentioned corticosteroid was injected into the epidural space.    The needle was removed intact.  Vital signs were stable throughout.        ESTIMATED BLOOD LOSS:  <5 mL  SPECIMENS:  none    COMPLICATIONS:   No complications were noted., There was no indication of vascular uptake on live injection of contrast dye., There was no indication of intrathecal uptake on live injection of contrast dye., There was not any evidence of dural puncture.  , and The patient did not have any signs of postprocedure numbness nor weakness.    TOLERANCE & DISCHARGE CONDITION:    The patient tolerated the procedure well.  The patient was transported to the recovery area without difficulties.  The patient was discharged to home under the care of family in stable and satisfactory condition.    PLAN OF CARE:  The patient was given our standard instruction sheet.  The patient will Return to clinic 3-4 wks.  The patient will resume all medications as per the medication reconciliation sheet.

## 2025-01-07 ENCOUNTER — TELEPHONE (OUTPATIENT)
Dept: PAIN MEDICINE | Facility: CLINIC | Age: 60
End: 2025-01-07

## 2025-01-07 NOTE — TELEPHONE ENCOUNTER
LVM FOR PATIENT TO CALL BACK AND RESCHEDULE THE APPT 1/7/2025 THAT WAS CANCELED DUE TO INCLEMENT WEATHER. PLEASE RESCHEDULE WITH IVANNA WALSH

## 2025-01-20 ENCOUNTER — OFFICE VISIT (OUTPATIENT)
Dept: PAIN MEDICINE | Facility: CLINIC | Age: 60
End: 2025-01-20
Payer: COMMERCIAL

## 2025-01-20 VITALS
HEART RATE: 72 BPM | HEIGHT: 63 IN | OXYGEN SATURATION: 98 % | DIASTOLIC BLOOD PRESSURE: 76 MMHG | SYSTOLIC BLOOD PRESSURE: 122 MMHG | WEIGHT: 147 LBS | TEMPERATURE: 96 F | BODY MASS INDEX: 26.05 KG/M2

## 2025-01-20 DIAGNOSIS — M54.16 LUMBAR RADICULOPATHY: ICD-10-CM

## 2025-01-20 DIAGNOSIS — M48.061 LUMBAR FORAMINAL STENOSIS: Primary | ICD-10-CM

## 2025-01-20 PROCEDURE — 99212 OFFICE O/P EST SF 10 MIN: CPT | Performed by: NURSE PRACTITIONER

## 2025-01-20 NOTE — PROGRESS NOTES
"CHIEF COMPLAINT  Back pain    Subjective   Cheri Lopez is a 59 y.o. female  who presents to the office for follow-up of procedure.  She completed a Right L2-L3 transforaminal epidural steroid injection  on  12/10/24 performed by Dr. Kilpatrick for management of back pain. Patient reports 50% relief from the procedure.     Today her pain is 5/10VAS in severity.  She states she has been working with her deep tissue masseuse which is helpful. She also plans to return to PT, she is participating in her home PT exercises.  She notes that her pain is doing \"pretty well\" right now.     Past pain medications: Tramadol (causes nausea), Toradol injections (unsure how much relief she had with these)     Current pain medications: Baclofen 10 mg at night (managed by her PCP).      Past therapies:  Physical Therapy: Yes, currently in PT, has had 2 sessions, has seen some improvement.   Chiropractor: Yes, years ago  Massage Therapy: Yes, helpful  TENS: Yes, temporary relief in the past (has not used since prior to her fusion)  Dry Needling: Yes, helpful   Neck or back surgery:   5/5/2020: L4-L5 posterior lumbar decompression and fusion performed by Dr. Short  11/15/2023: Right L2-L3 laminectomy performed by Dr. Christian  Past pain [place] Rossi [place] [facility] [place] Sena (now out of network).      Previous Injections:   Multiple injections with Dr. Sena      2/13/2019-phase 1 SCS trialing ([employer])-documented 75% relief with improvement in activity in her post procedure follow up. Referral to Dr. Gauthier for permanent placement. She did not proceed with a SCS phase 2 implant, states she was not happy with the level of relief from the trial.   1/30/2019-right SI joint injection  12/5/2018-right L2-L5 MBB-no relief  10/31/2018-right L2-L5 MBB-80% relief x 8 hours  10/3/2018-right SI joint injection-no relief     5/2/2018-LESI at L4-5-no relief ( [facility])    Back " Pain  This is a chronic problem. The current episode started more than 1 year ago. The problem occurs constantly. The problem is unchanged. The pain is present in the gluteal and lumbar spine. The quality of the pain is described as aching, shooting and stabbing. The pain radiates to the right buttock. The pain is at a severity of 5/10. The symptoms are aggravated by standing (walking). Associated symptoms include numbness (outer thigh on right side). Pertinent negatives include no abdominal pain, chest pain, fever, headaches or weakness. She has tried heat, chiropractic manipulation and NSAIDs (PT, dry needling) for the symptoms.      PEG Assessment   What number best describes your pain on average in the past week?6  What number best describes how, during the past week, pain has interfered with your enjoyment of life?8  What number best describes how, during the past week, pain has interfered with your general activity?  7    The following portions of the patient's history were reviewed and updated as appropriate: allergies, current medications, past family history, past medical history, past social history, past surgical history, and problem list.    Review of Systems   Constitutional:  Negative for chills and fever.   Respiratory:  Negative for cough and shortness of breath.    Cardiovascular:  Negative for chest pain.   Gastrointestinal:  Negative for abdominal pain, constipation and diarrhea.   Genitourinary:  Negative for difficulty urinating.   Musculoskeletal:  Positive for back pain.   Neurological:  Positive for numbness (outer thigh on right side). Negative for dizziness, weakness, light-headedness and headaches.   Psychiatric/Behavioral:  Negative for agitation.      --  The aforementioned information the Chief Complaint section and above subjective data including any HPI data, and also the Review of Systems data, has been personally reviewed and affirmed.  --     Vitals:    01/20/25 1125   BP: 122/76  "  BP Location: Left arm   Patient Position: Sitting   Pulse: 72   Temp: 96 °F (35.6 °C)   TempSrc: Temporal   SpO2: 98%   Weight: 66.7 kg (147 lb)   Height: 160 cm (63\")   PainSc:   5   PainLoc: Back     Objective   Physical Exam  Vitals and nursing note reviewed.   Constitutional:       Appearance: Normal appearance. She is well-developed.   Eyes:      General: Lids are normal.   Cardiovascular:      Rate and Rhythm: Normal rate.   Pulmonary:      Effort: Pulmonary effort is normal.   Neurological:      Mental Status: She is alert and oriented to person, place, and time.   Psychiatric:         Attention and Perception: Attention normal.         Mood and Affect: Mood normal.         Speech: Speech normal.         Behavior: Behavior normal.         Judgment: Judgment normal.       Assessment & Plan   Diagnoses and all orders for this visit:    1. Lumbar foraminal stenosis (Primary)    2. Lumbar radiculopathy      Cheri R John reports a pain score of 5.  Given her pain assessment as noted, treatment options were discussed and the following options were decided upon as a follow-up plan to address the patient's pain: continuation of current treatment plan for pain.    --- May Repeat Right L2 TFESI every 3 months PRN  --- Follow-up if pain returns/worsens.        Dictated utilizing Dragon dictation.    "

## 2025-01-23 RX ORDER — BACLOFEN 10 MG/1
10 TABLET ORAL 3 TIMES DAILY PRN
Qty: 45 TABLET | Refills: 2 | Status: SHIPPED | OUTPATIENT
Start: 2025-01-23

## 2025-01-27 NOTE — PROGRESS NOTES
Subjective   Patient ID: Cheri Lopez is a 59 y.o. female is here today for follow-up with increased low back pain post PT, pain management.    Today patient states that she is having low back pain post JOSELUIS x1    History of Present Illness    This patient was last here in October.  At that time she was having pain primarily in her back.  There was a little pain in her hip particularly on the right.  This is all continued and gotten worse since she was here last.  When she was here last I sent her for an injection and physical therapy but they really did not help.  She did have 1 injection in her back which helped just a little bit but then the pain came right back.    The following portions of the patient's history were reviewed and updated as appropriate: allergies, current medications, past family history, past medical history, past social history, past surgical history, and problem list.      Objective     There were no vitals filed for this visit.  There is no height or weight on file to calculate BMI.    Tobacco Use: Medium Risk (1/28/2025)    Patient History     Smoking Tobacco Use: Former     Smokeless Tobacco Use: Never     Passive Exposure: Past          Physical Exam    Neurological:      Mental Status: He is alert and oriented to person, place, and time.       Neurological Exam    Mental Status  Alert. Oriented to person, place, and time.            Assessment & Plan   Independent Review of Radiographic Studies:      I personally reviewed the images from the following studies.    I reviewed her plain films and her MRI which were done in September of last year.  There is a little right-sided lateral recess stenosis and foraminal stenosis at L2-3.  The other levels generally looked okay.    Medical Decision Making:      I told the patient we will go ahead and try a sacroiliac injection before we do anything else.  She is very sensitive to steroids and so we will have them use a half a dose of steroids  and an increased dose of local when they do the injection.  I told her to call me about a week after the injection and let me know how to work.  We can base her response as to what we do next.    Diagnoses and all orders for this visit:    1. Sacroiliitis (Primary)  -     SI Joint Injection      Return for Recheck and call after treatment or consultation.

## 2025-01-28 ENCOUNTER — OFFICE VISIT (OUTPATIENT)
Dept: NEUROSURGERY | Facility: CLINIC | Age: 60
End: 2025-01-28
Payer: COMMERCIAL

## 2025-01-28 DIAGNOSIS — M46.1 SACROILIITIS: Primary | ICD-10-CM

## 2025-01-28 PROCEDURE — 99213 OFFICE O/P EST LOW 20 MIN: CPT | Performed by: NEUROLOGICAL SURGERY

## 2025-02-21 RX ORDER — LEVOTHYROXINE SODIUM 88 UG/1
88 TABLET ORAL
Qty: 90 TABLET | Refills: 2 | Status: SHIPPED | OUTPATIENT
Start: 2025-02-21

## 2025-03-27 ENCOUNTER — HOSPITAL ENCOUNTER (OUTPATIENT)
Dept: GENERAL RADIOLOGY | Facility: HOSPITAL | Age: 60
Discharge: HOME | End: 2025-03-27
Payer: COMMERCIAL

## 2025-03-27 ENCOUNTER — ANESTHESIA EVENT (OUTPATIENT)
Dept: PAIN MEDICINE | Facility: HOSPITAL | Age: 60
End: 2025-03-27
Payer: COMMERCIAL

## 2025-03-27 ENCOUNTER — ANESTHESIA (OUTPATIENT)
Dept: PAIN MEDICINE | Facility: HOSPITAL | Age: 60
End: 2025-03-27
Payer: COMMERCIAL

## 2025-03-27 ENCOUNTER — HOSPITAL ENCOUNTER (OUTPATIENT)
Dept: PAIN MEDICINE | Facility: HOSPITAL | Age: 60
Discharge: HOME | End: 2025-03-27
Payer: COMMERCIAL

## 2025-03-27 VITALS
SYSTOLIC BLOOD PRESSURE: 146 MMHG | TEMPERATURE: 97.1 F | OXYGEN SATURATION: 100 % | DIASTOLIC BLOOD PRESSURE: 78 MMHG | HEART RATE: 64 BPM | RESPIRATION RATE: 16 BRPM

## 2025-03-27 DIAGNOSIS — M53.3 SACROILIAC JOINT DYSFUNCTION: Primary | ICD-10-CM

## 2025-03-27 DIAGNOSIS — R52 PAIN: ICD-10-CM

## 2025-03-27 DIAGNOSIS — M46.1 SACROILIITIS: ICD-10-CM

## 2025-03-27 PROCEDURE — 77003 FLUOROGUIDE FOR SPINE INJECT: CPT

## 2025-03-27 PROCEDURE — 25010000002 METHYLPREDNISOLONE PER 40 MG: Performed by: ANESTHESIOLOGY

## 2025-03-27 RX ORDER — FENTANYL CITRATE 50 UG/ML
50 INJECTION, SOLUTION INTRAMUSCULAR; INTRAVENOUS ONCE
Status: DISCONTINUED | OUTPATIENT
Start: 2025-03-27 | End: 2025-03-29 | Stop reason: HOSPADM

## 2025-03-27 RX ORDER — MIDAZOLAM HYDROCHLORIDE 1 MG/ML
1 INJECTION, SOLUTION INTRAMUSCULAR; INTRAVENOUS ONCE AS NEEDED
Status: DISCONTINUED | OUTPATIENT
Start: 2025-03-27 | End: 2025-03-29 | Stop reason: HOSPADM

## 2025-03-27 RX ORDER — METHYLPREDNISOLONE ACETATE 40 MG/ML
40 INJECTION, SUSPENSION INTRA-ARTICULAR; INTRALESIONAL; INTRAMUSCULAR; SOFT TISSUE ONCE
Status: COMPLETED | OUTPATIENT
Start: 2025-03-27 | End: 2025-03-27

## 2025-03-27 RX ORDER — BUPIVACAINE HYDROCHLORIDE AND EPINEPHRINE 2.5; 5 MG/ML; UG/ML
20 INJECTION, SOLUTION EPIDURAL; INFILTRATION; INTRACAUDAL; PERINEURAL ONCE
Status: COMPLETED | OUTPATIENT
Start: 2025-03-27 | End: 2025-03-27

## 2025-03-27 RX ORDER — LIDOCAINE HYDROCHLORIDE 10 MG/ML
1 INJECTION, SOLUTION INFILTRATION; PERINEURAL ONCE
Status: DISCONTINUED | OUTPATIENT
Start: 2025-03-27 | End: 2025-03-29 | Stop reason: HOSPADM

## 2025-03-27 RX ADMIN — BUPIVACAINE HYDROCHLORIDE AND EPINEPHRINE BITARTRATE 10 ML: 2.5; .005 INJECTION, SOLUTION EPIDURAL; INFILTRATION; INTRACAUDAL; PERINEURAL at 11:48

## 2025-03-27 RX ADMIN — METHYLPREDNISOLONE ACETATE 40 MG: 40 INJECTION, SUSPENSION INTRA-ARTICULAR; INTRALESIONAL; INTRAMUSCULAR; INTRASYNOVIAL; SOFT TISSUE at 11:49

## 2025-03-27 NOTE — H&P
[facility]    History and Physical    Patient Name: Cheri Lopez  :  1965  MRN:  [id number]  Date of Admission: 3/27/2025    Subjective     Patient is a 59 y.o. female presents with chief complaint of chronic, moderate low back and buttock pain.  Onset of symptoms was gradual starting several years ago.  Symptoms are associated/aggravated by nothing in particular or activity. Symptoms improve with injection - more than 50% relief w/ prior SI injections.  Had transforaminal injection previously w/ moderate benefit.  Presents today for R SI injection - has requested half dose of steroid d/t side effects.      The following portions of the patients history were reviewed and updated as appropriate: current medications, allergies, past medical history, past surgical history, past family history, past social history, and problem list                Objective     Past Medical History:   Past Medical History:   Diagnosis Date    Ankle sprain     Arthritis     Arthritis of back     Back pain     Chronic pain disorder 2015    CTS (carpal tunnel syndrome)     Depression     ((Pt Qnr Sub: patient states no depression))     Fracture, fibula     Fracture, tibia and fibula     GERD (gastroesophageal reflux disease)     H/O colonoscopy     Hip arthrosis 2016    Hip pain     History of cellulitis 2023    HAND    Hypertension     Hypothyroidism     IBS (irritable bowel syndrome)     Knee pain     Knee swelling     Low back pain     Low back strain     Lumbosacral disc disease     Peripheral neuropathy     Pneumonia     RECURRENT PNEUMONIA WITH HOSPITALIZATIONS    PONV (postoperative nausea and vomiting)     VERY SEVERE    Tear of meniscus of knee 2020     Past Surgical History:   Past Surgical History:   Procedure Laterality Date    BREAST AUGMENTATION  2007    BREAST IMPLANT REMOVAL  2022    CARPAL TUNNEL RELEASE WITH CUBITAL TUNNEL RELEASE Right     COLONOSCOPY N/A  2018    Procedure: COLONOSCOPY to cecum and t.i. with polypectomy;  Surgeon: Rima Luis MD;  Location: [facility] ENDOSCOPY;  Service:     EPIDURAL Right 12/10/2024    Procedure: Right L2-L3 transforaminal epidural steroid injection 46842;  Surgeon: Ron Kilpatrick MD;  [place] [facility] [place];  Service: Pain Management;  Laterality: Right;    HAND SURGERY  2914    HIP DEBRIDEMENT Right      &    KNEE ARTHROSCOPY Left 2020    Procedure: Left KNEE ARTHROSCOPy, partial medial menisectomy and debridement of arthritis;  Surgeon: Génesis Mg MD;  [facility];  Service: Orthopedics;  Laterality: Left;    KNEE CARTILAGE SURGERY Right     LAMINECTOMY  Nov 15, 2023    LUMBAR DISCECTOMY Right 11/15/2023    Procedure: Right lumbar 2 to lumbar 3 laminectomy with possible discectomy using metrx;  Surgeon: Jatinder Christian MD;  Location: [facility] MAIN OR;  Service: Neurosurgery;  Laterality: Right;    ORTHOPEDIC SURGERY  mar 2016, 2022    rt hip debidement/resurfacing    SINUS SURGERY      DEVIATED SEPTUM    SKIN BIOPSY      hand - precancerous    SKIN BIOPSY      SPINAL FUSION  2020    L4 &L5     Family History:   Family History   Problem Relation Age of Onset    Osteoarthritis Mother     Heart disease Mother     Arthritis Mother     No Known Problems Brother     Breast cancer Maternal Aunt     Cancer Paternal Grandmother     Arthritis Maternal Grandfather     Stroke Maternal Grandmother     Arthritis Maternal Aunt     Malig Hyperthermia Neg Hx      Social History:   Social History     Socioeconomic History    Marital status:     Number of children: 0    Years of education: ms   Tobacco Use    Smoking status: Former     Current packs/day: 0.00     Average packs/day: 0.3 packs/day for 21.7 years (5.4 ttl pk-yrs)     Types: Cigarettes     Start date: 1983     Quit date: 2005     Years since quittin.2     Passive exposure: Past    Smokeless tobacco: Never    Tobacco  comments:     social smoker - never really smoked much   Vaping Use    Vaping status: Never Used   Substance and Sexual Activity    Alcohol use: Yes     Alcohol/week: 2.0 standard drinks of alcohol     Types: 2 Glasses of wine per week    Drug use: Never    Sexual activity: Yes     Partners: Male     Birth control/protection: Vasectomy       Vital Signs Range for the last 24 hours  Temperature: Temp:  [36.2 °C (97.1 °F)] 36.2 °C (97.1 °F)   Temp Source: Temp src: Infrared   BP: BP: (120)/(75) 120/75   Pulse: Heart Rate:  [67] 67   Respirations: Resp:  [16] 16   SPO2: SpO2:  [100 %] 100 %   O2 Amount (l/min):     O2 Devices Device (Oxygen Therapy): room air   Weight:           --------------------------------------------------------------------------------    Current Outpatient Medications   Medication Sig Dispense Refill    Ascorbic Acid (VITAMIN C PO) Take 1,000 mg by mouth Daily.      B Complex Vitamins (VITAMIN B COMPLEX PO) Take 200 mcg by mouth Daily.      baclofen (LIORESAL) 10 MG tablet TAKE ONE TABLET BY MOUTH THREE TIMES DAILY AS NEEDED FOR MUSCLE SPASMS 45 tablet 2    cycloSPORINE (RESTASIS) 0.05 % ophthalmic emulsion Administer 1 drop to both eyes Every 12 (Twelve) Hours.      estradiol (ESTRING) 2 MG vaginal ring Insert 2 mg into the vagina.      estradiol (MINIVELLE, VIVELLE-DOT) 0.05 MG/24HR patch PLACE ONE PATCH ONTO THE SKIN TWICE A WEEK      levothyroxine (SYNTHROID, LEVOTHROID) 88 MCG tablet Take 1 tablet by mouth Every Morning. 90 tablet 2    Magnesium-Potassium 250-100 MG tablet Take 1 tablet by mouth Daily.      Multiple Vitamins-Minerals (PRESERVISION AREDS PO) Take 1 tablet by mouth 2 (two) times a day. HOLD FOR SURGERY      Probiotic Product (Probiotic 10 Ultra Strength) capsule Take 1 tablet by mouth Daily.      Progesterone (PROMETRIUM) 100 MG capsule Take 1 capsule by mouth Every Night. nightly      Sodium Fluoride 5000 Enamel 1.1-5 % gel USE AS DIRECTED DO NOT SWALLOW      vitamin D3 125  MCG (5000 UT) capsule capsule Take 1 capsule by mouth Daily.      vitamin E 400 UNIT capsule Take 1 capsule by mouth Daily. HOLD FOR SURGERY       No current facility-administered medications for this encounter.       --------------------------------------------------------------------------------  Assessment & Plan      Anesthesia Evaluation     Patient summary reviewed and Nursing notes reviewed   history of anesthetic complications:  PONV               Airway   Mallampati: II  Dental      Pulmonary    (+) pneumonia , a smoker (remote) Former,  Cardiovascular   Exercise tolerance: good (4-7 METS)    (+) hypertension      Neuro/Psych  (+) numbness, psychiatric history  GI/Hepatic/Renal/Endo    (+) GERD, thyroid problem hypothyroidism    Musculoskeletal     (+) back pain, chronic pain, LALI test      PE comment: +right compression/distraction  Abdominal    Substance History - negative use     OB/GYN negative ob/gyn ROS         Other   arthritis,                  Diagnosis and Plan    Treatment Plan  ASA 2      Procedures: Sacroiliac joint injection, With fluoroscopy,      Anesthetic plan and risks discussed with patient.          Diagnosis     * Sacroiliitis [M46.1]

## 2025-04-21 ENCOUNTER — TELEPHONE (OUTPATIENT)
Dept: NEUROSURGERY | Facility: CLINIC | Age: 60
End: 2025-04-21

## 2025-04-21 ENCOUNTER — OFFICE VISIT (OUTPATIENT)
Dept: NEUROSURGERY | Facility: CLINIC | Age: 60
End: 2025-04-21
Payer: COMMERCIAL

## 2025-04-21 DIAGNOSIS — M48.061 NEURAL FORAMINAL STENOSIS OF LUMBAR SPINE: Primary | ICD-10-CM

## 2025-04-21 PROCEDURE — 99214 OFFICE O/P EST MOD 30 MIN: CPT | Performed by: NEUROLOGICAL SURGERY

## 2025-04-21 RX ORDER — DEXAMETHASONE 4 MG/1
8 TABLET ORAL TAKE AS DIRECTED
Qty: 2 TABLET | Refills: 0 | Status: SHIPPED | OUTPATIENT
Start: 2025-04-21

## 2025-04-21 NOTE — PROGRESS NOTES
Subjective   Patient ID: Cheri Lopez is a 60 y.o. female is here today for follow-up pain post SI Injection done on 3/27/2025.    Patient states that she is having R leg pain, along with numbness and weakness     History of Present Illness    This patient continues with pain in her back with radiation into her right leg.  It is mostly in her hip but it does go down her leg to some extent as well.  She had a sacroiliac injection at the end of March which did not help at all.  She had an epidural block last fall which helped a little bit but then the pain came right back.  She is also done physical therapy which did not help.  She says the pain keeps getting worse to the point where she can no longer live with it.  The pain is located in her right hip and radiates into her anterolateral thigh down to just the knee.     The following portions of the patient's history were reviewed and updated as appropriate: allergies, current medications, past family history, past medical history, past social history, past surgical history, and problem list.      Objective     There were no vitals filed for this visit.  There is no height or weight on file to calculate BMI.    Tobacco Use: Medium Risk (4/21/2025)    Patient History     Smoking Tobacco Use: Former     Smokeless Tobacco Use: Never     Passive Exposure: Past          Physical Exam    Neurological:      Mental Status: He is alert and oriented to person, place, and time.       Neurological Exam    Mental Status  Alert. Oriented to person, place, and time.            Assessment & Plan   Independent Review of Radiographic Studies:      I personally reviewed the images from the following studies.    I reviewed an MRI which was done in September of last year.  This shows some right-sided lateral recess stenosis and some foraminal stenosis at L2-3 but the other levels all looked okay.  She has had a previous L4-5 fusion.  She did have a myelogram in July 2023.  Reviewed  those films as well.  This does show a hint of some pressure on the right at L2-3.    Medical Decision Making:      I told the patient at this point we should proceed with a lumbar myelogram.  I told the patient what a myelogram involves.  I explained that there is a 50% chance of developing a bad headache and nausea as a result of the test.  I explained that there is also a very small chance of infection, seizures, and bleeding.  I explained how we would treat a post myelogram headache including bedrest, caffeinated fluids, steroids, and blood patch.  The patient does ask to proceed.    Diagnoses and all orders for this visit:    1. Neural foraminal stenosis of lumbar spine (Primary)  -     Obtain Informed Consent; Standing  -     IR Myelogram Lumbar Spine; Future  -     CT Lumbar Spine With Intrathecal Contrast; Future  -     XR Spine Lumbar Complete With Flex & Ext; Future  -     No Lab Testing Needed; Standing  -     dexAMETHasone (DECADRON) 4 MG tablet; Take 2 tablets by mouth Take As Directed. Take both tablets by mouth 2 hours before myelogram  Dispense: 2 tablet; Refill: 0      Return for After radiology test.

## 2025-04-21 NOTE — TELEPHONE ENCOUNTER
PT called ans stated she had an episode on last Thursday, with extreme pain in lower back&right leg. PT stated she also experiencing numbness,weakness. PT would like to be seen sooner than her May jhonatan w/Dr Christian if possible. Please advise.          Thank you

## 2025-04-23 NOTE — PROGRESS NOTES
05/06/25 0002   Pre-Procedure Phone Call   Procedure Time Verified Yes   Arrival Time 0600   Procedure Location Verified Yes   Medical History Reviewed No   NPO Status Reinforced Yes   Ride and Caregiver Arranged Yes   Patient Knows to Bring Current Medications   (No changes in medications since last reviewed. Decadron ordered.)   Bring Outside Films Requested   (Dr Christian patient)

## 2025-05-06 ENCOUNTER — HOSPITAL ENCOUNTER (OUTPATIENT)
Dept: GENERAL RADIOLOGY | Facility: HOSPITAL | Age: 60
Discharge: HOME | End: 2025-05-06
Payer: COMMERCIAL

## 2025-05-06 ENCOUNTER — HOSPITAL ENCOUNTER (OUTPATIENT)
Dept: CT IMAGING | Facility: HOSPITAL | Age: 60
Discharge: HOME | End: 2025-05-06
Payer: COMMERCIAL

## 2025-05-06 ENCOUNTER — PREP FOR SURGERY (OUTPATIENT)
Dept: OTHER | Facility: HOSPITAL | Age: 60
End: 2025-05-06
Payer: COMMERCIAL

## 2025-05-06 ENCOUNTER — HOSPITAL ENCOUNTER (OUTPATIENT)
Facility: HOSPITAL | Age: 60
Discharge: HOME | End: 2025-05-07
Attending: NEUROLOGICAL SURGERY | Admitting: NEUROLOGICAL SURGERY
Payer: COMMERCIAL

## 2025-05-06 VITALS
DIASTOLIC BLOOD PRESSURE: 81 MMHG | HEART RATE: 64 BPM | TEMPERATURE: 97.3 F | SYSTOLIC BLOOD PRESSURE: 148 MMHG | RESPIRATION RATE: 14 BRPM | BODY MASS INDEX: 24.8 KG/M2 | WEIGHT: 140 LBS | HEIGHT: 63 IN | OXYGEN SATURATION: 98 %

## 2025-05-06 DIAGNOSIS — R11.2 NAUSEA AND VOMITING, UNSPECIFIED VOMITING TYPE: ICD-10-CM

## 2025-05-06 DIAGNOSIS — R42 DIZZINESS: ICD-10-CM

## 2025-05-06 DIAGNOSIS — M48.061 NEURAL FORAMINAL STENOSIS OF LUMBAR SPINE: ICD-10-CM

## 2025-05-06 DIAGNOSIS — R42 DIZZINESS: Primary | ICD-10-CM

## 2025-05-06 LAB
DEPRECATED RDW RBC AUTO: 47.2 FL (ref 37–54)
ERYTHROCYTE [DISTWIDTH] IN BLOOD BY AUTOMATED COUNT: 13.3 % (ref 12.3–15.4)
HCT VFR BLD AUTO: 42.6 % (ref 34–46.6)
HGB BLD-MCNC: 13.9 G/DL (ref 12–15.9)
MCH RBC QN AUTO: 31.3 PG (ref 26.6–33)
MCHC RBC AUTO-ENTMCNC: 32.6 G/DL (ref 31.5–35.7)
MCV RBC AUTO: 95.9 FL (ref 79–97)
PLATELET # BLD AUTO: 190 10*3/MM3 (ref 140–450)
PMV BLD AUTO: 10 FL (ref 6–12)
RBC # BLD AUTO: 4.44 10*6/MM3 (ref 3.77–5.28)
WBC NRBC COR # BLD AUTO: 6.09 10*3/MM3 (ref 3.4–10.8)

## 2025-05-06 PROCEDURE — 96374 THER/PROPH/DIAG INJ IV PUSH: CPT

## 2025-05-06 PROCEDURE — 25010000002 TRIMETHOBENZAMIDE PER 200 MG: Performed by: NEUROLOGICAL SURGERY

## 2025-05-06 PROCEDURE — 72114 X-RAY EXAM L-S SPINE BENDING: CPT

## 2025-05-06 PROCEDURE — 25010000002 HYDROCORTISONE SOD SUC (PF) 250 MG RECONSTITUTED SOLUTION: Performed by: NEUROLOGICAL SURGERY

## 2025-05-06 PROCEDURE — 85027 COMPLETE CBC AUTOMATED: CPT | Performed by: NEUROLOGICAL SURGERY

## 2025-05-06 PROCEDURE — 25510000001 IOPAMIDOL 41 % SOLUTION: Performed by: NEUROLOGICAL SURGERY

## 2025-05-06 PROCEDURE — G0378 HOSPITAL OBSERVATION PER HR: HCPCS

## 2025-05-06 PROCEDURE — 51798 US URINE CAPACITY MEASURE: CPT

## 2025-05-06 PROCEDURE — 96375 TX/PRO/DX INJ NEW DRUG ADDON: CPT

## 2025-05-06 PROCEDURE — 62304 MYELOGRAPHY LUMBAR INJECTION: CPT

## 2025-05-06 PROCEDURE — 25010000002 ONDANSETRON PER 1 MG: Performed by: NURSE PRACTITIONER

## 2025-05-06 PROCEDURE — 72132 CT LUMBAR SPINE W/DYE: CPT

## 2025-05-06 PROCEDURE — 25010000002 ONDANSETRON PER 1 MG: Performed by: NEUROLOGICAL SURGERY

## 2025-05-06 PROCEDURE — 25010000002 LIDOCAINE 1 % SOLUTION: Performed by: NEUROLOGICAL SURGERY

## 2025-05-06 PROCEDURE — 25010000002 HYDROMORPHONE PER 4 MG: Performed by: NEUROLOGICAL SURGERY

## 2025-05-06 PROCEDURE — 72240 MYELOGRAPHY NECK SPINE: CPT

## 2025-05-06 PROCEDURE — 25010000002 FAMOTIDINE 10 MG/ML SOLUTION: Performed by: NEUROLOGICAL SURGERY

## 2025-05-06 RX ORDER — AMOXICILLIN 250 MG
2 CAPSULE ORAL 2 TIMES DAILY PRN
Status: DISCONTINUED | OUTPATIENT
Start: 2025-05-06 | End: 2025-05-07 | Stop reason: HOSPADM

## 2025-05-06 RX ORDER — SODIUM CHLORIDE 450 MG/100ML
125 INJECTION, SOLUTION INTRAVENOUS CONTINUOUS
Status: DISCONTINUED | OUTPATIENT
Start: 2025-05-06 | End: 2025-05-07 | Stop reason: HOSPADM

## 2025-05-06 RX ORDER — LIDOCAINE HYDROCHLORIDE 10 MG/ML
10 INJECTION, SOLUTION INFILTRATION; PERINEURAL ONCE
Status: COMPLETED | OUTPATIENT
Start: 2025-05-06 | End: 2025-05-06

## 2025-05-06 RX ORDER — ONDANSETRON 2 MG/ML
4 INJECTION INTRAMUSCULAR; INTRAVENOUS ONCE
Status: COMPLETED | OUTPATIENT
Start: 2025-05-06 | End: 2025-05-06

## 2025-05-06 RX ORDER — POLYETHYLENE GLYCOL 3350 17 G/17G
17 POWDER, FOR SOLUTION ORAL DAILY PRN
Status: DISCONTINUED | OUTPATIENT
Start: 2025-05-06 | End: 2025-05-07 | Stop reason: HOSPADM

## 2025-05-06 RX ORDER — ACETAMINOPHEN 160 MG/5ML
650 SOLUTION ORAL EVERY 4 HOURS PRN
Status: CANCELLED | OUTPATIENT
Start: 2025-05-06

## 2025-05-06 RX ORDER — BISACODYL 5 MG/1
5 TABLET, DELAYED RELEASE ORAL DAILY PRN
Status: CANCELLED | OUTPATIENT
Start: 2025-05-06

## 2025-05-06 RX ORDER — SODIUM CHLORIDE 0.9 % (FLUSH) 0.9 %
10 SYRINGE (ML) INJECTION AS NEEDED
Status: DISCONTINUED | OUTPATIENT
Start: 2025-05-06 | End: 2025-05-07 | Stop reason: HOSPADM

## 2025-05-06 RX ORDER — SODIUM CHLORIDE 450 MG/100ML
125 INJECTION, SOLUTION INTRAVENOUS CONTINUOUS
Status: CANCELLED | OUTPATIENT
Start: 2025-05-06 | End: 2025-05-07

## 2025-05-06 RX ORDER — DEXTROSE MONOHYDRATE AND SODIUM CHLORIDE 5; .45 G/100ML; G/100ML
125 INJECTION, SOLUTION INTRAVENOUS CONTINUOUS
Status: ACTIVE | OUTPATIENT
Start: 2025-05-06 | End: 2025-05-06

## 2025-05-06 RX ORDER — ACETAMINOPHEN 325 MG/1
650 TABLET ORAL EVERY 4 HOURS PRN
Status: DISCONTINUED | OUTPATIENT
Start: 2025-05-06 | End: 2025-05-07 | Stop reason: HOSPADM

## 2025-05-06 RX ORDER — BISACODYL 10 MG
10 SUPPOSITORY, RECTAL RECTAL DAILY PRN
Status: DISCONTINUED | OUTPATIENT
Start: 2025-05-06 | End: 2025-05-07 | Stop reason: HOSPADM

## 2025-05-06 RX ORDER — ONDANSETRON 2 MG/ML
8 INJECTION INTRAMUSCULAR; INTRAVENOUS ONCE
Status: COMPLETED | OUTPATIENT
Start: 2025-05-06 | End: 2025-05-06

## 2025-05-06 RX ORDER — SODIUM CHLORIDE 0.9 % (FLUSH) 0.9 %
10 SYRINGE (ML) INJECTION EVERY 12 HOURS SCHEDULED
Status: CANCELLED | OUTPATIENT
Start: 2025-05-06

## 2025-05-06 RX ORDER — POLYETHYLENE GLYCOL 3350 17 G/17G
17 POWDER, FOR SOLUTION ORAL DAILY PRN
Status: CANCELLED | OUTPATIENT
Start: 2025-05-06

## 2025-05-06 RX ORDER — ACETAMINOPHEN 325 MG/1
650 TABLET ORAL EVERY 4 HOURS PRN
Status: CANCELLED | OUTPATIENT
Start: 2025-05-06

## 2025-05-06 RX ORDER — ONDANSETRON 2 MG/ML
4 INJECTION INTRAMUSCULAR; INTRAVENOUS EVERY 6 HOURS PRN
Status: DISCONTINUED | OUTPATIENT
Start: 2025-05-06 | End: 2025-05-07 | Stop reason: HOSPADM

## 2025-05-06 RX ORDER — FAMOTIDINE 10 MG/ML
20 INJECTION, SOLUTION INTRAVENOUS EVERY 12 HOURS SCHEDULED
Status: DISCONTINUED | OUTPATIENT
Start: 2025-05-06 | End: 2025-05-07 | Stop reason: HOSPADM

## 2025-05-06 RX ORDER — BISACODYL 10 MG
10 SUPPOSITORY, RECTAL RECTAL DAILY PRN
Status: CANCELLED | OUTPATIENT
Start: 2025-05-06

## 2025-05-06 RX ORDER — ACETAMINOPHEN 650 MG/1
650 SUPPOSITORY RECTAL EVERY 4 HOURS PRN
Status: CANCELLED | OUTPATIENT
Start: 2025-05-06

## 2025-05-06 RX ORDER — AMOXICILLIN 250 MG
2 CAPSULE ORAL 2 TIMES DAILY PRN
Status: CANCELLED | OUTPATIENT
Start: 2025-05-06

## 2025-05-06 RX ORDER — IOPAMIDOL 408 MG/ML
20 INJECTION, SOLUTION INTRATHECAL
Status: COMPLETED | OUTPATIENT
Start: 2025-05-06 | End: 2025-05-06

## 2025-05-06 RX ORDER — SODIUM CHLORIDE 9 MG/ML
40 INJECTION, SOLUTION INTRAVENOUS AS NEEDED
Status: CANCELLED | OUTPATIENT
Start: 2025-05-06

## 2025-05-06 RX ORDER — SODIUM CHLORIDE 0.9 % (FLUSH) 0.9 %
10 SYRINGE (ML) INJECTION EVERY 12 HOURS SCHEDULED
Status: DISCONTINUED | OUTPATIENT
Start: 2025-05-06 | End: 2025-05-07 | Stop reason: HOSPADM

## 2025-05-06 RX ORDER — BISACODYL 5 MG/1
5 TABLET, DELAYED RELEASE ORAL DAILY PRN
Status: DISCONTINUED | OUTPATIENT
Start: 2025-05-06 | End: 2025-05-07 | Stop reason: HOSPADM

## 2025-05-06 RX ORDER — ACETAMINOPHEN 160 MG/5ML
650 SOLUTION ORAL EVERY 4 HOURS PRN
Status: DISCONTINUED | OUTPATIENT
Start: 2025-05-06 | End: 2025-05-07 | Stop reason: HOSPADM

## 2025-05-06 RX ORDER — FAMOTIDINE 10 MG/ML
20 INJECTION, SOLUTION INTRAVENOUS EVERY 12 HOURS SCHEDULED
Status: CANCELLED | OUTPATIENT
Start: 2025-05-06

## 2025-05-06 RX ORDER — IBUPROFEN 400 MG/1
600 TABLET, FILM COATED ORAL EVERY 4 HOURS PRN
Status: DISCONTINUED | OUTPATIENT
Start: 2025-05-06 | End: 2025-05-07 | Stop reason: HOSPADM

## 2025-05-06 RX ORDER — IBUPROFEN 600 MG/1
600 TABLET, FILM COATED ORAL EVERY 4 HOURS PRN
Status: CANCELLED | OUTPATIENT
Start: 2025-05-06

## 2025-05-06 RX ORDER — HYDROCODONE BITARTRATE AND ACETAMINOPHEN 5; 325 MG/1; MG/1
1 TABLET ORAL EVERY 4 HOURS PRN
Status: DISCONTINUED | OUTPATIENT
Start: 2025-05-06 | End: 2025-05-07 | Stop reason: HOSPADM

## 2025-05-06 RX ORDER — SODIUM CHLORIDE 0.9 % (FLUSH) 0.9 %
10 SYRINGE (ML) INJECTION AS NEEDED
Status: CANCELLED | OUTPATIENT
Start: 2025-05-06

## 2025-05-06 RX ORDER — ACETAMINOPHEN 650 MG/1
650 SUPPOSITORY RECTAL EVERY 4 HOURS PRN
Status: DISCONTINUED | OUTPATIENT
Start: 2025-05-06 | End: 2025-05-07 | Stop reason: HOSPADM

## 2025-05-06 RX ORDER — HYDROMORPHONE HYDROCHLORIDE 1 MG/ML
0.25 INJECTION, SOLUTION INTRAMUSCULAR; INTRAVENOUS; SUBCUTANEOUS ONCE
Status: COMPLETED | OUTPATIENT
Start: 2025-05-06 | End: 2025-05-06

## 2025-05-06 RX ORDER — SODIUM CHLORIDE 9 MG/ML
40 INJECTION, SOLUTION INTRAVENOUS AS NEEDED
Status: DISCONTINUED | OUTPATIENT
Start: 2025-05-06 | End: 2025-05-07 | Stop reason: HOSPADM

## 2025-05-06 RX ORDER — ONDANSETRON 2 MG/ML
4 INJECTION INTRAMUSCULAR; INTRAVENOUS EVERY 6 HOURS PRN
Status: CANCELLED | OUTPATIENT
Start: 2025-05-06

## 2025-05-06 RX ADMIN — ONDANSETRON 4 MG: 2 INJECTION, SOLUTION INTRAMUSCULAR; INTRAVENOUS at 16:25

## 2025-05-06 RX ADMIN — FAMOTIDINE 20 MG: 10 INJECTION INTRAVENOUS at 20:00

## 2025-05-06 RX ADMIN — DEXTROSE MONOHYDRATE AND SODIUM CHLORIDE 125 ML/HR: 5; .45 INJECTION, SOLUTION INTRAVENOUS at 09:08

## 2025-05-06 RX ADMIN — LIDOCAINE HYDROCHLORIDE 2 ML: 10 INJECTION, SOLUTION INFILTRATION; PERINEURAL at 07:05

## 2025-05-06 RX ADMIN — ONDANSETRON 4 MG: 2 INJECTION, SOLUTION INTRAMUSCULAR; INTRAVENOUS at 22:27

## 2025-05-06 RX ADMIN — HYDROCORTISONE SODIUM SUCCINATE 250 MG: 250 INJECTION, POWDER, FOR SOLUTION INTRAMUSCULAR; INTRAVENOUS at 09:03

## 2025-05-06 RX ADMIN — TRIMETHOBENZAMIDE HYDROCHLORIDE 200 MG: 100 INJECTION INTRAMUSCULAR at 07:41

## 2025-05-06 RX ADMIN — SODIUM CHLORIDE 125 ML/HR: 4.5 INJECTION, SOLUTION INTRAVENOUS at 13:16

## 2025-05-06 RX ADMIN — HYDROMORPHONE HYDROCHLORIDE 0.25 MG: 1 INJECTION, SOLUTION INTRAMUSCULAR; INTRAVENOUS; SUBCUTANEOUS at 09:02

## 2025-05-06 RX ADMIN — IOPAMIDOL 20 ML: 408 INJECTION, SOLUTION INTRATHECAL at 07:07

## 2025-05-06 RX ADMIN — HYDROCORTISONE SODIUM SUCCINATE 250 MG: 250 INJECTION, POWDER, FOR SOLUTION INTRAMUSCULAR; INTRAVENOUS at 19:41

## 2025-05-06 RX ADMIN — ONDANSETRON 8 MG: 2 INJECTION, SOLUTION INTRAMUSCULAR; INTRAVENOUS at 10:38

## 2025-05-06 RX ADMIN — HYDROCODONE BITARTRATE AND ACETAMINOPHEN 1 TABLET: 5; 325 TABLET ORAL at 08:27

## 2025-05-06 RX ADMIN — Medication 10 ML: at 22:27

## 2025-05-06 NOTE — PROGRESS NOTES
Received call from XR triage stating patient was still nauseated and dizzy, also that she had not voided since arriving this morning.  Ask RN to do bladder scan before patient sent to room on [facility].  Shortly after this call, RN called back stating that patient was able to get up and void on her own.

## 2025-05-06 NOTE — PROGRESS NOTES
1200:  Received call from radiology holding stating patient dizzy, unable to stand up and ambulate.  Still complaining of nausea despite IV fluids and several doses of antiemetics.  Will admit patient for overnight observation, continue IV fluids, antiemetics and IV steroids.  I saw patient in x-ray holding, she is very tearful, she states that she does not feel like she could walk due to the dizziness.  She denies headache or back pain.

## 2025-05-06 NOTE — DISCHARGE INSTRUCTIONS
EDUCATION /DISCHARGE INSTRUCTIONS:    A myelogram is a special radiology procedure of the spinal cord, spinal nerves and other related structures.  You will be awake during the examination.  An area of your lower back will be cleansed with an antiseptic solution.  The physician will inject a numbing medication in your lower back.  While your back is numb, a needle will be placed in the lower back area.  A small amount of spinal fluid may be withdrawn and sent to the lab if ordered by your physician. While the needle is in the back, an injection of a contrast material (xray dye) will be given through the needle.  The contrast material will allow the physician to see the spinal cord and spinal nerves.  Once injected, the needle will be removed and a band aid will be placed over the injection site.  The table will be tilted during the process to allow the contrast material to flow to particular areas in the spine.  Following the injection and xrays, you will be taken to the CT scanner where more pictures will be taken. After the procedure is finished, the contrast material will be absorbed by your body and eliminated through your kidneys.  The radiologist will study and interpret your myelogram and send the results to your physician.  Procedure risks of a myelogram include, but are not limited to:  *  Bleeding   *  Seizure  *  Infection   *  Headache, possibly severe requiring a blood patch  *  Contrast reaction  *  Nerve or cord injury  *  Paralysis and death    Benefits of the procedure:  *  Best examination for delineating pathology related to spinal cord compression from a disc and/or nerve root compression  Alternatives to the procedure:  MRI - a non invasive procedure requiring intravenous contrast injection.  Cannot be done on patients with certain pacemakers or metal in the body.  MRI risks include possible reaction to the contrast material, movement of metal located in the body.Benefit to MRI:  Non-invasive  and usually painless procedure.    24 hour rest period ends _Wednesday, May 7, 2025 at 10:00 AM__.    Important information following your myelogram:  * ACTIVITY:   *  You may sit up in the car to go home.  *  When you get home, remain on bed rest (flat on your back or on your side) for 24 hours. You may place a rolled up towel under your neck for support  * You may get up to the bathroom and sit up to eat and drink then lie back down  * Drink additional fluids for 24 hours after the myelogram.   * Continue to drink additional fluids for the next 2-3 days. Water and caffeinated beverages are encouraged.  * Remain less active for the next two to three days.  * Do not drive for 24 hours following a myelogram.  * You may remove the bandage and shower in the morning.    CALL YOUR PHYSICIAN FOR THE FOLLOWING:  * Pain at the injection site  * Redness, swelling, bruising or drainage at the injection site.  * A fever by mouth of 101.0 or any new symptoms  Headaches are a common side effect after a myelogram.  If you get a headache, you should stay flat in bed and drink plenty of fluids. If the headache persists and does not go away with rest/medication, CALL Dr. Christian at [phone].

## 2025-05-07 ENCOUNTER — READMISSION MANAGEMENT (OUTPATIENT)
Dept: CALL CENTER | Facility: HOSPITAL | Age: 60
End: 2025-05-07
Payer: COMMERCIAL

## 2025-05-07 VITALS
TEMPERATURE: 98.4 F | DIASTOLIC BLOOD PRESSURE: 67 MMHG | WEIGHT: 140 LBS | HEIGHT: 63 IN | HEART RATE: 77 BPM | OXYGEN SATURATION: 98 % | BODY MASS INDEX: 24.8 KG/M2 | RESPIRATION RATE: 20 BRPM | SYSTOLIC BLOOD PRESSURE: 134 MMHG

## 2025-05-07 LAB
ANION GAP SERPL CALCULATED.3IONS-SCNC: 11 MMOL/L (ref 5–15)
BASOPHILS # BLD MANUAL: 0 10*3/MM3 (ref 0–0.2)
BASOPHILS NFR BLD MANUAL: 0 % (ref 0–1.5)
BUN SERPL-MCNC: 8 MG/DL (ref 8–23)
BUN/CREAT SERPL: 14.5 (ref 7–25)
CALCIUM SPEC-SCNC: 8.9 MG/DL (ref 8.6–10.5)
CHLORIDE SERPL-SCNC: 102 MMOL/L (ref 98–107)
CO2 SERPL-SCNC: 21 MMOL/L (ref 22–29)
CREAT SERPL-MCNC: 0.55 MG/DL (ref 0.57–1)
DEPRECATED RDW RBC AUTO: 45.2 FL (ref 37–54)
EGFRCR SERPLBLD CKD-EPI 2021: 105.1 ML/MIN/1.73
EOSINOPHIL # BLD MANUAL: 0 10*3/MM3 (ref 0–0.4)
EOSINOPHIL NFR BLD MANUAL: 0 % (ref 0.3–6.2)
ERYTHROCYTE [DISTWIDTH] IN BLOOD BY AUTOMATED COUNT: 13.2 % (ref 12.3–15.4)
GLUCOSE SERPL-MCNC: 164 MG/DL (ref 65–99)
HCT VFR BLD AUTO: 41.5 % (ref 34–46.6)
HGB BLD-MCNC: 13.7 G/DL (ref 12–15.9)
LYMPHOCYTES # BLD MANUAL: 0.44 10*3/MM3 (ref 0.7–3.1)
LYMPHOCYTES NFR BLD MANUAL: 1 % (ref 5–12)
MCH RBC QN AUTO: 30.9 PG (ref 26.6–33)
MCHC RBC AUTO-ENTMCNC: 33 G/DL (ref 31.5–35.7)
MCV RBC AUTO: 93.7 FL (ref 79–97)
MONOCYTES # BLD: 0.11 10*3/MM3 (ref 0.1–0.9)
NEUTROPHILS # BLD AUTO: 10.38 10*3/MM3 (ref 1.7–7)
NEUTROPHILS NFR BLD MANUAL: 95 % (ref 42.7–76)
PLAT MORPH BLD: NORMAL
PLATELET # BLD AUTO: 213 10*3/MM3 (ref 140–450)
PMV BLD AUTO: 10.5 FL (ref 6–12)
POTASSIUM SERPL-SCNC: 3.9 MMOL/L (ref 3.5–5.2)
RBC # BLD AUTO: 4.43 10*6/MM3 (ref 3.77–5.28)
RBC MORPH BLD: NORMAL
SODIUM SERPL-SCNC: 134 MMOL/L (ref 136–145)
VARIANT LYMPHS NFR BLD MANUAL: 4 % (ref 19.6–45.3)
WBC MORPH BLD: NORMAL
WBC NRBC COR # BLD AUTO: 10.93 10*3/MM3 (ref 3.4–10.8)

## 2025-05-07 PROCEDURE — 25010000002 HYDROCORTISONE SOD SUC (PF) 250 MG RECONSTITUTED SOLUTION: Performed by: NEUROLOGICAL SURGERY

## 2025-05-07 PROCEDURE — 85027 COMPLETE CBC AUTOMATED: CPT | Performed by: NEUROLOGICAL SURGERY

## 2025-05-07 PROCEDURE — 85007 BL SMEAR W/DIFF WBC COUNT: CPT | Performed by: NEUROLOGICAL SURGERY

## 2025-05-07 PROCEDURE — 25010000002 FAMOTIDINE 10 MG/ML SOLUTION: Performed by: NEUROLOGICAL SURGERY

## 2025-05-07 PROCEDURE — 96376 TX/PRO/DX INJ SAME DRUG ADON: CPT

## 2025-05-07 PROCEDURE — 80048 BASIC METABOLIC PNL TOTAL CA: CPT | Performed by: NEUROLOGICAL SURGERY

## 2025-05-07 PROCEDURE — 99238 HOSP IP/OBS DSCHRG MGMT 30/<: CPT | Performed by: NURSE PRACTITIONER

## 2025-05-07 PROCEDURE — G0378 HOSPITAL OBSERVATION PER HR: HCPCS

## 2025-05-07 RX ORDER — ONDANSETRON 4 MG/1
4 TABLET, ORALLY DISINTEGRATING ORAL EVERY 6 HOURS PRN
Qty: 16 TABLET | Refills: 0 | Status: SHIPPED | OUTPATIENT
Start: 2025-05-07

## 2025-05-07 RX ADMIN — FAMOTIDINE 20 MG: 10 INJECTION INTRAVENOUS at 08:39

## 2025-05-07 RX ADMIN — HYDROCORTISONE SODIUM SUCCINATE 250 MG: 250 INJECTION, POWDER, FOR SOLUTION INTRAMUSCULAR; INTRAVENOUS at 01:34

## 2025-05-07 RX ADMIN — Medication 10 ML: at 08:40

## 2025-05-07 RX ADMIN — HYDROCORTISONE SODIUM SUCCINATE 250 MG: 250 INJECTION, POWDER, FOR SOLUTION INTRAMUSCULAR; INTRAVENOUS at 08:39

## 2025-05-07 NOTE — DISCHARGE SUMMARY
Cheri Lopez  1965    Patient Care Team:  Felicia Gonzalez MD as PCP - General (Internal Medicine)  Idalia Morales MD as Consulting Physician (Obstetrics and Gynecology)    Date of Admit: 5/6/2025    Date of Discharge:  5/7/2025    Discharge Diagnosis:  Dizziness      Procedures Performed  Lumbar myelogram       Complications: None    Consultants:   Consults       No orders found for last 30 day(s).            Condition on Discharge: stable    Discharge disposition: home      Brief HPI: Patient evaluated in office for complaints of back pain with radiation into right leg.  MRI from September 2024 revealed right-sided lateral recess stenosis and foraminal stenosis at L2-3. RBAs of treatment were discussed including the above procedure. Patient consented to above procedure.    Hospital Course: Patient admitted for dizziness and nausea/vomiting after lumbar myelogram.  She did well with the procedure, however became very nauseated and started vomiting while awaiting post procedure time.  She then became very dizzy and was unable to ambulate due to the dizziness spite receiving antiemetics and IV fluids.  The patient was admitted for overnight observation.  Patient reports significant improvement in nausea/vomiting and dizziness this morning.  She is ambulating without assistance and has had no further vomiting since last night.  She has follow-up appoint with Dr. Christian tomorrow( 5/8/25) to review myelogram results.  She is stable and ready for discharge today.    Discharge Physical Exam:    Temp:  [98 °F (36.7 °C)-98.6 °F (37 °C)] 98.4 °F (36.9 °C)  Heart Rate:  [60-78] 77  Resp:  [14-20] 20  BP: (125-150)/(66-77) 134/77    Current labs:  Lab Results (last 24 hours)       Procedure Component Value Units Date/Time    CBC (No Diff) [[id number]]  (Normal) Collected: 05/06/25 1756    Specimen: Blood Updated: 05/06/25 1818     WBC 6.09 10*3/mm3      RBC 4.44 10*6/mm3      Hemoglobin 13.9 g/dL      Hematocrit 42.6  %      MCV 95.9 fL      MCH 31.3 pg      MCHC 32.6 g/dL      RDW 13.3 %      RDW-SD 47.2 fl      MPV 10.0 fL      Platelets 190 10*3/mm3     CBC & Differential [[id number]]  (Abnormal) Collected: 05/07/25 0515    Specimen: Blood Updated: 05/07/25 0751    Narrative:      The following orders were created for panel order CBC & Differential.  Procedure                               Abnormality         Status                     ---------                               -----------         ------                     Manual Differential[[id number]]          Abnormal            Final result               CBC Auto Differential[[id number]]        Abnormal            Final result                 Please view results for these tests on the individual orders.    Basic Metabolic Panel [[id number]]  (Abnormal) Collected: 05/07/25 0515    Specimen: Blood Updated: 05/07/25 0627     Glucose 164 mg/dL      BUN 8 mg/dL      Creatinine 0.55 mg/dL      Sodium 134 mmol/L      Potassium 3.9 mmol/L      Chloride 102 mmol/L      CO2 21.0 mmol/L      Calcium 8.9 mg/dL      BUN/Creatinine Ratio 14.5     Anion Gap 11.0 mmol/L      eGFR 105.1 mL/min/1.73     Narrative:      GFR Categories in Chronic Kidney Disease (CKD)              GFR Category          GFR (mL/min/1.73)    Interpretation  G1                    90 or greater        Normal or high (1)  G2                    60-89                Mild decrease (1)  G3a                   45-59                Mild to moderate decrease  G3b                   30-44                Moderate to severe decrease  G4                    15-29                Severe decrease  G5                    14 or less           Kidney failure    (1)In the absence of evidence of kidney disease, neither GFR category G1 or G2 fulfill the criteria for CKD.    eGFR calculation 2021 CKD-EPI creatinine equation, which does not include race as a factor    Manual Differential [[id number]]  (Abnormal) Collected: 05/07/25 0515     Specimen: Blood Updated: 05/07/25 0751     Neutrophil % 95.0 %      Lymphocyte % 4.0 %      Monocyte % 1.0 %      Eosinophil % 0.0 %      Basophil % 0.0 %      Neutrophils Absolute 10.38 10*3/mm3      Lymphocytes Absolute 0.44 10*3/mm3      Monocytes Absolute 0.11 10*3/mm3      Eosinophils Absolute 0.00 10*3/mm3      Basophils Absolute 0.00 10*3/mm3      RBC Morphology Normal     WBC Morphology Normal     Platelet Morphology Normal    CBC Auto Differential [[id number]]  (Abnormal) Collected: 05/07/25 0515    Specimen: Blood Updated: 05/07/25 0624     WBC 10.93 10*3/mm3      RBC 4.43 10*6/mm3      Hemoglobin 13.7 g/dL      Hematocrit 41.5 %      MCV 93.7 fL      MCH 30.9 pg      MCHC 33.0 g/dL      RDW 13.2 %      RDW-SD 45.2 fl      MPV 10.5 fL      Platelets 213 10*3/mm3               General Appearance No acute distress   HEENT NC/AT;    Neurological Awake, Alert, and oriented x 3   Cranial nerves CN II-XII intact   Gait and station Ambulating without assistance   Back None tender, no swelling or erythema at myelogram puncture site   Extremities Moves all extremities well         Discharge Medications  OTTO has been reviewed and narcotic consent is on file in the patient's chart.     Your medication list        START taking these medications        Instructions Last Dose Given Next Dose Due   ondansetron ODT 4 MG disintegrating tablet  Commonly known as: ZOFRAN-ODT      Place 1 tablet on the tongue Every 6 (Six) Hours As Needed for Nausea or Vomiting.              CONTINUE taking these medications        Instructions Last Dose Given Next Dose Due   baclofen 10 MG tablet  Commonly known as: LIORESAL      TAKE ONE TABLET BY MOUTH THREE TIMES DAILY AS NEEDED FOR MUSCLE SPASMS       cycloSPORINE 0.05 % ophthalmic emulsion  Commonly known as: RESTASIS      Administer 1 drop to both eyes Every 12 (Twelve) Hours.       estradiol 0.05 MG/24HR patch  Commonly known as: LALO ABEBE      PLACE ONE PATCH ONTO THE  SKIN TWICE A WEEK       estradiol 2 MG vaginal ring  Commonly known as: ESTRING      Insert 2 mg into the vagina.       levothyroxine 88 MCG tablet  Commonly known as: SYNTHROID, LEVOTHROID      Take 1 tablet by mouth Every Morning.       Magnesium-Potassium 250-100 MG tablet      Take 1 tablet by mouth Daily.       Probiotic 10 Ultra Strength capsule      Take 1 tablet by mouth Daily.       Progesterone 100 MG capsule  Commonly known as: PROMETRIUM      Take 1 capsule by mouth Every Night. nightly       Sodium Fluoride 5000 Enamel 1.1-5 % gel  Generic drug: Sod Fluoride-Potassium Nitrate      USE AS DIRECTED DO NOT SWALLOW       VITAMIN B COMPLEX PO      Take 200 mcg by mouth Daily.       VITAMIN C PO      Take 1,000 mg by mouth Daily.       vitamin D3 125 MCG (5000 UT) capsule capsule      Take 1 capsule by mouth Daily.       vitamin E 400 UNIT capsule      Take 1 capsule by mouth Daily. HOLD FOR SURGERY              STOP taking these medications      dexAMETHasone 4 MG tablet  Commonly known as: DECADRON                  Where to Get Your Medications        These medications were sent to [facility] [place] [id number]      Hours: Monday to Friday 7 AM to 6 PM, Saturday & Sunday 8 AM to 4:30 PM (Closed 12 PM to 12:30 PM) Phone: [phone]   ondansetron ODT 4 MG disintegrating tablet         Discharge Diet:   Diet Instructions       Diet: Regular/House Diet; Thin (IDDSI 0)      Discharge Diet: Regular/House Diet    Fluid Consistency: Thin (IDDSI 0)          Diet Order   Procedures    Diet: Regular/House; Fluid Consistency: Thin (IDDSI 0)       Activity at Discharge:   Activity Instructions       Discharge Activity      Rest, increase fluids, change positions slowly            Call for: questions or concerns    Follow-up Appointments  Future Appointments   Date Time Provider Department Center   5/8/2025 10:15 AM Jatinder Christian MD [facility]   7/15/2025  9:00 AM  "[facility]   7/22/2025  7:30 AM Felicia Gonzalez MD [facility]      Follow-up Information       Felicia Gonzalez MD .    Specialty: Internal Medicine  Contact information:  [address]  [place] [id number] [phone]                           Additional Instructions for the Follow-ups that You Need to Schedule       Notify Physician or Go To The ED For the Following Conditions   As directed      Including but not limited to fever >100.5, chills, wound concerns (redness, swelling, drainage), new symptoms of numbness, tingling, weakness; new or uncontrolled pain despite using prescribed medications    Order Comments: Including but not limited to fever >100.5, chills, wound concerns (redness, swelling, drainage), new symptoms of numbness, tingling, weakness; new or uncontrolled pain despite using prescribed medications                 Test Results Pending at Discharge     None    I discussed the discharge instructions with patient, family, nursing staff, and Dr Gino Waters, APRN  05/07/25  11:06 EDT    I spent 30 minutes caring for Cheri Lopez on this date of service. This time includes time spent by me in the following activities: preparing for the visit, reviewing tests, obtaining and/or reviewing a separately obtained history, performing a medically appropriate examination and/or evaluation, counseling and educating the patient/family/caregiver, ordering medications, tests, or procedures, referring and communicating with other health care professionals, documenting information in the medical record, independently interpreting results and communicating that information with the patient/family/caregiver, and care coordination         \"Dictated utilizing Dragon dictation\".      "

## 2025-05-07 NOTE — OUTREACH NOTE
Prep Survey      Flowsheet Row Responses   [facility] [place] patient discharged from? [place]   Is LACE score < 7 ? Yes   Eligibility [facility]   Date of Admission 05/06/25   Date of Discharge 05/07/25   Discharge Disposition Home or Self Care   Discharge diagnosis Dizziness Lumbar myelogram   Does the patient have one of the following disease processes/diagnoses(primary or secondary)? Other   Does the patient have Home health ordered? No   Is there a DME ordered? No   Prep survey completed? Yes            ERVIN CARBAJAL - Registered Nurse

## 2025-05-07 NOTE — CASE MANAGEMENT/SOCIAL WORK
Case Management Discharge Note      Final Note: Home---no needs per private auto         Selected Continued Care - Discharged on 5/7/2025 Admission date: 5/6/2025 - Discharge disposition: Home or Self Care      Destination    No services have been selected for the patient.                Durable Medical Equipment    No services have been selected for the patient.                Dialysis/Infusion    No services have been selected for the patient.                Home Medical Care    No services have been selected for the patient.                Therapy    No services have been selected for the patient.                Community Resources    No services have been selected for the patient.                Community & DME    No services have been selected for the patient.                    Transportation Services  Private: Car    Final Discharge Disposition Code: 01 - home or self-care

## 2025-05-07 NOTE — PROGRESS NOTES
Subjective   Patient ID: Cheri Lopez is a 60 y.o. female is here today for follow-up with a new Lumbar Myelogram done on 5/6/2025.    Today patient's symptoms are unchanged    History of Present Illness    This patient returns today.  She continues with pain in her back with radiation into her right leg.  It is mostly in her hip but she does have pain down her right leg to some extent.  It radiates down her anterolateral thigh just to the knee.    The following portions of the patient's history were reviewed and updated as appropriate: allergies, current medications, past family history, past medical history, past social history, past surgical history, and problem list.      Objective     There were no vitals filed for this visit.  There is no height or weight on file to calculate BMI.    Tobacco Use: Medium Risk (5/6/2025)    Patient History     Smoking Tobacco Use: Former     Smokeless Tobacco Use: Never     Passive Exposure: Past          Physical Exam    Neurological:      Mental Status: He is alert and oriented to person, place, and time.       Neurological Exam    Mental Status  Alert. Oriented to person, place, and time.            Assessment & Plan   Independent Review of Radiographic Studies:      I personally reviewed the images from the following studies.    I reviewed her plain films, myelogram, and CT scan myself.  The plain films show previous fusion at L4-5 which appears to be solidly fused.  There is no evidence of abnormal movement on flexion and extension.  On the myelogram itself there is pretty good nerve root filling throughout the lumbar spine.  This is on the initial prone films.  There is some narrowing on the right side however at L2-3.  The other levels mostly look okay even on the standing films.    On the post myelographic CT scan the lower thoracic spine down to L2 looks okay.  At L2-3 there is previous surgery on the right side.  I do not see a lot of compression of the nerve at  that level.  There is not a lot of facet left.  L3-4 really looks okay as does L4-5 and L5-S1.    Medical Decision Making:      I had a long discussion with the patient about situation.  I told her that there is nothing here so severe that we absolutely have to do anything at all.  If she feels the pain is bad enough that we have to do something we could certainly consider surgery at L2-3 but that would be of L3-4 unfused and that makes the question as to whether we should do L3-4 also.  I think the better way to go is to get pain management to see her and do either a facet injection or a selective nerve root injection on the right side at L2-3.    Diagnoses and all orders for this visit:    1. Spinal stenosis of lumbar region with neurogenic claudication (Primary)  -     Ambulatory Referral to Pain Management      Return for Recheck and call after treatment or consultation.

## 2025-05-07 NOTE — PLAN OF CARE
Goal Outcome Evaluation:  Plan of Care Reviewed With: patient, spouse.  Patient had 1 episode nausea / vomiting this shift. Up to BR with SBA..  Plan for home if medically today.        Progress: improving

## 2025-05-08 ENCOUNTER — OFFICE VISIT (OUTPATIENT)
Dept: NEUROSURGERY | Facility: CLINIC | Age: 60
End: 2025-05-08
Payer: COMMERCIAL

## 2025-05-08 ENCOUNTER — TRANSITIONAL CARE MANAGEMENT TELEPHONE ENCOUNTER (OUTPATIENT)
Dept: CALL CENTER | Facility: HOSPITAL | Age: 60
End: 2025-05-08
Payer: COMMERCIAL

## 2025-05-08 DIAGNOSIS — M48.062 SPINAL STENOSIS OF LUMBAR REGION WITH NEUROGENIC CLAUDICATION: Primary | ICD-10-CM

## 2025-05-08 PROCEDURE — 99214 OFFICE O/P EST MOD 30 MIN: CPT | Performed by: NEUROLOGICAL SURGERY

## 2025-05-08 NOTE — OUTREACH NOTE
Call Center TCM Note      Flowsheet Row Responses   [facility] patient discharged from? [place]   Does the patient have one of the following disease processes/diagnoses(primary or secondary)? Other   TCM attempt successful? Yes   Call start time 1243   Call end time 1246   Discharge diagnosis Dizziness Lumbar myelogram   Meds reviewed with patient/caregiver? Yes   Is the patient having any side effects they believe may be caused by any medication additions or changes? No   Does the patient have all medications ordered at discharge? Yes   Is the patient taking all medications as directed (includes completed medication regime)? Yes   Comments Pt had follow up today with neurosurgeon.   Does the patient have an appointment with their PCP within 7-14 days of discharge? No   Nursing Interventions Patient desires to follow up with specialty only   Psychosocial issues? No   Did the patient receive a copy of their discharge instructions? Yes   Nursing interventions Reviewed instructions with patient   What is the patient's perception of their health status since discharge? Improving   Is the patient/caregiver able to teach back signs and symptoms related to disease process for when to call PCP? Yes   Is the patient/caregiver able to teach back signs and symptoms related to disease process for when to call 911? Yes   Is the patient/caregiver able to teach back the hierarchy of who to call/visit for symptoms/problems? PCP, Specialist, Home health nurse, Urgent Care, ED, 911 Yes   If the patient is a current smoker, are they able to teach back resources for cessation? Not a smoker   Additional teach back comments States she is doing well but still has some nausea.  Saw Dr. Christian today.  Will discuss issues with pain meds with PCP at appt in June and declined sooner [facility]   TCM call completed? Yes   Wrap up additional comments No questions or needs at this time.   Call end time 1246            Estrella Hernandez  Nurse    5/8/2025, 12:47 EDT

## 2025-05-27 ENCOUNTER — TELEPHONE (OUTPATIENT)
Dept: INTERNAL MEDICINE | Facility: CLINIC | Age: 60
End: 2025-05-27
Payer: COMMERCIAL

## 2025-05-27 RX ORDER — METHYLPREDNISOLONE 4 MG/1
TABLET ORAL
Qty: 21 TABLET | Refills: 0 | Status: SHIPPED | OUTPATIENT
Start: 2025-05-27

## 2025-05-27 RX ORDER — ALBUTEROL SULFATE 90 UG/1
2 INHALANT RESPIRATORY (INHALATION) EVERY 4 HOURS PRN
Qty: 18 G | Refills: 1 | Status: SHIPPED | OUTPATIENT
Start: 2025-05-27

## 2025-05-27 NOTE — TELEPHONE ENCOUNTER
Caller: Cheri Lopez    Relationship to patient: Self    Best call back number: [phone]    Patient is needing:   PATIENT LEFT THIS [employer] MESSAGE AND WANTS A REPLY FROM WHOEVER CAN VIEW IT THE FASTEST.     -Good morning. On Sunday I went to [facility] care for a very bad cold. I am taking azithromycin 250 mg, amox-clav 875-125 mg tabs, and promethazine DM 5 ml. I am some better, but struggling. They did not take an xray. It was at [facility] in [facility].   Would you like me to follow up with you?  Thank you.    PATIENT IS REQUESTING A CALL BACK.     PLEASE ADVISE.

## 2025-05-30 ENCOUNTER — OFFICE VISIT (OUTPATIENT)
Dept: INTERNAL MEDICINE | Facility: CLINIC | Age: 60
End: 2025-05-30
Payer: COMMERCIAL

## 2025-05-30 VITALS
RESPIRATION RATE: 16 BRPM | HEIGHT: 63 IN | HEART RATE: 99 BPM | WEIGHT: 141 LBS | BODY MASS INDEX: 24.98 KG/M2 | SYSTOLIC BLOOD PRESSURE: 148 MMHG | DIASTOLIC BLOOD PRESSURE: 92 MMHG | OXYGEN SATURATION: 73 %

## 2025-05-30 DIAGNOSIS — R05.1 ACUTE COUGH: Primary | ICD-10-CM

## 2025-05-30 DIAGNOSIS — J40 BRONCHITIS: ICD-10-CM

## 2025-05-30 DIAGNOSIS — R53.83 OTHER FATIGUE: ICD-10-CM

## 2025-05-30 DIAGNOSIS — R09.89 CHEST CONGESTION: ICD-10-CM

## 2025-05-30 PROCEDURE — 99214 OFFICE O/P EST MOD 30 MIN: CPT | Performed by: INTERNAL MEDICINE

## 2025-05-30 RX ORDER — TERCONAZOLE 4 MG/G
1 CREAM VAGINAL NIGHTLY
Qty: 45 G | Refills: 3 | Status: SHIPPED | OUTPATIENT
Start: 2025-05-30

## 2025-05-30 RX ORDER — BENZONATATE 100 MG/1
100 CAPSULE ORAL 3 TIMES DAILY PRN
Qty: 30 CAPSULE | Refills: 3 | Status: SHIPPED | OUTPATIENT
Start: 2025-05-30

## 2025-05-30 NOTE — PROGRESS NOTES
"Chief Complaint   Patient presents with    Cough       Cough  Associated symptoms: shortness of breath, sore throat and wheezing    Associated symptoms: no chest pain, no chills, no ear pain, no fever, no headaches, no myalgias, no postnasal drip, no rash and no rhinorrhea       Cheri Lopez is a 60 y.o. female presents for acute care. Patient notes that about 11 days ago felt \"over tired\". Next day w congestion which progressed over 3-4 days. To icc. Was rx azithromycin w augmentin medrol dose pack, albuterol, and cough suppressant. She started this 3 days ago w cetirizine and fatigue. Mucous improved but continued sinus congestion and fatigue. Notes now \"when I breathe in I still feel junky.\" Initially w fever but no longer feeling febrile.       The following portions of the patient's history were reviewed and updated as appropriate: allergies, current medications, past family history, past medical history, past social history, past surgical history and problem list.  Current Outpatient Medications on File Prior to Visit   Medication Sig Dispense Refill    albuterol sulfate  (90 Base) MCG/ACT inhaler Inhale 2 puffs Every 4 (Four) Hours As Needed for Wheezing or Shortness of Air. 18 g 1    Ascorbic Acid (VITAMIN C PO) Take 1,000 mg by mouth Daily.      B Complex Vitamins (VITAMIN B COMPLEX PO) Take 200 mcg by mouth Daily.      baclofen (LIORESAL) 10 MG tablet TAKE ONE TABLET BY MOUTH THREE TIMES DAILY AS NEEDED FOR MUSCLE SPASMS 45 tablet 2    cycloSPORINE (RESTASIS) 0.05 % ophthalmic emulsion Administer 1 drop to both eyes Every 12 (Twelve) Hours.      estradiol (ESTRING) 2 MG vaginal ring Insert 2 mg into the vagina.      estradiol (MINIVELLE, VIVELLE-DOT) 0.05 MG/24HR patch PLACE ONE PATCH ONTO THE SKIN TWICE A WEEK      levothyroxine (SYNTHROID, LEVOTHROID) 88 MCG tablet Take 1 tablet by mouth Every Morning. 90 tablet 2    Magnesium-Potassium 250-100 MG tablet Take 1 tablet by mouth Daily.      " methylPREDNISolone (MEDROL) 4 MG dose pack Take as directed on package instructions. 21 tablet 0    ondansetron ODT (ZOFRAN-ODT) 4 MG disintegrating tablet Place 1 tablet on the tongue Every 6 (Six) Hours As Needed for Nausea or Vomiting. 16 tablet 0    Probiotic Product (Probiotic 10 Ultra Strength) capsule Take 1 tablet by mouth Daily.      Progesterone (PROMETRIUM) 100 MG capsule Take 1 capsule by mouth Every Night. nightly      Sodium Fluoride 5000 Enamel 1.1-5 % gel USE AS DIRECTED DO NOT SWALLOW      vitamin D3 125 MCG (5000 UT) capsule capsule Take 1 capsule by mouth Daily.      vitamin E 400 UNIT capsule Take 1 capsule by mouth Daily. HOLD FOR SURGERY       No current facility-administered medications on file prior to visit.     Review of Systems   Constitutional:  Positive for fatigue. Negative for chills and fever.   HENT:  Positive for sore throat. Negative for ear pain, postnasal drip and rhinorrhea.    Eyes: Negative.    Respiratory:  Positive for cough, shortness of breath and wheezing.    Cardiovascular:  Negative for chest pain.   Gastrointestinal: Negative.    Endocrine: Negative.    Genitourinary: Negative.    Musculoskeletal: Negative.  Negative for myalgias.   Skin:  Negative for rash.   Allergic/Immunologic: Negative.    Neurological:  Negative for headaches.       Objective   Physical Exam  HENT:      Head: Normocephalic and atraumatic.      Right Ear: Tympanic membrane normal.      Left Ear: Tympanic membrane normal.      Nose: Nose normal.      Mouth/Throat:      Mouth: Mucous membranes are moist.   Eyes:      Extraocular Movements: Extraocular movements intact.      Pupils: Pupils are equal, round, and reactive to light.   Cardiovascular:      Rate and Rhythm: Normal rate.      Pulses: Normal pulses.   Pulmonary:      Effort: Pulmonary effort is normal.      Breath sounds: Rhonchi present.      Comments: Rhonchi. Clears w cough.   Musculoskeletal:         General: Normal range of motion.      " Cervical back: Normal range of motion.   Neurological:      General: No focal deficit present.      Mental Status: She is alert and oriented to person, place, and time.   Psychiatric:         Mood and Affect: Mood normal.         Behavior: Behavior normal.         Thought Content: Thought content normal.         Judgment: Judgment normal.        CXR for cough h/o pneumonia- nad. No prior.     /92   Pulse 99   Resp 16   Ht 160 cm (63\")   Wt 64 kg (141 lb)   SpO2 (!) 73%   BMI 24.98 kg/m²     Assessment & Plan   Diagnoses and all orders for this visit:    Acute cough  -     XR Chest PA & Lateral (In Office)    Chest congestion  -     XR Chest PA & Lateral (In Office)    Other fatigue  -     XR Chest PA & Lateral (In Office)    Bronchitis    Other orders  -     amoxicillin-clavulanate (AUGMENTIN) 875-125 MG per tablet; Take 1 tablet by mouth 2 (Two) Times a Day.  -     benzonatate (Tessalon Perles) 100 MG capsule; Take 1 capsule by mouth 3 (Three) Times a Day As Needed for Cough.  -     terconazole (TERAZOL 7) 0.4 % vaginal cream; Insert 1 applicator into the vagina Every Night.      Patient w acute cough, congestion and fatigue. Reviewed urgent care notes. Did not have full initial response and added medrol w benefit. Will complete 0-14 days augmentin for full coverage. She has a negative cxr today that will be sent for overread. Terazol sent for possible yeast vaginitis on abx. To take probiotic. Bp elevated, advised to d/c bp enhancing decongestants and switch to nasal decongestant. She was given albuterol by nebulizer today w benefit. May use mdi q 6 hr prn but advised this may cause tachycardia.  Hydration and rest emphasized. To ER if any difficulty w respirations. F/u here if worsens or fails to improve.            "

## 2025-06-26 ENCOUNTER — OFFICE VISIT (OUTPATIENT)
Dept: INTERNAL MEDICINE | Facility: CLINIC | Age: 60
End: 2025-06-26
Payer: COMMERCIAL

## 2025-06-26 VITALS
HEART RATE: 72 BPM | OXYGEN SATURATION: 99 % | DIASTOLIC BLOOD PRESSURE: 87 MMHG | WEIGHT: 146.4 LBS | BODY MASS INDEX: 25.94 KG/M2 | HEIGHT: 63 IN | SYSTOLIC BLOOD PRESSURE: 148 MMHG

## 2025-06-26 DIAGNOSIS — R14.0 BLOATING: ICD-10-CM
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
## 2025-06-26 NOTE — PROGRESS NOTES
Stephane Doherty M.D.  Internal Medicine  [facility]  [address], [address]  [place] [address]  [phone]      Chief Complaint  Insect Bite (Tick bite on back of right knee )    SUBJECTIVE  Cheri Lopez is a 60 y.o. female  with hypothyroidism, degenerative disc disease, hypertension who presents to the office today as an established patient of Dr. Gonzalez here for an acute visit.  She last saw me on 10/31/2024 for foot swelling.     History of Present Illness    She reports a tick bite that occurred the previous night around 5:30 PM while cleaning out a rabbit nest in her backyard. Approximately 30 minutes after the activity, she noticed a small tick which she thinks is a seed tick attached to her skin. She promptly removed the tick using tweezers but is uncertain if the head was completely extracted. She expresses concern about potential infection or disease transmission from the tick, given her acquaintances' experiences with alpha-gal syndrome and Lyme disease. 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 Additionally, they use another product that is safe for human skin. She describes the current state of the bite as inflamed and itchy.    She also discusses her ongoing struggle with IBS-C. She drinks about 7 to 8 ounces of kombucha before going to bed at night and takes magnesium supplements but avoids drinking straight magnesium due to its laxative effect. She has previously used Zelnorm, which worked well for her, but it was taken off the market in 2007 or 2008. She has tried Linzess before and may consider it again during her upcoming appointment in July. She reports feeling bloated most of the time.          Review of Systems   Constitutional:  Negative for chills, diaphoresis, fatigue and fever.   HENT:  Negative for congestion and sore throat.    Respiratory:   Negative for cough.    Cardiovascular:  Negative for chest pain.   Gastrointestinal:  Negative for abdominal pain, anorexia, nausea and vomiting.   Genitourinary:  Negative for dysuria.   Musculoskeletal:  Negative for joint pain, myalgias and neck pain.   Skin:  Negative for rash.   Neurological:  Negative for vertigo, weakness, numbness and headaches.     Allergies   Allergen Reactions    Sulfa Antibiotics Other (See Comments)     Elevated liver enzymes     LONG TERM EFFECT ,FOUND IN BLOOD WORK        No outpatient medications have been marked as taking for the 6/26/25 encounter (Office Visit) with Stephane Doherty MD.        Past Medical History:   Diagnosis Date    Ankle sprain 1998    Arthritis     Arthritis of back     Back pain     Chronic pain disorder March 2015    CTS (carpal tunnel syndrome)     Depression     ((Pt Qnr Sub: patient states no depression))     Fracture, fibula     Fracture, tibia and fibula 1994    GERD (gastroesophageal reflux disease)     H/O colonoscopy     Hip arthrosis 2016    Hip pain     History of cellulitis 03/2023    HAND    Hypertension     Hypothyroidism     IBS (irritable bowel syndrome)     Knee pain     Knee swelling 2010    Low back pain     Low back strain 2003    Lumbosacral disc disease     Peripheral neuropathy     Pneumonia     RECURRENT PNEUMONIA WITH HOSPITALIZATIONS    PONV (postoperative nausea and vomiting)     VERY SEVERE    Tear of meniscus of knee 2010, 2020     Past Surgical History:   Procedure Laterality Date    BREAST AUGMENTATION  01/17/2007    BREAST IMPLANT REMOVAL  08/2022    CARPAL TUNNEL RELEASE WITH CUBITAL TUNNEL RELEASE Right     COLONOSCOPY N/A 01/17/2018    Procedure: COLONOSCOPY to cecum and t.i. with polypectomy;  Surgeon: Rima Luis MD;  Location: [facility] ENDOSCOPY;  Service:     EPIDURAL Right 12/10/2024    Procedure: Right L2-L3 transforaminal epidural steroid injection 17170;  Surgeon: Ron Kilpatrick MD;  [place] [facility] [place];   "Service: Pain Management;  Laterality: Right;    HAND SURGERY  2914    HIP DEBRIDEMENT Right     2016 &2021    KNEE ARTHROSCOPY Left 08/12/2020    Procedure: Left KNEE ARTHROSCOPy, partial medial menisectomy and debridement of arthritis;  Surgeon: Génesis Mg MD;  Location: [facility];  Service: Orthopedics;  Laterality: Left;    KNEE CARTILAGE SURGERY Right     LAMINECTOMY  Nov 15, 2023    LUMBAR DISCECTOMY Right 11/15/2023    Procedure: Right lumbar 2 to lumbar 3 laminectomy with possible discectomy using metrx;  Surgeon: Jatinder Christian MD;  Location: [facility];  Service: Neurosurgery;  Laterality: Right;    ORTHOPEDIC SURGERY  mar 2016, jun 2022    rt hip debidement/resurfacing    SINUS SURGERY      DEVIATED SEPTUM    SKIN BIOPSY      hand - precancerous    SKIN BIOPSY      SPINAL FUSION  05/2020    L4 &L5     Family History   Problem Relation Age of Onset    Osteoarthritis Mother     Heart disease Mother     Arthritis Mother     No Known Problems Brother     Breast cancer Maternal Aunt     Cancer Paternal Grandmother     Arthritis Maternal Grandfather     Stroke Maternal Grandmother     Arthritis Maternal Aunt     Malig Hyperthermia Neg Hx     reports that she quit smoking about 20 years ago. Her smoking use included cigarettes. She started smoking about 42 years ago. She has a 5.4 pack-year smoking history. She has been exposed to tobacco smoke. She has never used smokeless tobacco. She reports current alcohol use of about 2.0 standard drinks of alcohol per week. She reports that she does not use drugs.    OBJECTIVE    Vital Signs:   /87   Pulse 72   Ht 160 cm (62.99\")   Wt 66.4 kg (146 lb 6.4 oz)   SpO2 99%   BMI 25.94 kg/m²     Physical Exam  Constitutional:       General: She is not in acute distress.     Appearance: Normal appearance.   Pulmonary:      Effort: Pulmonary effort is normal. No respiratory distress.   Skin:     Comments: Small erythematous area of right popliteal " fossa   Neurological:      Mental Status: She is alert. Mental status is at baseline.   Psychiatric:         Mood and Affect: Mood normal.         Behavior: Behavior normal.         Thought Content: Thought content normal.          Physical Exam      The following data was reviewed by: Stephane Doherty MD on 06/26/2025:  CMP          9/16/2024    08:36 5/7/2025    05:15   CMP   Glucose 85  164    BUN 9  8    Creatinine 0.66  0.55    EGFR 101.2  105.1    Sodium 138  134    Potassium 4.8  3.9    Chloride 104  102    Calcium 9.7  8.9    Total Protein 6.8     Albumin 4.4     Globulin 2.4     Total Bilirubin 0.5     Alkaline Phosphatase 99     AST (SGOT) 55     ALT (SGPT) 45     Albumin/Globulin Ratio 1.8     BUN/Creatinine Ratio 13.6  14.5    Anion Gap  11.0      CBC w/diff          9/16/2024    08:36 5/6/2025    17:56 5/7/2025    05:15   CBC w/Diff   WBC 4.91  6.09  10.93    RBC 4.44  4.44  4.43    Hemoglobin 14.1  13.9  13.7    Hematocrit 41.7  42.6  41.5    MCV 93.9  95.9  93.7    MCH 31.8  31.3  30.9    MCHC 33.8  32.6  33.0    RDW 13.0  13.3  13.2    Platelets 180  190  213    Neutrophil Rel % 51.8      Lymphocyte Rel % 34.8      Monocyte Rel % 10.2      Eosinophil Rel % 1.8      Basophil Rel % 1.0        Lipid Panel          9/16/2024    08:36   Lipid Panel   Total Cholesterol 173    Triglycerides 27    HDL Cholesterol 108    VLDL Cholesterol 7    LDL Cholesterol  58      TSH          9/16/2024    08:36   TSH   TSH 1.930                  ASSESSMENT & PLAN        Tick bite, unspecified site, initial encounter  - The tick bite does not exhibit signs of infection at present, but there is noticeable inflammation.  - Antibiotic treatment is not deemed necessary unless symptoms such as spreading redness, heat, or swelling occur.  - She was not bitten by a deer tick so Lyme disease ppx not indicated  - testing or treatment for alpha-gal not indicated. Alpha gal is spread by the lone star tick  - She has been advised to apply  hydrocortisone cream to alleviate itching.  - If she experiences systemic symptoms such as fever or chills, she should inform the clinic immediately.       Bloating  - She reports ongoing issues with IBS-C and has tried various treatments, including MiraLAX, magnesium, and Zelnorm in the past.  - She is not a fan of MiraLAX and finds magnesium challenging to use due to its side effects.  - She has used Linzess before and may consider discussing it again with her doctor in July.  - A low FODMAP diet has been recommended to help manage bloating. A list of suitable foods will be provided for her reference.         Assessment & Plan          Health Maintenance Due   Topic Date Due    Pneumococcal Vaccine 50+ (1 of 1 - PCV) Never done    ZOSTER VACCINE (1 of 2) Never done    COVID-19 Vaccine (1 - 2024-25 season) Never done    ANNUAL PHYSICAL  03/19/2025        Follow Up  No follow-ups on file.    Patient/family had no further questions at this time and verbalized understanding of the plan discussed today.     Patient or patient representative verbalized consent for the use of Ambient Listening during the visit with  Stephane Doherty MD for chart documentation. 6/26/2025  11:31 EDT

## 2025-07-08 ENCOUNTER — PREP FOR SURGERY (OUTPATIENT)
Dept: SURGERY | Facility: SURGERY CENTER | Age: 60
End: 2025-07-08
Payer: COMMERCIAL

## 2025-07-08 ENCOUNTER — OFFICE VISIT (OUTPATIENT)
Dept: PAIN MEDICINE | Facility: CLINIC | Age: 60
End: 2025-07-08
Payer: COMMERCIAL

## 2025-07-08 ENCOUNTER — TRANSCRIBE ORDERS (OUTPATIENT)
Dept: SURGERY | Facility: SURGERY CENTER | Age: 60
End: 2025-07-08
Payer: COMMERCIAL

## 2025-07-08 VITALS
HEART RATE: 74 BPM | WEIGHT: 146 LBS | BODY MASS INDEX: 25.87 KG/M2 | SYSTOLIC BLOOD PRESSURE: 133 MMHG | TEMPERATURE: 97.9 F | HEIGHT: 63 IN | DIASTOLIC BLOOD PRESSURE: 75 MMHG | OXYGEN SATURATION: 100 %

## 2025-07-08 DIAGNOSIS — M47.816 LUMBAR FACET ARTHROPATHY: Primary | ICD-10-CM

## 2025-07-08 DIAGNOSIS — Z41.9 SURGERY, ELECTIVE: Primary | ICD-10-CM

## 2025-07-08 PROCEDURE — 99214 OFFICE O/P EST MOD 30 MIN: CPT | Performed by: NURSE PRACTITIONER

## 2025-07-08 NOTE — PROGRESS NOTES
CHIEF COMPLAINT  F/U BACK PAIN- PATIENT STATES THAT HER PAIN HAS WORSENED SINCE HER LAST VISIT.     Subjective   Cheri Lopez is a 60 y.o. female  who presents for follow-up.  She has a history of back pain.  She completed a right L2 TFESI on 12/10/2024 performed by Dr. Kilpatrick.  She reports minimal relief with this procedure.     Today her pain is 5/10VAS in severity. She describes her back pain as right low back pain and posterior hip pain. She describes her back pain as aching and burning pain. Her pain is worsened by prolonged sitting, and walking.       Hx of Right hip surgery x 2, most recently in ~June of 2021 or 2022 with Dr. Fallon. She states her right hip has been ruled out as the cause of her pain since her pain returned in July 2024.      Procedure List:  3/27/2025--Right SI joint injection ([facility]minimal relief  12/10/2024-right L2 TFESI-minimal relief    Past pain medications: Tramadol (causes nausea), Toradol injections (unsure how much relief she had with these)     Current pain medications: Baclofen 10 mg at night (managed by her PCP).      Past therapies:  Physical Therapy: Yes, currently in PT, has had 2 sessions, has seen some improvement.   Chiropractor: Yes, years ago  Massage Therapy: Yes, helpful  TENS: Yes, temporary relief in the past (has not used since prior to her fusion)  Dry Needling: Yes, helpful   Neck or back surgery:   5/5/2020: L4-L5 posterior lumbar decompression and fusion performed by Dr. Short  11/15/2023: Right L2-L3 laminectomy performed by Dr. Christian  Past pain management: Dr. Rossi Stein at [facility] pain management, Dr. Sena (now out of network).      Previous Injections:   Multiple injections with Dr. Sena      2/13/2019-phase 1 SCS trialing ([employer])-documented 75% relief with improvement in activity in her post procedure follow up. Referral to Dr. Gauthier for permanent placement. She did not proceed with a SCS phase 2 implant, states she was  not happy with the level of relief from the trial.   1/30/2019-right SI joint injection  12/5/2018-right L2-L5 MBB-80% temporary relief  10/31/2018-right L2-L5 MBB-80% relief x 8 hours  10/3/2018-right SI joint injection-no relief     5/2/2018-LESI at L4-5-no relief ( [facility])    Back Pain  This is a chronic problem. The current episode started more than 1 year ago. The problem occurs constantly. Progression since onset: unstable. The pain is present in the gluteal and lumbar spine. The quality of the pain is described as aching, shooting and stabbing. The pain radiates to the right buttock. The pain is at a severity of 5/10. The symptoms are aggravated by standing (walking). Associated symptoms include weakness (RIGHT HIP). Pertinent negatives include no abdominal pain, chest pain, dysuria, fever, headaches or numbness. She has tried heat, chiropractic manipulation and NSAIDs (PT, dry needling) for the symptoms.      PEG Assessment   What number best describes your pain on average in the past week?5  What number best describes how, during the past week, pain has interfered with your enjoyment of life?5  What number best describes how, during the past week, pain has interfered with your general activity?  7    The following portions of the patient's history were reviewed and updated as appropriate: allergies, current medications, past family history, past medical history, past social history, past surgical history, and problem list.    Review of Systems   Constitutional:  Positive for fatigue. Negative for activity change, chills and fever.   HENT:  Negative for congestion.    Eyes:  Negative for visual disturbance.   Respiratory:  Negative for chest tightness and shortness of breath.    Cardiovascular:  Negative for chest pain.   Gastrointestinal:  Positive for constipation. Negative for abdominal pain and diarrhea.   Genitourinary:  Negative for difficulty urinating, dyspareunia and dysuria.  "  Musculoskeletal:  Positive for back pain.   Neurological:  Positive for weakness (RIGHT HIP). Negative for dizziness, light-headedness, numbness and headaches.   Psychiatric/Behavioral:  Positive for agitation and sleep disturbance. Negative for self-injury and suicidal ideas.      --  The aforementioned information the Chief Complaint section and above subjective data including any HPI data, and also the Review of Systems data, has been personally reviewed and affirmed.  --    Vitals:    07/08/25 0816   BP: 133/75   Pulse: 74   Temp: 97.9 °F (36.6 °C)   SpO2: 100%   Weight: 66.2 kg (146 lb)   Height: 160 cm (63\")   PainSc: 5    PainLoc: Hip  Comment: RIGHT AND BACK     Objective   Physical Exam  Vitals and nursing note reviewed.   Constitutional:       Appearance: Normal appearance. She is well-developed.   Eyes:      General: Lids are normal.   Pulmonary:      Effort: Pulmonary effort is normal.   Musculoskeletal:      Lumbar back: Tenderness and bony tenderness present. Decreased range of motion.      Comments:   +Lumbar facet loading   Neurological:      Mental Status: She is alert and oriented to person, place, and time.      Comments:                                           Motor Function:                                  Right                        Left                           Iliopsoas:                     5                             5      Quadriceps:                 5                             5  Hamstrings:                 5                             5  Tibialis Anterior:          5                             5   Gastroc/Soleus:           5                             5      Psychiatric:         Attention and Perception: Attention normal.         Mood and Affect: Mood normal.         Speech: Speech normal.         Behavior: Behavior normal.         Judgment: Judgment normal.       Assessment & Plan   Diagnoses and all orders for this visit:    1. Lumbar facet arthropathy " (Primary)      Cheri Lopez reports a pain score of 5.  Given her pain assessment as noted, treatment options were discussed and the following options were decided upon as a follow-up plan to address the patient's pain: injections.    Diagnostic Facet Joint Procedure:   MBB     The first diagnostic facet joint procedure is considered medically reasonable and necessary for the diagnosis and treatment of chronic pain for this patient due to the patient meeting all of the following criteria:    - 1. Moderate to severe chronic neck or low back pain, predominantly axial, that causes functional deficit measured on pain or disability scale.  - 2. Pain present for minimum of 3 months with documented failure to respond to noninvasive conservative management (as tolerated)  - 3. Absence of untreated radiculopathy or neurogenic claudication (except for radiculopathy caused by facet joint synovial cyst)  - 4. There is no non-facet pathology per clinical assessment or radiology studies that could explain the source of the patient’s pain, including but not limited to fracture, tumor, infection, or significant deformity.    --- Right L2-L3 MBB   Reviewed the procedure at length with the patient.  Included in the review was expectations, complications, risk and benefits.The procedure was described in detail and the risks, benefits and alternatives were discussed with the patient (including but not limited to: bleeding, infection, nerve damage, worsening of pain, inability to perform injection, paralysis, seizures, coma, no pain relief and death) who agreed to proceed.  Discussed the potential for sedation if warranted/wanted.  The procedure will plan to be performed at [facility] with fluoroscopic guidance(unless ultrasound is indicated) and could potentially have steroids and contrast dye used. Questions were answered and in a way the patient could understand.  Patient verbalized understanding and  wishes to proceed.  This intervention will be ordered.  Discussed with patient that all procedures are part of a multimodal plan of care and include either formal PT or a home exercise program.  Patient has no evidence of coagulopathy or current infection.    --- Follow-up after procedure     Dictated utilizing Dragon dictation.

## 2025-07-08 NOTE — H&P (VIEW-ONLY)
CHIEF COMPLAINT  F/U BACK PAIN- PATIENT STATES THAT HER PAIN HAS WORSENED SINCE HER LAST VISIT.     Subjective   Cheri Lopez is a 60 y.o. female  who presents for follow-up.  She has a history of back pain.  She completed a right L2 TFESI on 12/10/2024 performed by Dr. Kilpatrick.  She reports minimal relief with this procedure.     Today her pain is 5/10VAS in severity. She describes her back pain as right low back pain and posterior hip pain. She describes her back pain as aching and burning pain. Her pain is worsened by prolonged sitting, and walking.       Hx of Right hip surgery x 2, most recently in ~June of 2021 or 2022 with Dr. Fallon. She states her right hip has been ruled out as the cause of her pain since her pain returned in July 2024.      Procedure List:  3/27/2025--Right SI joint injection ([facility])--minimal relief  12/10/2024-right L2 TFESI-minimal relief    Past pain medications: Tramadol (causes nausea), Toradol injections (unsure how much relief she had with these)     Current pain medications: Baclofen 10 mg at night (managed by her PCP).      Past therapies:  Physical Therapy: Yes, currently in PT, has had 2 sessions, has seen some improvement.   Chiropractor: Yes, years ago  Massage Therapy: Yes, helpful  TENS: Yes, temporary relief in the past (has not used since prior to her fusion)  Dry Needling: Yes, helpful   Neck or back surgery:   5/5/2020: L4-L5 posterior lumbar decompression and fusion performed by Dr. Short  11/15/2023: Right L2-L3 laminectomy performed by Dr. Christian  Past pain management: Dr. Rossi Stein at [facility] pain [facility], Dr. Sena (now out of network).      Previous Injections:   Multiple injections with Dr. Sena      2/13/2019-phase 1 SCS trialing ([employer])-documented 75% relief with improvement in activity in her post procedure follow up. Referral to Dr. Gauthier for permanent placement. She did not proceed with a SCS phase 2 implant, states she was  not happy with the level of relief from the trial.   1/30/2019-right SI joint injection  12/5/2018-right L2-L5 MBB-80% temporary relief  10/31/2018-right L2-L5 MBB-80% relief x 8 hours  10/3/2018-right SI joint injection-no relief     5/2/2018-LESI at L4-5-no relief ( [facility])    Back Pain  This is a chronic problem. The current episode started more than 1 year ago. The problem occurs constantly. Progression since onset: unstable. The pain is present in the gluteal and lumbar spine. The quality of the pain is described as aching, shooting and stabbing. The pain radiates to the right buttock. The pain is at a severity of 5/10. The symptoms are aggravated by standing (walking). Associated symptoms include weakness (RIGHT HIP). Pertinent negatives include no abdominal pain, chest pain, dysuria, fever, headaches or numbness. She has tried heat, chiropractic manipulation and NSAIDs (PT, dry needling) for the symptoms.      PEG Assessment   What number best describes your pain on average in the past week?5  What number best describes how, during the past week, pain has interfered with your enjoyment of life?5  What number best describes how, during the past week, pain has interfered with your general activity?  7    The following portions of the patient's history were reviewed and updated as appropriate: allergies, current medications, past family history, past medical history, past social history, past surgical history, and problem list.    Review of Systems   Constitutional:  Positive for fatigue. Negative for activity change, chills and fever.   HENT:  Negative for congestion.    Eyes:  Negative for visual disturbance.   Respiratory:  Negative for chest tightness and shortness of breath.    Cardiovascular:  Negative for chest pain.   Gastrointestinal:  Positive for constipation. Negative for abdominal pain and diarrhea.   Genitourinary:  Negative for difficulty urinating, dyspareunia and dysuria.  "  Musculoskeletal:  Positive for back pain.   Neurological:  Positive for weakness (RIGHT HIP). Negative for dizziness, light-headedness, numbness and headaches.   Psychiatric/Behavioral:  Positive for agitation and sleep disturbance. Negative for self-injury and suicidal ideas.      --  The aforementioned information the Chief Complaint section and above subjective data including any HPI data, and also the Review of Systems data, has been personally reviewed and affirmed.  --    Vitals:    07/08/25 0816   BP: 133/75   Pulse: 74   Temp: 97.9 °F (36.6 °C)   SpO2: 100%   Weight: 66.2 kg (146 lb)   Height: 160 cm (63\")   PainSc: 5    PainLoc: Hip  Comment: RIGHT AND BACK     Objective   Physical Exam  Vitals and nursing note reviewed.   Constitutional:       Appearance: Normal appearance. She is well-developed.   Eyes:      General: Lids are normal.   Pulmonary:      Effort: Pulmonary effort is normal.   Musculoskeletal:      Lumbar back: Tenderness and bony tenderness present. Decreased range of motion.      Comments:   +Lumbar facet loading   Neurological:      Mental Status: She is alert and oriented to person, place, and time.      Comments:                                           Motor Function:                                  Right                        Left                           Iliopsoas:                     5                             5      Quadriceps:                 5                             5  Hamstrings:                 5                             5  Tibialis Anterior:          5                             5   Gastroc/Soleus:           5                             5      Psychiatric:         Attention and Perception: Attention normal.         Mood and Affect: Mood normal.         Speech: Speech normal.         Behavior: Behavior normal.         Judgment: Judgment normal.       Assessment & Plan   Diagnoses and all orders for this visit:    1. Lumbar facet arthropathy " (Primary)      Cheri Lopez reports a pain score of 5.  Given her pain assessment as noted, treatment options were discussed and the following options were decided upon as a follow-up plan to address the patient's pain: injections.    Diagnostic Facet Joint Procedure:   MBB     The first diagnostic facet joint procedure is considered medically reasonable and necessary for the diagnosis and treatment of chronic pain for this patient due to the patient meeting all of the following criteria:    - 1. Moderate to severe chronic neck or low back pain, predominantly axial, that causes functional deficit measured on pain or disability scale.  - 2. Pain present for minimum of 3 months with documented failure to respond to noninvasive conservative management (as tolerated)  - 3. Absence of untreated radiculopathy or neurogenic claudication (except for radiculopathy caused by facet joint synovial cyst)  - 4. There is no non-facet pathology per clinical assessment or radiology studies that could explain the source of the patient’s pain, including but not limited to fracture, tumor, infection, or significant deformity.    --- Right L2-L3 MBB   Reviewed the procedure at length with the patient.  Included in the review was expectations, complications, risk and benefits.The procedure was described in detail and the risks, benefits and alternatives were discussed with the patient (including but not limited to: bleeding, infection, nerve damage, worsening of pain, inability to perform injection, paralysis, seizures, coma, no pain relief and death) who agreed to proceed.  Discussed the potential for sedation if warranted/wanted.  The procedure will plan to be performed at [facility] with fluoroscopic guidance(unless ultrasound is indicated) and could potentially have steroids and contrast dye used. Questions were answered and in a way the patient could understand.  Patient verbalized understanding and  wishes to proceed.  This intervention will be ordered.  Discussed with patient that all procedures are part of a multimodal plan of care and include either formal PT or a home exercise program.  Patient has no evidence of coagulopathy or current infection.    --- Follow-up after procedure     Dictated utilizing Dragon dictation.

## 2025-07-10 DIAGNOSIS — I10 HYPERTENSION, UNSPECIFIED TYPE: Primary | ICD-10-CM

## 2025-07-10 DIAGNOSIS — R79.89 ELEVATED LFTS: ICD-10-CM

## 2025-07-10 DIAGNOSIS — E03.9 ADULT HYPOTHYROIDISM: ICD-10-CM

## 2025-07-16 LAB
ALBUMIN SERPL-MCNC: 3.9 G/DL (ref 3.5–5.2)
ALBUMIN/GLOB SERPL: 1.3 G/DL
ALP SERPL-CCNC: 82 U/L (ref 39–117)
ALT SERPL-CCNC: 20 U/L (ref 1–33)
APPEARANCE UR: CLEAR
AST SERPL-CCNC: 29 U/L (ref 1–32)
BACTERIA #/AREA URNS HPF: NORMAL /[HPF]
BASOPHILS # BLD AUTO: 0.05 10*3/MM3 (ref 0–0.2)
BASOPHILS NFR BLD AUTO: 1 % (ref 0–1.5)
BILIRUB SERPL-MCNC: 0.4 MG/DL (ref 0–1.2)
BILIRUB UR QL STRIP: NEGATIVE
BUN SERPL-MCNC: 9 MG/DL (ref 8–23)
BUN/CREAT SERPL: 10 (ref 7–25)
CALCIUM SERPL-MCNC: 9.5 MG/DL (ref 8.6–10.5)
CASTS URNS QL MICRO: NORMAL /LPF
CHLORIDE SERPL-SCNC: 101 MMOL/L (ref 98–107)
CHOLEST SERPL-MCNC: 179 MG/DL (ref 0–200)
CO2 SERPL-SCNC: 26.8 MMOL/L (ref 22–29)
COLOR UR: YELLOW
CREAT SERPL-MCNC: 0.9 MG/DL (ref 0.57–1)
EGFRCR SERPLBLD CKD-EPI 2021: 73.3 ML/MIN/1.73
EOSINOPHIL # BLD AUTO: 0.07 10*3/MM3 (ref 0–0.4)
EOSINOPHIL NFR BLD AUTO: 1.4 % (ref 0.3–6.2)
EPI CELLS #/AREA URNS HPF: NORMAL /HPF (ref 0–10)
ERYTHROCYTE [DISTWIDTH] IN BLOOD BY AUTOMATED COUNT: 13.9 % (ref 12.3–15.4)
GLOBULIN SER CALC-MCNC: 3 GM/DL
GLUCOSE SERPL-MCNC: 88 MG/DL (ref 65–99)
GLUCOSE UR QL STRIP: NEGATIVE
HCT VFR BLD AUTO: 39.9 % (ref 34–46.6)
HDLC SERPL-MCNC: 96 MG/DL (ref 40–60)
HGB BLD-MCNC: 13.1 G/DL (ref 12–15.9)
HGB UR QL STRIP: NEGATIVE
IMM GRANULOCYTES # BLD AUTO: 0.01 10*3/MM3 (ref 0–0.05)
IMM GRANULOCYTES NFR BLD AUTO: 0.2 % (ref 0–0.5)
KETONES UR QL STRIP: NEGATIVE
LDLC SERPL CALC-MCNC: 77 MG/DL (ref 0–100)
LEUKOCYTE ESTERASE UR QL STRIP: NEGATIVE
LYMPHOCYTES # BLD AUTO: 1.8 10*3/MM3 (ref 0.7–3.1)
LYMPHOCYTES NFR BLD AUTO: 36.7 % (ref 19.6–45.3)
MCH RBC QN AUTO: 31.4 PG (ref 26.6–33)
MCHC RBC AUTO-ENTMCNC: 32.8 G/DL (ref 31.5–35.7)
MCV RBC AUTO: 95.7 FL (ref 79–97)
MICRO URNS: NORMAL
MICRO URNS: NORMAL
MONOCYTES # BLD AUTO: 0.58 10*3/MM3 (ref 0.1–0.9)
MONOCYTES NFR BLD AUTO: 11.8 % (ref 5–12)
NEUTROPHILS # BLD AUTO: 2.39 10*3/MM3 (ref 1.7–7)
NEUTROPHILS NFR BLD AUTO: 48.9 % (ref 42.7–76)
NITRITE UR QL STRIP: NEGATIVE
NRBC BLD AUTO-RTO: 0 /100 WBC (ref 0–0.2)
PH UR STRIP: 7.5 [PH] (ref 5–7.5)
PLATELET # BLD AUTO: 190 10*3/MM3 (ref 140–450)
POTASSIUM SERPL-SCNC: 4.6 MMOL/L (ref 3.5–5.2)
PROT SERPL-MCNC: 6.9 G/DL (ref 6–8.5)
PROT UR QL STRIP: NEGATIVE
RBC # BLD AUTO: 4.17 10*6/MM3 (ref 3.77–5.28)
RBC #/AREA URNS HPF: NORMAL /HPF (ref 0–2)
SODIUM SERPL-SCNC: 137 MMOL/L (ref 136–145)
SP GR UR STRIP: 1.01 (ref 1–1.03)
TRIGL SERPL-MCNC: 22 MG/DL (ref 0–150)
TSH SERPL DL<=0.005 MIU/L-ACNC: 1.75 UIU/ML (ref 0.27–4.2)
URINALYSIS REFLEX: NORMAL
UROBILINOGEN UR STRIP-MCNC: 0.2 MG/DL (ref 0.2–1)
VLDLC SERPL CALC-MCNC: 6 MG/DL (ref 5–40)
WBC # BLD AUTO: 4.9 10*3/MM3 (ref 3.4–10.8)
WBC #/AREA URNS HPF: NORMAL /HPF (ref 0–5)

## 2025-07-22 ENCOUNTER — HOSPITAL ENCOUNTER (OUTPATIENT)
Facility: SURGERY CENTER | Age: 60
Setting detail: HOSPITAL OUTPATIENT SURGERY
Discharge: HOME | End: 2025-07-22
Attending: ANESTHESIOLOGY | Admitting: ANESTHESIOLOGY
Payer: COMMERCIAL

## 2025-07-22 ENCOUNTER — OFFICE VISIT (OUTPATIENT)
Dept: INTERNAL MEDICINE | Facility: CLINIC | Age: 60
End: 2025-07-22
Payer: COMMERCIAL

## 2025-07-22 ENCOUNTER — HOSPITAL ENCOUNTER (OUTPATIENT)
Dept: GENERAL RADIOLOGY | Facility: SURGERY CENTER | Age: 60
Setting detail: HOSPITAL OUTPATIENT SURGERY
End: 2025-07-22
Payer: COMMERCIAL

## 2025-07-22 VITALS
TEMPERATURE: 98.4 F | SYSTOLIC BLOOD PRESSURE: 135 MMHG | HEART RATE: 74 BPM | OXYGEN SATURATION: 99 % | DIASTOLIC BLOOD PRESSURE: 80 MMHG | RESPIRATION RATE: 17 BRPM

## 2025-07-22 VITALS
HEART RATE: 69 BPM | HEIGHT: 63 IN | DIASTOLIC BLOOD PRESSURE: 80 MMHG | OXYGEN SATURATION: 99 % | BODY MASS INDEX: 24.98 KG/M2 | SYSTOLIC BLOOD PRESSURE: 150 MMHG | WEIGHT: 141 LBS

## 2025-07-22 DIAGNOSIS — M48.061 LUMBAR FORAMINAL STENOSIS: ICD-10-CM

## 2025-07-22 DIAGNOSIS — Z41.9 SURGERY, ELECTIVE: ICD-10-CM

## 2025-07-22 DIAGNOSIS — E03.9 ADULT HYPOTHYROIDISM: ICD-10-CM

## 2025-07-22 DIAGNOSIS — R60.0 PERIPHERAL EDEMA: ICD-10-CM

## 2025-07-22 DIAGNOSIS — I10 HYPERTENSION, UNSPECIFIED TYPE: ICD-10-CM

## 2025-07-22 DIAGNOSIS — Z00.00 HEALTHCARE MAINTENANCE: Primary | ICD-10-CM

## 2025-07-22 PROCEDURE — 64493 INJ PARAVERT F JNT L/S 1 LEV: CPT | Performed by: ANESTHESIOLOGY

## 2025-07-22 PROCEDURE — 25510000001 IOPAMIDOL 61 % SOLUTION 30 ML VIAL: Performed by: ANESTHESIOLOGY

## 2025-07-22 PROCEDURE — 76000 FLUOROSCOPY <1 HR PHYS/QHP: CPT

## 2025-07-22 PROCEDURE — 77002 NEEDLE LOCALIZATION BY XRAY: CPT

## 2025-07-22 PROCEDURE — 25010000002 BUPIVACAINE (PF) 0.5 % SOLUTION 10 ML VIAL: Performed by: ANESTHESIOLOGY

## 2025-07-22 PROCEDURE — 99396 PREV VISIT EST AGE 40-64: CPT | Performed by: INTERNAL MEDICINE

## 2025-07-22 PROCEDURE — 25010000002 LIDOCAINE PF 1% 1 % SOLUTION 5 ML VIAL: Performed by: ANESTHESIOLOGY

## 2025-07-22 RX ORDER — OLMESARTAN MEDOXOMIL 5 MG/1
5 TABLET ORAL DAILY
Qty: 90 TABLET | Refills: 1 | Status: SHIPPED | OUTPATIENT
Start: 2025-07-22

## 2025-07-22 NOTE — PROGRESS NOTES
Subjective   CPE  DDD w radiculopathy spinal stenosis  Hypothyroid  Hypertension    Cheri Lopez is a 60 y.o. female who presents for a complete physical exam, to review chronic issues, and to address any acute needs.   She has chronic low back pain. To get medial branch block and determine if needs nerve ablation.   Patient  also takes magnesium in the evening.   Patient is euthyroid on synthroid replacement.   Elevated bp. Patient was treated with losartan in the past. Last several office visits has elevated bp. When on losartan she did become light headed.   B leg swelling while fishing L>R      Review of Systems   Constitutional: Negative.    HENT: Negative.     Eyes: Negative.    Respiratory: Negative.     Cardiovascular: Negative.    Gastrointestinal: Negative.    Endocrine: Negative.    Genitourinary: Negative.    Musculoskeletal: Negative.    Skin: Negative.    Allergic/Immunologic: Negative.    Neurological: Negative.    Hematological: Negative.    Psychiatric/Behavioral: Negative.         The following portions of the patient's history were reviewed and updated as appropriate: allergies, current medications, past family history, past medical history, past social history, past surgical history, and problem list.     Patient Active Problem List   Diagnosis    Chronic constipation    Blues    Adult hypothyroidism    Encounter for screening for malignant neoplasm of colon    Acute right-sided low back pain with right-sided sciatica    DDD (degenerative disc disease), lumbar    Chronic right-sided low back pain with right-sided sciatica    Right hip pain    Sacroiliac joint dysfunction    Allergic rhinitis due to allergen    Scoliosis due to degenerative disease of spine in adult patient    Low back pain    Neural foraminal stenosis of lumbar spine    Tear of medial meniscus of left knee, current    Hypertension    Multifocal pneumonia    Elevated LFTs    Viral pneumonia    Lumbar foraminal stenosis     Lumbar radiculopathy    Sacroiliitis    Dizziness    Lumbar facet arthropathy       Past Medical History:   Diagnosis Date    Ankle sprain 1998    Arthritis     Arthritis of back     Back pain     Chronic pain disorder March 2015    CTS (carpal tunnel syndrome)     Depression     ((Pt Qnr Sub: patient states no depression))     Fracture, fibula     Fracture, tibia and fibula 1994    GERD (gastroesophageal reflux disease)     H/O colonoscopy     Hip arthrosis 2016    Hip pain     History of cellulitis 03/2023    HAND    Hypertension     Hypothyroidism     IBS (irritable bowel syndrome)     Knee pain     Knee swelling 2010    Lactose intolerance 1995    Low back pain     Low back strain 2003    Lumbosacral disc disease     Peripheral neuropathy     Pneumonia     RECURRENT PNEUMONIA WITH HOSPITALIZATIONS    PONV (postoperative nausea and vomiting)     VERY SEVERE    Tear of meniscus of knee 2010, 2020       Past Surgical History:   Procedure Laterality Date    BREAST AUGMENTATION  01/17/2007    BREAST IMPLANT REMOVAL  08/2022    BREAST SURGERY  2007 implants, 2022 removal    CARPAL TUNNEL RELEASE  3/2014    CARPAL TUNNEL RELEASE WITH CUBITAL TUNNEL RELEASE Right     COLONOSCOPY N/A 01/17/2018    Procedure: COLONOSCOPY to cecum and t.i. with polypectomy;  Surgeon: Rima Luis MD;  Location: [facility] ENDOSCOPY;  Service:     EPIDURAL Right 12/10/2024    Procedure: Right L2-L3 transforaminal epidural steroid injection 45019;  Surgeon: Ron Kilpatrick MD;  Location: [facility] MAIN OR;  Service: Pain Management;  Laterality: Right;    HAND SURGERY  2914    HIP DEBRIDEMENT Right     2016 &2021    KNEE ARTHROSCOPY Left 08/12/2020    Procedure: Left KNEE ARTHROSCOPy, partial medial menisectomy and debridement of arthritis;  Surgeon: Génesis Mg MD;  Location: [facility] OR OSC;  Service: Orthopedics;  Laterality: Left;    KNEE CARTILAGE SURGERY Right     LAMINECTOMY  Nov 15, 2023    LUMBAR DISCECTOMY Right 11/15/2023     Procedure: Right lumbar 2 to lumbar 3 laminectomy with possible discectomy using metrx;  Surgeon: Jatinder Christian MD;  Location: [facility] OR;  Service: Neurosurgery;  Laterality: Right;    ORTHOPEDIC SURGERY  mar 2016, 2022    rt hip debidement/resurfacing    SINUS SURGERY      DEVIATED SEPTUM    SKIN BIOPSY      hand - precancerous    SKIN BIOPSY      SPINAL FUSION  2020    L4 &L5       Family History   Problem Relation Age of Onset    Osteoarthritis Mother     Heart disease Mother     Arthritis Mother     Alcohol abuse Mother     Depression Mother     Alcohol abuse Father     Depression Father     No Known Problems Brother     Breast cancer Maternal Aunt     Cancer Paternal Grandmother     Arthritis Maternal Grandfather     Cancer Maternal Grandfather     Stroke Maternal Grandmother     Arthritis Maternal Aunt     Arthritis Maternal Aunt     Cancer Maternal Aunt     Arthritis Maternal Aunt     Cancer Maternal Aunt     Malig Hyperthermia Neg Hx        Social History     Socioeconomic History    Marital status:     Number of children: 0    Years of education: ms   Tobacco Use    Smoking status: Former     Current packs/day: 0.00     Average packs/day: 0.3 packs/day for 21.8 years (5.5 ttl pk-yrs)     Types: Cigarettes     Start date: 1983     Quit date: 2005     Years since quittin.5     Passive exposure: Past    Smokeless tobacco: Never    Tobacco comments:     social smoker - never really smoked much   Vaping Use    Vaping status: Never Used   Substance and Sexual Activity    Alcohol use: Yes     Alcohol/week: 7.0 standard drinks of alcohol     Types: 7 Glasses of wine per week    Drug use: Never    Sexual activity: Yes     Partners: Male     Birth control/protection: Vasectomy       Current Outpatient Medications on File Prior to Visit   Medication Sig Dispense Refill    Ascorbic Acid (VITAMIN C PO) Take 1,000 mg by mouth Daily.      B Complex Vitamins (VITAMIN B COMPLEX PO) Take  200 mcg by mouth Daily.      cycloSPORINE (RESTASIS) 0.05 % ophthalmic emulsion Administer 1 drop to both eyes Every 12 (Twelve) Hours.      estradiol (ESTRING) 2 MG vaginal ring Insert 2 mg into the vagina.      estradiol (MINIVELLE, VIVELLE-DOT) 0.05 MG/24HR patch PLACE ONE PATCH ONTO THE SKIN TWICE A WEEK      levothyroxine (SYNTHROID, LEVOTHROID) 88 MCG tablet Take 1 tablet by mouth Every Morning. 90 tablet 2    Magnesium-Potassium 250-100 MG tablet Take 1 tablet by mouth Daily.      Probiotic Product (Probiotic 10 Ultra Strength) capsule Take 1 tablet by mouth Daily.      Progesterone (PROMETRIUM) 100 MG capsule Take 1 capsule by mouth Every Night. nightly      Sodium Fluoride 5000 Enamel 1.1-5 % gel USE AS DIRECTED DO NOT SWALLOW      vitamin D3 125 MCG (5000 UT) capsule capsule Take 1 capsule by mouth Daily.      vitamin E 400 UNIT capsule Take 1 capsule by mouth Daily. HOLD FOR SURGERY       No current facility-administered medications on file prior to visit.       Allergies   Allergen Reactions    Sulfa Antibiotics Other (See Comments)     Elevated liver enzymes     LONG TERM EFFECT ,FOUND IN BLOOD WORK       Immunization History   Administered Date(s) Administered    Anthrax 09/11/2008, 10/05/2008, 10/21/2008    Cholera 12/02/1990    Flu Vaccine Split Quad 09/22/2015    Flublok 18+yrs 11/14/2020    Fluzone  >6mos 10/12/2012, 11/01/2019    Fluzone (or Fluarix & Flulaval for VFC) >6mos 09/22/2015    Fluzone Quad >6mos (Multi-dose) 11/01/2019    H1N1 Inj 12/08/2009    Hepatitis A 10/03/1998, 06/05/1999, 01/01/2008, 06/01/2008    Hepatitis B Adult/Adolescent IM 08/27/2008, 09/27/2008, 05/16/2009    Influenza Whole 11/01/1997, 10/03/1998, 10/16/1999, 01/06/2001, 12/01/2001, 11/02/2002, 11/01/2003    Influenza, Unspecified 11/01/2008, 11/09/2010, 09/09/2011, 09/09/2013, 10/01/2020, 11/14/2020    MMR 12/01/1990    Meningococcal Polysaccharide 04/24/2002    OPV 10/01/1989    PPD Test 10/03/1998, 06/05/1999,  "01/03/2001, 12/01/2001, 11/02/2002, 11/15/2007    Smallpox 09/11/2008    TD Preservative Free (Tenivac) 02/20/2023    TYPHOID, PARENTERAL, AKD ([place] ) 07/01/1995    Td (TDVAX) 06/01/1989, 06/05/1999    Td, Unspecified 01/01/2003, 10/01/2008    Tdap 09/17/2009, 01/01/2017    Typhoid Inactivated 05/16/2006, 05/18/2008, 06/30/2010    Typhoid Live 05/16/1998    Yellow Fever 10/01/1989, 09/11/1999    influenza Split 11/19/2005, 11/15/2006, 11/15/2007, 11/19/2008, 09/17/2009, 10/07/2010    typhoid, parenteral 06/07/2003       Objective     /80   Pulse 69   Ht 160 cm (63\")   Wt 64 kg (141 lb)   SpO2 99%   BMI 24.98 kg/m²     Physical Exam  Vitals and nursing note reviewed.   Constitutional:       Appearance: Normal appearance. She is well-developed.   HENT:      Head: Normocephalic and atraumatic.      Right Ear: Tympanic membrane and external ear normal.      Left Ear: Tympanic membrane and external ear normal.      Nose: Nose normal.      Mouth/Throat:      Mouth: Mucous membranes are moist.   Eyes:      Extraocular Movements: Extraocular movements intact.      Pupils: Pupils are equal, round, and reactive to light.   Cardiovascular:      Rate and Rhythm: Normal rate and regular rhythm.      Pulses: Normal pulses.      Heart sounds: Normal heart sounds.   Pulmonary:      Effort: Pulmonary effort is normal. No respiratory distress.      Breath sounds: Normal breath sounds.   Abdominal:      General: Abdomen is flat.      Palpations: Abdomen is soft.   Musculoskeletal:         General: Normal range of motion.      Cervical back: Normal range of motion and neck supple.   Skin:     General: Skin is warm and dry.   Neurological:      General: No focal deficit present.      Mental Status: She is alert and oriented to person, place, and time.   Psychiatric:         Mood and Affect: Mood normal.         Behavior: Behavior normal.         Thought Content: Thought content normal.         Judgment: Judgment " normal.       Assessment & Plan   Diagnoses and all orders for this visit:    1. Healthcare maintenance (Primary)    2. Hypertension, unspecified type    3. Adult hypothyroidism    4. Lumbar foraminal stenosis    5. Peripheral edema        Discussion    Patient presents today for a CPE.  She is having increased pain and will f/u w pain management and will have nerve block w possible ablation. She has htn w elevated bp on several testing. Will start olmesartan 5 mg daily. May hold for high heat/ high activity days. She will continue current synthroid dosing. Will moitor. She will start wearing compression stockings during the day. To avoid compressive neuropathy at night and discussed sleep positioning in regards to this. Reviewed all labs.     Patient follows a healthy diet.   Patient follows an adequate exercise regimen. Mammogram is up to date.   Colon cancer screening is up to date.   Pap smears are performed by the patient's gynecologist.   Immunizations are up to date.   Discussed importance of preventative care including vaccinations, age appropriate cancer screening, routine lab work, healthy diet, and active lifestyle.         Future Appointments   Date Time Provider Department Center   7/22/2025  2:15 PM [facility] PAIN [facility] EP XR OR None   7/29/2025  9:20 AM Xiomara Dhillon APRN [facility] PM EASPT [facility]   8/5/2025 10:30 AM [facility] MAIN PAIN OR [facility] EP XR OR None   8/13/2025  9:40 AM Xiomara Dhillon APRN [facility] PM EASPT [facility]

## 2025-07-22 NOTE — DISCHARGE INSTRUCTIONS
[facility] - Post-procedure Instructions          --  While there are no absolute restrictions, it is recommended that you do not perform strenuous activity today. In the morning, you may resume your level of activity as before your block.    --  If you have a band-aid at your injection site, please remove it later today. Observe the area for any redness, swelling, pus-like drainage, or a temperature over 101°. If any of these symptoms occur, please call your doctor at [phone]. If after office hours, leave a message and the on-call provider will return your call.    --  Ice may be applied to your injection site. It is recommended you avoid direct heat (heating pad; hot tub) for 1-2 days.    --  Call [facility] at [phone] if you experience persistent headache, persistent bleeding from the injection site, or severe pain not relieved by heat or oral medication.    --  Do not make important decisions today.    --  Due to the effects of the block and/or the I.V. Sedation, DO NOT drive or operate hazardous machinery for 12 hours.  Local anesthetics may cause numbness after procedure and precautions must be taken with regards to operating equipment as well as with walking, even if ambulating with assistance of another person or with an assistive device.    --  Do not drink alcohol for 12 hours.    -- You may return to work tomorrow, or as directed by your referring doctor.    --  Occasionally you may notice a slight increase in your pain after the procedure. This should start to improve within the next 24-48 hours. Radiofrequency ablation procedure pain may last 3-4 weeks.    --  It may take as long as 3-4 days before you notice a gradual improvement in your pain and/or other symptoms.    -- You may continue to take your prescribed pain medication as needed.    --  Some normal possible side effects of steroid use could include fluid retention, increased blood sugar, dull headache,  increased sweating, increased appetite, mood swings and flushing.    --  Diabetics are recommended to watch their blood glucose level closely for 24-48 hours after the injection.    --  Must stay in PACU for 20 min upon arrival and prove no leg weakness before being discharged.    --  IN THE EVENT OF A LIFE THREATENING EMERGENCY, (CHEST PAIN, BREATHING DIFFICULTIES, PARALYSIS…) YOU SHOULD GO TO YOUR NEAREST EMERGENCY ROOM.    --  You should be contacted by our office within 2-3 days to schedule follow up or next appointment date.  If not contacted within 7 days, please call the office at [phone]

## 2025-07-22 NOTE — OP NOTE
Lumbar Medial Branch Blockade  [facility]      PREOPERATIVE DIAGNOSIS:  Lumbar spondylosis without myelopathy    POSTOPERATIVE DIAGNOSIS:  Lumbar spondylosis without myelopathy    PROCEDURE:   Diagnostic right  Lumbar Medial Branch Nerve Blockades, with fluoroscopy:   L2 and L3 nerve levels    PRE-PROCEDURE DISCUSSION WITH PATIENT:    Risks and complications were discussed with the patient prior to starting the procedure and informed consent was obtained.      SURGEON:  Ron Kilpatrick MD    REASON FOR PROCEDURE:   Painful area identified on exam under fluoroscopy    SEDATION:  Patient declined administration of moderate sedation        ANESTHETIC:  Marcaine 0.5%  STEROID:  NONE  TOTAL VOLUME OF SOLUTION:  2 mL    DESCRIPTON OF PROCEDURE:  After obtaining informed consent, IV access was not obtained in the preoperative area.   The patient was taken to the operating room.  The patient was placed in the prone position with a pillow under the abdomen. All pressure points were well padded.  EKG, blood pressure, and pulse oximeter were monitored.  The patient was monitored and sedated by the RN under my direction. The lumbosacral area was prepped with Chloraprep and draped in a sterile fashion.     Under fluoroscopic guidance the vertebral  transverse processes areas at the appropriate vertebral levels were located, and points were identified at the junctions of the superior articular processes with the superior articular processes.     Skin and subcutaneous tissue were anesthetized with 1% lidocaine above each of these points. A 22-gauge spinal needle was introduced under fluoroscopic guidance at the above junctions. Aspiration was negative for blood and CSF.  After confirming the position of the needle with fluoroscope in all views, 0.25 mL of Omnipaque was injected, and after seeing the proper spread a total of 1 mL of the anesthetic solution noted above was injected at each of these points.   Needles were removed intact from each of the areas.  Onset of analgesia was noted.  Vital signs remained stable throughout.      ESTIMATED BLOOD LOSS:  <5 mL  SPECIMENS:  none    COMPLICATIONS:   No complications were noted., There was no indication of vascular uptake on live injection of contrast dye., and There was not any evidence of dural puncture.      TOLERANCE & DISCHARGE CONDITION:    The patient tolerated the procedure well.  The patient was transported to the recovery area without difficulties.  The patient was discharged to home under the care of family in stable and satisfactory condition.    PLAN OF CARE:  The patient was given our standard instruction sheet.  We discussed that Lumbar Medial Branch Blockade is a diagnostic procedure in consideration for radiofrequency ablation if two diagnostic procedures prove to be positive for significant benefit.  If sustained relief of 6 to eight weeks is obtained, then an alternative plan could be therapeutic lumbar branch blockades.  The patient is asked to keep a pain log each hour for 8 hours after the procedure today.  The patient will  Return to clinic 1 wk.  The patient will resume all medications as per the medication reconciliation sheet.

## 2025-07-22 NOTE — INTERVAL H&P NOTE
H&P reviewed. The patient was examined and there are no changes to the H&P.  Exam under fluoro to follow

## 2025-07-29 ENCOUNTER — PREP FOR SURGERY (OUTPATIENT)
Dept: SURGERY | Facility: SURGERY CENTER | Age: 60
End: 2025-07-29
Payer: COMMERCIAL

## 2025-07-29 ENCOUNTER — TRANSCRIBE ORDERS (OUTPATIENT)
Dept: SURGERY | Facility: SURGERY CENTER | Age: 60
End: 2025-07-29
Payer: COMMERCIAL

## 2025-07-29 ENCOUNTER — OFFICE VISIT (OUTPATIENT)
Dept: PAIN MEDICINE | Facility: CLINIC | Age: 60
End: 2025-07-29
Payer: COMMERCIAL

## 2025-07-29 VITALS
HEIGHT: 63 IN | RESPIRATION RATE: 18 BRPM | WEIGHT: 146 LBS | BODY MASS INDEX: 25.87 KG/M2 | DIASTOLIC BLOOD PRESSURE: 72 MMHG | TEMPERATURE: 97.2 F | HEART RATE: 69 BPM | SYSTOLIC BLOOD PRESSURE: 118 MMHG | OXYGEN SATURATION: 98 %

## 2025-07-29 DIAGNOSIS — M47.816 LUMBAR FACET ARTHROPATHY: Primary | ICD-10-CM

## 2025-07-29 DIAGNOSIS — Z41.9 SURGERY, ELECTIVE: Primary | ICD-10-CM

## 2025-07-29 DIAGNOSIS — M53.3 SACROILIAC JOINT DYSFUNCTION: ICD-10-CM

## 2025-07-29 PROCEDURE — 99214 OFFICE O/P EST MOD 30 MIN: CPT | Performed by: NURSE PRACTITIONER

## 2025-07-29 RX ORDER — SODIUM CHLORIDE 0.9 % (FLUSH) 0.9 %
10 SYRINGE (ML) INJECTION EVERY 12 HOURS SCHEDULED
OUTPATIENT
Start: 2025-07-29

## 2025-07-29 RX ORDER — SODIUM CHLORIDE 0.9 % (FLUSH) 0.9 %
10 SYRINGE (ML) INJECTION AS NEEDED
OUTPATIENT
Start: 2025-07-29

## 2025-07-29 NOTE — PROGRESS NOTES
CHIEF COMPLAINT  Follow-up for back pain.    Subjective   Cheri Lopez is a 60 y.o. female  who presents to the office for follow-up of procedure.  She completed a right L2-L3 on  7/22/2025 performed by Dr. Kilpatrick for management of back pain. Patient reports 80% relief the day of the procedure.  She tolerated Vacuuming and household tasks which typically aggravate her back pain.      Today her pain is 6/10VAS in severity. She describes her back pain as right lower lumbar and posterior hip pain. She describes her back pain as aching and burning pain. Her pain is worsened by prolonged sitting, twisting, sleep, prolonged standing.  Improves some with ice.       Pertinent surgeries:  Hx of Right hip surgery x 2, most recently in 2022     Recent Procedure List:  3[facility]27[facility]2025--[facility]minimal relief  12/10/2024-right L2 TFESI-minimal relief     Past pain medications: Tramadol (causes nausea), Toradol injections (unsure how much relief she had with these)     Current pain medications: Baclofen 10 mg at night (managed by her PCP).      Past therapies:  Physical Therapy: Yes, currently in PT, has had 2 sessions, has seen some improvement.   Chiropractor: Yes, years ago  Massage Therapy: Yes, helpful  TENS: Yes, temporary relief in the past (has not used since prior to her fusion)  Dry Needling: Yes, helpful   Neck or back surgery:   5/5/2020: L4-L5 posterior lumbar decompression and fusion performed by Dr. Short  11/15/2023: Right L2-L3 laminectomy performed by Dr. Christian  Past pain management: Dr. Rossi Stein at [facility] pain [facility], Dr. Sena (now out of network).      Previous Injections:   Multiple injections with Dr. Sena   2/13/2019-phase 1 SCS trialing ([employer])-documented 75% relief with improvement in activity in her post procedure follow up. Referral to Dr. Gauthier for permanent placement. She did not proceed with a SCS phase 2 implant, states she was not happy  "with the level of relief from the trial.   1/30/2019-right SI joint injection  12/5/2018-right L2-L5 MBB-80% temporary relief  10/31/2018-right L2-L5 MBB-80% relief x 8 hours  10/3/2018-right SI joint injection-no relief     5/2/2018-LESI at L4-5-no relief ( [facility])          History of Present Illness     PEG Assessment   What number best describes your pain on average in the past week?6  What number best describes how, during the past week, pain has interfered with your enjoyment of life?10  What number best describes how, during the past week, pain has interfered with your general activity?  7    Review of Pertinent Medical Data ---  MRI LUMBAR      The following portions of the patient's history were reviewed and updated as appropriate: allergies, current medications, past family history, past medical history, past social history, past surgical history, and problem list.    Review of Systems   Gastrointestinal:  Positive for constipation. Negative for diarrhea.   Genitourinary:  Negative for difficulty urinating.   Musculoskeletal:  Positive for back pain.   Neurological:  Positive for weakness and numbness.   Psychiatric/Behavioral:  Positive for sleep disturbance. Negative for suicidal ideas. The patient is not nervous/anxious.      I have reviewed and confirmed the accuracy of the ROS as documented by the MA/JINNY/RN IVANNA Sandoval     Vitals:    07/29/25 0917   BP: 118/72   Pulse: 69   Resp: 18   Temp: 97.2 °F (36.2 °C)   SpO2: 98%   Weight: 66.2 kg (146 lb)   Height: 160 cm (63\")   PainSc: 6    PainLoc: Back     Objective   Physical Exam  Vitals and nursing note reviewed.   Constitutional:       General: She is not in acute distress.     Appearance: Normal appearance. She is not ill-appearing.   Pulmonary:      Effort: Pulmonary effort is normal. No respiratory distress.   Musculoskeletal:      Lumbar back: Tenderness and bony tenderness present. Negative right straight leg raise test " and negative left straight leg raise test.      Comments: +right lumbar facet tenderness/loading    Neurological:      Mental Status: She is alert and oriented to person, place, and time.      Motor: Motor function is intact. No weakness.      Gait: Gait is intact. Gait normal.   Psychiatric:         Mood and Affect: Mood normal.         Behavior: Behavior normal.       Assessment & Plan   Diagnoses and all orders for this visit:    1. Lumbar facet arthropathy (Primary)    2. Sacroiliac joint dysfunction        --- Right L2-L3 MBB #2    Diagnostic Facet Joint Procedure:   MBB     The confirmatory diagnostic facet joint procedure is considered medically reasonable and necessary for the diagnosis and treatment of chronic pain for this patient due to the patient meeting all of the following criteria:    - 1. Moderate to severe chronic neck or low back pain, predominantly axial, that causes functional deficit measured on pain or disability scale.  - 2. Pain present for minimum of 3 months with documented failure to respond to noninvasive conservative management (as tolerated)  - 3. Absence of untreated radiculopathy or neurogenic claudication (except for radiculopathy caused by facet joint synovial cyst)  - 4. There is no non-facet pathology per clinical assessment or radiology studies that could explain the source of the patient’s pain, including but not limited to fracture, tumor, infection, or significant deformity.    The patient has also shown a consistent positive response of at least 80% relief of primary (index) pain (with the duration of relief being consistent with the agent used).    Reviewed the procedure at length with the patient.  Included in the review was expectations, complications, risk and benefits.The procedure was described in detail and the risks, benefits and alternatives were discussed with the patient (including but not limited to: bleeding, infection, nerve damage, worsening of pain, inability  to perform injection, paralysis, seizures, coma, no pain relief and death) who agreed to proceed.  The procedure will plan to be performed at [facility] with fluoroscopic guidance(unless ultrasound is indicated) and could potentially have steroids and contrast dye used. Questions were answered and in a way the patient could understand.  Patient verbalized understanding and wishes to proceed.  This intervention will be ordered.  Patient has no evidence of coagulopathy or current infection.    Cheri Lopez reports a pain score of 6.  Given her pain assessment as noted, treatment options were discussed and the following options were decided upon as a follow-up plan to address the patient's pain: see plan.    --- Follow-up for procedure

## 2025-08-05 ENCOUNTER — HOSPITAL ENCOUNTER (OUTPATIENT)
Dept: GENERAL RADIOLOGY | Facility: SURGERY CENTER | Age: 60
End: 2025-08-05
Payer: COMMERCIAL

## 2025-08-05 ENCOUNTER — HOSPITAL ENCOUNTER (OUTPATIENT)
Facility: SURGERY CENTER | Age: 60
Setting detail: HOSPITAL OUTPATIENT SURGERY
Discharge: HOME | End: 2025-08-05
Attending: ANESTHESIOLOGY | Admitting: ANESTHESIOLOGY
Payer: COMMERCIAL

## 2025-08-05 VITALS
OXYGEN SATURATION: 96 % | TEMPERATURE: 97 F | WEIGHT: 140 LBS | SYSTOLIC BLOOD PRESSURE: 150 MMHG | RESPIRATION RATE: 20 BRPM | HEART RATE: 65 BPM | BODY MASS INDEX: 24.8 KG/M2 | DIASTOLIC BLOOD PRESSURE: 89 MMHG | HEIGHT: 63 IN

## 2025-08-05 DIAGNOSIS — Z41.9 SURGERY, ELECTIVE: ICD-10-CM

## 2025-08-05 PROCEDURE — 25010000002 LIDOCAINE PF 1% 1 % SOLUTION 5 ML VIAL: Performed by: ANESTHESIOLOGY

## 2025-08-05 PROCEDURE — 25010000002 BUPIVACAINE (PF) 0.5 % SOLUTION 10 ML VIAL: Performed by: ANESTHESIOLOGY

## 2025-08-05 PROCEDURE — 64493 INJ PARAVERT F JNT L/S 1 LEV: CPT | Performed by: ANESTHESIOLOGY

## 2025-08-05 PROCEDURE — 25510000001 IOPAMIDOL 61 % SOLUTION 30 ML VIAL: Performed by: ANESTHESIOLOGY

## 2025-08-05 PROCEDURE — 77002 NEEDLE LOCALIZATION BY XRAY: CPT

## 2025-08-05 PROCEDURE — 76000 FLUOROSCOPY <1 HR PHYS/QHP: CPT

## 2025-08-13 ENCOUNTER — OFFICE VISIT (OUTPATIENT)
Dept: PAIN MEDICINE | Facility: CLINIC | Age: 60
End: 2025-08-13
Payer: COMMERCIAL

## 2025-08-13 VITALS
DIASTOLIC BLOOD PRESSURE: 86 MMHG | OXYGEN SATURATION: 99 % | HEART RATE: 70 BPM | TEMPERATURE: 98.3 F | BODY MASS INDEX: 25.83 KG/M2 | WEIGHT: 145.8 LBS | HEIGHT: 63 IN | SYSTOLIC BLOOD PRESSURE: 135 MMHG

## 2025-08-13 DIAGNOSIS — M48.061 LUMBAR FORAMINAL STENOSIS: ICD-10-CM

## 2025-08-13 DIAGNOSIS — M53.3 SACROILIAC JOINT DYSFUNCTION: ICD-10-CM

## 2025-08-13 DIAGNOSIS — M47.816 LUMBAR FACET ARTHROPATHY: Primary | ICD-10-CM

## 2025-08-13 PROCEDURE — 99214 OFFICE O/P EST MOD 30 MIN: CPT | Performed by: NURSE PRACTITIONER

## (undated) DEVICE — DISPOSABLE TOURNIQUET CUFF SINGLE BLADDER, SINGLE PORT AND QUICK CONNECT CONNECTOR: Brand: COLOR CUFF

## (undated) DEVICE — SPONGE,NEURO,.75"X.75",XR,STRL,LF,10/PK: Brand: MEDLINE

## (undated) DEVICE — SOL NACL 0.9PCT 100ML SGL

## (undated) DEVICE — PATIENT RETURN ELECTRODE, SINGLE-USE, CONTACT QUALITY MONITORING, ADULT, WITH 9FT CORD, FOR PATIENTS WEIGING OVER 33LBS. (15KG): Brand: MEGADYNE

## (undated) DEVICE — ANTIBACTERIAL UNDYED BRAIDED (POLYGLACTIN 910), SYNTHETIC ABSORBABLE SUTURE: Brand: COATED VICRYL

## (undated) DEVICE — APPL CHLORAPREP HI/LITE 26ML ORNG

## (undated) DEVICE — PK ARTHSCP 40

## (undated) DEVICE — BNDG ELAS ELITE V/CLOSE 4IN 5YD LF STRL

## (undated) DEVICE — EPIDURAL TRAY: Brand: MEDLINE INDUSTRIES, INC.

## (undated) DEVICE — SMOKE EVACUATION TUBING WITH 7/8 IN TO 1/4 IN REDUCER: Brand: BUFFALO FILTER

## (undated) DEVICE — CANN NASL CO2 TRULINK W/O2 A/

## (undated) DEVICE — DRP MICROSCP LEICA 137X381CM

## (undated) DEVICE — SYR CONTRL PRESS/LO FIX/M/LL W/THMB/RNG 10ML

## (undated) DEVICE — LABEL SHEET CUSTOM 2X2 YELLOW: Brand: MEDLINE INDUSTRIES, INC.

## (undated) DEVICE — NEEDLE, QUINCKE, 20GX3.5": Brand: MEDLINE

## (undated) DEVICE — Device: Brand: DEFENDO AIR/WATER/SUCTION AND BIOPSY VALVE

## (undated) DEVICE — NDL SPINE 22G 31/2IN BLK

## (undated) DEVICE — GLV SURG BIOGEL LTX PF 6 1/2

## (undated) DEVICE — ABL APOLLO RF M/PRT 50D

## (undated) DEVICE — THE TORRENT IRRIGATION SCOPE CONNECTOR IS USED WITH THE TORRENT IRRIGATION TUBING TO PROVIDE IRRIGATION FLUIDS SUCH AS STERILE WATER DURING GASTROINTESTINAL ENDOSCOPIC PROCEDURES WHEN USED IN CONJUNCTION WITH AN IRRIGATION PUMP (OR ELECTROSURGICAL UNIT).: Brand: TORRENT

## (undated) DEVICE — TOOL MR8-15BA50T MR8 15CM BAL SYMTRI 5MM: Brand: MIDAS REX MR8

## (undated) DEVICE — GLV SURG TRIUMPH PF LTX 7.5 STRL

## (undated) DEVICE — UNDERCAST PADDING: Brand: DEROYAL

## (undated) DEVICE — ADHS LIQ MASTISOL 2/3ML

## (undated) DEVICE — Device

## (undated) DEVICE — PK NEURO SPINE 40

## (undated) DEVICE — SKIN PREP TRAY W/CHG: Brand: MEDLINE INDUSTRIES, INC.

## (undated) DEVICE — GLV SURG BIOGEL LTX PF 7

## (undated) DEVICE — NDL SPINE 22G 5IN BLK

## (undated) DEVICE — CONN TBG Y 5 IN 1 LF STRL

## (undated) DEVICE — DRP C/ARM 41X74IN

## (undated) DEVICE — SPNG GZ WOVN 4X4IN 12PLY 10/BX STRL

## (undated) DEVICE — GLV SURG SENSICARE W/ALOE PF LF 7.5 STRL

## (undated) DEVICE — DRSNG WND GZ CURAD OIL EMULSION 3X3IN STRL

## (undated) DEVICE — BLD DISSCT COOL CUT SJ CRVD 4MM 13CM

## (undated) DEVICE — TUBING, SUCTION, 1/4" X 10', STRAIGHT: Brand: MEDLINE

## (undated) DEVICE — SUT ETHLN 3/0 PS1 18IN 1663H

## (undated) DEVICE — TBG ARTHSCP PT W CONN/REDUC 8FT

## (undated) DEVICE — 3M™ STERI-STRIP™ ANTIMICROBIAL SKIN CLOSURES 1/2 INCH X 4 INCHES 50/CARTON 4 CARTONS/CASE A1847: Brand: 3M™ STERI-STRIP™

## (undated) DEVICE — UNDYED BRAIDED (POLYGLACTIN 910), SYNTHETIC ABSORBABLE SUTURE: Brand: COATED VICRYL

## (undated) DEVICE — Device: Brand: PORTEX

## (undated) DEVICE — FRCP BIOP RADLJAW4 HOT 2.2X240 BX40